# Patient Record
Sex: FEMALE | Race: ASIAN | NOT HISPANIC OR LATINO | Employment: OTHER | URBAN - METROPOLITAN AREA
[De-identification: names, ages, dates, MRNs, and addresses within clinical notes are randomized per-mention and may not be internally consistent; named-entity substitution may affect disease eponyms.]

---

## 2019-09-19 ENCOUNTER — TRANSFERRED RECORDS (OUTPATIENT)
Dept: HEALTH INFORMATION MANAGEMENT | Facility: CLINIC | Age: 59
End: 2019-09-19

## 2019-12-12 ENCOUNTER — TRANSFERRED RECORDS (OUTPATIENT)
Dept: HEALTH INFORMATION MANAGEMENT | Facility: CLINIC | Age: 59
End: 2019-12-12

## 2019-12-23 ENCOUNTER — TRANSCRIBE ORDERS (OUTPATIENT)
Dept: OTHER | Age: 59
End: 2019-12-23

## 2019-12-23 DIAGNOSIS — H40.1233 LOW-TENSION GLAUCOMA OF BOTH EYES, SEVERE STAGE: Primary | ICD-10-CM

## 2020-03-01 ENCOUNTER — NURSE TRIAGE (OUTPATIENT)
Dept: NURSING | Facility: CLINIC | Age: 60
End: 2020-03-01

## 2020-03-01 NOTE — TELEPHONE ENCOUNTER
S-(situation):  calls asking for Tamiflu.  Patient has symptoms and was exposed to someone with the flu.    B-(background): Symptoms of fatigue,bodyaches, nausea yesterday, cough, headache, nasal drainage, sore throat started yesterday.  Temp of 101 F (ax) started this morning.      A-(assessment): needs to be seen, does not meet criteria for JON protocol since she is not an established patient.  Patient is previous patient at [facility].    R-(recommendations): Reviewed care advice with caller per RN triage protocol.  FNA advised to call [facility] to request, on-care or go to  as last option.  FNA advised to call back with any concerns.   Caller verbalized understanding and agrees with plan.          Influenza-Like Illness (JON) Protocol    Willa Nettleslle      Age: 59 year old     YOB: 1960    Please select the type of triage protocol you used for this patient? Epic Filiberto and Juan Miguel or another form of electronic triage protocol was used.     Influenza-Like Illness (JON) Standing Order    Has the patient been seen by a primary care provider at an [facility] or [facility] within the past two years? No, therefore the patient is not eligible for JON Standing Order evaluation.  Recommend an in clinic visit with a provider.    Additional educational resources include:    [email or web address]    http://www.cdc.gov/flu/  Fuad Chow RN        Reason for Disposition    [1] Patient is NOT HIGH RISK AND [2] strongly requests antiviral medicine AND [3] flu symptoms present < 48 hours    Additional Information    Negative: Severe difficulty breathing (e.g., struggling for each breath, speaks in single words)    Negative: Bluish (or gray) lips or face now    Negative: Shock suspected (e.g., cold/pale/clammy skin, too weak to stand, low BP, rapid pulse)    Negative: Sounds like a life-threatening emergency to the triager    Negative: [1]  Sounds like a cold AND [2] no fever    Negative: [1] Cough with sputum AND [2] no fever    Negative: [1] Dry cough (no sputum) sputum AND [2] no fever    Negative: [1] Throat pain AND [2] no fever    Negative: Influenza vaccine reaction is suspected    Negative: Chest pain  (Exception: MILD central chest pain, present only when coughing)    Negative: [1] Headache AND [2] stiff neck (can't touch chin to chest)    Negative: Fever > 104 F (40 C)    Negative: [1] Difficulty breathing AND [2] not severe AND [3] not from stuffy nose (e.g., not relieved by cleaning out the nose)    Negative: Patient sounds very sick or weak to the triager    Negative: [1] Fever > 101 F (38.3 C) AND [2] age > 60    Negative: [1] Fever > 100.0 F (37.8 C) AND [2] bedridden (e.g., nursing home patient, CVA, chronic illness, recovering from surgery)    Negative: [1] Fever > 100.0 F (37.8 C) AND [2] diabetes mellitus or weak immune system (e.g., HIV positive, cancer chemo, splenectomy, chronic steroids)    Negative: Patient is HIGH RISK (e.g., age > 64 years, pregnant, HIV+, or chronic medical condition)    Negative: Fever present > 3 days (72 hours)    Negative: [1] Fever returns after gone for over 24 hours AND [2] symptoms worse or not improved    Negative: [1] Using nasal washes and pain medicine > 24 hours AND [2] sinus pain (around cheekbone or eye) persists    Negative: Earache    Protocols used: INFLUENZA - SEASONAL-A-

## 2020-04-07 ENCOUNTER — TELEPHONE (OUTPATIENT)
Dept: OPHTHALMOLOGY | Facility: CLINIC | Age: 60
End: 2020-04-07

## 2020-05-08 NOTE — TELEPHONE ENCOUNTER
RECORDS STATUS - BREAST    RECORDS REQUESTED FROM: [employer]   DATE REQUESTED:    NOTES DETAILS STATUS   OFFICE NOTE from referring provider     OFFICE NOTE from medical oncologist     OFFICE NOTE from surgeon     OFFICE NOTE from radiation oncologist     DISCHARGE SUMMARY from hospital     DISCHARGE REPORT from the ER     OPERATIVE REPORT     MEDICATION LIST     CLINICAL TRIAL TREATMENTS TO DATE     LABS     PATHOLOGY REPORTS  (Tissue diagnosis, Stage, ER/WY percentage positive and intensity of staining, HER2 IHC, FISH, and all biopsies from breast and any distant metastasis)                 CE - [facility] 7/31/19: Report in  CE   GENONOMIC TESTING     TYPE:   (Next Generation Sequencing, including Foundation One testing, and Oncotype score)     IMAGING (NEED IMAGES & REPORT)     CT SCANS     MRI     MAMMO PACS 7/30/19, 7/29/19: [facility], Report in CE   ULTRASOUND     PET     BONE SCAN     BRAIN MRI       Action    Action Taken 5/8/20: Imaging from [facility] resolved, and now viewable to PACS  3:24 PM

## 2020-05-08 NOTE — TELEPHONE ENCOUNTER
Oncology/Surgical Oncology Referral Request:     Specialty Requested: Surgical Oncology - Breast Surgery    Referring Provider: Self Referred    Referring Clinic/Organization: Self Referred - [facility]    Records location: Care Everywhere     Requested Provider (if specified): Not Specified

## 2020-05-11 NOTE — PROGRESS NOTES
NEW CONSULTATION  May 13, 2020     Willa Downey is a 59 year old woman who presents with a left breast lump and left breast pain.     HPI:    She had a screening mammogram in  that demonstrated right breast calcifications. Biopsy was done and demonstrated fibrocystic change.     She developed left breast discomfort this past April. Then about 1 week ago she self palpated a left breast lump on the top part of the breast. The lump is about the size of a rectangular pill, about 1/2 inch in size. She denies any skin changes, nipple inversion or nipple discharge. The breast pain is at the top of the breast and radiated to her arm pit.     BREAST-SPECIFIC HISTORY:    Previous breast imaging: Yes   - 03 Smammo BI-RADS 1  - 04 Smammo BI-RADS 1  - 06 Smammo BI-RADS 1  - 08 Smammo right breast oval density BI-RADS 0  - 08 right Dmammo BI-RADS 1  - 09 SMammo BI-RADS 1  - /10 Smammo BI-RADS 1  - // Smammo BI-RADS 1  - 12 Smammo BI-RADS 1  - 14 Smamo BI-RADS 1  - /15 Smammo BI-RADS 1  - / Smammo BI-RADS 1  - / Smammo BI-RADS 1  - 18 Smammo BI-RADS 1  - 19 Smammo right breast calcification at 9:00 BI-RADS 0  - 19 right Dmammo: calcifications 10:00 BI-RADS 4. Stereotactic biopsy right breast 10:00: fibrocystic change    Prior breast biopsies/surgeries: Yes   - 19 Stereotactic biopsy right breast 10:00: fibrocystic change    Prior history of breast cancer: No  Prior radiation history: No   Self breast exams: Yes  Breast density: heterogeneously dense    GYN HISTORY:  . Age at 1st pregnancy: 41. Breastfeeding history: Yes.   Age at menarche: [age]  Menopausal: 5  Menopausal hormone replacement therapy: No     RISK ASSESSMENT: > 1.7% 5 year risk and < 20% lifetime risk   Deborah: 3.1% lifetime risk   KATE: 15.8% lifetime risk   Seun: 9.4% lifetime risk     FAMILY HISTORY:  Breast ca: Yes   - maternal aunt diagnosed at age 60's, passed of  leukemia   Ovarian ca: No  Pancreatic ca: No  Prostate: No  Gastric ca: Yes   - father 88  Melanoma: No  Colon ca: No  Other cancer: No  Other genetic, testing, syndromes, or clotting disorders: no     PAST MEDICAL HISTORY  No past medical history on file.    PAST SURGICAL HISTORY   No past surgical history on file.    MEDICATIONS  No current outpatient medications on file.      ALLERGIES   Allergies not on file     SOCIAL HISTORY:  Smokes: No  EtOH: No  Exercise: yes, 30 minutes per day     ROS:  Change in vision no  Headaches: no  Respiratory: No shortness of breath, dyspnea on exertion, cough, or hemoptysis   Cardiovascular: negative   Gastrointestinal: negative Abdominal pain: no  Breast: left breast pain and lump  Musculoskeletal: negative Joint pain No Back pain: no  Psychiatric: negative  Hematologic/Lymphatic/Immunologic: negative  Endocrine: negative    EXAM  There were no vitals taken for this visit.   PHYSICAL EXAM  Constitutional - general appearance, body habitus  Eyes - redness, discharge  Respiratory - cough, labored breathing  Musculoskeletal - range of motion  Skin - discoloration, lesions  Neurological - tremors, headaches  Psychiatric - anxiety, alert & oriented  The rest of a comprehensive physical examination is deferred due to PHE (public health emergency) video visit restrictions.    ASSESSMENT/PLAN:    Willa Downey is a 59 year old woman with new left breast pain and left breast lump and family history of breast cancer.    1) Left breast mass with pain  - Will get a bilateral diagnostic mammogram and left breast ultrasound    2) Left breast pain - will obtain imaging   Discussed unclear etiology for breast pain and that it can be difficult to treat. We discussed its association with breast cancer is low. The following was suggested.   -  Supportive Bra that was professionally fit. Recommend Soma or Allure Intimate Apparel.    -  Wear a high-intensity sports bra when exercising. Wear while  sleeping.   -  Consider eliminating/decrease caffeine (chocolate, tea, coffee, soda) for a two week period of time to see if pain improves/resolves  -  Eat a low fat diet and improve omega 3 fatty acid intake   -  Flax seed 25 grams (2 tablespoons) daily     3) Family history of breast cancer  Continue yearly screening mammogram (consider tomosynthesis) and clinical encounter. Be familiar with your breast and how they normally feel and appear. Promptly report any changes to your healthcare provider.     Luz Berry PA-C    Total time spent face to face with the patient was 30 minutes. 20 minutes was spent counseling the patient as described above.

## 2020-05-12 ASSESSMENT — ENCOUNTER SYMPTOMS
BREAST PAIN: 1
DOUBLE VISION: 0
EYE PAIN: 1
EYE IRRITATION: 0
BREAST MASS: 1
EYE REDNESS: 0
EYE WATERING: 0

## 2020-05-13 ENCOUNTER — VIRTUAL VISIT (OUTPATIENT)
Dept: SURGERY | Facility: CLINIC | Age: 60
End: 2020-05-13
Attending: PHYSICIAN ASSISTANT
Payer: COMMERCIAL

## 2020-05-13 ENCOUNTER — PRE VISIT (OUTPATIENT)
Dept: ONCOLOGY | Facility: CLINIC | Age: 60
End: 2020-05-13

## 2020-05-13 DIAGNOSIS — N63.0 BREAST LUMP: Primary | ICD-10-CM

## 2020-05-13 PROCEDURE — 99204 OFFICE O/P NEW MOD 45 MIN: CPT | Mod: GT | Performed by: PHYSICIAN ASSISTANT

## 2020-05-13 PROCEDURE — 40000114 ZZH STATISTIC NO CHARGE CLINIC VISIT

## 2020-05-13 RX ORDER — PHENOL 1.4 %
AEROSOL, SPRAY (ML) MUCOUS MEMBRANE DAILY
COMMUNITY

## 2020-05-13 RX ORDER — CLOBETASOL PROPIONATE 0.5 MG/G
OINTMENT TOPICAL
COMMUNITY
Start: 2019-12-10 | End: 2021-06-11

## 2020-05-13 RX ORDER — LORATADINE 10 MG/1
1 CAPSULE, LIQUID FILLED ORAL
COMMUNITY

## 2020-05-13 NOTE — LETTER
2020       RE: Willa Downey  [address] [facility] [address]     Dear Colleague,    Thank you for referring your patient, Willa Downey, to the [facility]. Please see a copy of my visit note below.    NEW CONSULTATION  May 13, 2020     Willa Downey is a 59 year old woman who presents with a left breast lump and left breast pain.     HPI:    She had a screening mammogram in  that demonstrated right breast calcifications. Biopsy was done and demonstrated fibrocystic change.     She developed left breast discomfort this past April. Then about 1 week ago she self palpated a left breast lump on the top part of the breast. The lump is about the size of a rectangular pill, about 1/2 inch in size. She denies any skin changes, nipple inversion or nipple discharge. The breast pain is at the top of the breast and radiated to her arm pit.     BREAST-SPECIFIC HISTORY:    Previous breast imaging: Yes   - 03 Smammo BI-RADS 1  - 04 Smammo BI-RADS 1  - 06 Smammo BI-RADS 1  - 08 Smammo right breast oval density BI-RADS 0  - 08 right Dmammo BI-RADS 1  - 09 SMammo BI-RADS 1  - 6/16/10 Smammo BI-RADS 1  - 11 Smammo BI-RADS 1  - 12 Smammo BI-RADS 1  - 14 Smamo BI-RADS 1  - /15 Smammo BI-RADS 1  - 16 Smammo BI-RADS 1  - / Smammo BI-RADS 1  - 18 Smammo BI-RADS 1  - 19 Smammo right breast calcification at 9:00 BI-RADS 0  - 19 right Dmammo: calcifications 10:00 BI-RADS 4. Stereotactic biopsy right breast 10:00: fibrocystic change    Prior breast biopsies/surgeries: Yes   - 19 Stereotactic biopsy right breast 10:00: fibrocystic change    Prior history of breast cancer: No  Prior radiation history: No   Self breast exams: Yes  Breast density: heterogeneously dense    GYN HISTORY:  . Age at 1st pregnancy: 41. Breastfeeding history: Yes.   Age at menarche: [age]  Menopausal: 5  Menopausal hormone replacement  therapy: No     RISK ASSESSMENT: > 1.7% 5 year risk and < 20% lifetime risk   Deborah: 3.1% lifetime risk   KATE: 15.8% lifetime risk   Seun: 9.4% lifetime risk     FAMILY HISTORY:  Breast ca: Yes   - maternal aunt diagnosed at age 60's, passed of leukemia   Ovarian ca: No  Pancreatic ca: No  Prostate: No  Gastric ca: Yes   - father 88  Melanoma: No  Colon ca: No  Other cancer: No  Other genetic, testing, syndromes, or clotting disorders: no     PAST MEDICAL HISTORY  No past medical history on file.    PAST SURGICAL HISTORY   No past surgical history on file.    MEDICATIONS  No current outpatient medications on file.      ALLERGIES   Allergies not on file     SOCIAL HISTORY:  Smokes: No  EtOH: No  Exercise: yes, 30 minutes per day     ROS:  Change in vision no  Headaches: no  Respiratory: No shortness of breath, dyspnea on exertion, cough, or hemoptysis   Cardiovascular: negative   Gastrointestinal: negative Abdominal pain: no  Breast: left breast pain and lump  Musculoskeletal: negative Joint pain No Back pain: no  Psychiatric: negative  Hematologic/Lymphatic/Immunologic: negative  Endocrine: negative    EXAM  There were no vitals taken for this visit.   PHYSICAL EXAM  Constitutional - general appearance, body habitus  Eyes - redness, discharge  Respiratory - cough, labored breathing  Musculoskeletal - range of motion  Skin - discoloration, lesions  Neurological - tremors, headaches  Psychiatric - anxiety, alert & oriented  The rest of a comprehensive physical examination is deferred due to PHE (public health emergency) video visit restrictions.    ASSESSMENT/PLAN:    Willa Downey is a 59 year old woman with new left breast pain and left breast lump and family history of breast cancer.    1) Left breast mass with pain  - Will get a bilateral diagnostic mammogram and left breast ultrasound    2) Left breast pain - will obtain imaging   Discussed unclear etiology for breast pain and that it can be difficult to  "treat. We discussed its association with breast cancer is low. The following was suggested.   -  Supportive Bra that was professionally fit. Recommend Soma or Allure Intimate Apparel.    -  Wear a high-intensity sports bra when exercising. Wear while sleeping.   -  Consider eliminating/decrease caffeine (chocolate, tea, coffee, soda) for a two week period of time to see if pain improves/resolves  -  Eat a low fat diet and improve omega 3 fatty acid intake   -  Flax seed 25 grams (2 tablespoons) daily     3) Family history of breast cancer  Continue yearly screening mammogram (consider tomosynthesis) and clinical encounter. Be familiar with your breast and how they normally feel and appear. Promptly report any changes to your healthcare provider.     Luz Berry PA-C    Total time spent face to face with the patient was 30 minutes. 20 minutes was spent counseling the patient as described above.     Willa Downey is a 59 year old female who is being evaluated via a billable video visit.      The patient has been notified of following:     \"This video visit will be conducted via a call between you and your physician/provider. We have found that certain health care needs can be provided without the need for an in-person physical exam.  This service lets us provide the care you need with a video conversation.  If a prescription is necessary we can send it directly to your pharmacy.  If lab work is needed we can place an order for that and you can then stop by our lab to have the test done at a later time.    Video visits are billed at different rates depending on your insurance coverage.  Please reach out to your insurance provider with any questions.    If during the course of the call the physician/provider feels a video visit is not appropriate, you will not be charged for this service.\"    Patient has given verbal consent for Video visit? Yes    How would you like to obtain your AVS? Aneudy    Patient would " like the video invitation sent by: Send to e-mail at: [email or web address]    Will anyone else be joining your video visit? No      New pt to discuss breast lump.    Video-Visit Details    Type of service:  Video Visit    Video Start Time:10:00 am  Video End Time: 10:30 am    Originating Location (pt. Location): Home    Distant Location (provider location):  [facility]     Platform used for Video Visit: Tiburcio Berry PA-C

## 2020-05-13 NOTE — PROGRESS NOTES
"Willa Downey is a 59 year old female who is being evaluated via a billable video visit.      The patient has been notified of following:     \"This video visit will be conducted via a call between you and your physician/provider. We have found that certain health care needs can be provided without the need for an in-person physical exam.  This service lets us provide the care you need with a video conversation.  If a prescription is necessary we can send it directly to your pharmacy.  If lab work is needed we can place an order for that and you can then stop by our lab to have the test done at a later time.    Video visits are billed at different rates depending on your insurance coverage.  Please reach out to your insurance provider with any questions.    If during the course of the call the physician/provider feels a video visit is not appropriate, you will not be charged for this service.\"    Patient has given verbal consent for Video visit? Yes    How would you like to obtain your AVS? Aneudy    Patient would like the video invitation sent by: Send to e-mail at: [email or web address]    Will anyone else be joining your video visit? No      New pt to discuss breast lump.    Video-Visit Details    Type of service:  Video Visit    Video Start Time:10:00 am  Video End Time: 10:30 am    Originating Location (pt. Location): Home    Distant Location (provider location):  [facility]     Platform used for Video Visit: Tiburcio Berry PA-C        "

## 2020-05-21 ENCOUNTER — ANCILLARY PROCEDURE (OUTPATIENT)
Dept: MAMMOGRAPHY | Facility: CLINIC | Age: 60
End: 2020-05-21
Attending: PHYSICIAN ASSISTANT
Payer: COMMERCIAL

## 2020-05-21 DIAGNOSIS — N63.0 BREAST LUMP: ICD-10-CM

## 2020-07-09 ENCOUNTER — VIRTUAL VISIT (OUTPATIENT)
Dept: FAMILY MEDICINE | Facility: CLINIC | Age: 60
End: 2020-07-09
Payer: COMMERCIAL

## 2020-07-09 DIAGNOSIS — M54.41 ACUTE RIGHT-SIDED LOW BACK PAIN WITH RIGHT-SIDED SCIATICA: Primary | ICD-10-CM

## 2020-07-09 PROCEDURE — 99213 OFFICE O/P EST LOW 20 MIN: CPT | Mod: TEL | Performed by: PHYSICIAN ASSISTANT

## 2020-07-09 RX ORDER — CYCLOBENZAPRINE HCL 5 MG
5 TABLET ORAL 3 TIMES DAILY PRN
Qty: 30 TABLET | Refills: 1 | Status: SHIPPED | OUTPATIENT
Start: 2020-07-09 | End: 2020-10-14

## 2020-07-09 SDOH — HEALTH STABILITY: MENTAL HEALTH: HOW OFTEN DO YOU HAVE A DRINK CONTAINING ALCOHOL?: NEVER

## 2020-07-09 NOTE — PROGRESS NOTES
"Willa Downey is a 59 year old female who is being evaluated via a billable telephone visit.      The patient has been notified of following:     \"This telephone visit will be conducted via a call between you and your physician/provider. We have found that certain health care needs can be provided without the need for a physical exam.  This service lets us provide the care you need with a short phone conversation.  If a prescription is necessary we can send it directly to your pharmacy.  If lab work is needed we can place an order for that and you can then stop by our lab to have the test done at a later time.    Telephone visits are billed at different rates depending on your insurance coverage. During this emergency period, for some insurers they may be billed the same as an in-person visit.  Please reach out to your insurance provider with any questions.    If during the course of the call the physician/provider feels a telephone visit is not appropriate, you will not be charged for this service.\"    Patient has given verbal consent for Telephone visit?  Yes    What phone number would you like to be contacted at? [phone]    How would you like to obtain your AVS? Aneudy Talbot     Willa Downey is a 59 year old female who presents via phone visit today for the following health issues:    HPI  Back Pain       Duration: x 6 weeks        Specific cause: none    Description:   Location of pain: low back right, middle of back right, upper back right and hip right  Character of pain: sharp and stabbing  Pain radiation:radiates into the right leg  New numbness or weakness in legs, not attributed to pain:  no     Intensity: At its worst 9/10    History:   Pain interferes with job: Not applicable  History of back problems: no prior back problems  Any previous MRI or X-rays: None  Sees a specialist for back pain:  No  Therapies tried without relief: ibuprofen, stretching, exercise, ice/heat " pack    Alleviating factors:   Improved by: none      Precipitating factors:  Worsened by: Standing    6 week history of right side low back pain with sciatica down right leg.   Feels like symptoms are getting worse.   Has been trying multiple therapies to help. 30 minutes stationary bike. Going for walks. Has also been doing floor stretching since pain started. Has also tried ice, heat, ibuprofen. Taking ibuprofen multiple times per day but pain especially in evening is intense.    No injury leading up to symptoms.   No history of back problems.   Tylenol does not seem to help as much as ibuprofen.       Accompanying Signs & Symptoms:  Risk of Fracture:  None  Risk of Cauda Equina:  None  Risk of Infection:  None  Risk of Cancer:  Family history breast cancer  Risk of Ankylosing Spondylitis:  Onset at age <35, male, AND morning back stiffness. no              Review of Systems   Constitutional, HEENT, cardiovascular, pulmonary, gi and gu systems are negative, except as otherwise noted.       Objective   Reported vitals:  There were no vitals taken for this visit.   healthy, alert and no distress  PSYCH: Alert and oriented times 3; coherent speech, normal   rate and volume, able to articulate logical thoughts, able   to abstract reason, no tangential thoughts, no hallucinations   or delusions  Her affect is normal and pleasant  RESP: No cough, no audible wheezing, able to talk in full sentences  Remainder of exam unable to be completed due to telephone visits    Diagnostic Test Results:  none         Assessment/Plan:  1. Acute right-sided low back pain with right-sided sciatica  - Referred patient to orthopedic walk in clinic at the [facility]. Phone number to schedule provided (they are not currently taking walk-in appts during pandemic but are scheduling patients for face-to-face visits). She has been diligent about conservative care for this new back pain but symptoms have been worsening. Warrants physical exam and  further evaluation. Will trial course of flexeril in meantime. Patient understands not to take prior to driving or operating machinery. Will likely take at bedtime only. Advised to cut back on ibuprofen use as has been taking throughout the day x 6 weeks will recommend she try to manage pain more with tylenol. Discussed daily max of 3000 mg. Patient in agreement with this plan.   - cyclobenzaprine (FLEXERIL) 5 MG tablet; Take 1 tablet (5 mg) by mouth 3 times daily as needed for muscle spasms  Dispense: 30 tablet; Refill: 1    Return for orthopedic walk in clinic.      Phone call duration:  21 minutes    Chad Peck PA-C

## 2020-07-13 ENCOUNTER — OFFICE VISIT (OUTPATIENT)
Dept: ORTHOPEDICS | Facility: CLINIC | Age: 60
End: 2020-07-13
Payer: COMMERCIAL

## 2020-07-13 ENCOUNTER — ANCILLARY PROCEDURE (OUTPATIENT)
Dept: GENERAL RADIOLOGY | Facility: CLINIC | Age: 60
End: 2020-07-13
Attending: FAMILY MEDICINE
Payer: COMMERCIAL

## 2020-07-13 VITALS — WEIGHT: 140 LBS | HEIGHT: 64 IN | BODY MASS INDEX: 23.9 KG/M2

## 2020-07-13 DIAGNOSIS — M54.41 ACUTE RIGHT-SIDED LOW BACK PAIN WITH RIGHT-SIDED SCIATICA: Primary | ICD-10-CM

## 2020-07-13 RX ORDER — METHYLPREDNISOLONE 4 MG
TABLET, DOSE PACK ORAL
Qty: 21 TABLET | Refills: 0 | Status: SHIPPED | OUTPATIENT
Start: 2020-07-13 | End: 2020-10-14

## 2020-07-13 ASSESSMENT — MIFFLIN-ST. JEOR: SCORE: 1205.04

## 2020-07-13 NOTE — PATIENT INSTRUCTIONS
Take steroid medication in the morning with food.  Take all tablets in the morning.  Okay to use acetaminophen with steroid medication  Do not use ibuprofen while taking steroid  Continue to use Flexeril as needed for symptom relief  Follow-up with physical therapy  Follow-up in clinic if symptoms are not improved after steroid course or if they fail to improve with physical therapy.

## 2020-07-13 NOTE — PROGRESS NOTES
SPORTS & ORTHOPEDIC WALK-IN VISIT 7/13/2020    Primary Care Physician: Dr. Gross    Here with right sided low back pain and pain radiating down posterior right leg. She started to have right low back pain about 6 weeks ago. No inciting event or trauma. It is radiating pain from right low back down leg into calf. She has been doing some home exercises to try and help but she hasn't had any relief. She saw her primary and was prescribed flexeril and it doesn't seem to help. Her pain is worse at night and with sitting. No tingling or numbness. No noticeable weakness.  No significant history of low back pain.  She has been trying some stretches she found on the Internet which do seem to help slightly but only temporarily.    Reason for visit:     What part of your body is injured / painful?  right low back    What caused the injury /pain? No inciting event     How long ago did your injury occur or pain begin? 6 weeks ago    What are your most bothersome symptoms? Pain    How would you characterize your symptom?  sharp and radiating    What makes your symptoms better? Ibuprofen     What makes your symptoms worse? Standing, Walking, Movement and Bending    Have you been previously seen for this problem? Yes, FP 7/9/20    Medical History:    Any recent changes to your medical history? No    Any new medication prescribed since last visit? No    Have you had surgery on this body part before? No    Social History:    Occupation: Homemaker    Handedness: Right    Exercise: Walking and stationary bike    Review of Systems:    Do you have fever, chills, weight loss? No    Do you have any vision problems? No    Do you have any chest pain or edema? No    Do you have any shortness of breath or wheezing?  No    Do you have stomach problems? No    Do you have any numbness or focal weakness? No    Do you have diabetes? No    Do you have problems with bleeding or clotting? No    Do you have an rashes or other skin lesions? Yes,  "psoriasis          Past Medical History, Current Medications, and Allergies are reviewed in the electronic medical record as appropriate.       EXAM:Ht 1.626 m (5' 4\")   Wt 63.5 kg (140 lb)   BMI 24.03 kg/m      General: alert, pleasant, no distress. sitting comfortably in chair  Back/Spine: no tenderness to palpation of spinous processes, or paraspinous musculature of lumbar spine. full ROM with flexion, extension, twisting, and side bending with pain on extension. Straight leg raise negative bilaterally.   mild hamstring tightness noted. no pain in back with LILLIAM testing bilaterally  Neuro: SILT on bilateral lower extremities, can stand on toes and heel walk without difficulty, patellar and achilles reflexes 2+ and symmetric    Imagin view xrays of lumbar spine performed and reviewed independently demonstrating mild anterolisthesis of L4 on L5.  No acute fracture.  Otherwise no significant degenerative change.  See EMR for formal radiology report.     Assessment: Patient is a 59 year old female with right-sided low back pain and right lower extremity radiculopathy.    Recommendations:   Reviewed imaging and assessment with the patient in detail  Have recommended a short course of steroid  She can continue to use Flexeril to the degree that it helps her symptoms  She will follow-up with physical therapy  She will follow-up in clinic if her symptoms are not improved or worsening after the course of steroid.    Claus Tesfaye MD                "

## 2020-07-15 ENCOUNTER — THERAPY VISIT (OUTPATIENT)
Dept: PHYSICAL THERAPY | Facility: CLINIC | Age: 60
End: 2020-07-15
Attending: FAMILY MEDICINE
Payer: COMMERCIAL

## 2020-07-15 DIAGNOSIS — M54.41 ACUTE RIGHT-SIDED LOW BACK PAIN WITH RIGHT-SIDED SCIATICA: ICD-10-CM

## 2020-07-15 PROCEDURE — 97530 THERAPEUTIC ACTIVITIES: CPT | Mod: GP | Performed by: PHYSICAL THERAPIST

## 2020-07-15 PROCEDURE — 97161 PT EVAL LOW COMPLEX 20 MIN: CPT | Mod: GP | Performed by: PHYSICAL THERAPIST

## 2020-07-15 PROCEDURE — 97112 NEUROMUSCULAR REEDUCATION: CPT | Mod: GP | Performed by: PHYSICAL THERAPIST

## 2020-07-15 NOTE — PROGRESS NOTES
[facility] Initial Evaluation  Subjective:  The history is provided by the patient. No  was used.   Therapist Generated HPI Evaluation         Type of problem:  Lumbar (right , June 2020).    This is a new condition.  Condition occurred with:  Insidious onset.  Where condition occurred: for unknown reasons.  Site of Pain: right low back 2/10.  Pain is described as sharp, shooting and stabbing and is intermittent.  Pain radiates to:  No radiation (pain radiates to posterior distal calf). Pain timing: none.  Since onset symptoms are gradually improving.  Associated with: none. Symptoms are exacerbated by standing, walking, bending and lifting  Relieved by: oral steroid - started yesterday and feeling better.  Special tests included:  X-ray.  Past treatment: none. There was none improvement following previous treatment.  Work activity restrictions: homemaker.  Barriers include:  None as reported by patient.                        Objective:  System         Lumbar/SI Evaluation  ROM:    AROM Lumbar:   Flexion:          90%  Ext:                    WNL   Side Bend:        Left:  50%    Right:  50%  Rotation:           Left:     Right:   Side Glide:        Left:     Right:           Lumbar Myotomes:  not assessed            Lumbar DTR's:  not assessed      Cord Signs:  not assessed    Lumbar Dermtomes:  not assessed                Neural Tension/Mobility:  Lumbar:  Not assessed  Thoracic:  Not assessed                                                       XRAY: Impression:  1.  No acute osseous abnormality.  2.  Grade 1 degenerative type spondylolisthesis of L4 on L5, measuring  approximately 8 mm.    Fair abdominal tone, faulty abdominal recruitment pattern with compensatory sucking in,     General     ROS    Assessment/Plan:    Patient is a 59 year old female with lumbar complaints.    Patient has the following significant findings with corresponding treatment plan.                 1) Diagnosis 1:  Acute right-sided low back pain with right-sided sciatica   Clinical presentation characteristics are:   Stable/Uncomplicated.  2) Decision-Making    Low complexity using standardized patient assessment instrument and/or measureable assessment of functional outcome.  Cumulative Therapy Evaluation is: Low complexity.    Previous and current functional limitations:  (See Goal Flow Sheet for this information)    Short term and Long term goals: (See Goal Flow Sheet for this information)     Communication ability:  Patient appears to be able to clearly communicate and understand verbal and written communication and follow directions correctly.  Treatment Explanation - The following has been discussed with the patient:   RX ordered/plan of care  Anticipated outcomes  Possible risks and side effects  This patient would benefit from PT intervention to resume normal activities.   Rehab potential is excellent.    Frequency:  1 X week, once daily  Duration:  for 6 weeks  Discharge Plan:  Achieve all LTG.  Independent in home treatment program.  Reach maximal therapeutic benefit.    Please refer to the daily flowsheet for treatment today, total treatment time and time spent performing 1:1 timed codes.

## 2020-07-22 ENCOUNTER — THERAPY VISIT (OUTPATIENT)
Dept: PHYSICAL THERAPY | Facility: CLINIC | Age: 60
End: 2020-07-22
Payer: COMMERCIAL

## 2020-07-22 DIAGNOSIS — M54.41 ACUTE RIGHT-SIDED LOW BACK PAIN WITH RIGHT-SIDED SCIATICA: Primary | ICD-10-CM

## 2020-07-22 PROCEDURE — 97112 NEUROMUSCULAR REEDUCATION: CPT | Mod: GP | Performed by: PHYSICAL THERAPIST

## 2020-07-22 PROCEDURE — 97110 THERAPEUTIC EXERCISES: CPT | Mod: GP | Performed by: PHYSICAL THERAPIST

## 2020-07-29 ENCOUNTER — THERAPY VISIT (OUTPATIENT)
Dept: PHYSICAL THERAPY | Facility: CLINIC | Age: 60
End: 2020-07-29
Payer: COMMERCIAL

## 2020-07-29 DIAGNOSIS — M54.41 ACUTE RIGHT-SIDED LOW BACK PAIN WITH RIGHT-SIDED SCIATICA: Primary | ICD-10-CM

## 2020-07-29 PROCEDURE — 97112 NEUROMUSCULAR REEDUCATION: CPT | Mod: GP | Performed by: PHYSICAL THERAPIST

## 2020-07-29 PROCEDURE — 97140 MANUAL THERAPY 1/> REGIONS: CPT | Mod: GP | Performed by: PHYSICAL THERAPIST

## 2020-08-04 ENCOUNTER — TELEPHONE (OUTPATIENT)
Dept: ORTHOPEDICS | Facility: CLINIC | Age: 60
End: 2020-08-04

## 2020-08-04 NOTE — TELEPHONE ENCOUNTER
Appointment made for Willa for continued Sciatica issues, progressing to calf tightness. Made return appointment for Friday per her request. [facility] restrictions, masking requirements, and  updates were given.

## 2020-08-07 ENCOUNTER — OFFICE VISIT (OUTPATIENT)
Dept: ORTHOPEDICS | Facility: CLINIC | Age: 60
End: 2020-08-07
Payer: COMMERCIAL

## 2020-08-07 VITALS — HEIGHT: 64 IN | RESPIRATION RATE: 16 BRPM | BODY MASS INDEX: 23.9 KG/M2 | WEIGHT: 140 LBS

## 2020-08-07 DIAGNOSIS — M54.41 ACUTE RIGHT-SIDED LOW BACK PAIN WITH RIGHT-SIDED SCIATICA: Primary | ICD-10-CM

## 2020-08-07 RX ORDER — GABAPENTIN 300 MG/1
300 CAPSULE ORAL 2 TIMES DAILY
Qty: 30 CAPSULE | Refills: 0 | Status: SHIPPED | OUTPATIENT
Start: 2020-08-07 | End: 2020-08-16

## 2020-08-07 ASSESSMENT — MIFFLIN-ST. JEOR: SCORE: 1205.04

## 2020-08-07 NOTE — PROGRESS NOTES
"ESTABLISHED PATIENT FOLLOW-UP:  RECHECK (back pain )       HISTORY OF PRESENT ILLNESS  Ms. Downey is a pleasant 59 year old year old female who presents to clinic today for follow-up of low back pain with radiculopathy.    Date of onset: Mid May 2020  Date last seen: 7/13/20  Following Therapeutic Plan: Yes  Pain: Unchanged  Function: Unchanged  Interval History: Had improvement with steroid dose for first week.  However after cessation, pain has slowly worsened. Continued radicular symptoms down lateral right leg.  Left calf now starting.     Unable to perform PT exercises.     Continued pain with prolonged standing, walking.  Improved with rest.     Using cyclobenzaprine helps at night only. Too drowsy with daytime dosing.    S/p ibuprofen x 6 weeks followed by most recently tylenol.     No weakness    Additional medical/Social/Surgical histories reviewed in [facility] and updated as appropriate.    REVIEW OF SYSTEMS (8/7/2020)  CONSTITUTIONAL: Denies fever and weight loss  GASTROINTESTINAL: Denies abdominal pain, nausea, vomiting  MUSCULOSKELETAL: See HPI  SKIN: Denies any recent rash or lesion  NEUROLOGICAL: Denies numbness or focal weakness     PHYSICAL EXAM  Resp 16   Ht 1.626 m (5' 4\")   Wt 63.5 kg (140 lb)   BMI 24.03 kg/m      General: alert, pleasant, no distress. sitting comfortably in chair  Back/Spine: no tenderness to palpation of spinous processes, or paraspinous musculature of lumbar spine. full ROM with flexion, extension, twisting, and side bending with pain on extension. Straight leg raise negative bilaterally.   mild hamstring tightness noted. no pain in back with LILLIAM testing bilaterally  Neuro: SILT on bilateral lower extremities, can stand on toes and heel walk without difficulty, patellar and achilles reflexes 2+ and symmetric    IMAGING :   Impression:  1.  No acute osseous abnormality.  2.  Grade 1 degenerative type spondylolisthesis of L4 on L5, measuring  approximately 8 mm.     I have " personally reviewed the examination and initial interpretation  and I agree with the findings.     PABLO TOBIN     ASSESSMENT & PLAN  Ms. Downey is a 59 year old year old female who presents to clinic today with persisting right sided low back pain with right sided sciatica despite analgesics, PT, muscle relaxants, and steroid burst.    -MRI lumbar spine.  -Start gabapentin BID at this time, transition to TID  -Heat, analgesics OTC  -Flexeril at bedtime sleep  -Follow up after MRI    It was a pleasure seeing Willa.    Dimitri Zhou DO, CAQSM  Primary Care Sports Medicine

## 2020-08-07 NOTE — PROGRESS NOTES
SPORTS & ORTHOPEDIC WALK-IN FOLLOW-UP VISIT 8/7/2020    Interval History:     Follow up reason: right sided low back pain    Date of injury: NA    Date last seen: 7/13/20     Following Therapeutic Plan: Yes     Pain: Unchanged    Function: Unchanged    Interval History:      Medical History:    Any recent changes to your medical history? No    Any new medication prescribed since last visit? No    Review of Systems:    Do you have fever, chills, weight loss? No    Do you have any vision problems? No    Do you have any chest pain or edema? No    Do you have any shortness of breath or wheezing?  No    Do you have stomach problems? No    Do you have any numbness or focal weakness? No    Do you have diabetes? No    Do you have problems with bleeding or clotting? No    Do you have an rashes or other skin lesions? Psoriasis fingers

## 2020-08-07 NOTE — LETTER
8/7/2020         RE: Willa Downey  [address]  [facility] [address]        Dear Colleague,    Thank you for referring your patient, Willa Downey, to the [facility] SPORTS AND ORTHOPAEDIC WALK IN CLINIC. Please see a copy of my visit note below.          SPORTS & ORTHOPEDIC WALK-IN FOLLOW-UP VISIT 8/7/2020    Interval History:     Follow up reason: right sided low back pain    Date of injury: NA    Date last seen: 7/13/20     Following Therapeutic Plan: Yes     Pain: Unchanged    Function: Unchanged    Interval History:      Medical History:    Any recent changes to your medical history? No    Any new medication prescribed since last visit? No    Review of Systems:    Do you have fever, chills, weight loss? No    Do you have any vision problems? No    Do you have any chest pain or edema? No    Do you have any shortness of breath or wheezing?  No    Do you have stomach problems? No    Do you have any numbness or focal weakness? No    Do you have diabetes? No    Do you have problems with bleeding or clotting? No    Do you have an rashes or other skin lesions? Psoriasis fingers              ESTABLISHED PATIENT FOLLOW-UP:  RECHECK (back pain )       HISTORY OF PRESENT ILLNESS  Ms. Downey is a pleasant 59 year old year old female who presents to clinic today for follow-up of low back pain with radiculopathy.    Date of onset: Mid May 2020  Date last seen: 7/13/20  Following Therapeutic Plan: Yes  Pain: Unchanged  Function: Unchanged  Interval History: Had improvement with steroid dose for first week.  However after cessation, pain has slowly worsened. Continued radicular symptoms down lateral right leg.  Left calf now starting.     Unable to perform PT exercises.     Continued pain with prolonged standing, walking.  Improved with rest.     Using cyclobenzaprine helps at night only. Too drowsy with daytime dosing.    S/p ibuprofen x 6 weeks followed by most recently tylenol.     No  "weakness    Additional medical/Social/Surgical histories reviewed in [facility] and updated as appropriate.    REVIEW OF SYSTEMS (8/7/2020)  CONSTITUTIONAL: Denies fever and weight loss  GASTROINTESTINAL: Denies abdominal pain, nausea, vomiting  MUSCULOSKELETAL: See HPI  SKIN: Denies any recent rash or lesion  NEUROLOGICAL: Denies numbness or focal weakness     PHYSICAL EXAM  Resp 16   Ht 1.626 m (5' 4\")   Wt 63.5 kg (140 lb)   BMI 24.03 kg/m      General: alert, pleasant, no distress. sitting comfortably in chair  Back/Spine: no tenderness to palpation of spinous processes, or paraspinous musculature of lumbar spine. full ROM with flexion, extension, twisting, and side bending with pain on extension. Straight leg raise negative bilaterally.   mild hamstring tightness noted. no pain in back with LILLIAM testing bilaterally  Neuro: SILT on bilateral lower extremities, can stand on toes and heel walk without difficulty, patellar and achilles reflexes 2+ and symmetric    IMAGING :   Impression:  1.  No acute osseous abnormality.  2.  Grade 1 degenerative type spondylolisthesis of L4 on L5, measuring  approximately 8 mm.     I have personally reviewed the examination and initial interpretation  and I agree with the findings.     PABLO TOBIN     ASSESSMENT & PLAN  Ms. Downey is a 59 year old year old female who presents to clinic today with persisting right sided low back pain with right sided sciatica despite analgesics, PT, muscle relaxants, and steroid burst.    -MRI lumbar spine.  -Start gabapentin BID at this time, transition to TID  -Heat, analgesics OTC  -Flexeril at bedtime sleep  -Follow up after MRI    It was a pleasure seeing Willa.    Dimitri Zhou DO, CAM  Primary Care Sports Medicine        "

## 2020-08-12 ENCOUNTER — TELEPHONE (OUTPATIENT)
Dept: OPHTHALMOLOGY | Facility: CLINIC | Age: 60
End: 2020-08-12

## 2020-08-12 NOTE — TELEPHONE ENCOUNTER
Called the patient and left a voicemail informing them that the appointment they had scheduled with Dr. Yeager needs to be rescheduled due to the provider leaving the clinic. Voicemail was left and letter was sent.    Patient is active on MyChart. Message was sent and appointment was canceled.    Giselle Iglesias  Patient Rep

## 2020-08-12 NOTE — TELEPHONE ENCOUNTER
Dr. Silva referred pt to Dr. Yeager who has now left  [facility]    Reviewed may contact Dr. Yeager's new location    -- pt states changed insurance to see Dr. Yeager.    Pt will check on insurance coverage with Dr. Yeager at new location    -- if insurance does not cover would recommend f/u with Dr. Roly Deluca here for glaucoma care.    Direct number provided for scheduling here     Pt seemed comfortable with information/plan    Omid Brooks RN 2:50 PM 08/12/20        [facility] Call Center    Phone Message    May a detailed message be left on voicemail: yes     Reason for Call: Other: Pt would like a call back to discuss her eye care needs  , she feels she needs to see Glaucoma Specialist but would like to discuss with Care team for advise  Please call Pt to discuss  Thank you,      Action Taken: Message routed to:  [facility]    Travel Screening: Not Applicable

## 2020-08-14 ENCOUNTER — ANCILLARY PROCEDURE (OUTPATIENT)
Dept: MRI IMAGING | Facility: CLINIC | Age: 60
End: 2020-08-14
Attending: FAMILY MEDICINE
Payer: COMMERCIAL

## 2020-08-14 DIAGNOSIS — M54.41 ACUTE RIGHT-SIDED LOW BACK PAIN WITH RIGHT-SIDED SCIATICA: ICD-10-CM

## 2020-08-16 ENCOUNTER — VIRTUAL VISIT (OUTPATIENT)
Dept: ORTHOPEDICS | Facility: CLINIC | Age: 60
End: 2020-08-16
Payer: COMMERCIAL

## 2020-08-16 DIAGNOSIS — M51.26 DISPLACEMENT OF LUMBAR INTERVERTEBRAL DISC WITHOUT MYELOPATHY: ICD-10-CM

## 2020-08-16 DIAGNOSIS — M54.41 ACUTE RIGHT-SIDED LOW BACK PAIN WITH RIGHT-SIDED SCIATICA: Primary | ICD-10-CM

## 2020-08-16 RX ORDER — GABAPENTIN 300 MG/1
300 CAPSULE ORAL 2 TIMES DAILY
Qty: 90 CAPSULE | Refills: 0 | Status: SHIPPED | OUTPATIENT
Start: 2020-08-16 | End: 2020-08-18

## 2020-08-16 NOTE — PROGRESS NOTES
"Willa Downey is a 59 year old female who is being evaluated via a billable video visit.      The patient has been notified of following:     \"This video visit will be conducted via a call between you and your physician/provider. We have found that certain health care needs can be provided without the need for an in-person physical exam.  This service lets us provide the care you need with a video conversation.  If a prescription is necessary we can send it directly to your pharmacy.  If lab work is needed we can place an order for that and you can then stop by our lab to have the test done at a later time.    Video visits are billed at different rates depending on your insurance coverage.  Please reach out to your insurance provider with any questions.    If during the course of the call the physician/provider feels a video visit is not appropriate, you will not be charged for this service.\"    Patient has given verbal consent for Video visit? Yes  How would you like to obtain your AVS? MyChart  If you are dropped from the video visit, the video invite should be resent to: Send to e-mail at: [email or web address]  Will anyone else be joining your video visit? No      ESTABLISHED PATIENT FOLLOW-UP:  Follow Up (MRI Lumbar f/u)       HISTORY OF PRESENT ILLNESS  Ms. Downey is a pleasant 59 year old year old female who presents virtually for follow up:    Date of onset: May 2020  Date last seen: 7/13/20  Following Therapeutic Plan: Yes  Pain: Improved  Function: Unchanged  Interval History:  Willa notes that overall quality and intensity of pain have improved with gabapentin.  Tolerating well taking twice daily.  Feels worse after gabapentin effects wane toward afternoon. Feels sleeping has also improved.  Still having same pattern of pain of lumbar and right lower extremity. Does note left calf tenderness and very light sensation of tingling at plantar aspect of left foot.  No weakness of feet or lower extremities " present.    Additional medical/Social/Surgical histories reviewed in [facility] and updated as appropriate.    REVIEW OF SYSTEMS (8/16/2020)  CONSTITUTIONAL: Denies fever and weight loss  GASTROINTESTINAL: Denies abdominal pain, nausea, vomiting  MUSCULOSKELETAL: See HPI  SKIN: Denies any recent rash or lesion  NEUROLOGICAL: Denies numbness or focal weakness     PHYSICAL EXAM  There were no vitals taken for this visit.    General Appearance: Well appearing, alert, in no acute distress, well-hydrated, and well nourished  Skin: No rashes, lesions, or ecchymosis present  Respiratory: no respiratory distress, no audible wheezing, no labored breathing, symmetric thoracic excursion  Psychiatric: mood and affect are appropriate, patient is oriented to time, place and person    IMAGING:  MRI LUMBAR SPINE W/O CONTRAST  Impression:   1. Spondylosis, particularly at L4-5 where there is severe spinal  canal stenosis and moderate bilateral neural foraminal stenosis.  2. Active inflammatory arthropathy of the bilateral L4-5 facet joints.     VANDANA HUBBARD MD     ASSESSMENT & PLAN    Diagnosis:  Lumbar stenosis   Lumbar radiculopathy without myelopathy    Ms. Downey is a 59 year old year old female who presents to clinic today for follow-up of lumbar MRI and discuss ongoing lumbar radiculopathy now affecting bilateral lower extremities R>>L.  MRI revealing severe spinal stenosis without evidence for myelomalacia.  No red flag symptoms or weakness on exam today. Because of the absence of these symptoms we reviewed options and she would like to proceed with epidural steroid injection.  She has had extensive physical therapy and I do think that she may benefit from returning 1-2 weeks after her injection for a few sessions.  Continue gabapentin and increase to TID as she is tolerating well and having favorable improvement with medication.  Red flag symptoms warrant immediate follow up. Otherwise 3 weeks after EMILY.    It was a  rosie Crouch.    Dimitri Zhou DO, CAQSM  Primary Care Sports Medicine      Video-Visit Details    Type of service:  Video Visit    Video Start Time: 0942  Video End Time: 0958    Originating Location (pt. Location): Home    Distant Location (provider location):  [facility] SPORTS AND ORTHOPAEDIC WALK IN CLINIC     Platform used for Video Visit: [email or web address]

## 2020-08-16 NOTE — LETTER
"    8/16/2020         RE: Willa Downey  [address]  [facility] [address]        Dear Colleague,    Thank you for referring your patient, Willa Downey, to the [facility]. Please see a copy of my visit note below.    Willa Downey is a 59 year old female who is being evaluated via a billable video visit.      The patient has been notified of following:     \"This video visit will be conducted via a call between you and your physician/provider. We have found that certain health care needs can be provided without the need for an in-person physical exam.  This service lets us provide the care you need with a video conversation.  If a prescription is necessary we can send it directly to your pharmacy.  If lab work is needed we can place an order for that and you can then stop by our lab to have the test done at a later time.    Video visits are billed at different rates depending on your insurance coverage.  Please reach out to your insurance provider with any questions.    If during the course of the call the physician/provider feels a video visit is not appropriate, you will not be charged for this service.\"    Patient has given verbal consent for Video visit? Yes  How would you like to obtain your AVS? MyChart  If you are dropped from the video visit, the video invite should be resent to: Send to e-mail at: [email or web address]  Will anyone else be joining your video visit? No      ESTABLISHED PATIENT FOLLOW-UP:  Follow Up (MRI Lumbar f/u)       HISTORY OF PRESENT ILLNESS  Ms. Downey is a pleasant 59 year old year old female who presents virtually for follow up:    Date of onset: May 2020  Date last seen: 7/13/20  Following Therapeutic Plan: Yes  Pain: Improved  Function: Unchanged  Interval History:  Willa notes that overall quality and intensity of pain have improved with gabapentin.  Tolerating well taking twice daily.  Feels worse after gabapentin effects wane " toward afternoon. Feels sleeping has also improved.  Still having same pattern of pain of lumbar and right lower extremity. Does note left calf tenderness and very light sensation of tingling at plantar aspect of left foot.  No weakness of feet or lower extremities present.    Additional medical/Social/Surgical histories reviewed in EPIC and updated as appropriate.    REVIEW OF SYSTEMS (8/16/2020)  CONSTITUTIONAL: Denies fever and weight loss  GASTROINTESTINAL: Denies abdominal pain, nausea, vomiting  MUSCULOSKELETAL: See HPI  SKIN: Denies any recent rash or lesion  NEUROLOGICAL: Denies numbness or focal weakness     PHYSICAL EXAM  There were no vitals taken for this visit.    General Appearance: Well appearing, alert, in no acute distress, well-hydrated, and well nourished  Skin: No rashes, lesions, or ecchymosis present  Respiratory: no respiratory distress, no audible wheezing, no labored breathing, symmetric thoracic excursion  Psychiatric: mood and affect are appropriate, patient is oriented to time, place and person    IMAGING:  MRI LUMBAR SPINE W/O CONTRAST  Impression:   1. Spondylosis, particularly at L4-5 where there is severe spinal  canal stenosis and moderate bilateral neural foraminal stenosis.  2. Active inflammatory arthropathy of the bilateral L4-5 facet joints.     VANDANA HUBBARD MD     ASSESSMENT & PLAN    Diagnosis:  Lumbar stenosis   Lumbar radiculopathy without myelopathy    Ms. Downey is a 59 year old year old female who presents to clinic today for follow-up of lumbar MRI and discuss ongoing lumbar radiculopathy now affecting bilateral lower extremities R>>L.  MRI revealing severe spinal stenosis without evidence for myelomalacia.  No red flag symptoms or weakness on exam today. Because of the absence of these symptoms we reviewed options and she would like to proceed with epidural steroid injection.  She has had extensive physical therapy and I do think that she may benefit from  returning 1-2 weeks after her injection for a few sessions.  Continue gabapentin and increase to TID as she is tolerating well and having favorable improvement with medication.  Red flag symptoms warrant immediate follow up. Otherwise 3 weeks after EMILY.    It was a pleasure seeing Willa.    Dimitri Zhou DO, IRAJ  Primary Care Sports Medicine      Video-Visit Details    Type of service:  Video Visit    Video Start Time: 0942  Video End Time: 0958    Originating Location (pt. Location): Home    Distant Location (provider location):  [facility] CLIN[facility]     Platform used for Video Visit: Cast[employer]

## 2020-08-17 DIAGNOSIS — Z11.59 ENCOUNTER FOR SCREENING FOR OTHER VIRAL DISEASES: Primary | ICD-10-CM

## 2020-08-18 DIAGNOSIS — M54.41 ACUTE RIGHT-SIDED LOW BACK PAIN WITH RIGHT-SIDED SCIATICA: ICD-10-CM

## 2020-08-18 RX ORDER — GABAPENTIN 300 MG/1
300 CAPSULE ORAL 3 TIMES DAILY
Qty: 90 CAPSULE | Refills: 0 | Status: SHIPPED | OUTPATIENT
Start: 2020-08-18 | End: 2020-09-15

## 2020-09-08 ENCOUNTER — VIRTUAL VISIT (OUTPATIENT)
Dept: ORTHOPEDICS | Facility: CLINIC | Age: 60
End: 2020-09-08
Payer: COMMERCIAL

## 2020-09-08 DIAGNOSIS — M54.41 ACUTE RIGHT-SIDED LOW BACK PAIN WITH RIGHT-SIDED SCIATICA: Primary | ICD-10-CM

## 2020-09-08 NOTE — LETTER
"    9/8/2020         RE: Wlila Downey  [address]  [facility] [address]        Dear Colleague,    Thank you for referring your patient, Willa Downey, to the [facility] CLINIC. Please see a copy of my visit note below.    Video Visit Technology for this patient: [employer] Video Visit- Patient was left in waiting room      Willa Downey is a 60 year old female who is being evaluated via a billable video visit.      The patient has been notified of following:     \"This video visit will be conducted via a call between you and your physician/provider. We have found that certain health care needs can be provided without the need for an in-person physical exam.  This service lets us provide the care you need with a video conversation.  If a prescription is necessary we can send it directly to your pharmacy.  If lab work is needed we can place an order for that and you can then stop by our lab to have the test done at a later time.    Video visits are billed at different rates depending on your insurance coverage.  Please reach out to your insurance provider with any questions.    If during the course of the call the physician/provider feels a video visit is not appropriate, you will not be charged for this service.\"    Patient has given verbal consent for Video visit? Yes  How would you like to obtain your AVS? MyChart  If you are dropped from the video visit, the video invite should be resent to: Send to e-mail at: [email or web address]  Will anyone else be joining your video visit? No      ESTABLISHED PATIENT FOLLOW-UP:  Follow Up (She has gotten a lot of good relief from the injection and she has continued to take gabapentin. )       HISTORY OF PRESENT ILLNESS  Ms. Downey is a pleasant 60 year old year old female who presents to clinic today for follow-up after lumbar EMILY.    Date of injury: May 2020  Date last seen: Virtual 8/16/20  Following Therapeutic Plan: Yes, EMILY " "completed  Pain: Improved  Function: Improved  Interval History: Willa very happy with progress to date. States she has improved pain now \"nearly gone\" to 2/10 at worst.  Has not returned to walking program due to hesitancy.  Occasional \"fullness\" of legs and back when gabapentin wears off. Has been continuing gabapentin due to this.No other issues. No weakness. Did not go back to PT as she is not completely satisfied with her PT course to date.  Has HEP though.     Additional medical/Social/Surgical histories reviewed in [facility] and updated as appropriate.    REVIEW OF SYSTEMS (9/8/2020)  CONSTITUTIONAL: Denies fever and weight loss  GASTROINTESTINAL: Denies abdominal pain, nausea, vomiting  MUSCULOSKELETAL: See HPI  SKIN: Denies any recent rash or lesion  NEUROLOGICAL: Denies numbness or focal weakness     PHYSICAL EXAM  There were no vitals taken for this visit.    General Appearance: Well appearing, alert, in no acute distress, well-hydrated, and well nourished  Cardiovascular: no signs of upper extremity edema  Respiratory: no respiratory distress, no audible wheezing, no labored breathing, symmetric thoracic excursion  Psychiatric: mood and affect are appropriate, patient is oriented to time, place and person    ASSESSMENT & PLAN  Ms. Downey is a 60 year old year old female who presents to clinic today with Follow Up (She has gotten a lot of good relief from the injection and she has continued to take gabapentin. )    Doing well s/p EMILY.      -Continue gabapentin for now, review medication with ophthalmologist  -Start HEP from PT to begin this week  -Gradually increase activity  -Mychart in 4 weeks on progress, consider discontinuing gabapentin if going well  -Follow up PRN; if worsening in next 3 months, discussed considering referral to spine surgery.    It was a pleasure seeing Willa.    Dimitri Zhou DO, CAQSM  Primary Care Sports Medicine    Video-Visit Details    Type of service:  Video Visit    Video " Start Time: 0855  Video End Time: 0910    Originating Location (pt. Location): Home    Distant Location (provider location):  [facility]TH[facility] CLINIC     Platform used for Video Visit: Tiburcio

## 2020-09-08 NOTE — PROGRESS NOTES
"Video Visit Technology for this patient: [facility] Video Visit- Patient was left in waiting room      Willa Downey is a 60 year old female who is being evaluated via a billable video visit.      The patient has been notified of following:     \"This video visit will be conducted via a call between you and your physician/provider. We have found that certain health care needs can be provided without the need for an in-person physical exam.  This service lets us provide the care you need with a video conversation.  If a prescription is necessary we can send it directly to your pharmacy.  If lab work is needed we can place an order for that and you can then stop by our lab to have the test done at a later time.    Video visits are billed at different rates depending on your insurance coverage.  Please reach out to your insurance provider with any questions.    If during the course of the call the physician/provider feels a video visit is not appropriate, you will not be charged for this service.\"    Patient has given verbal consent for Video visit? Yes  How would you like to obtain your AVS? MyChart  If you are dropped from the video visit, the video invite should be resent to: Send to e-mail at: [email or web address]  Will anyone else be joining your video visit? No      ESTABLISHED PATIENT FOLLOW-UP:  Follow Up (She has gotten a lot of good relief from the injection and she has continued to take gabapentin. )       HISTORY OF PRESENT ILLNESS  Ms. Downey is a pleasant 60 year old year old female who presents to clinic today for follow-up after lumbar EMILY.    Date of injury: May 2020  Date last seen: Virtual 8/16/20  Following Therapeutic Plan: Yes, EMILY completed  Pain: Improved  Function: Improved  Interval History: Willa very happy with progress to date. States she has improved pain now \"nearly gone\" to 2/10 at worst.  Has not returned to walking program due to hesitancy.  Occasional \"fullness\" of legs and back when " gabapentin wears off. Has been continuing gabapentin due to this.No other issues. No weakness. Did not go back to PT as she is not completely satisfied with her PT course to date.  Has HEP though.     Additional medical/Social/Surgical histories reviewed in [facility] and updated as appropriate.    REVIEW OF SYSTEMS (9/8/2020)  CONSTITUTIONAL: Denies fever and weight loss  GASTROINTESTINAL: Denies abdominal pain, nausea, vomiting  MUSCULOSKELETAL: See HPI  SKIN: Denies any recent rash or lesion  NEUROLOGICAL: Denies numbness or focal weakness     PHYSICAL EXAM  There were no vitals taken for this visit.    General Appearance: Well appearing, alert, in no acute distress, well-hydrated, and well nourished  Cardiovascular: no signs of upper extremity edema  Respiratory: no respiratory distress, no audible wheezing, no labored breathing, symmetric thoracic excursion  Psychiatric: mood and affect are appropriate, patient is oriented to time, place and person    ASSESSMENT & PLAN  Ms. Downey is a 60 year old year old female who presents to clinic today with Follow Up (She has gotten a lot of good relief from the injection and she has continued to take gabapentin. )    Doing well s/p EMILY.      -Continue gabapentin for now, review medication with ophthalmologist  -Start HEP from PT to begin this week  -Gradually increase activity  -Mychart in 4 weeks on progress, consider discontinuing gabapentin if going well  -Follow up PRN; if worsening in next 3 months, discussed considering referral to spine surgery.    It was a pleasure seeing Willa.    Dimitri Zhou DO, CAM  Primary Care Sports Medicine    Video-Visit Details    Type of service:  Video Visit    Video Start Time: 0855  Video End Time: 0910    Originating Location (pt. Location): Home    Distant Location (provider location):  [facility]THOPA[facility] CLINIC     Platform used for Video Visit: [employer]il[employer]

## 2020-09-15 ENCOUNTER — MYC REFILL (OUTPATIENT)
Dept: ORTHOPEDICS | Facility: CLINIC | Age: 60
End: 2020-09-15

## 2020-09-15 DIAGNOSIS — M54.41 ACUTE RIGHT-SIDED LOW BACK PAIN WITH RIGHT-SIDED SCIATICA: ICD-10-CM

## 2020-09-15 RX ORDER — GABAPENTIN 300 MG/1
300 CAPSULE ORAL 3 TIMES DAILY
Qty: 90 CAPSULE | Refills: 0 | Status: SHIPPED | OUTPATIENT
Start: 2020-09-15 | End: 2020-10-14

## 2020-09-16 ENCOUNTER — MYC MEDICAL ADVICE (OUTPATIENT)
Dept: ORTHOPEDICS | Facility: CLINIC | Age: 60
End: 2020-09-16

## 2020-09-16 DIAGNOSIS — M54.41 ACUTE RIGHT-SIDED LOW BACK PAIN WITH RIGHT-SIDED SCIATICA: Primary | ICD-10-CM

## 2020-09-17 RX ORDER — GABAPENTIN 300 MG/1
300 CAPSULE ORAL 3 TIMES DAILY
Qty: 90 CAPSULE | Refills: 0 | Status: SHIPPED | OUTPATIENT
Start: 2020-09-17 | End: 2020-10-18

## 2020-10-12 ENCOUNTER — MEDICAL CORRESPONDENCE (OUTPATIENT)
Dept: HEALTH INFORMATION MANAGEMENT | Facility: CLINIC | Age: 60
End: 2020-10-12

## 2020-10-12 ENCOUNTER — TRANSFERRED RECORDS (OUTPATIENT)
Dept: HEALTH INFORMATION MANAGEMENT | Facility: CLINIC | Age: 60
End: 2020-10-12

## 2020-10-15 ENCOUNTER — OFFICE VISIT (OUTPATIENT)
Dept: FAMILY MEDICINE | Facility: CLINIC | Age: 60
End: 2020-10-15
Payer: COMMERCIAL

## 2020-10-15 VITALS
OXYGEN SATURATION: 96 % | BODY MASS INDEX: 24.46 KG/M2 | HEART RATE: 83 BPM | WEIGHT: 146.8 LBS | HEIGHT: 65 IN | RESPIRATION RATE: 13 BRPM | SYSTOLIC BLOOD PRESSURE: 134 MMHG | DIASTOLIC BLOOD PRESSURE: 81 MMHG | TEMPERATURE: 98 F

## 2020-10-15 DIAGNOSIS — L40.50 PSORIATIC ARTHROPATHY (H): ICD-10-CM

## 2020-10-15 DIAGNOSIS — L40.9 PSORIASIS OF NAIL: ICD-10-CM

## 2020-10-15 DIAGNOSIS — M79.89 SWELLING OF LEFT MIDDLE FINGER: Primary | ICD-10-CM

## 2020-10-15 DIAGNOSIS — M18.0 OSTEOARTHRITIS OF CARPOMETACARPAL (CMC) JOINTS OF BOTH THUMBS, UNSPECIFIED OSTEOARTHRITIS TYPE: ICD-10-CM

## 2020-10-15 PROBLEM — H25.13 NUCLEAR SENILE CATARACT OF BOTH EYES: Status: ACTIVE | Noted: 2018-08-16

## 2020-10-15 PROBLEM — M19.90 OSTEOARTHRITIS: Status: ACTIVE | Noted: 2020-10-15

## 2020-10-15 PROCEDURE — 99214 OFFICE O/P EST MOD 30 MIN: CPT | Performed by: FAMILY MEDICINE

## 2020-10-15 RX ORDER — MOMETASONE FUROATE 1 MG/G
OINTMENT TOPICAL DAILY
COMMUNITY
End: 2021-07-13

## 2020-10-15 ASSESSMENT — MIFFLIN-ST. JEOR: SCORE: 1246.76

## 2020-10-15 NOTE — Clinical Note
Chart reviewed. Recommend she go ahead schedule the mri ordered by outside rhuematologist while she awaits hand surgeon consult. Also recommend she see rheum at the [facility]. & get us records of derm & rheum seen recently outside [facility]. If she can also clarify when she last took methotrexate given in the past for psoriatic arthritis & psoriasis of the nail? thxs

## 2020-10-15 NOTE — PATIENT INSTRUCTIONS
See hand specialist  Go to the ER if has fever  Return in 2 weeks for a physical and follow up from today   Will try to review prior records

## 2020-10-15 NOTE — PROGRESS NOTES
Subjective     Willa Downey is a 60 year old female who presents to clinic today for the following health issues:    HPI         Musculoskeletal problem/pain  Onset/Duration: 1 month   Description  Location: fingers - left  Joint Swelling: YES  Redness: YES  Pain: YES  Warmth: YES  Intensity:  moderate  Progression of Symptoms:  same  Accompanying signs and symptoms:   Fevers: no  Numbness/tingling/weakness: no  History  Trauma to the area: no  Recent illness:  no  Previous similar problem:  Yes- on right finger  Previous evaluation:  YES - derm and rheumatoid  Precipitating or alleviating factors:  Aggravating factors include:  Hard to bend and painful with touch.  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,  with two children youngest is 14, in [place] since 1989, parents disceased, older sister also in the , Hx of OA back and thumb on gabapentin tid for low back pain, hx of migraines, gum disease, glaucoma s/p laser in 2009 & surgery 2012 & 2016, on eye drops, , HLD, insomnia , seasonal allergies, dust mites, on loratadine prn, allergic to codeine, morphine, latex & adhesive tape, hx of prior C section in 2001 secondary to a fibroid blocking the birth canal, went into [facility] after the surgery and uterine artery embolization done and adopted daughter in 2006, prior cataract surgery 2015 & 2018,     Seen for chest pain in 2012 and EKG and CXR and exam was normal. Seen by cardiology and stress echo test done was normal. Under care of eye. Seen by dentist , for URI in 2014, seen for a physical in 2014. In 2016 seen for ETD after a flight. Seen by audiology ( normal test) ,  & ENT, in 2017 seen for acute back pain and multiple small hand joint pain DIP & PIP advised PT and NSAIDS.     In 5/2018 noted nail changes of right ring finger and right thumb. Felt nail was  from nail bed. Nail clippings sent for fungal culture. Seen 5/9/2018 and gyn  was normal and pap done.     Seen 7/2018 by rheumatology at [facility] for a one year history of intermittent bilateral first CMC joint tenderness consistent with osteoarthritis, who presented with right fourth fingernail changes beginning March 2018 with onycholysis and dystrophic changes, fungal culture negative, mild onycholysis in the right thumbnail, and acute swelling, redness and tenderness of the right fourth DIP joint since May. This had been associated with some swelling and tenderness in her finger pad with tender blister-type sores. Dermatology gave her triamcinolone which caused burning of the skin. Her primary physician gave her naproxen which caused daytime lethargy but gave her some partial relief. Exam revealed nail changes and right fourth DIP joint swelling, most consistent with psoriatic arthritis. Laboratories to include CBC with differential, basic metabolic panel, liver panel, ESR, CRP, TSH, RF, CCP and hepatitis C antibody (on two occasions she has positive hepatitis B core antibody and surface antibody suggestive of prior infection which she cleared). Was given desonide for perioral dermatitis. testing was done and X-ray of hands showed soft tissue swelling at the ring finger DIP joints. No bony changes. X-ray of feet were normal. RF and CCP negative. Normal ESR and CRP. She was started on methotrexate four tablets weekly, tapered up to 6 tablets weekly. &  received prednisone 30 mg, 20 mg, 10 mg, 5 mg each ×5 days, then off.  Seen oct 2018 noted improved and methotrexate increase dto manage symptoms and felt had OA of left knee and referred to derm for fungal scrapings of toenail concerns.     Seen by dermatology at [facility] for nail changes right fourth fingernail suspected to be psoriasis & had tac injection to the nail bed, , Pincer nail deformity b/l 5th toes & epidermal inclusion cyst on neck.     Seen by rheum at [facility] on 2/222019 & noted improved and continued  on  eight tablets weekly.   Seen by derm at [facility] 3/13/19 for psoriatic nail disease and continue don clobetasol ointment bid  prn and TAC injection done of nail bed.   Seen by derm 6/2019 and declined soriatane or humira or kenalog injections and was to continue clobetasol ointment twice a day.  Had PVD left eye 8/2019.    Seen by rheum 9/13/19 & stable & continued on methotrexate, refilled  weekly. & advised thumb splints & OTC meds for b/l OA 1st CMC & meds refilled in 12/2019.  methotrexate     Next seen at [place] with insurance changes  Seen by Chad Peck 7/9/20 for acute back pain , referred to ortho and given flexeril. Seen by ortho 7/13 & given steroids and advised PT & to use flexeril sparingly. Seen by PT 7/15, then by back doctor and xray was unremarkable. On 8/16 due to persistent lumbar radiculopathy MRI done showed lumbar stenosis. Had an epidural 9/8 & noted pain improved and continued on gabapentin and home care exercise program & advised to increase activity and follow up as needed.   [place]  shows getting gabapentin 300 mg # 90 on 8/7, 8/18 & (/16/20.     CURRENTLY  Reports has no PCP at this point  Was being seen at [facility]  2 yrs ago at [facility] saw rheum due to swelling right 4th finger distal joint and nail changes. Was told had psoriasis of finger nail & arthritis. Referred to dermatologist  This time left middle finger has swelled up. Also seea nail affected and finger tip is swollen and hurts to bump things with it and is similar to prior right 4th finger issues.   Decided to go to rheumatology. Called her insurance and saw a group outside the facility. No records available. Seen on Sept 22nd. They didn't think it was psoriatic arthritis and thought it was paronychia and given keflex 500 mg twice a day  For 10 days and meanwhile was advised to see dermatology so so derm  specialists in [place]  Saw derm 10 days after had completed keflex on oct 2nd. Derm tod her keflex  was not the correct abx and gave her Cipro 500 mg bid to take for 10 days but noted No improvement. So set up another apt with derm on oct 12th. Derm said they did see some improvement and that the color was better but it still felt swollen and painful to touch. Was told to leave it alone & due to touching anything or bumping into it caused pain she had been covering it wit neosporin, gauze & paper tape & derm told her that had made it worse & to leave it open. Felt derm was dismissive with her. Then on Oct 13th had an additional rheum apt. He looked at finger and told her the finger was quite swollen and he was worried it was infected & told her she needed an MRI done to rule out dactylitis & to see a hand surgeon & she got the impression he was frustrated with her.     She is uncertain what to do & what to think & who to see.     Labs on 9/22 and oct 2 were normal : Crp, LFT, uric acid esr . Xray showed no findings other than cmc arthritis.   Her nail is coming off but that doesnt bother her    No fever or chills, no headache or dizziness, no double or blurry vision, no facial pain, earache, sore throat, runny nose, post nasal drip, no trouble hearing, smelling, tasting or swallowing, no cough , no chest pain, trouble breathing or palpitations, No abdominal pain, heart burn, reflux, nausea or vomiting or diarrhea or constipation, no blood in stools or black stools, no weight loss or night sweats. No dysuria, hematuria, frequency, urgency, hesitancy, incontinence, No pelvic complaints. No leg swelling No rash.      Will return for a physical at another time  Previously getting care at [facility] and [facility]  CARE EVERYWHERE reviewed    Review of Systems   Constitutional, HEENT, cardiovascular, pulmonary, GI, , musculoskeletal, neuro, skin, endocrine and psych systems are negative, except as otherwise noted.      Objective    /81 (BP Location: Left arm, Patient Position: Chair, Cuff Size: Adult Regular)   " Pulse 83   Temp 98  F (36.7  C) (Tympanic)   Resp 13   Ht 1.651 m (5' 5\")   Wt 66.6 kg (146 lb 12.8 oz)   SpO2 96%   BMI 24.43 kg/m    Body mass index is 24.43 kg/m .  Physical Exam   GENERAL: healthy, alert and no distress  EYES: Eyes grossly normal to inspection, PERRL and conjunctivae and sclerae normal  RESP: lungs clear to auscultation - no rales, rhonchi or wheezes  CV: regular rates and rhythm, normal S1 S2, no S3 or S4, no murmur, click or rub, peripheral pulses strong and no peripheral edema  ABDOMEN: soft, non tender  MS: no gross musculoskeletal defects noted, no edema  Right 4th finger: no swelling, redness or warmth, has a dystrophic nail with ridges & separation from nail bed partially  Left middle finger : dip & pulp is swollen, red, not warm, is tender & no blanching noted. There is no drainage or pus or blisters. The nail is dystrophic with horizontal ridges & onychoclasis & separation from nail bed.   SKIN: no suspicious lesions or rashes  NEURO: Normal strength and tone, mentation intact and speech normal  PSYCH: mentation appears normal, affect normal/bright, tearful, anxious, judgement and insight intact and appearance well groomed    No results found for any visits on 10/15/20.        Assessment & Plan     Willa was seen today for musculoskeletal problem.    Diagnoses and all orders for this visit:    Swelling of left middle finger  -     Orthopedic & Spine  Referral; Future  -     RHEUMATOLOGY REFERRAL; Future    Psoriasis of nail  -     RHEUMATOLOGY REFERRAL; Future    Psoriatic arthropathy (H)  -     RHEUMATOLOGY REFERRAL; Future    Osteoarthritis of carpometacarpal (CMC) joints of both thumbs, unspecified osteoarthritis type  -     RHEUMATOLOGY REFERRAL; Future      60 yr old lady with previous hx of psoriatic arthritis & psoriasis of right fourth finger nail treated in 2018 with tac injections by derm, clobetasol bid & on methotrexate by rheum, now no longer appearing to be " on methotrexate with new onset left middle finger nail changes, separation from nail bed with dip to pul swelling pain & tenderness similar O when dx with psoriasis & psoriatic arthritis previously.   Was previously under care of health [facility] derm & rheum but since insurance changed & didn't have a PCP did see rheum out of system & [facility] in [place]  & given two courses of abx with no improvement & reports rheum concerned about dactylitis & advised to get an mri & see the hand surgeon. Has not done the imaging yet & needs help as to what to do next.   Currently no sign of sepsis & looks well.  Records reviewed after visit in more detail  Was advised & given referral to se a hand surgeon & to hang on to mri order for now as hand surgeon will likely make recommendations on that  Advised to Go to the ER if has fever  Return in 2 weeks for a physical and follow up from today   Later on I also thought may be good to se rheum at the  & go ahead with imaging as awaits ortho consult.   Will also try to get records from prior rheum & derm seen recently outside the [facility] clin.             See Patient Instructions    Return in about 1 week (around 10/22/2020) for Physical Exam with PCP.    Geovanna Rosales MD  [facility]

## 2020-10-18 ENCOUNTER — MYC REFILL (OUTPATIENT)
Dept: ORTHOPEDICS | Facility: CLINIC | Age: 60
End: 2020-10-18

## 2020-10-18 DIAGNOSIS — M54.41 ACUTE RIGHT-SIDED LOW BACK PAIN WITH RIGHT-SIDED SCIATICA: ICD-10-CM

## 2020-10-19 ENCOUNTER — TELEPHONE (OUTPATIENT)
Dept: FAMILY MEDICINE | Facility: CLINIC | Age: 60
End: 2020-10-19

## 2020-10-19 PROBLEM — L40.9 PSORIASIS OF NAIL: Status: ACTIVE | Noted: 2020-10-19

## 2020-10-19 RX ORDER — GABAPENTIN 300 MG/1
300 CAPSULE ORAL 3 TIMES DAILY
Qty: 90 CAPSULE | Refills: 0 | Status: SHIPPED | OUTPATIENT
Start: 2020-10-19 | End: 2020-11-15

## 2020-10-19 NOTE — TELEPHONE ENCOUNTER
"\"  Geovanna Rosales MD  P Hw Triage [place] [facility]             Chart reviewed. Recommend she go ahead schedule the mri ordered by outside rhuematologist while she awaits hand surgeon consult. Also recommend she see rheum at the [place]. & get us records of derm & rheum seen recently outside [place]. If she can also clarify when she last took methotrexate given in the past for psoriatic arthritis & psoriasis of the nail? thxs \"       "

## 2020-10-20 NOTE — TELEPHONE ENCOUNTER
Dr. Rosales:  1. Patient stated last dose of Methotrexate was 1 year ago, or more  2. Patient has hand specialist appointment with [facility] on 10/22/20    Writer called patient and reviewed message per Dr. Rosales.    Patient verbalized understanding and in agreement with plan.    GERALD KebedeN, RN

## 2020-10-20 NOTE — TELEPHONE ENCOUNTER
Ok will wait to see what ortho hand says. Want to rule out infection first.   hopefully they will send us records  I do think though if she had psoriatric arthritis and was taking methotrexate for that may likely need to see a rheumatologist to get back on meds for psoriais and psoriatic arthritis as im not sure it just goes away but likely needs lifelong medications to keep it quiscent & current symptoms could be a flare up.

## 2020-10-22 ENCOUNTER — TELEPHONE (OUTPATIENT)
Dept: RHEUMATOLOGY | Facility: CLINIC | Age: 60
End: 2020-10-22

## 2020-10-22 ENCOUNTER — TELEPHONE (OUTPATIENT)
Dept: FAMILY MEDICINE | Facility: CLINIC | Age: 60
End: 2020-10-22

## 2020-10-22 NOTE — TELEPHONE ENCOUNTER
Dr Bobby BARAJAS    Pt was contacted. She was aware they would not see her at [facility]  Rheumatology    Today she went to [facility]. The issue she has on her middle finger which is swollen for one month, the nail came off. The pain has subsided. The ortho provider thinks it will get better in the next few months.   She was told today she just has simple osteoarthrits and therefore she does not need rheumatology.    (Pt does have long appt in person on 11/11 with Dr Rosales)    aWleska Mcintyre, RN, BSN

## 2020-10-22 NOTE — TELEPHONE ENCOUNTER
Adult Rheumatology Clinic  [address], Mail [address]                        [place] [address]  Ph: [phone]                     Fax: [phone]       Date: October 22, 2020  Name: Willa Downey  YOB: 1960  MRN: [id number]     Dear Referring Provider,  Thank you for the referral for Willa Downey, 1960. After careful review by the Rheumatologist, your patient does not meet the criteria for an appointment. We encourage you to refer your patient to one of our community sites:    [facility]    Provider of your choice  Thank you for your support and understanding.    Sincerely,  The Division of Arthritis and Autoimmune Disorders

## 2020-10-22 NOTE — LETTER
Adult Rheumatology Clinic  [address], [address]                        [place] [address]  Ph: [phone]                     Fax: [phone]       Date: October 22, 2020  Name: Willa Downey  YOB: 1960  MRN: [id number]     Dear Referring Provider,  Thank you for the referral for Willa Downey, 1960. After careful review by the Rheumatologist, your patient does not meet the criteria for an appointment. We encourage you to refer your patient to one of our community sites:    [facility]    Provider of your choice  Thank you for your support and understanding.    Sincerely,  The Division of Arthritis and Autoimmune Disorders

## 2020-10-22 NOTE — TELEPHONE ENCOUNTER
Geovanna Rosales MD  P  Triage [facility]             Please inform patient u of m rheum does not have ability to see her. If ortho hand has no answers recommend seeing a rheum in the community maybe different from one she recently saw if desired to evaluate for hx of psoriatic arthritis

## 2020-11-08 ENCOUNTER — HEALTH MAINTENANCE LETTER (OUTPATIENT)
Age: 60
End: 2020-11-08

## 2020-11-10 PROBLEM — H25.13 NUCLEAR SENILE CATARACT OF BOTH EYES: Status: RESOLVED | Noted: 2018-08-16 | Resolved: 2020-11-10

## 2020-11-10 ASSESSMENT — ENCOUNTER SYMPTOMS
CHILLS: 0
NERVOUS/ANXIOUS: 0
DYSURIA: 0
CONSTIPATION: 0
MYALGIAS: 0
SORE THROAT: 0
NAUSEA: 0
ARTHRALGIAS: 0
BREAST MASS: 0
SHORTNESS OF BREATH: 0
HEMATOCHEZIA: 0
PARESTHESIAS: 0
FEVER: 0
HEMATURIA: 0
JOINT SWELLING: 0
DIZZINESS: 0
WEAKNESS: 0
COUGH: 0
HEARTBURN: 0
FREQUENCY: 0
PALPITATIONS: 0
DIARRHEA: 0
HEADACHES: 0
ABDOMINAL PAIN: 0
EYE PAIN: 0

## 2020-11-11 ENCOUNTER — OFFICE VISIT (OUTPATIENT)
Dept: FAMILY MEDICINE | Facility: CLINIC | Age: 60
End: 2020-11-11
Payer: COMMERCIAL

## 2020-11-11 VITALS
HEIGHT: 64 IN | BODY MASS INDEX: 24.89 KG/M2 | SYSTOLIC BLOOD PRESSURE: 118 MMHG | OXYGEN SATURATION: 97 % | HEART RATE: 97 BPM | TEMPERATURE: 97.6 F | WEIGHT: 145.8 LBS | RESPIRATION RATE: 16 BRPM | DIASTOLIC BLOOD PRESSURE: 66 MMHG

## 2020-11-11 DIAGNOSIS — M48.061 SPINAL STENOSIS OF LUMBAR REGION, UNSPECIFIED WHETHER NEUROGENIC CLAUDICATION PRESENT: ICD-10-CM

## 2020-11-11 DIAGNOSIS — R73.09 ELEVATED GLUCOSE: ICD-10-CM

## 2020-11-11 DIAGNOSIS — Z12.11 SCREEN FOR COLON CANCER: ICD-10-CM

## 2020-11-11 DIAGNOSIS — Z11.59 ENCOUNTER FOR HEPATITIS C SCREENING TEST FOR LOW RISK PATIENT: ICD-10-CM

## 2020-11-11 DIAGNOSIS — E78.5 HYPERLIPIDEMIA, UNSPECIFIED HYPERLIPIDEMIA TYPE: ICD-10-CM

## 2020-11-11 DIAGNOSIS — M54.41 BILATERAL LOW BACK PAIN WITH RIGHT-SIDED SCIATICA, UNSPECIFIED CHRONICITY: ICD-10-CM

## 2020-11-11 DIAGNOSIS — Z13.1 SCREENING FOR DIABETES MELLITUS: ICD-10-CM

## 2020-11-11 DIAGNOSIS — M48.061 NEUROFORAMINAL STENOSIS OF LUMBAR SPINE: ICD-10-CM

## 2020-11-11 DIAGNOSIS — Z00.00 ROUTINE HISTORY AND PHYSICAL EXAMINATION OF ADULT: Primary | ICD-10-CM

## 2020-11-11 DIAGNOSIS — M18.0 OSTEOARTHRITIS OF CARPOMETACARPAL (CMC) JOINTS OF BOTH THUMBS, UNSPECIFIED OSTEOARTHRITIS TYPE: ICD-10-CM

## 2020-11-11 DIAGNOSIS — L40.50 PSORIATIC ARTHROPATHY (H): ICD-10-CM

## 2020-11-11 DIAGNOSIS — Z11.4 SCREENING FOR HIV (HUMAN IMMUNODEFICIENCY VIRUS): ICD-10-CM

## 2020-11-11 DIAGNOSIS — H40.1233 LOW-TENSION GLAUCOMA OF BOTH EYES, SEVERE STAGE: ICD-10-CM

## 2020-11-11 DIAGNOSIS — L40.9 PSORIASIS OF NAIL: ICD-10-CM

## 2020-11-11 LAB
ALBUMIN SERPL-MCNC: 3.8 G/DL (ref 3.4–5)
ALP SERPL-CCNC: 77 U/L (ref 40–150)
ALT SERPL W P-5'-P-CCNC: 23 U/L (ref 0–50)
ANION GAP SERPL CALCULATED.3IONS-SCNC: 7 MMOL/L (ref 3–14)
AST SERPL W P-5'-P-CCNC: 14 U/L (ref 0–45)
BASOPHILS # BLD AUTO: 0.1 10E9/L (ref 0–0.2)
BASOPHILS NFR BLD AUTO: 0.9 %
BILIRUB SERPL-MCNC: 0.4 MG/DL (ref 0.2–1.3)
BUN SERPL-MCNC: 15 MG/DL (ref 7–30)
CALCIUM SERPL-MCNC: 9.7 MG/DL (ref 8.5–10.1)
CHLORIDE SERPL-SCNC: 107 MMOL/L (ref 94–109)
CHOLEST SERPL-MCNC: 271 MG/DL
CO2 SERPL-SCNC: 23 MMOL/L (ref 20–32)
CREAT SERPL-MCNC: 0.56 MG/DL (ref 0.52–1.04)
DIFFERENTIAL METHOD BLD: NORMAL
EOSINOPHIL # BLD AUTO: 0.3 10E9/L (ref 0–0.7)
EOSINOPHIL NFR BLD AUTO: 4.6 %
ERYTHROCYTE [DISTWIDTH] IN BLOOD BY AUTOMATED COUNT: 12.3 % (ref 10–15)
GFR SERPL CREATININE-BSD FRML MDRD: >90 ML/MIN/{1.73_M2}
GLUCOSE SERPL-MCNC: 141 MG/DL (ref 70–99)
HBA1C MFR BLD: 5.3 % (ref 0–5.6)
HCT VFR BLD AUTO: 39.3 % (ref 35–47)
HCV AB SERPL QL IA: NONREACTIVE
HDLC SERPL-MCNC: 45 MG/DL
HGB BLD-MCNC: 13 G/DL (ref 11.7–15.7)
LDLC SERPL CALC-MCNC: ABNORMAL MG/DL
LDLC SERPL DIRECT ASSAY-MCNC: 166 MG/DL
LYMPHOCYTES # BLD AUTO: 2.4 10E9/L (ref 0.8–5.3)
LYMPHOCYTES NFR BLD AUTO: 36.2 %
MCH RBC QN AUTO: 29.7 PG (ref 26.5–33)
MCHC RBC AUTO-ENTMCNC: 33.1 G/DL (ref 31.5–36.5)
MCV RBC AUTO: 90 FL (ref 78–100)
MONOCYTES # BLD AUTO: 0.5 10E9/L (ref 0–1.3)
MONOCYTES NFR BLD AUTO: 6.9 %
NEUTROPHILS # BLD AUTO: 3.4 10E9/L (ref 1.6–8.3)
NEUTROPHILS NFR BLD AUTO: 51.4 %
NONHDLC SERPL-MCNC: 226 MG/DL
PLATELET # BLD AUTO: 358 10E9/L (ref 150–450)
POTASSIUM SERPL-SCNC: 3.6 MMOL/L (ref 3.4–5.3)
PROT SERPL-MCNC: 7.6 G/DL (ref 6.8–8.8)
RBC # BLD AUTO: 4.37 10E12/L (ref 3.8–5.2)
SODIUM SERPL-SCNC: 137 MMOL/L (ref 133–144)
TRIGL SERPL-MCNC: 452 MG/DL
TSH SERPL DL<=0.005 MIU/L-ACNC: 1.3 MU/L (ref 0.4–4)
WBC # BLD AUTO: 6.6 10E9/L (ref 4–11)

## 2020-11-11 PROCEDURE — 87389 HIV-1 AG W/HIV-1&-2 AB AG IA: CPT | Performed by: FAMILY MEDICINE

## 2020-11-11 PROCEDURE — 86803 HEPATITIS C AB TEST: CPT | Performed by: FAMILY MEDICINE

## 2020-11-11 PROCEDURE — 83036 HEMOGLOBIN GLYCOSYLATED A1C: CPT | Performed by: FAMILY MEDICINE

## 2020-11-11 PROCEDURE — 80050 GENERAL HEALTH PANEL: CPT | Performed by: FAMILY MEDICINE

## 2020-11-11 PROCEDURE — 99213 OFFICE O/P EST LOW 20 MIN: CPT | Mod: 25 | Performed by: FAMILY MEDICINE

## 2020-11-11 PROCEDURE — 80061 LIPID PANEL: CPT | Performed by: FAMILY MEDICINE

## 2020-11-11 PROCEDURE — 36415 COLL VENOUS BLD VENIPUNCTURE: CPT | Performed by: FAMILY MEDICINE

## 2020-11-11 PROCEDURE — 99396 PREV VISIT EST AGE 40-64: CPT | Performed by: FAMILY MEDICINE

## 2020-11-11 ASSESSMENT — ENCOUNTER SYMPTOMS
CHILLS: 0
HEMATOCHEZIA: 0
MYALGIAS: 0
SHORTNESS OF BREATH: 0
NAUSEA: 0
SORE THROAT: 0
WEAKNESS: 0
HEMATURIA: 0
COUGH: 0
HEARTBURN: 0
PALPITATIONS: 0
FREQUENCY: 0
HEADACHES: 0
FEVER: 0
EYE PAIN: 0
PARESTHESIAS: 0
BREAST MASS: 0
DYSURIA: 0
ARTHRALGIAS: 0
DIARRHEA: 0
ABDOMINAL PAIN: 0
CONSTIPATION: 0
DIZZINESS: 0
NERVOUS/ANXIOUS: 0
JOINT SWELLING: 0

## 2020-11-11 ASSESSMENT — MIFFLIN-ST. JEOR: SCORE: 1226.34

## 2020-11-11 NOTE — RESULT ENCOUNTER NOTE
Tram Ms. Downey,  Your results came back showing  Triglycerides are elevated expected due to non fasting sample  ldl remains elevated similar to prior this does not get affected by whether you fasted or not but given elevated tg in the future may be best to always check fasting   Irrespective of that given ldl value and other factors, overall cardiac risk is low so I dont think a statin medication is necessary but we could try that if you would like or discuss more or work on diet and recheck in 3 to 6 months a fasting sample to get a better picture of the triglycerides too   The 10-year ASCVD risk score (Lacyludivina MATTHEWS Jr., et al., 2013) is: 3.5%    Values used to calculate the score:      Age: 60 years      Sex: Female      Is Non- : No      Diabetic: No      Tobacco smoker: No      Systolic Blood Pressure: 118 mmHg      Is BP treated: No      HDL Cholesterol: 45 mg/dL      Total Cholesterol: 271 mg/dL   -LDL(bad) cholesterol level is elevated, and your triglycerides are elevated which can increase your heart disease risk.  A diet high in fat and simple carbohydrates, genetics and being overweight can contribute to this. ADVISE: exercising 150 minutes of aerobic exercise per week (30 minutes for 5 days per week or 50 minutes for 3 days per week are options), eating a low saturated fat/low carbohydrate diet, and omega-3 fatty acids (fish oil) 2332-6893 mg daily are helpful to improve this. In 3 months, you should recheck your fasting cholesterol panel by scheduling a lab-only appointment.  -Liver and gallbladder tests (ALT,AST, Alk phos,bilirubin) are normal.  -Kidney function (GFR) is normal.  -Sodium is normal.  -Potassium is normal.  -Calcium is normal.  Glucose is elevated. This can be just from being a non fasting samples but I am trying to add a HBA1c test to make sure you dont have diabetes.   -TSH (thyroid stimulating hormone) level is normal which indicates normal thyroid function..  If you have any further concerns please do not hesitate to contact us by message, phone or making an appointment.  Have a good day   Dr Bobby ROSE

## 2020-11-11 NOTE — PROGRESS NOTES
SUBJECTIVE:   CC: Willa Downey is an 60 year old woman who presents for preventive health visit.     Patient has been advised of split billing requirements and indicates understanding: Yes  Healthy Habits:     Getting at least 3 servings of Calcium per day:  Yes    Bi-annual eye exam:  Yes    Dental care twice a year:  NO    Sleep apnea or symptoms of sleep apnea:  None    Diet:  Regular (no restrictions)    Frequency of exercise:  2-3 days/week    Duration of exercise:  15-30 minutes    Taking medications regularly:  Yes    Medication side effects:  None    PHQ-2 Total Score: 0    Additional concerns today:  Yes    HERE FOR A PHYSICAL  No current breast issues, had a biopsy in 2019 on right that was benign and diagnostic imaging for pain in 5/2020 that was normal and due for mammogram in 2021. Will do Colon cancer screen with FIT   No prior abnormal pap smears, due for cotest in 2021  Discussed working on advance directives   HLd, FH of mother who was thin with hld. Previous wellness labs brought in for review. In 2018 TC was 232, HDL 49,  &  and in 2019 was 234/57/134/248. Due to her sciatica has not been able to exercise much. Ok to do non fasting lipids today to compare LDL and make a risk assessment  OA cmc B/l thumbs: no acute issue currently  lBP with sciatica/ Lumbar stenosis L 4 -5, neuro foraminal stenosis, spondylolisthesis L 4 on 5, has sciatica right leg , epidural only helped temporarily. Managing with therapy and gabapentin. Would prefer to defer surgery as long as possible odalis in these covid times    Hx of prior noted psoriatic arthropathy noted 2 yrs ago [facility]um [facility]. Seen most recently in sept / oct by derm specialists and a different rheumatology group for similar to prior though now in left middle finger distal joint to pulp tip swelling and nail loosening. Seen by rheum and dermatology and given differing opinions. Rheumatologist seen  recently said did not have psoriatic arthritis & was concerned about dactylitis. Seen by derm and treated with two different abx for possible paronychia and on clobetasol and elecon for psoriasis of nail and nail fold skin. Seen by [facility] hand specialist on 10/22. No notes from them or rheumatology seen recently yet. About 5 days before seen hand specialist the nail came off. Its not as painful but still a bit swollen. Ortho hand looked at her xray and told her she had no evidence of psoriatic arthritis  And mri was not needed & advised it could take several months to get better. They were not sure why finger was swollen.  [facility] rheum did not accept her . Since is doing better, has no fever  & feels well would like to hold off currently on seeing a different derm or rheum specialist for now as both recent rheumatologist and ortho hand told her unlikely was psoriatic arthritis. She would prefer to monitor and contact us if gets worse or recurs etc.  For the psoriasis of her nail she has been using clobetasol under care of derm  Felt though derm and rheum were dismissive and if needs new one sin the future would prefer a different group to ones she had seen recently.     Glaucoma under care of eye, s/p surgery    Ok to labs ordered today.     Currently No fever or chills, no headache or dizziness, no double or blurry vision, no facial pain, earache, sore throat, runny nose, post nasal drip, no trouble hearing, smelling, tasting or swallowing, no cough, no chest pain, trouble breathing or palpitations, No abdominal pain, heart burn, reflux, nausea or vomiting or diarrhea or constipation, no blood in stools or black stools, no weight loss or night sweats. No dysuria, hematuria, frequency, urgency, hesitancy, incontinence, No pelvic complaints. No leg swelling or joint pain. No rash.    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,  with two children youngest is 14, in [place] since 1989, parents diseased, older  sister also in the [place], Hx of OA back and thumb on gabapentin tid for low back pain, hx of psoriatic arthropathy and psoriasis of nail, hx of migraines, gum disease, glaucoma s/p laser in 2009 & surgery 2012 & 2016, on eye drops, , HLD on no meds, , insomnia , seasonal allergies, dust mites, on loratadine prn, allergic to codeine, morphine, latex & adhesive tape, hx of prior C section in 2001 secondary to a fibroid blocking the birth canal, went into [facility] after the surgery and uterine artery embolization done and adopted daughter in 2006, prior cataract surgery 2015 & 2018, prior right breast biopsy ( benign),      Seen for chest pain in 2012 and EKG and CXR and exam was normal. Seen by cardiology and stress echo test done was normal. Under care of eye. Seen by dentist , for URI in 2014, seen for a physical in 2014. In 2016 seen for ETD after a flight. Seen by audiology ( normal test) ,  & ENT, in 2017 seen for acute back pain and multiple small hand joint pain DIP & PIP advised PT and NSAIDS.      In 5/2018 noted nail changes of right ring finger and right thumb. Felt nail was  from nail bed. Nail clippings sent for fungal culture. Seen 5/9/2018 and gyn was normal and pap done.      [facility] of intermittent bilateral first CMC joint tenderness consistent with osteoarthritis, who presented with right fourth fingernail changes beginning March 2018 with onycholysis and dystrophic changes, fungal culture negative, mild onycholysis in the right thumbnail, and acute swelling, redness and tenderness of the right fourth DIP joint since May. This had been associated with some swelling and tenderness in her finger pad with tender blister-type sores. Dermatology gave her triamcinolone which caused burning of the skin. Her primary physician gave her naproxen which caused daytime lethargy but gave her some partial relief. Exam revealed nail changes and right  fourth DIP joint swelling, most consistent with psoriatic arthritis. Laboratories to include CBC with differential, basic metabolic panel, liver panel, ESR, CRP, TSH, RF, CCP and hepatitis C antibody (on two occasions she has positive hepatitis B core antibody and surface antibody suggestive of prior infection which she cleared). Was given desonide for perioral dermatitis. testing was done and X-ray of hands showed soft tissue swelling at the ring finger DIP joints. No bony changes. X-ray of feet were normal. RF and CCP negative. Normal ESR and CRP. She was started on methotrexate four tablets weekly, tapered up to 6 tablets weekly. &  received prednisone 30 mg, 20 mg, 10 mg, 5 mg each ×5 days, then off.  Seen oct 2018 noted improved and methotrexate increase dto manage symptoms and felt had OA of left knee and referred to derm for fungal scrapings of toenail concerns.      Seen by dermatology at [facility] for nail changes right fourth fingernail suspected to be psoriasis & had tac injection to the nail bed, , Pincer nail deformity B/l 5th toes & epidermal inclusion cyst on neck.      Seen by rheum at [facility] on 2/222019 & noted improved and continued on  eight tablets weekly.   Seen by derm at [facility] 3/13/19 for psoriatic nail disease and continue don clobetasol ointment bid  prn and TAC injection done of nail bed.   Seen by derm 6/2019 and declined soriatane or humira or kenalog injections and was to continue clobetasol ointment twice a day.  Had PVD left eye 8/2019.  Seen by rheum 9/13/19 & stable & continued on methotrexate, refilled  weekly. & advised thumb splints & OTC meds for B/l OA 1st CMC & meds refilled in 12/2019.  methotrexate      Next seen at [place] with insurance changes  Seen by Chad Peck 7/9/20 for acute back pain , referred to ortho and given flexeril. Seen by ortho 7/13 & given steroids and advised PT & to use flexeril sparingly. Seen by PT 7/15, then by back doctor and  xray was unremarkable. On 8/16 due to persistent lumbar radiculopathy MRI done showed lumbar stenosis. Had an epidural 9/8 & noted pain improved and continued on gabapentin and home care exercise program & advised to increase activity and follow up as needed.     Seen first time by this writer on 10/15/20 for swelling of left middle finger pulp and onycholysis of the nail with prior hx of psoriatic arthropathy / psoriatic arthritis & psoriasis of right fourth finger nail treated in 2018 with Kenalog  injections by derm, clobetasol bid & on methotrexate by rheum, no longer on methotrexate since 12/2019 with new onset left middle finger nail changes, separation from nail bed with DIP to pulp swelling pain & tenderness similar to when dx with psoriasis & psoriatic arthritis previously.   Was [facility] under care of [facility] & rheum but since insurance changed & didn't have a PCP did see rheum out of [facility] in [place]  & given two courses of abx with no improvement & reports rheum concerned about dactylitis & advised to get an mri & see the hand surgeon. Had not done the imaging yet & needed help on what to do next.   Had no sign of sepsis & looked well. Records reviewed after visit in more detail. Was advised & given referral to see a hand surgeon & to hang on to mri order until seen by the  hand surgeon to make further recommendations on that. Advised to Go to the ER if had a fever     Derm notes received and reviewed noting psoriasis. No notes received form rheum. [facility] were not able to see her. Seen by hand specialist at [facility] on 1/22 , by then nail had fallen off and pain had subsided and was told had simple OA and opted not to see another rheumatologist.   [place]  shows getting gabapentin 300 mg # 90 on 8/7, 8/18 & 9/16/20.   Today's PHQ-2 Score:   PHQ-2 ( 1999 Pfizer) 11/10/2020   Q1: Little interest or pleasure in doing things 0   Q2: Feeling down, depressed or  hopeless 0   PHQ-2 Score 0   Q1: Little interest or pleasure in doing things Not at all   Q2: Feeling down, depressed or hopeless Not at all   PHQ-2 Score 0       Abuse: Current or Past (Physical, Sexual or Emotional) - No  Do you feel safe in your environment? Yes    Have you ever done Advance Care Planning? (For example, a Health Directive, POLST, or a discussion with a medical provider or your loved ones about your wishes): No, advance care planning information given to patient to review.  Patient declined advance care planning discussion at this time.    Social History     Tobacco Use     Smoking status: Never Smoker     Smokeless tobacco: Never Used   Substance Use Topics     Alcohol use: Never     Frequency: Never     If you drink alcohol do you typically have >3 drinks per day or >7 drinks per week? No    No flowsheet data found.    Reviewed orders with patient.  Reviewed health maintenance and updated orders accordingly - Yes  Lab work is in process  Labs reviewed in EPIC  BP Readings from Last 3 Encounters:   11/11/20 118/66   10/15/20 134/81    Wt Readings from Last 3 Encounters:   11/11/20 66.1 kg (145 lb 12.8 oz)   10/15/20 66.6 kg (146 lb 12.8 oz)   08/07/20 63.5 kg (140 lb)         Patient Active Problem List   Diagnosis     Hyperlipidemia     Low-tension glaucoma of both eyes, severe stage     Myopia     Osteoarthritis     Screening for malignant neoplasm of cervix     Psoriasis of nail     Spinal stenosis of lumbar region, unspecified whether neurogenic claudication present     Neuroforaminal stenosis of lumbar spine     Bilateral low back pain with right-sided sciatica, unspecified chronicity     Past Surgical History:   Procedure Laterality Date     BIOPSY  07/30/2019    the right breast tissue(benign)     EYE SURGERY      3 surgeries for glaucoma treatment       Social History     Tobacco Use     Smoking status: Never Smoker     Smokeless tobacco: Never Used   Substance Use Topics     Alcohol use:  Never     Frequency: Never     Family History   Problem Relation Age of Onset     Hyperlipidemia Mother         My mother had high cholesterol.     Osteoporosis Mother      Hypertension Mother      Stomach Cancer Father      Other Cancer Father         My father  of stomach cancer.     Cataracts Maternal Grandmother      Breast Cancer Maternal Aunt      Breast Cancer Other         My maternal aunt  of breast cancer in her 70s.         Current Outpatient Medications   Medication Sig Dispense Refill     CALCIUM CARBONATE-VIT D-MIN PO Take 1 tablet by mouth       clobetasol (TEMOVATE) 0.05 % external ointment Apply to affected area tid prn.       gabapentin (NEURONTIN) 300 MG capsule Take 1 capsule (300 mg) by mouth 3 times daily 90 capsule 0     loratadine (CLARITIN) 10 MG capsule Take 1 tablet by mouth       mometasone (ELOCON) 0.1 % external ointment Apply topically daily       Multiple Vitamins-Minerals (MULTIVITAMIN ADULTS 50+) TABS        Allergies   Allergen Reactions     Adhesive Tape Blisters, Itching and Swelling     Dust Mites      Latex Blisters, Itching, Other (See Comments) and Swelling     Skin sensitivity       Morphine Sulfate (Concentrate) Itching     Other reaction(s): Chills  heart palpatations     Seasonal Allergies      Codeine Palpitations     chills     No lab results found.     Mammogram Screening: Patient over age 50, mutual decision to screen reflected in health maintenance.    Pertinent mammograms are reviewed under the imaging tab.  History of abnormal Pap smear: NO - age 30-65 PAP every 5 years with negative HPV co-testing recommended  Last 3 Pap Results: No results found for: PAP  Last 3 Pap and HPV Results:       Reviewed and updated as needed this visit by clinical staff  Tobacco  Allergies  Meds  Problems  Med Hx  Surg Hx  Fam Hx  Soc Hx          Reviewed and updated as needed this visit by Provider  Tobacco  Allergies  Meds  Problems  Med Hx  Surg Hx  Fam Hx  Soc  "Hx         Past Medical History:   Diagnosis Date     Nuclear senile cataract of both eyes 8/16/2018     Psoriatic arthropathy (H) 10/19/2020      Past Surgical History:   Procedure Laterality Date     BIOPSY  07/30/2019    the right breast tissue(benign)     EYE SURGERY      3 surgeries for glaucoma treatment     OB History   No obstetric history on file.       Review of Systems   Constitutional: Negative for chills and fever.   HENT: Negative for congestion, ear pain, hearing loss and sore throat.    Eyes: Negative for pain and visual disturbance.   Respiratory: Negative for cough and shortness of breath.    Cardiovascular: Negative for chest pain, palpitations and peripheral edema.   Gastrointestinal: Negative for abdominal pain, constipation, diarrhea, heartburn, hematochezia and nausea.   Breasts:  Negative for tenderness, breast mass and discharge.   Genitourinary: Negative for dysuria, frequency, genital sores, hematuria, pelvic pain, urgency, vaginal bleeding and vaginal discharge.   Musculoskeletal: Negative for arthralgias, joint swelling and myalgias.   Skin: Negative for rash.   Neurological: Negative for dizziness, weakness, headaches and paresthesias.   Psychiatric/Behavioral: Negative for mood changes. The patient is not nervous/anxious.       OBJECTIVE:   /66 (BP Location: Left arm, Patient Position: Sitting, Cuff Size: Adult Regular)   Pulse 97   Temp 97.6  F (36.4  C) (Oral)   Resp 16   Ht 1.626 m (5' 4\")   Wt 66.1 kg (145 lb 12.8 oz)   SpO2 97%   BMI 25.03 kg/m    Physical Exam  GENERAL: healthy, alert and no distress  EYES: Eyes grossly normal to inspection, PERRL and conjunctivae and sclerae normal, wears glasses.  HENT: ear canals and TM's normal, nose and mouth without ulcers or lesions  NECK: no adenopathy, no asymmetry, masses, or scars and thyroid normal to palpation  RESP: lungs clear to auscultation - no rales, rhonchi or wheezes  BREAST: normal without masses, tenderness or " nipple discharge and no palpable axillary masses or adenopathy  CV: regular rate and rhythm, normal S1 S2, no S3 or S4, no murmur, click or rub, no peripheral edema and peripheral pulses strong  ABDOMEN: soft, nontender, no hepatosplenomegaly, no masses and bowel sounds normal  MS: no gross musculoskeletal defects noted, no edema  SKIN: no suspicious lesions or rashes, left middle finger shows absence of nail, nail bed look not infected, distal finger soft tissue mildly erythematous , non blanching, not warm or tender. full range of motion of finger joints.   NEURO: Normal strength and tone, mentation intact and speech normal  PSYCH: mentation appears normal, affect normal/bright    Diagnostic Test Results:  Labs reviewed in Epic  Results for orders placed or performed in visit on 11/11/20   CBC with platelets differential     Status: None   Result Value Ref Range    WBC 6.6 4.0 - 11.0 10e9/L    RBC Count 4.37 3.8 - 5.2 10e12/L    Hemoglobin 13.0 11.7 - 15.7 g/dL    Hematocrit 39.3 35.0 - 47.0 %    MCV 90 78 - 100 fl    MCH 29.7 26.5 - 33.0 pg    MCHC 33.1 31.5 - 36.5 g/dL    RDW 12.3 10.0 - 15.0 %    Platelet Count 358 150 - 450 10e9/L    % Neutrophils 51.4 %    % Lymphocytes 36.2 %    % Monocytes 6.9 %    % Eosinophils 4.6 %    % Basophils 0.9 %    Absolute Neutrophil 3.4 1.6 - 8.3 10e9/L    Absolute Lymphocytes 2.4 0.8 - 5.3 10e9/L    Absolute Monocytes 0.5 0.0 - 1.3 10e9/L    Absolute Eosinophils 0.3 0.0 - 0.7 10e9/L    Absolute Basophils 0.1 0.0 - 0.2 10e9/L    Diff Method Automated Method        ASSESSMENT/PLAN:       ICD-10-CM    1. Routine history and physical examination of adult  Z00.00 Comprehensive metabolic panel     Fecal colorectal cancer screen FIT     HIV Antigen Antibody Combo     Hepatitis C Screen Reflex to HCV RNA Quant and Genotype   2. Hyperlipidemia, unspecified hyperlipidemia type  E78.5 Lipid panel reflex to direct LDL Non-fasting     TSH with free T4 reflex   3. Osteoarthritis of  carpometacarpal (CMC) joints of both thumbs, unspecified osteoarthritis type  M18.0 CBC with platelets differential   4. Spinal stenosis of lumbar region, unspecified whether neurogenic claudication present  M48.061    5. Neuroforaminal stenosis of lumbar spine  M99.73    6. Bilateral low back pain with right-sided sciatica, unspecified chronicity  M54.41    7. Psoriatic arthropathy (H)  L40.50 CBC with platelets differential   8. Psoriasis of nail  L40.9 CBC with platelets differential   9. Low-tension glaucoma of both eyes, severe stage  H40.1233    10. Screening for diabetes mellitus  Z13.1 Comprehensive metabolic panel   11. Screen for colon cancer  Z12.11 Fecal colorectal cancer screen FIT   12. Screening for HIV (human immunodeficiency virus)  Z11.4 HIV Antigen Antibody Combo   13. Encounter for hepatitis C screening test for low risk patient  Z11.59 Hepatitis C Screen Reflex to HCV RNA Quant and Genotype     Physical done. Breast exam was normal. Self check regularly. Mammogram due 5/2021 as dx imaging normal in 5/2020. Pelvic/ pap due next year 2021. Discussed working on advance directives. Will do FIT for colon cancer screening. Labs today.    HLD on no meds. Likely  familial. Not much activity due to sciatica and spinal stenosis but slowly picking up. Will do labs and make further recommendations.     OA CMC B/l thumb. Stay active and see ortho as needed    Spinal stenosis/ spondylolisthesis L 4-5 with neuro foraminal stenosis and sciatica on right, not much help with epidural. Did PT, seen by dr Zhou on gabapentin 300 mg tid which helps. Started stationary bike. Hopes to defer surgery for as long as possible. No side effects from gabapentin to date. See back doctor for spinal stenosis and continue gabapentin with them if no side effects    Prior hx of right finger documented psoriatic arthropathy treated in 2018 was on methotrexate, not had in over 1 yr. Hx of psoriasis nail seen by derm in past and  recently for it. Recently had new onset left middle finger DIP to pulp swelling with loosening and subsequent loss of a dystrophic looking nail. Was ruled out for fungal infection and treated for paronychia with two different antibiotics, uses clobetasol on nail folds and seen by a different rheumatologist who told her it was not psoriatic arthritis and advised seeing a hand surgeon & doing an mri as was concerned about possibility of dactylitis. Seen by [facility] hand surgeon on 10/22 who also reviewed xray's and said joints were not involved and did not look like psoriatic arthritis , symptoms improved after nail fell off mid October and advised  MRI was not needed and to monitor. Currently doing better and opts to wait to see a new derm and rheum for a third opinion and will call if needed for now. Follow up with dermatology and ortho back as needed. To get us records from recent visit with [facility].  Prior records a bit confusing as some stated had psoriatic arthritis and usually it is not a diagnosis that goes away. But recently ortho and rheum said its not psoriatic arthritis & maybe it was just localized skin/ nail psoriasis. Will wait to see what records show or how she does. Can refer to rheumatology to evaluate further if needed as well as a new dermatologist if needed. For now I resolved the dx of psoriatic arthropathy off her active problem list.     Continue care with eye for her glaucoma    Return in  1 yr for a preventive physical or sooner in an office visit or virtual visit for any new or persistent concerns.     Patient has been advised of split billing requirements and indicates understanding: Yes  COUNSELING:  Reviewed preventive health counseling, as reflected in patient instructions       Regular exercise       Healthy diet/nutrition       Vision screening       Hearing screening       Immunizations       Alcohol Use       Osteoporosis prevention/bone health       Colon cancer screening       The ASCVD  "Risk score (Lacy MATTHEWS Jr., et al., 2013) failed to calculate for the following reasons:    Cannot find a previous HDL lab    Cannot find a previous total cholesterol lab       Advance Care Planning    Estimated body mass index is 25.03 kg/m  as calculated from the following:    Height as of this encounter: 1.626 m (5' 4\").    Weight as of this encounter: 66.1 kg (145 lb 12.8 oz).    Weight management plan: Discussed healthy diet and exercise guidelines    She reports that she has never smoked. She has never used smokeless tobacco.      Counseling Resources:  ATP IV Guidelines  Pooled Cohorts Equation Calculator  Breast Cancer Risk Calculator  BRCA-Related Cancer Risk Assessment: FHS-7 Tool  FRAX Risk Assessment  ICSI Preventive Guidelines  Dietary Guidelines for Americans, 2010  USDA's MyPlate  ASA Prophylaxis  Lung CA Screening    Geovanna Rosales MD  [facility]    "

## 2020-11-11 NOTE — RESULT ENCOUNTER NOTE
Tram MckeonRoberto Shayan,  Some of your results came back and are within acceptable limits. -Normal red blood cell (hgb) levels, normal white blood cell count and normal platelet levels.. If you have any further concerns please do not hesitate to contact us by message, phone or making an appointment.  Have a good day   Dr Bobby ROSE

## 2020-11-11 NOTE — PATIENT INSTRUCTIONS
Physical done  Breast exam  Self check regularily   Mammogram due 5/2021 as dx imaging normal in 5/2020  Pelvic/ pap due next year  Work on advance directives  Labs today   Follow up with dermatology and ortho back as needed  Get us records from recent visit with [facility]  Prior records a bit confusing as some state had psoriatic arthritis and usually it is not a diagnosis that goes away.  But recently ortho and rheum said its not psoriatic arthritis & maybe it was just localized skin/ nail psoriasis  See back doctor for spinal stenosis and continue gabapentin with them if no side effects  Can refer to rheumatology to evaluate further if needed as well as a new dermatologist if needed      Preventive Health Recommendations  Female Ages 50 - 64    Yearly exam: See your health care provider every year in order to  o Review health changes.   o Discuss preventive care.    o Review your medicines if your doctor has prescribed any.      Get a Pap test every three years (unless you have an abnormal result and your provider advises testing more often).    If you get Pap tests with HPV test, you only need to test every 5 years, unless you have an abnormal result.     You do not need a Pap test if your uterus was removed (hysterectomy) and you have not had cancer.    You should be tested each year for STDs (sexually transmitted diseases) if you're at risk.     Have a mammogram every 1 to 2 years.    Have a colonoscopy at age 50, or have a yearly FIT test (stool test). These exams screen for colon cancer.      Have a cholesterol test every 5 years, or more often if advised.    Have a diabetes test (fasting glucose) every three years. If you are at risk for diabetes, you should have this test more often.     If you are at risk for osteoporosis (brittle bone disease), think about having a bone density scan (DEXA).    Shots: Get a flu shot each year. Get a tetanus shot every 10 years.    Nutrition:     Eat at least 5 servings of  fruits and vegetables each day.    Eat whole-grain bread, whole-wheat pasta and brown rice instead of white grains and rice.    Get adequate Calcium and Vitamin D.     Lifestyle    Exercise at least 150 minutes a week (30 minutes a day, 5 days a week). This will help you control your weight and prevent disease.    Limit alcohol to one drink per day.    No smoking.     Wear sunscreen to prevent skin cancer.     See your dentist every six months for an exam and cleaning.    See your eye doctor every 1 to 2 years.

## 2020-11-12 DIAGNOSIS — Z00.00 ROUTINE HISTORY AND PHYSICAL EXAMINATION OF ADULT: ICD-10-CM

## 2020-11-12 DIAGNOSIS — Z12.11 SCREEN FOR COLON CANCER: ICD-10-CM

## 2020-11-12 LAB — HIV 1+2 AB+HIV1 P24 AG SERPL QL IA: NONREACTIVE

## 2020-11-12 PROCEDURE — 82274 ASSAY TEST FOR BLOOD FECAL: CPT | Performed by: FAMILY MEDICINE

## 2020-11-12 NOTE — RESULT ENCOUNTER NOTE
Tram Ms. Downey,  Your results came back and are within acceptable limits. You do not have diabetes. -A1C (diabetic test) is normal and indicates that your blood sugar has been in a normal range the last 3 months.  -Hepatitis C antibody screen test shows no signs of a previous hepatitis C infection.. If you have any further concerns please do not hesitate to contact us by message, phone or making an appointment.  Have a good day   Dr Bobby ROSE

## 2020-11-12 NOTE — RESULT ENCOUNTER NOTE
Tram MckeonRoberto Shayan,  Your results came back and are within acceptable limits. -HIV test is normal.. If you have any further concerns please do not hesitate to contact us by message, phone or making an appointment.  Have a good day   Dr Bobby ROSE

## 2020-11-15 ENCOUNTER — MYC REFILL (OUTPATIENT)
Dept: ORTHOPEDICS | Facility: CLINIC | Age: 60
End: 2020-11-15

## 2020-11-15 DIAGNOSIS — M54.41 ACUTE RIGHT-SIDED LOW BACK PAIN WITH RIGHT-SIDED SCIATICA: ICD-10-CM

## 2020-11-16 LAB — HEMOCCULT STL QL IA: NEGATIVE

## 2020-11-16 RX ORDER — GABAPENTIN 300 MG/1
300 CAPSULE ORAL 3 TIMES DAILY
Qty: 90 CAPSULE | Refills: 0 | Status: SHIPPED | OUTPATIENT
Start: 2020-11-16 | End: 2020-12-20

## 2020-11-17 NOTE — RESULT ENCOUNTER NOTE
Tram Ms. Shayan,  Your results came back and are within acceptable limits. -FIT test (screening test for colon cancer) was normal. ADVISE: rechecking this test in 1 year.. If you have any further concerns please do not hesitate to contact us by message, phone or making an appointment.  Have a good day   Dr Bobby ROSE

## 2021-01-15 ENCOUNTER — ANCILLARY PROCEDURE (OUTPATIENT)
Dept: GENERAL RADIOLOGY | Facility: CLINIC | Age: 61
End: 2021-01-15
Attending: FAMILY MEDICINE
Payer: COMMERCIAL

## 2021-01-15 ENCOUNTER — ANCILLARY PROCEDURE (OUTPATIENT)
Dept: MRI IMAGING | Facility: CLINIC | Age: 61
End: 2021-01-15
Attending: FAMILY MEDICINE
Payer: COMMERCIAL

## 2021-01-15 ENCOUNTER — OFFICE VISIT (OUTPATIENT)
Dept: ORTHOPEDICS | Facility: CLINIC | Age: 61
End: 2021-01-15
Payer: COMMERCIAL

## 2021-01-15 VITALS — BODY MASS INDEX: 23.9 KG/M2 | HEIGHT: 64 IN | WEIGHT: 140 LBS

## 2021-01-15 DIAGNOSIS — M25.442 SWELLING OF FINGER JOINT OF LEFT HAND: ICD-10-CM

## 2021-01-15 DIAGNOSIS — M79.642 LEFT HAND PAIN: Primary | ICD-10-CM

## 2021-01-15 DIAGNOSIS — M25.442 SWELLING OF FINGER JOINT OF LEFT HAND: Primary | ICD-10-CM

## 2021-01-15 LAB — RADIOLOGIST FLAGS: NORMAL

## 2021-01-15 PROCEDURE — 73221 MRI JOINT UPR EXTREM W/O DYE: CPT | Mod: LT | Performed by: RADIOLOGY

## 2021-01-15 PROCEDURE — 99214 OFFICE O/P EST MOD 30 MIN: CPT | Performed by: FAMILY MEDICINE

## 2021-01-15 PROCEDURE — 73130 X-RAY EXAM OF HAND: CPT | Mod: LT | Performed by: RADIOLOGY

## 2021-01-15 ASSESSMENT — MIFFLIN-ST. JEOR: SCORE: 1200.04

## 2021-01-15 NOTE — PROGRESS NOTES
SPORTS & ORTHOPEDIC WALK-IN VISIT 1/15/2021    Primary Care Physician: Dr. Gross    In September 2020 she started to have pain in her left middle finger. She saw Rheumatology and Dermatology and was given 2 types of antibiotics. She has pain, redness, warm near the DIP of middle finger. She was originally able to bend the DIP and now she is unable to. She has also noticed a similar issue developing on her 5th finger.     Reason for visit:     What part of your body is injured / painful?  left hand    What caused the injury /pain? No inciting event     How long ago did your injury occur or pain begin? Sep. 2020    What are your most bothersome symptoms? Pain and Swelling    How would you characterize your symptom?  sharp    What makes your symptoms better? Nothing    What makes your symptoms worse? Movement    Have you been previously seen for this problem? Yes, Rheum and Derm    Medical History:    Any recent changes to your medical history? No    Any new medication prescribed since last visit? No    Have you had surgery on this body part before? No    Social History:    Occupation:     Handedness: Right    Exercise: Most days/week    Review of Systems:    Do you have fever, chills, weight loss? No    Do you have any vision problems? No    Do you have any chest pain or edema? No    Do you have any shortness of breath or wheezing?  No    Do you have stomach problems? No    Do you have any numbness or focal weakness? No    Do you have diabetes? No    Do you have problems with bleeding or clotting? No    Do you have an rashes or other skin lesions? No

## 2021-01-15 NOTE — PROGRESS NOTES
"CHIEF COMPLAINT:  Pain of the Left Hand       HISTORY OF PRESENT ILLNESS  Ms. Downey is a pleasant 60 year old year old female who presents to clinic today with pain of left finger.  Willa explains that left finger pain pain and swelling.    In September 2020 she started to have pain in her left middle finger. She saw Rheumatology and started on keflex for possible paronychia.  She then was referred to Dermatology and started on ciproflocacin for possible underlying pseudomonal nail infection with suspicion for psoriatic arthritis at joint. She was then referred on to rheumatology on October 13, told that it was possibly infected.       She has had pain, redness, warm near the DIP of middle finger since September. Nail had been \"green\" subungual but resolved after ABx. Onycholysis flared in this finger and nail avulsed.  She was originally able to bend the DIP and now she is unable to. She has also noticed a similar issue developing on her 5th finger.      She has a history of psoriasis and is on methotrexate. Dx with psoriatic arthritis as well in 2019, hx of psoriateic arthritis involving fourth DIP and fingernail of right hand.  Previous right finger DIP swelling and pain treated with metotrexate and TAC injection to \"  nail bed\".    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 Additonal history.     Reason for visit:     What part of your body is injured / painful?  left hand    What caused the injury /pain? No inciting event     How long ago did your injury occur or pain begin? Sep. 2020    What are your most bothersome symptoms? Pain and Swelling    How would you characterize your symptom?  sharp    What makes your symptoms better? Nothing    What makes your symptoms worse? Movement    Have you been previously seen for this problem? Yes, Rheum and Derm, hand surgeon at [facility].          Additional history: as documented    MEDICAL HISTORY  Patient Active Problem List   Diagnosis     Hyperlipidemia     " "Low-tension glaucoma of both eyes, severe stage     Myopia     Osteoarthritis     Screening for malignant neoplasm of cervix     Psoriasis of nail     Spinal stenosis of lumbar region, unspecified whether neurogenic claudication present     Neuroforaminal stenosis of lumbar spine     Bilateral low back pain with right-sided sciatica, unspecified chronicity       Current Outpatient Medications   Medication Sig Dispense Refill     CALCIUM CARBONATE-VIT D-MIN PO Take 1 tablet by mouth       clobetasol (TEMOVATE) 0.05 % external ointment Apply to affected area tid prn.       gabapentin (NEURONTIN) 300 MG capsule Take 1 capsule (300 mg) by mouth 3 times daily 90 capsule 3     loratadine (CLARITIN) 10 MG capsule Take 1 tablet by mouth       mometasone (ELOCON) 0.1 % external ointment Apply topically daily       Multiple Vitamins-Minerals (MULTIVITAMIN ADULTS 50+) TABS          Allergies   Allergen Reactions     Adhesive Tape Blisters, Itching and Swelling     Dust Mites      Latex Blisters, Itching, Other (See Comments) and Swelling     Skin sensitivity       Morphine Sulfate (Concentrate) Itching     Other reaction(s): Chills  heart palpatations     Seasonal Allergies      Codeine Palpitations     chills       Family History   Problem Relation Age of Onset     Hyperlipidemia Mother         My mother had high cholesterol.     Osteoporosis Mother      Hypertension Mother      Stomach Cancer Father      Other Cancer Father         My father  of stomach cancer.     Cataracts Maternal Grandmother      Breast Cancer Maternal Aunt      Breast Cancer Other         My maternal aunt  of breast cancer in her 70s.       Additional medical/Social/Surgical histories reviewed in [facility] and updated as appropriate.     REVIEW OF SYSTEMS (1/15/2021)  A 10-point review of systems was obtained and is negative except for as noted in the HPI.      PHYSICAL EXAM  Ht 1.626 m (5' 4\")   Wt 63.5 kg (140 lb)   BMI 24.03 kg/m      General  - " normal appearance, in no obvious distress  Musculoskeletal -Left third finger  - inspection: no atrophy, normal joint alignment, no swelling  - palpation: no bony or soft tissue tenderness, no tenderness at the anatomical snuffbox  - ROM:  MCP 90 deg flexion   0 deg extension    deg flexion   0 deg extension   DIP Limited ROM flexion/ extension  - strength: 5/5  strength, 5/5 wrist abduction, 5/5 flexion, extension, pronation, supination, adduction  - special tests:  (-) varus  (-) valgus  Neuro  - no numbness, no motor deficit, grossly normal coordination, normal muscle tone  Skin  -Dytrophic nail of third and fifth digits. Erythematous and circumferential swelling DIP joint extending into nail bed.  Psych  - interactive, appropriate, normal mood and affect    IMAGING :XR hand left 3V Final results and radiologist's interpretation, available in the [facility] health record. Images were reviewed with the patient/family members in the office today. My personal interpretation of the performed imaging is no significant joint space narrowing or destruction of third DIP joint. 1st CMC osteoarthritis present.     ASSESSMENT & PLAN  Ms. Downey is a 60 year old year old female who presents to clinic today with pain and swelling of left third digit, additional onycholysis of this digit recently and on contralateral hand in the past.  Suspect psoriatic arthritis, ddx also includes septic arthritis, tenosynovitis.     Reviewed D[facility]atology [facility]ists note  Reviewed FP notes  XR independently interpreted.    Diagnosis:   1. Pain and swelling of left third finger  2. CMC osteoarthritis of left first digit    -S/p antibiotics x 2 without resolution  -No improvement despite rheumatology consultation, dermatology consultation and previous hand surgery visit at [facility]  -MRI left finger ordered  -Follow up after MRI    It was a pleasure seeing Willa today.    Dimitri Zhou DO, CAQSM  Primary Care Sports Medicine    40 minutes on  date of the encounter doing chart review, history and examination, independent imaging review, documentation, and additional activities noted above.

## 2021-01-15 NOTE — LETTER
1/15/2021         RE: Willa Downey  [address]  [facility] [address]        Dear Colleague,    Thank you for referring your patient, Willa Downey, to the [facility] [place]. Please see a copy of my visit note below.          SPORTS & ORTHOPEDIC WALK-IN VISIT 1/15/2021    Primary Care Physician: Dr. Gross    In September 2020 she started to have pain in her left middle finger. She saw Rheumatology and Dermatology and was given 2 types of antibiotics. She has pain, redness, warm near the DIP of middle finger. She was originally able to bend the DIP and now she is unable to. She has also noticed a similar issue developing on her 5th finger.     Reason for visit:     What part of your body is injured / painful?  left hand    What caused the injury /pain? No inciting event     How long ago did your injury occur or pain begin? Sep. 2020    What are your most bothersome symptoms? Pain and Swelling    How would you characterize your symptom?  sharp    What makes your symptoms better? Nothing    What makes your symptoms worse? Movement    Have you been previously seen for this problem? Yes, Rheum and Derm    Medical History:    Any recent changes to your medical history? No    Any new medication prescribed since last visit? No    Have you had surgery on this body part before? No    Social History:    Occupation:     Handedness: Right    Exercise: Most days/week    Review of Systems:    Do you have fever, chills, weight loss? No    Do you have any vision problems? No    Do you have any chest pain or edema? No    Do you have any shortness of breath or wheezing?  No    Do you have stomach problems? No    Do you have any numbness or focal weakness? No    Do you have diabetes? No    Do you have problems with bleeding or clotting? No    Do you have an rashes or other skin lesions? No           CHIEF COMPLAINT:  Pain of the Left Hand       HISTORY OF PRESENT  "ILLNESS  Ms. Downey is a pleasant 60 year old year old female who presents to clinic today with pain of left finger.  Willa explains that left finger pain pain and swelling.    In September 2020 she started to have pain in her left middle finger. She saw Rheumatology and started on keflex for possible paronychia.  She then was referred to Dermatology and started on ciproflocacin for possible underlying pseudomonal nail infection with suspicion for psoriatic arthritis at joint. She was then referred on to rheumatology on October 13, told that it was possibly infected.       She has had pain, redness, warm near the DIP of middle finger since September. Nail had been \"green\" subungual but resolved after ABx. Onycholysis flared in this finger and nail avulsed.  She was originally able to bend the DIP and now she is unable to. She has also noticed a similar issue developing on her 5th finger.      She has a history of psoriasis and is on methotrexate. Dx with psoriatic arthritis as well in 2019, hx of psoriateic arthritis involving fourth DIP and fingernail of right hand.  Previous right finger DIP swelling and pain treated with metotrexate and TAC injection to \"  nail bed\".    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 Additonal history.     Reason for visit:     What part of your body is injured / painful?  left hand    What caused the injury /pain? No inciting event     How long ago did your injury occur or pain begin? Sep. 2020    What are your most bothersome symptoms? Pain and Swelling    How would you characterize your symptom?  sharp    What makes your symptoms better? Nothing    What makes your symptoms worse? Movement    Have you been previously seen for this problem? Yes, Rheum and Derm, hand surgeon at [facility].          Additional history: as documented    MEDICAL HISTORY  Patient Active Problem List   Diagnosis     Hyperlipidemia     Low-tension glaucoma of both eyes, severe stage     Myopia     " "Osteoarthritis     Screening for malignant neoplasm of cervix     Psoriasis of nail     Spinal stenosis of lumbar region, unspecified whether neurogenic claudication present     Neuroforaminal stenosis of lumbar spine     Bilateral low back pain with right-sided sciatica, unspecified chronicity       Current Outpatient Medications   Medication Sig Dispense Refill     CALCIUM CARBONATE-VIT D-MIN PO Take 1 tablet by mouth       clobetasol (TEMOVATE) 0.05 % external ointment Apply to affected area tid prn.       gabapentin (NEURONTIN) 300 MG capsule Take 1 capsule (300 mg) by mouth 3 times daily 90 capsule 3     loratadine (CLARITIN) 10 MG capsule Take 1 tablet by mouth       mometasone (ELOCON) 0.1 % external ointment Apply topically daily       Multiple Vitamins-Minerals (MULTIVITAMIN ADULTS 50+) TABS          Allergies   Allergen Reactions     Adhesive Tape Blisters, Itching and Swelling     Dust Mites      Latex Blisters, Itching, Other (See Comments) and Swelling     Skin sensitivity       Morphine Sulfate (Concentrate) Itching     Other reaction(s): Chills  heart palpatations     Seasonal Allergies      Codeine Palpitations     chills       Family History   Problem Relation Age of Onset     Hyperlipidemia Mother         My mother had high cholesterol.     Osteoporosis Mother      Hypertension Mother      Stomach Cancer Father      Other Cancer Father         My father  of stomach cancer.     Cataracts Maternal Grandmother      Breast Cancer Maternal Aunt      Breast Cancer Other         My maternal aunt  of breast cancer in her 70s.       Additional medical/Social/Surgical histories reviewed in [facility] and updated as appropriate.     REVIEW OF SYSTEMS (1/15/2021)  A 10-point review of systems was obtained and is negative except for as noted in the HPI.      PHYSICAL EXAM  Ht 1.626 m (5' 4\")   Wt 63.5 kg (140 lb)   BMI 24.03 kg/m      General  - normal appearance, in no obvious distress  Musculoskeletal " -Left third finger  - inspection: no atrophy, normal joint alignment, no swelling  - palpation: no bony or soft tissue tenderness, no tenderness at the anatomical snuffbox  - ROM:  MCP 90 deg flexion   0 deg extension    deg flexion   0 deg extension   DIP Limited ROM flexion/ extension  - strength: 5/5  strength, 5/5 wrist abduction, 5/5 flexion, extension, pronation, supination, adduction  - special tests:  (-) varus  (-) valgus  Neuro  - no numbness, no motor deficit, grossly normal coordination, normal muscle tone  Skin  -Dytrophic nail of third and fifth digits. Erythematous and circumferential swelling DIP joint extending into nail bed.  Psych  - interactive, appropriate, normal mood and affect    IMAGING :XR hand left 3V Final results and radiologist's interpretation, available in the [facility] health record. Images were reviewed with the patient/family members in the office today. My personal interpretation of the performed imaging is no significant joint space narrowing or destruction of third DIP joint. 1st CMC osteoarthritis present.     ASSESSMENT & PLAN  Ms. Downey is a 60 year old year old female who presents to clinic today with pain and swelling of left third digit, additional onycholysis of this digit recently and on contralateral hand in the past.  Suspect psoriatic arthritis, ddx also includes septic arthritis, tenosynovitis.     Reviewed Dermatology Specialists note  Reviewed FP notes  XR independently interpreted.    Diagnosis:   1. Pain and swelling of left third finger  2. CMC osteoarthritis of left first digit    -S/p antibiotics x 2 without resolution  -No improvement despite rheumatology consultation, dermatology consultation and previous hand surgery visit at [facility]  -MRI left finger ordered  -Follow up after MRI    It was a pleasure seeing Willa today.    Dimitri Zhou DO, CAQSM  Primary Care Sports Medicine    40 minutes on date of the encounter doing chart review, history and  examination, independent imaging review, documentation, and additional activities noted above.

## 2021-01-18 ENCOUNTER — VIRTUAL VISIT (OUTPATIENT)
Dept: ORTHOPEDICS | Facility: CLINIC | Age: 61
End: 2021-01-18
Payer: COMMERCIAL

## 2021-01-18 DIAGNOSIS — M25.442 SWELLING OF FINGER JOINT OF LEFT HAND: Primary | ICD-10-CM

## 2021-01-18 PROCEDURE — 99215 OFFICE O/P EST HI 40 MIN: CPT | Mod: 95 | Performed by: FAMILY MEDICINE

## 2021-01-18 NOTE — PROGRESS NOTES
SPORTS & ORTHOPEDIC WALK-IN FOLLOW-UP VISIT 1/18/2021    Mentions Sx are about the same.    Interval History:     Follow up reason: MRI f/u    Date of injury/onset: 9/2020    Date last seen: 1/15/21    Following Therapeutic Plan: Yes     Pain: Unchanged    Function: Unchanged         Willa is a 60 year old who is being evaluated via a billable video visit.      How would you like to obtain your AVS? MyChart  If the video visit is dropped, the invitation should be resent by: Send to e-mail at: [email or web address]  Will anyone else be joining your video visit? No    ESTABLISHED PATIENT FOLLOW-UP VIRTUAL:  Follow Up (MRI f/u)       This encounter was conducted via telephone in lieu of face-to-face encounter due to precautions implemented during COVID-19 pandemic.     HISTORY OF PRESENT ILLNESS  Ms. Downey is a pleasant 60 year old year old female who presents virtually today for follow-up of MRI of left finger.    Date of onset: September 2020  Date last seen: 1/15/21  Following Therapeutic Plan: Yes  Pain: Unchanged  Function: Unchanged  Interval History:     Again patient has been seen by multiple providers for this left finger swelling.    Rheumatology visit in Sept - Concern for paronychia, keflex course x 10 days    Dermatology on 10/2/20 and 10/12/20 with diagnosis of pustular psoriasis and cellulitis, given clobetasol, rx ciprofloxacin.    Different rheumatology group - Labs ESR, CRP negative per PCP notes. Concern regarding dactylitis and recommend hand surgery / MRI.     Orthopedic hand surgery [facility] - 10/22/20. No MRI ordered. Told this was osteoarthritis per patient. We dont have records.      Additional medical/Social/Surgical histories reviewed in [facility] and updated as appropriate.    PHYSICAL EXAMINATION  General Appearance: Well appearing, alert, in no acute distress, well-hydrated, and well nourished  ENT: Pupils equal, round, no conjunctival injection.  No lid  lag  Cardiovascular: no signs of upper extremity edema  Respiratory: no respiratory distress, no audible wheezing, no labored breathing, symmetric thoracic excursion  Psychiatric: mood and affect are appropriate, patient is oriented to time, place and person  Musculoskeletal: Left third digit with swelling, erythema and loss ROM at DIPJ  Skin: No rashes, lesions, or ecchymosis present      IMAGING :   Impression:  1a. Correlating to the marker at the third digit interphalangeal  joint, there is a focal erosion along the volar aspect of the distal  phalangeal base with intense bone marrow edema and abnormal T1 marrow  signal. Findings are concerning for osteomyelitis and infection should  be ruled out. Also on the differential is sequelae of long-standing  active inflammatory arthropathy (including psoriatic arthritis given  patient's clinical history) where marginal erosions and marrow signal  abnormality are expected though the degree of T1 marrow replacement is  out of proportion to typical findings.   b. Subtle bone marrow edema in the middle phalanx without T1 marrow  signal abnormality, this is favored to represent reactive edema.  c. Joint effusion at the third digit interphalangeal joint,  potentially infectious or inflammatory. Consider aspiration for  definitive diagnosis.  d. The partially visualized fifth digit shows edema within the distal  phalanx and head of the middle phalanx with regional soft tissue  edema. Given predominant findings in the third digit, similar changes  in the fifth digit could support a polyarticular inflammatory  arthropathy. Correlate clinically.  2. Tenosynovitis of the third digit flexor and extensor tendons. There  is also tendinosis with high-grade (near full-thickness) tearing of  the extensor tendon/extensor saul at the distal phalangeal base with  slight proximal retraction of the residual fibers.  3. Diffusely thickened, edematous ulnar and radial collateral  ligaments,  likely combination of sprain and reactive edema.  4. Extensive soft tissue edema centered about the interphalangeal  joint.        [Consider Follow Up: 1. Concern for osteomyelitis of the third digit  distal phalanx. 2. Bone marrow edema in the partially visualized fifth  digit, potentially representing polyarticular inflammatory        This report will be copied to the [facility] to ensure a  provider acknowledges the finding.      I have personally reviewed the examination and initial interpretation  and I agree with the findings.     JUTTA ELLERMANN, MD    MRI Reviewed     ASSESSMENT & PLAN  Ms. Downey is a 60 year old year old female who presents virtually today with Follow Up (MRI f/u)    Diagnosis:  Pain and swelling of left third digit  History of psoriatic arthritis    Concern for osteomyelitis at this time given appearance of bony erosion/edema and tenosynovitis.  Patient is well currently and will immediately report for medical attention if this changes including f/c/n/v.    -Case discussed with Hand Surgery team, Dr. Kim and recommended labs/follow up in hand clinic  -Referral to our hand surgery team placed  -Repeat ESR, CRP, CBC prior to hand visit  -Follow up PRN    It was a pleasure seeing Willa.    Dimitri Zhou DO, [facility]  Primary Care Sports Medicine  [facility]      Video-Visit Details    Video Start Time: 0848  Type of service:  Video Visit    Video End Time:0902    Originating Location (pt. Location): Home    Distant Location (provider location):  [facility] [place]     Platform used for Video Visit: [employer]     40 minutes on date of the encounter doing chart review, history and examination, independent imaging review,discussing with consultant, documentation, and additional activities noted above.

## 2021-01-18 NOTE — LETTER
1/18/2021         RE: Willa Downey  [address]  [place] [address]        Dear Colleague,    Thank you for referring your patient, Willa Downey, to the [facility] [place]. Please see a copy of my visit note below.          SPORTS & ORTHOPEDIC WALK-IN FOLLOW-UP VISIT 1/18/2021    Mentions Sx are about the same.    Interval History:     Follow up reason: MRI f/u    Date of injury/onset: 9/2020    Date last seen: 1/15/21    Following Therapeutic Plan: Yes     Pain: Unchanged    Function: Unchanged         Willa is a 60 year old who is being evaluated via a billable video visit.      How would you like to obtain your AVS? MyChart  If the video visit is dropped, the invitation should be resent by: Send to e-mail at: [email or web address]  Will anyone else be joining your video visit? No    ESTABLISHED PATIENT FOLLOW-UP VIRTUAL:  Follow Up (MRI f/u)       This encounter was conducted via telephone in lieu of face-to-face encounter due to precautions implemented during COVID-19 pandemic.     HISTORY OF PRESENT ILLNESS  Ms. Downey is a pleasant 60 year old year old female who presents virtually today for follow-up of MRI of left finger.    Date of onset: September 2020  Date last seen: 1/15/21  Following Therapeutic Plan: Yes  Pain: Unchanged  Function: Unchanged  Interval History:     Again patient has been seen by multiple providers for this left finger swelling.    Rheumatology visit in Sept - Concern for paronychia, keflex course x 10 days    Dermatology on 10/2/20 and 10/12/20 with diagnosis of pustular psoriasis and cellulitis, given clobetasol, rx ciprofloxacin.    Different rheumatology group - Labs ESR, CRP negative per PCP notes. Concern regarding dactylitis and recommend hand surgery / MRI.     Orthopedic hand surgery [facility] - 10/22/20. No MRI ordered. Told this was osteoarthritis per patient. We dont have records.      Additional  medical/Social/Surgical histories reviewed in [facility] and updated as appropriate.    PHYSICAL EXAMINATION  General Appearance: Well appearing, alert, in no acute distress, well-hydrated, and well nourished  ENT: Pupils equal, round, no conjunctival injection.  No lid lag  Cardiovascular: no signs of upper extremity edema  Respiratory: no respiratory distress, no audible wheezing, no labored breathing, symmetric thoracic excursion  Psychiatric: mood and affect are appropriate, patient is oriented to time, place and person  Musculoskeletal: Left third digit with swelling, erythema and loss ROM at DIPJ  Skin: No rashes, lesions, or ecchymosis present      IMAGING :   Impression:  1a. Correlating to the marker at the third digit interphalangeal  joint, there is a focal erosion along the volar aspect of the distal  phalangeal base with intense bone marrow edema and abnormal T1 marrow  signal. Findings are concerning for osteomyelitis and infection should  be ruled out. Also on the differential is sequelae of long-standing  active inflammatory arthropathy (including psoriatic arthritis given  patient's clinical history) where marginal erosions and marrow signal  abnormality are expected though the degree of T1 marrow replacement is  out of proportion to typical findings.   b. Subtle bone marrow edema in the middle phalanx without T1 marrow  signal abnormality, this is favored to represent reactive edema.  c. Joint effusion at the third digit interphalangeal joint,  potentially infectious or inflammatory. Consider aspiration for  definitive diagnosis.  d. The partially visualized fifth digit shows edema within the distal  phalanx and head of the middle phalanx with regional soft tissue  edema. Given predominant findings in the third digit, similar changes  in the fifth digit could support a polyarticular inflammatory  arthropathy. Correlate clinically.  2. Tenosynovitis of the third digit flexor and extensor tendons.  There  is also tendinosis with high-grade (near full-thickness) tearing of  the extensor tendon/extensor saul at the distal phalangeal base with  slight proximal retraction of the residual fibers.  3. Diffusely thickened, edematous ulnar and radial collateral  ligaments, likely combination of sprain and reactive edema.  4. Extensive soft tissue edema centered about the interphalangeal  joint.        [Consider Follow Up: 1. Concern for osteomyelitis of the third digit  distal phalanx. 2. Bone marrow edema in the partially visualized fifth  digit, potentially representing polyarticular inflammatory        This report will be copied to the [facility] to ensure a  provider acknowledges the finding.      I have personally reviewed the examination and initial interpretation  and I agree with the findings.     JUTTA ELLERMANN, MD    MRI Reviewed     ASSESSMENT & PLAN  Ms. Downey is a 60 year old year old female who presents virtually today with Follow Up (MRI f/u)    Diagnosis:  Pain and swelling of left third digit  History of psoriatic arthritis    Concern for osteomyelitis at this time given appearance of bony erosion/edema and tenosynovitis.  Patient is well currently and will immediately report for medical attention if this changes including f/c/n/v.    -Case discussed with Hand Surgery team, Dr. Kim and recommended labs/follow up in hand clinic  -Referral to our hand surgery team placed  -Repeat ESR, CRP, CBC prior to hand visit  -Follow up PRN    It was a pleasure seeing Willa.    Dimitri Zhou DO, [facility]  Primary Care Sports Medicine  [facility]      Video-Visit Details    Video Start Time: 0848  Type of service:  Video Visit    Video End Time:0902    Originating Location (pt. Location): Home    Distant Location (provider location):  [facility]     Platform used for Video Visit: [employer]     40 minutes on date of the encounter doing chart  review, history and examination, independent imaging review,discussing with consultant, documentation, and additional activities noted above.

## 2021-02-02 DIAGNOSIS — M25.442 SWELLING OF FINGER JOINT OF LEFT HAND: ICD-10-CM

## 2021-02-02 LAB
BASOPHILS # BLD AUTO: 0 10E9/L (ref 0–0.2)
BASOPHILS NFR BLD AUTO: 0.7 %
CRP SERPL-MCNC: <2.9 MG/L (ref 0–8)
DIFFERENTIAL METHOD BLD: NORMAL
EOSINOPHIL # BLD AUTO: 0.2 10E9/L (ref 0–0.7)
EOSINOPHIL NFR BLD AUTO: 3.8 %
ERYTHROCYTE [DISTWIDTH] IN BLOOD BY AUTOMATED COUNT: 13.2 % (ref 10–15)
ERYTHROCYTE [SEDIMENTATION RATE] IN BLOOD BY WESTERGREN METHOD: 9 MM/H (ref 0–30)
HCT VFR BLD AUTO: 39.4 % (ref 35–47)
HGB BLD-MCNC: 12.8 G/DL (ref 11.7–15.7)
IMM GRANULOCYTES # BLD: 0 10E9/L (ref 0–0.4)
IMM GRANULOCYTES NFR BLD: 0.2 %
LYMPHOCYTES # BLD AUTO: 2.5 10E9/L (ref 0.8–5.3)
LYMPHOCYTES NFR BLD AUTO: 40.6 %
MCH RBC QN AUTO: 28.8 PG (ref 26.5–33)
MCHC RBC AUTO-ENTMCNC: 32.5 G/DL (ref 31.5–36.5)
MCV RBC AUTO: 89 FL (ref 78–100)
MONOCYTES # BLD AUTO: 0.5 10E9/L (ref 0–1.3)
MONOCYTES NFR BLD AUTO: 7.4 %
NEUTROPHILS # BLD AUTO: 2.9 10E9/L (ref 1.6–8.3)
NEUTROPHILS NFR BLD AUTO: 47.3 %
NRBC # BLD AUTO: 0 10*3/UL
NRBC BLD AUTO-RTO: 0 /100
PLATELET # BLD AUTO: 260 10E9/L (ref 150–450)
RBC # BLD AUTO: 4.45 10E12/L (ref 3.8–5.2)
WBC # BLD AUTO: 6.1 10E9/L (ref 4–11)

## 2021-02-02 PROCEDURE — 36415 COLL VENOUS BLD VENIPUNCTURE: CPT | Performed by: PATHOLOGY

## 2021-02-02 PROCEDURE — 85652 RBC SED RATE AUTOMATED: CPT | Performed by: PATHOLOGY

## 2021-02-02 PROCEDURE — 85025 COMPLETE CBC W/AUTO DIFF WBC: CPT | Performed by: PATHOLOGY

## 2021-02-02 PROCEDURE — 86140 C-REACTIVE PROTEIN: CPT | Performed by: PATHOLOGY

## 2021-02-03 ENCOUNTER — OFFICE VISIT (OUTPATIENT)
Dept: ORTHOPEDICS | Facility: CLINIC | Age: 61
End: 2021-02-03
Payer: COMMERCIAL

## 2021-02-03 ENCOUNTER — ANCILLARY PROCEDURE (OUTPATIENT)
Dept: GENERAL RADIOLOGY | Facility: CLINIC | Age: 61
End: 2021-02-03
Attending: STUDENT IN AN ORGANIZED HEALTH CARE EDUCATION/TRAINING PROGRAM
Payer: COMMERCIAL

## 2021-02-03 VITALS
HEIGHT: 64 IN | DIASTOLIC BLOOD PRESSURE: 88 MMHG | BODY MASS INDEX: 23.9 KG/M2 | SYSTOLIC BLOOD PRESSURE: 140 MMHG | WEIGHT: 140 LBS

## 2021-02-03 DIAGNOSIS — M25.442 SWELLING OF FINGER JOINT OF LEFT HAND: ICD-10-CM

## 2021-02-03 DIAGNOSIS — M46.90 DACTYLITIS DUE TO SPONDYLOARTHRITIC DISORDER (H): ICD-10-CM

## 2021-02-03 DIAGNOSIS — L40.50 PSORIATIC ARTHRITIS (H): Primary | ICD-10-CM

## 2021-02-03 PROCEDURE — 73130 X-RAY EXAM OF HAND: CPT | Mod: LT | Performed by: RADIOLOGY

## 2021-02-03 PROCEDURE — 99203 OFFICE O/P NEW LOW 30 MIN: CPT | Performed by: STUDENT IN AN ORGANIZED HEALTH CARE EDUCATION/TRAINING PROGRAM

## 2021-02-03 ASSESSMENT — MIFFLIN-ST. JEOR: SCORE: 1200.04

## 2021-02-03 NOTE — PROGRESS NOTES
Hand Surgery History & Physical    REFERRING PHYSICIAN: Dimitri Zhou   PRIMARY CARE PHYSICIAN: Lynsey Gross           Chief Complaint:   Pain of the Left Hand      History of Present Illness:  Symptom Profile Including: location of symptoms, onset, severity, exacerbating/alleviating factors, previous treatments:        Willa Downey is a 60 year old female who presents for evaluation of left middle, ring and small fingers and the right ring finger.     She states that she has had swelling and redness of the involved digits since September 2020.  She is never had any drainage from the digits.  The left hand became symptomatic in September however she does state the right hand ring finger started in 2018 or 2019.  These changes of swelling and erythema are also associated with significant nail deformity.  She denies prior injury.  She has been treated by primary care physicians as well as dermatologist with antibiotics in the past.  She was treated with ciprofloxacin and cephalexin and neither of these relieved her symptoms.  She was also treated with steroids including clobetasol and mometasone and these did not help her symptoms.  She was referred to me by Dr. Zhou due to concern for an MRI that was concerning for left middle finger distal phalanx osteomyelitis versus inflammatory arthropathy.  She also states that she has been treated with methotrexate in the past but this did not help.  She also states that she has had a prior diagnosis of psoriasis but she is currently not taking any medication for this.  She denies systemic signs of infection including fever or chills.         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    Past Medical History:   Diagnosis Date     Nuclear senile cataract of both eyes 8/16/2018     Psoriatic arthropathy (H) 10/19/2020            Past Surgical History:     Past Surgical History:   Procedure Laterality Date     BIOPSY   "2019    the right breast tissue(benign)     EYE SURGERY      3 surgeries for glaucoma treatment            Social History:     Works as a homemaker.    Social History     Tobacco Use     Smoking status: Never Smoker     Smokeless tobacco: Never Used   Substance Use Topics     Alcohol use: Never     Frequency: Never            Family History:     Family history of psoriasis    Family History   Problem Relation Age of Onset     Hyperlipidemia Mother         My mother had high cholesterol.     Osteoporosis Mother      Hypertension Mother      Stomach Cancer Father      Other Cancer Father         My father  of stomach cancer.     Cataracts Maternal Grandmother      Breast Cancer Maternal Aunt      Breast Cancer Other         My maternal aunt  of breast cancer in her 70s.            Allergies:     Allergies   Allergen Reactions     Adhesive Tape Blisters, Itching and Swelling     Dust Mites      Latex Blisters, Itching, Other (See Comments) and Swelling     Skin sensitivity       Morphine Sulfate (Concentrate) Itching     Other reaction(s): Chills  heart palpatations     Seasonal Allergies      Codeine Palpitations     chills            Medications:     Current Outpatient Medications   Medication     CALCIUM CARBONATE-VIT D-MIN PO     clobetasol (TEMOVATE) 0.05 % external ointment     gabapentin (NEURONTIN) 300 MG capsule     loratadine (CLARITIN) 10 MG capsule     mometasone (ELOCON) 0.1 % external ointment     Multiple Vitamins-Minerals (MULTIVITAMIN ADULTS 50+) TABS     No current facility-administered medications for this visit.              Review of Systems:     A 10 point ROS was performed and reviewed. Specific responses to these questions are noted at the end of the document.         Physical Exam:   Vitals: Ht 1.626 m (5' 4\")   Wt 63.5 kg (140 lb)   BMI 24.03 kg/m      Physical Exam Adult:  General: Well-nourished, alert, cooperative with exam and in no acute distress  HEENT: Atraumatic, " normocephalic, extraocular movements intact, moist mucous membranes, trachea midline  Pulmonary: Unlabored work of breathing, on room air  Cardiovascular: Warm and well-perfused extremities. 2+ radial pulses  Skin: Warm, intact without rashes to the upper extremities, head or neck   Gait: Normal  Neuro/psych: Oriented to time, place and person. Affect is appropriate    Musculoskeletal: A focused physical examination of the bilateral hands reveals:   Inspection-swelling and rubor of the left middle finger, ring finger, small finger and right ring finger including the entire distal phalanx, eponychial fold and ending at the DIP joint.  The eponychial fold is swollen.  The nails have deformity and there is a slight fungal appearance.  There is no drainage.  The tips of the fingers appear somewhat scaly with flaking skin.  The tips of the fingers are severely tender to palpation.  She does have full range of motion of all digits.  There is no pain in the DIP joint with axial loading.                        Imaging:   3 view X-ray of the left hand was independently interpreted by me. The results were discussed with the patient.  Findings include:    Very mild degenerative changes of the distal interphalangeal joints of the middle, ring, small fingers.  No erosive changes at the joint.  Lytic changes in the bone or evidence of osteolysis.      Other Diagnostic Tests:    MRI: Left middle finger was independently interpreted by me. The results were discussed with the patient.  Findings include:     Edema in the bones of the middle finger distal phalanx.  Joint effusion of the middle finger DIP joint.   Focal thickening of the radial and ulnar collateral ligaments.      Laboratory tests, ESR, CRP, CBC all within normal limits.               Assessment and Plan:   Assessment:  60 year old female with swelling and rubor of the left middle, ring, small fingers and right ring finger as well as nail deformity.  Diagnosis is not  certain however clinical exam is most consistent with cutaneous changes associated with psoriasis which are preceding degenerative DIP joint changes.  There may be superimposed onychomycosis of the nail plates.     There is no acute infection at this time and exam is not consistent with osteomyelitis or DIP joint septic arthritis.     Plan:  1. I counseled the patient that there are not certain about her diagnosis I do not think there is any need for acute surgical intervention.  I do not believe debridement for osteomyelitis is indicated because her exam is not consistent with osteomyelitis.  In addition surgery in areas of cutaneous manifestations of psoriasis are high risk for postoperative infection due to often superimposed staph aureus infection.  2. I have placed a referral to rheumatology to determine if there is any medication management that can be initiated for the patient.  3. I did discuss with some of my hand surgery colleagues if there is any surgical intervention that could be offered and I called and informed the patient about this but left a message.  I informed her on the voicemail that I could offer nail plate removal to determine if there is a superimposed fungal infection that needs treatment.  Other option would be treatment with a topical antifungal.  4. Follow-up as needed if there is any wish to proceed with surgical intervention of nail plate removal for culture/biopsy.      Kitty Mendez MD  Department of Orthopedic Surgery  Hand Surgery

## 2021-02-03 NOTE — PATIENT INSTRUCTIONS
Willa to follow up with Primary Care provider regarding elevated blood pressure.    1. Psoriatic arthritis (H)    2. Swelling of finger joint of left hand    3. 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 Mendez as needed    Call my office with any questions or concerns, [phone].

## 2021-02-03 NOTE — LETTER
2/3/2021         RE: Willa Downey  [address]  [facility] [address]        Dear Colleague,    Thank you for referring your patient, Willa Downey, to the [facility] [place]. Please see a copy of my visit note below.    Hand Surgery History & Physical    REFERRING PHYSICIAN: Dimitri Zhou   PRIMARY CARE PHYSICIAN: Lynsey Gross           Chief Complaint:   Pain of the Left Hand      History of Present Illness:  Symptom Profile Including: location of symptoms, onset, severity, exacerbating/alleviating factors, previous treatments:        Willa Downey is a 60 year old female who presents for evaluation of left middle, ring and small fingers and the right ring finger.     She states that she has had swelling and redness of the involved digits since September 2020.  She is never had any drainage from the digits.  The left hand became symptomatic in September however she does state the right hand ring finger started in 2018 or 2019.  These changes of swelling and erythema are also associated with significant nail deformity.  She denies prior injury.  She has been treated by primary care physicians as well as dermatologist with antibiotics in the past.  She was treated with ciprofloxacin and cephalexin and neither of these relieved her symptoms.  She was also treated with steroids including clobetasol and mometasone and these did not help her symptoms.  She was referred to me by Dr. Zhou due to concern for an MRI that was concerning for left middle finger distal phalanx osteomyelitis versus inflammatory arthropathy.  She also states that she has been treated with methotrexate in the past but this did not help.  She also states that she has had a prior diagnosis of psoriasis but she is currently not taking any medication for this.  She denies systemic signs of infection including fever or chills.         Past Medical History:     Psoriasis  Herniated disc  resulting in sciatica  Glaucoma.    Past Medical History:   Diagnosis Date     Nuclear senile cataract of both eyes 2018     Psoriatic arthropathy (H) 10/19/2020            Past Surgical History:     Past Surgical History:   Procedure Laterality Date     BIOPSY  2019    the right breast tissue(benign)     EYE SURGERY      3 surgeries for glaucoma treatment            Social History:     Works as a homemaker.    Social History     Tobacco Use     Smoking status: Never Smoker     Smokeless tobacco: Never Used   Substance Use Topics     Alcohol use: Never     Frequency: Never            Family History:     Family history of psoriasis    Family History   Problem Relation Age of Onset     Hyperlipidemia Mother         My mother had high cholesterol.     Osteoporosis Mother      Hypertension Mother      Stomach Cancer Father      Other Cancer Father         My father  of stomach cancer.     Cataracts Maternal Grandmother      Breast Cancer Maternal Aunt      Breast Cancer Other         My maternal aunt  of breast cancer in her 70s.            Allergies:     Allergies   Allergen Reactions     Adhesive Tape Blisters, Itching and Swelling     Dust Mites      Latex Blisters, Itching, Other (See Comments) and Swelling     Skin sensitivity       Morphine Sulfate (Concentrate) Itching     Other reaction(s): Chills  heart palpatations     Seasonal Allergies      Codeine Palpitations     chills            Medications:     Current Outpatient Medications   Medication     CALCIUM CARBONATE-VIT D-MIN PO     clobetasol (TEMOVATE) 0.05 % external ointment     gabapentin (NEURONTIN) 300 MG capsule     loratadine (CLARITIN) 10 MG capsule     mometasone (ELOCON) 0.1 % external ointment     Multiple Vitamins-Minerals (MULTIVITAMIN ADULTS 50+) TABS     No current facility-administered medications for this visit.              Review of Systems:     A 10 point ROS was performed and reviewed. Specific responses to these  "questions are noted at the end of the document.         Physical Exam:   Vitals: Ht 1.626 m (5' 4\")   Wt 63.5 kg (140 lb)   BMI 24.03 kg/m      Physical Exam Adult:  General: Well-nourished, alert, cooperative with exam and in no acute distress  HEENT: Atraumatic, normocephalic, extraocular movements intact, moist mucous membranes, trachea midline  Pulmonary: Unlabored work of breathing, on room air  Cardiovascular: Warm and well-perfused extremities. 2+ radial pulses  Skin: Warm, intact without rashes to the upper extremities, head or neck   Gait: Normal  Neuro/psych: Oriented to time, place and person. Affect is appropriate    Musculoskeletal: A focused physical examination of the bilateral hands reveals:   Inspection-swelling and rubor of the left middle finger, ring finger, small finger and right ring finger including the entire distal phalanx, eponychial fold and ending at the DIP joint.  The eponychial fold is swollen.  The nails have deformity and there is a slight fungal appearance.  There is no drainage.  The tips of the fingers appear somewhat scaly with flaking skin.  The tips of the fingers are severely tender to palpation.  She does have full range of motion of all digits.  There is no pain in the DIP joint with axial loading.                        Imaging:   3 view X-ray of the left hand was independently interpreted by me. The results were discussed with the patient.  Findings include:    Very mild degenerative changes of the distal interphalangeal joints of the middle, ring, small fingers.  No erosive changes at the joint.  Lytic changes in the bone or evidence of osteolysis.      Other Diagnostic Tests:    MRI: Left middle finger was independently interpreted by me. The results were discussed with the patient.  Findings include:     Edema in the bones of the middle finger distal phalanx.  Joint effusion of the middle finger DIP joint.   Focal thickening of the radial and ulnar collateral " ligaments.      Laboratory tests, ESR, CRP, CBC all within normal limits.               Assessment and Plan:   Assessment:  60 year old female with swelling and rubor of the left middle, ring, small fingers and right ring finger as well as nail deformity.  Diagnosis is not certain however clinical exam is most consistent with cutaneous changes associated with psoriasis which are preceding degenerative DIP joint changes.  There may be superimposed onychomycosis of the nail plates.     There is no acute infection at this time and exam is not consistent with osteomyelitis or DIP joint septic arthritis.     Plan:  1. I counseled the patient that there are not certain about her diagnosis I do not think there is any need for acute surgical intervention.  I do not believe debridement for osteomyelitis is indicated because her exam is not consistent with osteomyelitis.  In addition surgery in areas of cutaneous manifestations of psoriasis are high risk for postoperative infection due to often superimposed staph aureus infection.  2. I have placed a referral to rheumatology to determine if there is any medication management that can be initiated for the patient.  3. I did discuss with some of my hand surgery colleagues if there is any surgical intervention that could be offered and I called and informed the patient about this but left a message.  I informed her on the voicemail that I could offer nail plate removal to determine if there is a superimposed fungal infection that needs treatment.  Other option would be treatment with a topical antifungal.  4. Follow-up as needed if there is any wish to proceed with surgical intervention of nail plate removal for culture/biopsy.      Kitty Mendez MD  Department of Orthopedic Surgery  Hand Surgery              Again, thank you for allowing me to participate in the care of your patient.        Sincerely,        Kitty Mendez MD

## 2021-02-05 ENCOUNTER — VIRTUAL VISIT (OUTPATIENT)
Dept: RHEUMATOLOGY | Facility: CLINIC | Age: 61
End: 2021-02-05
Attending: STUDENT IN AN ORGANIZED HEALTH CARE EDUCATION/TRAINING PROGRAM
Payer: COMMERCIAL

## 2021-02-05 DIAGNOSIS — Z11.59 SCREENING FOR VIRAL DISEASE: ICD-10-CM

## 2021-02-05 DIAGNOSIS — L40.50 PSORIATIC ARTHRITIS (H): ICD-10-CM

## 2021-02-05 DIAGNOSIS — Z11.59 SCREENING FOR VIRAL DISEASE: Primary | ICD-10-CM

## 2021-02-05 DIAGNOSIS — M46.90 DACTYLITIS DUE TO SPONDYLOARTHRITIC DISORDER (H): ICD-10-CM

## 2021-02-05 DIAGNOSIS — M25.442 SWELLING OF FINGER JOINT OF LEFT HAND: ICD-10-CM

## 2021-02-05 LAB
ALBUMIN SERPL-MCNC: 4.2 G/DL (ref 3.4–5)
ALP SERPL-CCNC: 83 U/L (ref 40–150)
ALT SERPL W P-5'-P-CCNC: 21 U/L (ref 0–50)
ANION GAP SERPL CALCULATED.3IONS-SCNC: 7 MMOL/L (ref 3–14)
AST SERPL W P-5'-P-CCNC: 14 U/L (ref 0–45)
BASOPHILS # BLD AUTO: 0.1 10E9/L (ref 0–0.2)
BASOPHILS NFR BLD AUTO: 0.7 %
BILIRUB SERPL-MCNC: 0.4 MG/DL (ref 0.2–1.3)
BUN SERPL-MCNC: 12 MG/DL (ref 7–30)
CALCIUM SERPL-MCNC: 9.5 MG/DL (ref 8.5–10.1)
CHLORIDE SERPL-SCNC: 108 MMOL/L (ref 94–109)
CO2 SERPL-SCNC: 27 MMOL/L (ref 20–32)
CREAT SERPL-MCNC: 0.62 MG/DL (ref 0.52–1.04)
CRP SERPL-MCNC: <2.9 MG/L (ref 0–8)
DIFFERENTIAL METHOD BLD: NORMAL
EOSINOPHIL # BLD AUTO: 0.2 10E9/L (ref 0–0.7)
EOSINOPHIL NFR BLD AUTO: 3.4 %
ERYTHROCYTE [DISTWIDTH] IN BLOOD BY AUTOMATED COUNT: 13.2 % (ref 10–15)
ERYTHROCYTE [SEDIMENTATION RATE] IN BLOOD BY WESTERGREN METHOD: 10 MM/H (ref 0–30)
GFR SERPL CREATININE-BSD FRML MDRD: >90 ML/MIN/{1.73_M2}
GLUCOSE SERPL-MCNC: 78 MG/DL (ref 70–99)
HCT VFR BLD AUTO: 39.4 % (ref 35–47)
HGB BLD-MCNC: 13.1 G/DL (ref 11.7–15.7)
IMM GRANULOCYTES # BLD: 0 10E9/L (ref 0–0.4)
IMM GRANULOCYTES NFR BLD: 0.1 %
LYMPHOCYTES # BLD AUTO: 3 10E9/L (ref 0.8–5.3)
LYMPHOCYTES NFR BLD AUTO: 41.6 %
MCH RBC QN AUTO: 29 PG (ref 26.5–33)
MCHC RBC AUTO-ENTMCNC: 33.2 G/DL (ref 31.5–36.5)
MCV RBC AUTO: 87 FL (ref 78–100)
MONOCYTES # BLD AUTO: 0.5 10E9/L (ref 0–1.3)
MONOCYTES NFR BLD AUTO: 7.6 %
NEUTROPHILS # BLD AUTO: 3.3 10E9/L (ref 1.6–8.3)
NEUTROPHILS NFR BLD AUTO: 46.6 %
NRBC # BLD AUTO: 0 10*3/UL
NRBC BLD AUTO-RTO: 0 /100
PLATELET # BLD AUTO: 373 10E9/L (ref 150–450)
POTASSIUM SERPL-SCNC: 3.7 MMOL/L (ref 3.4–5.3)
PROT SERPL-MCNC: 8 G/DL (ref 6.8–8.8)
RBC # BLD AUTO: 4.51 10E12/L (ref 3.8–5.2)
SODIUM SERPL-SCNC: 141 MMOL/L (ref 133–144)
WBC # BLD AUTO: 7.1 10E9/L (ref 4–11)

## 2021-02-05 PROCEDURE — 80053 COMPREHEN METABOLIC PANEL: CPT | Performed by: PATHOLOGY

## 2021-02-05 PROCEDURE — 87389 HIV-1 AG W/HIV-1&-2 AB AG IA: CPT | Mod: 90 | Performed by: PATHOLOGY

## 2021-02-05 PROCEDURE — 87340 HEPATITIS B SURFACE AG IA: CPT | Mod: 90 | Performed by: PATHOLOGY

## 2021-02-05 PROCEDURE — 81374 HLA I TYPING 1 ANTIGEN LR: CPT | Mod: 90 | Performed by: PATHOLOGY

## 2021-02-05 PROCEDURE — 87517 HEPATITIS B DNA QUANT: CPT | Mod: 90 | Performed by: PATHOLOGY

## 2021-02-05 PROCEDURE — 86431 RHEUMATOID FACTOR QUANT: CPT | Mod: 90 | Performed by: PATHOLOGY

## 2021-02-05 PROCEDURE — 86140 C-REACTIVE PROTEIN: CPT | Performed by: PATHOLOGY

## 2021-02-05 PROCEDURE — 36415 COLL VENOUS BLD VENIPUNCTURE: CPT | Performed by: PATHOLOGY

## 2021-02-05 PROCEDURE — 99000 SPECIMEN HANDLING OFFICE-LAB: CPT | Performed by: PATHOLOGY

## 2021-02-05 PROCEDURE — 86481 TB AG RESPONSE T-CELL SUSP: CPT | Mod: 90 | Performed by: PATHOLOGY

## 2021-02-05 PROCEDURE — 99205 OFFICE O/P NEW HI 60 MIN: CPT | Mod: 95 | Performed by: INTERNAL MEDICINE

## 2021-02-05 PROCEDURE — 85652 RBC SED RATE AUTOMATED: CPT | Performed by: PATHOLOGY

## 2021-02-05 PROCEDURE — 86803 HEPATITIS C AB TEST: CPT | Mod: 90 | Performed by: PATHOLOGY

## 2021-02-05 PROCEDURE — 86704 HEP B CORE ANTIBODY TOTAL: CPT | Mod: 90 | Performed by: PATHOLOGY

## 2021-02-05 PROCEDURE — 85025 COMPLETE CBC W/AUTO DIFF WBC: CPT | Performed by: PATHOLOGY

## 2021-02-05 RX ORDER — MELOXICAM 15 MG/1
15 TABLET ORAL DAILY
Qty: 90 TABLET | Refills: 0 | Status: SHIPPED | OUTPATIENT
Start: 2021-02-05 | End: 2021-06-11

## 2021-02-05 NOTE — LETTER
2/5/2021       RE: Willa Downey  [address]  [facility] [address]     Dear Colleague,    Thank you for referring your patient, Willa Downey, to the [facility] [place] [facility]. Please see a copy of my visit note below.      Outpatient Rheumatology Consultation  This visit was conducted via synchronous video visit due to the current COVID-19 crisis to reduce patient risk.  Verbal consent was obtained and is documented below.    Name: Willa Downey    MRN [id number]   Today's date: 2/5/2021         Reason for consult: Evaluation and treatment of psoriatic arthritis   Requesting physician: Kitty Mendez MD             Assessment & Plan:   #negative RF  #psoriasis, nail predominant  #DIP Left hand 3rd, 4th, 5th and right hand 4th digit inflammatory arthritis    60 year old female with hx of advanced glaucoma followed closely by ophthalmology presents with 5 month of progressive left hand 3rd, 4th, 5th digit and right hand 4th digit DIP inflammatory arthritis in the context of psoriasis of the nails of these same fingers. Has had evaluation by PCP, derm, ortho, rheumatology with this presentation without obvious etiology found. Possibility of infection by exam and MRI raised, though normal inflammatory markers, lack of improvement with antibiotics, and now chronic small joint symmetric polyarthritis is somewhat atypical for this. Her presentation appears to be consistent with the diagnosis given to the patient in 2018 by Dr. Owens of  rheumatology, for which she was prescribed methotrexate though self discontinued when she changed insurance and was follow-up in early 2020.     I suspect that the patient's presentation is most likely secondary to psoriatic arthritis, though discussed with the patient that without aspiration or bx of an affected joint/bone that we cannot rule out infection. Clinically with  more evidence to support PsA, have discussed obtaining labs, to include hep b/c/HIV/quant gold, HLA-b27, RF. Will start on Mobic 15mg daily now. Will see patient back in 2 weeks. At that time will likely start her on DMARD therapy given polyarticular arthritis and nail inovlement suspect that TNF inhibitor will be most helpful in this case, with methotrexate. Could consider marie as alternative.    PLAN:  1) labs  2) mobic 15mg daily  3) f/u in 2 weeks to discuss response to NSAIDs and next steps with DMARD    Marc Arias MD  Rheumatology     I spent 60 minutes on the date of service on chart review, patient video encounter, , documentation.     Subjective:   Previously followed by Dr. Owens at  for psoriatic arthritis on methotrexate. Pain was initially in her CMCs, dx with hand OA. Then in 2019, had right hand 5th digit nail changes which was consistent with psoriasis. Was started on methotrexate at that time, was on it for 9 months. In 2020, switched to [facility] and stopped methotrexate. Then in September 2020, left hand middle finger became swollen. Tried to get into [facility], though could not. So went to [facility] instead with Dr. Orourke, there was concern for infection so started on antibiotics, then a second. Then [facility] No improvement after this second course. Went back to Dr. Orourke, who told her that she likely should not have been on methotrexate, though did not explain why. Could not determine the etiology of her swollen digit. With ongoing swelling/pain, was seen by hand ortho. Around that time lost her nail. The swelling never improved. Has pronounced ongoing DIP swelling, warmth, redness. Then went back to ortho hand, who ordered xray/mri of hands, and there was concern for infection.     Right hand only 4th digit nail involved. Left hand 3rd, 4th, 5th digit nail and DIP erythema/edema/warmth. Has night time pain that wakes her from sleep.  No pain in her PIPs/MCPs. No psoriasis elsewhere. Has sister with psoriasis of nails as well. Has severe glaucoma and has been following q6 months with ophthalmology. No other inflammatory eye disease. Has a history of low back pain, dx with herniated disc and sciatic nerve pain. Takes gabapentin. Back pain does worse at the end of the day/ more on her feet. Gabapentin is helpful. No other skin lesions. No ulcers in nose or mouth. No recurrent sinus or lung infections. No epistaxis/hemoptysis. No chest pain/SOB. No longer taking long walks due to back pain starting in 2020. No change in strength or sensation in arm or legs. No raynauds. Told by eye doctor that she has dry eyes. Not symptomatic. No dry mouth. No fever/chills/weight loss. No dactylitis. No trauma or known infection prior to onset. Unable to hold glass due to decreased . Pain is rated 6/10. Sharp at times. Dull at times. Worse gets up to 8/10. No radiation. Has tried ibuprofen/acetaminophin from time to time, which she is unclear if it is helpful. Some improvement in pain. Swelling not changed. Has been using stationary bike, which she finds helpful. At least 30 minutes daily. No GI complaints. No blood in stool.     Past Medical History  Past Medical History:   Diagnosis Date     Nuclear senile cataract of both eyes 8/16/2018     Psoriatic arthropathy (H) 10/19/2020     Past Surgical History  Past Surgical History:   Procedure Laterality Date     BIOPSY  07/30/2019    the right breast tissue(benign)     EYE SURGERY      3 surgeries for glaucoma treatment     Medications  Current Outpatient Medications   Medication     CALCIUM CARBONATE-VIT D-MIN PO     clobetasol (TEMOVATE) 0.05 % external ointment     gabapentin (NEURONTIN) 300 MG capsule     loratadine (CLARITIN) 10 MG capsule     mometasone (ELOCON) 0.1 % external ointment     Multiple Vitamins-Minerals (MULTIVITAMIN ADULTS 50+) TABS     No current facility-administered medications for this  visit.      Allergies   Allergies   Allergen Reactions     Adhesive Tape Blisters, Itching and Swelling     Dust Mites      Latex Blisters, Itching, Other (See Comments) and Swelling     Skin sensitivity       Morphine Sulfate (Concentrate) Itching     Other reaction(s): Chills  heart palpatations     Seasonal Allergies      Codeine Palpitations     chills     Family History:   No family history of autoimmune diseases.     Social History  , with children. 2 kids and they are healthy. No ETOH, smoking, drug use.      Objective:    Physical exam:  No vitals for this video visit. Vitals reviewed in Epic.  GEN: Sitting up at table. NAD  HEENT: no facial rash, sclera clear, no inflammatory nose/ external ear changes  CV: no upper extremity dependent edema  Pulm: speaking in full sentences, no cough, no audible wheezing, no use of accessory muscles  Abdomen: not distended  Skin: no acute cutaneous lesions  MSK: full active ROM of bilateral shoulders, elbows, wrists, MCPs, PIPs, DIPs. Has psoriatic changes of fingernails of digits 3-5 on the left hand and digit 3 on the right hand. Has DIP erythema/edema/warmth of the same digits affected by nail changes.     Labs:  2/2/21  ESR 9  CRP <2.9  WBC 6.1  HGB 12.8      11/11/20  A1C 5.3  Hep C/ HIV negative    Imaging:  MR left hand  Impression:  1a. Correlating to the marker at the third digit interphalangeal  joint, there is a focal erosion along the volar aspect of the distal  phalangeal base with intense bone marrow edema and abnormal T1 marrow  signal. Findings are concerning for osteomyelitis and infection should  be ruled out. Also on the differential is sequelae of long-standing  active inflammatory arthropathy (including psoriatic arthritis given  patient's clinical history) where marginal erosions and marrow signal  abnormality are expected though the degree of T1 marrow replacement is  out of proportion to typical findings.   b. Subtle bone marrow edema  in the middle phalanx without T1 marrow  signal abnormality, this is favored to represent reactive edema.  c. Joint effusion at the third digit interphalangeal joint,  potentially infectious or inflammatory. Consider aspiration for  definitive diagnosis.  d. The partially visualized fifth digit shows edema within the distal  phalanx and head of the middle phalanx with regional soft tissue  edema. Given predominant findings in the third digit, similar changes  in the fifth digit could support a polyarticular inflammatory  arthropathy. Correlate clinically.  2. Tenosynovitis of the third digit flexor and extensor tendons. There  is also tendinosis with high-grade (near full-thickness) tearing of  the extensor tendon/extensor saul at the distal phalangeal base with  slight proximal retraction of the residual fibers.  3. Diffusely thickened, edematous ulnar and radial collateral  ligaments, likely combination of sprain and reactive edema.  4. Extensive soft tissue edema centered about the interphalangeal  joint.  Pathology:     Patient reports 3 fingers on L hand are very swollen and painful. States this will be her 7th visit to a provider to properly diagnose and treat her symptoms that have been ongoing since last September.    Willa is a 60 year old who is being evaluated via a billable video visit.      How would you like to obtain your AVS? MyChart    Will anyone else be joining your video visit? No      Video-Visit Details    Type of service:  Video Visit    Start: 02/05/2021 10:01 am   Stop: 02/05/2021 10:51 am    Originating Location (pt. Location): Home    Distant Location (provider location):  [facility] [place]     Platform used for Video Visit: Tiburcio Arias MD  Rheumatology

## 2021-02-05 NOTE — PATIENT INSTRUCTIONS
1) Labs  2) Start mobic 15mg once daily, cannot take anything NSAIDs while you take this medicine  3) Once your labs come back, I will reach out with the results and instructions on starting your next medication  4) my clinic will call you to schedule follow-up with me in 4-6 weeks    Let me know if anything comes up prior to that follow-up.    Marc Arias MD  Rheumatology

## 2021-02-07 LAB
GAMMA INTERFERON BACKGROUND BLD IA-ACNC: 0.03 IU/ML
M TB IFN-G CD4+ BCKGRND COR BLD-ACNC: 9.97 IU/ML
M TB TUBERC IFN-G BLD QL: NEGATIVE
MITOGEN IGNF BCKGRD COR BLD-ACNC: 0.01 IU/ML
MITOGEN IGNF BCKGRD COR BLD-ACNC: 0.01 IU/ML
RHEUMATOID FACT SER NEPH-ACNC: <7 IU/ML (ref 0–20)

## 2021-02-09 LAB
HBV CORE AB SERPL QL IA: REACTIVE
HBV DNA SERPL NAA+PROBE-ACNC: NORMAL [IU]/ML
HBV DNA SERPL NAA+PROBE-LOG IU: NORMAL {LOG_IU}/ML
HBV SURFACE AG SERPL QL IA: NONREACTIVE
HCV AB SERPL QL IA: NONREACTIVE
HIV 1+2 AB+HIV1 P24 AG SERPL QL IA: NONREACTIVE

## 2021-02-11 LAB
B LOCUS: NORMAL
B27TEST METHOD: NORMAL

## 2021-02-18 ENCOUNTER — VIRTUAL VISIT (OUTPATIENT)
Dept: RHEUMATOLOGY | Facility: CLINIC | Age: 61
End: 2021-02-18
Attending: INTERNAL MEDICINE
Payer: COMMERCIAL

## 2021-02-18 DIAGNOSIS — Z79.1 NSAID LONG-TERM USE: ICD-10-CM

## 2021-02-18 DIAGNOSIS — L40.50 PSORIATIC ARTHRITIS (H): Primary | ICD-10-CM

## 2021-02-18 DIAGNOSIS — T50.905A MEDICATION SIDE EFFECT, INITIAL ENCOUNTER: ICD-10-CM

## 2021-02-18 PROCEDURE — 99215 OFFICE O/P EST HI 40 MIN: CPT | Mod: 95 | Performed by: INTERNAL MEDICINE

## 2021-02-18 ASSESSMENT — PAIN SCALES - GENERAL: PAINLEVEL: SEVERE PAIN (6)

## 2021-02-18 NOTE — LETTER
2/18/2021       RE: Willa Downey  [address]  [facility] [address]     Dear Colleague,    Thank you for referring your patient, Willa Downey, to the [facility] RHEUMATOLOGY CLINIC [place] at [facility]. Please see a copy of my visit note below.      Willa is a 60 year old who is being evaluated via a billable video visit.      How would you like to obtain your AVS? Mail a copy  If the video visit is dropped, the invitation should be resent by: Send to e-mail at: [email or web address]  Will anyone else be joining your video visit? No      Video-Visit Details    Type of service:  Video Visit    Start: 02/18/2021 10:01 am   Stop: 02/18/2021 10:26 am    Patient location: Home    Distant Location (provider location):  [facility] RHEUMATOLOGY [facility]     Platform used for Video Visit: Tiburcio Arias MD  Rheumatology    Outpatient Rheumatology Follow-up  This visit was conducted via synchronous video visit due to the current COVID-19 crisis to reduce patient risk.  Verbal consent was obtained and is documented below.    Name: Willa Downey    MRN [id number]   Today's date: 2/18/2021         Reason for consult: psoriatic arthritis   Requesting physician: Kitty Mendez MD        Assessment & Plan:   #negative RF  #psoriasis, nail predominant  #DIP Left hand 3rd, 4th, 5th and right hand 4th digit inflammatory arthritis    60 year old female with hx of advanced glaucoma followed closely by ophthalmology presented to rheumatology on 2/5/21 with 5 month of progressive left hand 3rd, 4th, 5th digit and right hand 4th digit DIP inflammatory arthritis in the context of psoriasis of the nails of these same fingers. Her presentation appears to be consistent with the diagnosis given to the patient in 2018 by Dr. Owens of  rheumatology, for which she was prescribed methotrexate though self discontinued when she changed  insurance / lost efficacy to this therapy in 2020. Have discussed case with ortho who had seen the patient prior to me due to possibility of infection by exam and MRI, though normal inflammatory markers, lack of improvement with antibiotics, and now chronic small joint symmetric polyarthritis is somewhat atypical for this. They agree. Ortho brings up possibility of fungal nail infection. I agree. I will place referral to derm to assist with work-up and treatment of this.     Given that Willa has now failed 2 NSAIDs (efficacy and side effects) and methotrexate, will proceed with initiating TNF inhibitor humira. Risks and benefits discussed to include infection, malignancy, autoimmune disease, demyelinating disease among others. She is agreeable. To minimize the current GI side effects of her current therapy, will decrease dose of mobic to 7.5mg to continue to some coverage while we start humira. Will also start omeprazole 20mg daily. While I think this process (both nail and DIP inflammatory arthritis) is most likely secondary to PsA, the possibility of a fungal infection contributing to the nail disease is possible and if this or other symptoms worsen as she starts immunosuppression, she knows to let us know immediately and not take additional doses.     PLAN  1) decrease mobic to 7.5mg  2) 20mg omeprazole  3) humira 40mg q2 weeks ordered to initiate PA. She is going to let me know once she receives/ starts.   4) Will place referral to derm to assist with nail disease work up (psoriasis vs fungal)    Marc Arias MD  Rheumatology    I spent a total of 40 minutes on the date of service on chart review, patient video encounter, documentation.    Subjective:   2/18/21  Within about 3-4 days of starting mobic, noticed improvement in pain and flexibility. Though from about last Wednesday, within about 10 hours after taking her medication (ie that night) she notices increases swelling/stiffness/pain. Also having  diarrhea (occasional, indigestion, stomach pain). The pain is after dinner each night, before bed, sitting on couch watching TV. Today, did not take her mobic, and if still, no pain. If flexes her fingers has 5/10 pain at the DIPs. Still with nail changes, unchanged. No new joints affected. Still with swelling and redness, though some improved since starting mobic. No radiation of the pain. Continues to spare the MCPs. ROS otherwise negative. Still without any other skin involvement. ROS otherwise negative.    HPI from initial consult  Previously followed by Dr. Owens at  for psoriatic arthritis on methotrexate. Pain was initially in her CMCs, dx with hand OA. Then in 2019, had right hand 5th digit nail changes which was consistent with psoriasis. Was started on methotrexate at that time, was on it for 9 months. In 2020, switched to [facility] and stopped methotrexate. Then in September 2020, left hand middle finger became swollen. Tried to get into [facility], though could not. So went to [facility] instead with Dr. Orourke, there was concern for infection so started on antibiotics, then a second. [facility] No improvement after this second course. Went back to Dr. Orourke, who told her that she likely should not have been on methotrexate, though did not explain why. Could not determine the etiology of her swollen digit. With ongoing swelling/pain, was seen by hand ortho. Around that time lost her nail. The swelling never improved. Has pronounced ongoing DIP swelling, warmth, redness. Then went back to ortho hand, who ordered xray/mri of hands, and there was concern for infection.     Right hand only 4th digit nail involved. Left hand 3rd, 4th, 5th digit nail and DIP erythema/edema/warmth. Has night time pain that wakes her from sleep. No pain in her PIPs/MCPs. No psoriasis elsewhere. Has sister with psoriasis of nails as well. Has severe glaucoma and has been following q6  months with ophthalmology. No other inflammatory eye disease. Has a history of low back pain, dx with herniated disc and sciatic nerve pain. Takes gabapentin. Back pain does worse at the end of the day/ more on her feet. Gabapentin is helpful. No other skin lesions. No ulcers in nose or mouth. No recurrent sinus or lung infections. No epistaxis/hemoptysis. No chest pain/SOB. No longer taking long walks due to back pain starting in 2020. No change in strength or sensation in arm or legs. No raynauds. Told by eye doctor that she has dry eyes. Not symptomatic. No dry mouth. No fever/chills/weight loss. No dactylitis. No trauma or known infection prior to onset. Unable to hold glass due to decreased . Pain is rated 6/10. Sharp at times. Dull at times. Worse gets up to 8/10. No radiation. Has tried ibuprofen/acetaminophin from time to time, which she is unclear if it is helpful. Some improvement in pain. Swelling not changed. Has been using stationary bike, which she finds helpful. At least 30 minutes daily. No GI complaints. No blood in stool.     Past Medical History  Past Medical History:   Diagnosis Date     Nuclear senile cataract of both eyes 8/16/2018     Psoriatic arthropathy (H) 10/19/2020     Past Surgical History  Past Surgical History:   Procedure Laterality Date     BIOPSY  07/30/2019    the right breast tissue(benign)     EYE SURGERY      3 surgeries for glaucoma treatment     Medications  Current Outpatient Medications   Medication     CALCIUM CARBONATE-VIT D-MIN PO     clobetasol (TEMOVATE) 0.05 % external ointment     gabapentin (NEURONTIN) 300 MG capsule     loratadine (CLARITIN) 10 MG capsule     meloxicam (MOBIC) 15 MG tablet     mometasone (ELOCON) 0.1 % external ointment     Multiple Vitamins-Minerals (MULTIVITAMIN ADULTS 50+) TABS     No current facility-administered medications for this visit.      Allergies   Allergies   Allergen Reactions     Adhesive Tape Blisters, Itching and Swelling      Dust Mites      Latex Blisters, Itching, Other (See Comments) and Swelling     Skin sensitivity       Morphine Sulfate (Concentrate) Itching     Other reaction(s): Chills  heart palpatations     Seasonal Allergies      Codeine Palpitations     chills     Family History:   No family history of autoimmune diseases.     Social History  , with children. 2 kids and they are healthy. No ETOH, smoking, drug use.      Objective:    Physical exam:  No vitals for this video visit. Vitals reviewed in Epic.  GEN: Sitting up at table. NAD  HEENT: no facial rash, sclera clear, no inflammatory nose/ external ear changes  CV: no upper extremity dependent edema  Pulm: speaking in full sentences, no cough, no audible wheezing, no use of accessory muscles  Abdomen: not distended  Skin: no acute cutaneous lesions  MSK: full active ROM of bilateral shoulders, elbows, wrists, MCPs, PIPs, DIPs. Has psoriatic changes of fingernails of digits 3-5 on the left hand and digit 3 on the right hand. Has DIP erythema/edema/warmth of the same digits affected by nail changes.     Labs:  2/5/21  HIV negative  Hep C AB negative  Hep B s AG negative  Hep B c AB reactive  Quant gold negative  Quant gold negative  RF negative  CRP <2.9  ESR 10  Cr 0.62  WBC 7.1  HGB 13.1    HLA b27 negative  Hep B virus DNA negative    2/2/21  ESR 9  CRP <2.9  WBC 6.1  HGB 12.8      11/11/20  A1C 5.3  Hep C/ HIV negative    Imaging:  MR left hand  Impression:  1a. Correlating to the marker at the third digit interphalangeal  joint, there is a focal erosion along the volar aspect of the distal  phalangeal base with intense bone marrow edema and abnormal T1 marrow  signal. Findings are concerning for osteomyelitis and infection should  be ruled out. Also on the differential is sequelae of long-standing  active inflammatory arthropathy (including psoriatic arthritis given  patient's clinical history) where marginal erosions and marrow  signal  abnormality are expected though the degree of T1 marrow replacement is  out of proportion to typical findings.   b. Subtle bone marrow edema in the middle phalanx without T1 marrow  signal abnormality, this is favored to represent reactive edema.  c. Joint effusion at the third digit interphalangeal joint,  potentially infectious or inflammatory. Consider aspiration for  definitive diagnosis.  d. The partially visualized fifth digit shows edema within the distal  phalanx and head of the middle phalanx with regional soft tissue  edema. Given predominant findings in the third digit, similar changes  in the fifth digit could support a polyarticular inflammatory  arthropathy. Correlate clinically.  2. Tenosynovitis of the third digit flexor and extensor tendons. There  is also tendinosis with high-grade (near full-thickness) tearing of  the extensor tendon/extensor saul at the distal phalangeal base with  slight proximal retraction of the residual fibers.  3. Diffusely thickened, edematous ulnar and radial collateral  ligaments, likely combination of sprain and reactive edema.  4. Extensive soft tissue edema centered about the interphalangeal  joint.

## 2021-02-18 NOTE — PROGRESS NOTES
Willa is a 60 year old who is being evaluated via a billable video visit.      How would you like to obtain your AVS? Mail a copy  If the video visit is dropped, the invitation should be resent by: Send to e-mail at: [email or web address]  Will anyone else be joining your video visit? No      Video-Visit Details    Type of service:  Video Visit    Start: 02/18/2021 10:01 am   Stop: 02/18/2021 10:26 am    Patient location: Home    Distant Location (provider location):  [facility] RHEUMATOLOGY CLINIC [place]     Platform used for Video Visit: Tiburcio Arias MD  Rheumatology    Outpatient Rheumatology Follow-up  This visit was conducted via synchronous video visit due to the current COVID-19 crisis to reduce patient risk.  Verbal consent was obtained and is documented below.    Name: Willa Downey    MRN [id number]   Today's date: 2/18/2021         Reason for consult: psoriatic arthritis   Requesting physician: Kitty Mendez MD        Assessment & Plan:   #negative RF  #psoriasis, nail predominant  #DIP Left hand 3rd, 4th, 5th and right hand 4th digit inflammatory arthritis    60 year old female with hx of advanced glaucoma followed closely by ophthalmology presented to rheumatology on 2/5/21 with 5 month of progressive left hand 3rd, 4th, 5th digit and right hand 4th digit DIP inflammatory arthritis in the context of psoriasis of the nails of these same fingers. Her presentation appears to be consistent with the diagnosis given to the patient in 2018 by Dr. Owens of  rheumatology, for which she was prescribed methotrexate though self discontinued when she changed insurance / lost efficacy to this therapy in 2020. Have discussed case with ortho who had seen the patient prior to me due to possibility of infection by exam and MRI, though normal inflammatory markers, lack of improvement with antibiotics, and now chronic small joint symmetric polyarthritis is somewhat atypical for this.  They agree. Ortho brings up possibility of fungal nail infection. I agree. I will place referral to derm to assist with work-up and treatment of this.     Given that Willa has now failed 2 NSAIDs (efficacy and side effects) and methotrexate, will proceed with initiating TNF inhibitor humira. Risks and benefits discussed to include infection, malignancy, autoimmune disease, demyelinating disease among others. She is agreeable. To minimize the current GI side effects of her current therapy, will decrease dose of mobic to 7.5mg to continue to some coverage while we start humira. Will also start omeprazole 20mg daily. While I think this process (both nail and DIP inflammatory arthritis) is most likely secondary to PsA, the possibility of a fungal infection contributing to the nail disease is possible and if this or other symptoms worsen as she starts immunosuppression, she knows to let us know immediately and not take additional doses.     PLAN  1) decrease mobic to 7.5mg  2) 20mg omeprazole  3) humira 40mg q2 weeks ordered to initiate PA. She is going to let me know once she receives/ starts.   4) Will place referral to derm to assist with nail disease work up (psoriasis vs fungal)    Marc Arias MD  Rheumatology    I spent a total of 40 minutes on the date of service on chart review, patient video encounter, documentation.    Subjective:   2/18/21  Within about 3-4 days of starting mobic, noticed improvement in pain and flexibility. Though from about last Wednesday, within about 10 hours after taking her medication (ie that night) she notices increases swelling/stiffness/pain. Also having diarrhea (occasional, indigestion, stomach pain). The pain is after dinner each night, before bed, sitting on couch watching TV. Today, did not take her mobic, and if still, no pain. If flexes her fingers has 5/10 pain at the DIPs. Still with nail changes, unchanged. No new joints affected. Still with swelling and redness, though  some improved since starting mobic. No radiation of the pain. Continues to spare the MCPs. ROS otherwise negative. Still without any other skin involvement. ROS otherwise negative.    HPI from initial consult  Previously followed by Dr. Owens at  for psoriatic arthritis on methotrexate. Pain was initially in her CMCs, dx with hand OA. Then in 2019, had right hand 5th digit nail changes which was consistent with psoriasis. Was started on methotrexate at that time, was on it for 9 months. In 2020, switched to [facility] and stopped methotrexate. Then in September 2020, left hand middle finger became swollen. Tried to get into [facility], though could not. So went to [facility] instead with Dr. Orourke, there was concern for infection so started on antibiotics, then a second. [facility] No improvement after this second course. Went back to Dr. Orourke, who told her that she likely should not have been on methotrexate, though did not explain why. Could not determine the etiology of her swollen digit. With ongoing swelling/pain, was seen by hand ortho. Around that time lost her nail. The swelling never improved. Has pronounced ongoing DIP swelling, warmth, redness. Then went back to ortho hand, who ordered xray/mri of hands, and there was concern for infection.     Right hand only 4th digit nail involved. Left hand 3rd, 4th, 5th digit nail and DIP erythema/edema/warmth. Has night time pain that wakes her from sleep. No pain in her PIPs/MCPs. No psoriasis elsewhere. Has sister with psoriasis of nails as well. Has severe glaucoma and has been following q6 months with ophthalmology. No other inflammatory eye disease. Has a history of low back pain, dx with herniated disc and sciatic nerve pain. Takes gabapentin. Back pain does worse at the end of the day/ more on her feet. Gabapentin is helpful. No other skin lesions. No ulcers in nose or mouth. No recurrent sinus or lung infections.  No epistaxis/hemoptysis. No chest pain/SOB. No longer taking long walks due to back pain starting in 2020. No change in strength or sensation in arm or legs. No raynauds. Told by eye doctor that she has dry eyes. Not symptomatic. No dry mouth. No fever/chills/weight loss. No dactylitis. No trauma or known infection prior to onset. Unable to hold glass due to decreased . Pain is rated 6/10. Sharp at times. Dull at times. Worse gets up to 8/10. No radiation. Has tried ibuprofen/acetaminophin from time to time, which she is unclear if it is helpful. Some improvement in pain. Swelling not changed. Has been using stationary bike, which she finds helpful. At least 30 minutes daily. No GI complaints. No blood in stool.     Past Medical History  Past Medical History:   Diagnosis Date     Nuclear senile cataract of both eyes 8/16/2018     Psoriatic arthropathy (H) 10/19/2020     Past Surgical History  Past Surgical History:   Procedure Laterality Date     BIOPSY  07/30/2019    the right breast tissue(benign)     EYE SURGERY      3 surgeries for glaucoma treatment     Medications  Current Outpatient Medications   Medication     CALCIUM CARBONATE-VIT D-MIN PO     clobetasol (TEMOVATE) 0.05 % external ointment     gabapentin (NEURONTIN) 300 MG capsule     loratadine (CLARITIN) 10 MG capsule     meloxicam (MOBIC) 15 MG tablet     mometasone (ELOCON) 0.1 % external ointment     Multiple Vitamins-Minerals (MULTIVITAMIN ADULTS 50+) TABS     No current facility-administered medications for this visit.      Allergies   Allergies   Allergen Reactions     Adhesive Tape Blisters, Itching and Swelling     Dust Mites      Latex Blisters, Itching, Other (See Comments) and Swelling     Skin sensitivity       Morphine Sulfate (Concentrate) Itching     Other reaction(s): Chills  heart palpatations     Seasonal Allergies      Codeine Palpitations     chills     Family History:   No family history of autoimmune diseases.     Social  History  , with children. 2 kids and they are healthy. No ETOH, smoking, drug use.      Objective:    Physical exam:  No vitals for this video visit. Vitals reviewed in Epic.  GEN: Sitting up at table. NAD  HEENT: no facial rash, sclera clear, no inflammatory nose/ external ear changes  CV: no upper extremity dependent edema  Pulm: speaking in full sentences, no cough, no audible wheezing, no use of accessory muscles  Abdomen: not distended  Skin: no acute cutaneous lesions  MSK: full active ROM of bilateral shoulders, elbows, wrists, MCPs, PIPs, DIPs. Has psoriatic changes of fingernails of digits 3-5 on the left hand and digit 3 on the right hand. Has DIP erythema/edema/warmth of the same digits affected by nail changes.     Labs:  2/5/21  HIV negative  Hep C AB negative  Hep B s AG negative  Hep B c AB reactive  Quant gold negative  Quant gold negative  RF negative  CRP <2.9  ESR 10  Cr 0.62  WBC 7.1  HGB 13.1    HLA b27 negative  Hep B virus DNA negative    2/2/21  ESR 9  CRP <2.9  WBC 6.1  HGB 12.8      11/11/20  A1C 5.3  Hep C/ HIV negative    Imaging:  MR left hand  Impression:  1a. Correlating to the marker at the third digit interphalangeal  joint, there is a focal erosion along the volar aspect of the distal  phalangeal base with intense bone marrow edema and abnormal T1 marrow  signal. Findings are concerning for osteomyelitis and infection should  be ruled out. Also on the differential is sequelae of long-standing  active inflammatory arthropathy (including psoriatic arthritis given  patient's clinical history) where marginal erosions and marrow signal  abnormality are expected though the degree of T1 marrow replacement is  out of proportion to typical findings.   b. Subtle bone marrow edema in the middle phalanx without T1 marrow  signal abnormality, this is favored to represent reactive edema.  c. Joint effusion at the third digit interphalangeal joint,  potentially infectious or  inflammatory. Consider aspiration for  definitive diagnosis.  d. The partially visualized fifth digit shows edema within the distal  phalanx and head of the middle phalanx with regional soft tissue  edema. Given predominant findings in the third digit, similar changes  in the fifth digit could support a polyarticular inflammatory  arthropathy. Correlate clinically.  2. Tenosynovitis of the third digit flexor and extensor tendons. There  is also tendinosis with high-grade (near full-thickness) tearing of  the extensor tendon/extensor saul at the distal phalangeal base with  slight proximal retraction of the residual fibers.  3. Diffusely thickened, edematous ulnar and radial collateral  ligaments, likely combination of sprain and reactive edema.  4. Extensive soft tissue edema centered about the interphalangeal  joint.

## 2021-02-21 NOTE — PATIENT INSTRUCTIONS
PLAN  1) decrease mobic to 7.5mg daily  2) 20mg omeprazole daily  3) humira 40mg once every 2 weeks ordered to initiate insurance approval. Let me know once you receive and start   4) I have placed a referral to dermatology to assist with nail disease work up (psoriasis vs fungal infection)    My clinic will call to schedule both the dermatology appointment and the follow-up with me in about 6 weeks.    Thank you  Marc Arias MD  Rheumatology

## 2021-02-22 ENCOUNTER — TELEPHONE (OUTPATIENT)
Dept: RHEUMATOLOGY | Facility: CLINIC | Age: 61
End: 2021-02-22

## 2021-02-22 NOTE — TELEPHONE ENCOUNTER
PA Initiation    Medication: HUMIRA   Insurance Company: [employer] - Phone [phone] Fax [phone]  Pharmacy Filling the Rx: [facility] - [place], [place] - [facility] [address]  Filling Pharmacy Phone:    Filling Pharmacy Fax:    Start Date: 2/22/2021    FLOR CONTRERAS ([place]: [id number])

## 2021-02-23 NOTE — TELEPHONE ENCOUNTER
Prior Authorization Approval    Authorization Effective Date: 2/22/2021  Authorization Expiration Date: 2/22/2022  Medication: HUMIRA - approved   Approved Dose/Quantity:  2 FOR 28 DAYS   Reference #:     Insurance Company: [employer] [phone] Fax [phone]  Expected CoPay: $5     CoPay Card Available: Yes    Foundation Assistance Needed:    Which Pharmacy is filling the prescription (Not needed for infusion/clinic administered): [facility] - [place] [address] [address]  Pharmacy Notified: Yes  Patient Notified: Yes

## 2021-03-01 NOTE — TELEPHONE ENCOUNTER
Called and spoke with pt regarding Humira, she is comfortable doing self-injections. Discussed [email or web address], and what to do if she becomes ill while she is on.    Nancie Warner RN  Rheumatology Clinic

## 2021-04-01 ENCOUNTER — OFFICE VISIT (OUTPATIENT)
Dept: DERMATOLOGY | Facility: CLINIC | Age: 61
End: 2021-04-01
Attending: INTERNAL MEDICINE
Payer: COMMERCIAL

## 2021-04-01 DIAGNOSIS — L72.9 CYST OF SKIN: ICD-10-CM

## 2021-04-01 DIAGNOSIS — L40.50 PSORIATIC ARTHRITIS (H): ICD-10-CM

## 2021-04-01 DIAGNOSIS — L40.9 PSORIASIS: Primary | ICD-10-CM

## 2021-04-01 PROCEDURE — 99204 OFFICE O/P NEW MOD 45 MIN: CPT | Mod: GC

## 2021-04-01 RX ORDER — CLOBETASOL PROPIONATE 0.5 MG/G
OINTMENT TOPICAL
Qty: 60 G | Refills: 3 | Status: SHIPPED | OUTPATIENT
Start: 2021-04-01 | End: 2021-07-13

## 2021-04-01 ASSESSMENT — PAIN SCALES - GENERAL: PAINLEVEL: NO PAIN (0)

## 2021-04-01 NOTE — PROGRESS NOTES
[facility] Dermatology Note  Encounter Date: Apr 1, 2021  Office Visit     Dermatology Problem List:  1. Psoriasis with psoriatic arthritis   - mainly on the hands, fingertips, nails   - humira per rheumatology   - clobetasol for fingertips and proximal nail folds  - future: consider alternative biologic   2. Cyst of skin, posterior neck midline  - referral for excision      ____________________________________________    Assessment & Plan:     # Psoriasis with psoriatic arthritis, mainly located on the hands and fingertips.  Sees rheumatology recently started on Humira in February.  We will wait another 6-12 weeks to see if she has any benefit from the humira for her fingernails and tips of the fingers.  Then we would consider starting another agent that would also work for her PsA.    - continue Humira per rheumatology   - continue clobetasol to proximal nail folds and fingertips     # EIC posterior neck midline   - referral for excision     Procedures Performed:   None    Follow-up: 2 month(s) in-person, or earlier for new or changing lesions    Staff and Resident:     Ale Mclaughlin MD     STAFF: NICOL    I have personally examined this patient and agree with Dr. Mclaughlin's documentation and plan of care. I have reviewed and amended the resident's note above. The documentation accurately reflects my clinical observations, diagnoses, treatment and follow-up plans.     Celine Morales MD  Dermatology Staff    ____________________________________________    CC: Derm Problem (Nail disease. Concern for fungal vs psoriasiatic nail involvement, X 2018 off and on)    HPI:  Ms. Willa Downey is a(n) 60 year old female who presents today as a new patient for psoriasis of the fingertips and dystrophic nails.  She was started on Humira by rheumatology in February for PsA and is also using clobetasol and mometasone for finger tips and the proximal nail folds where psoriasis is the worse.  Her  psoriasis is confined to the fingertips.  She also has dystrophic nails.  She states humira has helped significantly, however 2 days before she is due for injection, she notices flaring of the nails.   Patient is otherwise feeling well, without additional skin concerns.    Labs Reviewed:  N/A    Physical Exam:  Vitals: There were no vitals taken for this visit.  SKIN: examined the nails and hands bilaterally and the neck   - distal onycholysis right 4th digit   - Longitudinal melanonyuchia  - First right with oil spot   - First left, 3rd, 4th, 5th - thinning of the nails nail spicules   - Inflammation at proximal nail fold   - posterior neck midline with dome shaped nodule with central punctum   - No other lesions of concern on areas examined.     Medications:  Current Outpatient Medications   Medication     adalimumab (HUMIRA PEN) 40 MG/0.8ML pen kit     CALCIUM CARBONATE-VIT D-MIN PO     clobetasol (TEMOVATE) 0.05 % external ointment     gabapentin (NEURONTIN) 300 MG capsule     loratadine (CLARITIN) 10 MG capsule     meloxicam (MOBIC) 15 MG tablet     Multiple Vitamins-Minerals (MULTIVITAMIN ADULTS 50+) TABS     mometasone (ELOCON) 0.1 % external ointment     No current facility-administered medications for this visit.       Past Medical History:   Patient Active Problem List   Diagnosis     Hyperlipidemia     Low-tension glaucoma of both eyes, severe stage     Myopia     Osteoarthritis     Screening for malignant neoplasm of cervix     Psoriasis of nail     Spinal stenosis of lumbar region, unspecified whether neurogenic claudication present     Neuroforaminal stenosis of lumbar spine     Bilateral low back pain with right-sided sciatica, unspecified chronicity     Past Medical History:   Diagnosis Date     Nuclear senile cataract of both eyes 8/16/2018     Psoriatic arthropathy (H) 10/19/2020       CC Marc Arias MD  [address] [place] [address] on close of this encounter.

## 2021-04-01 NOTE — NURSING NOTE
Chief Complaint   Patient presents with     Derm Problem     Nail disease. Concern for fungal vs psoriasiatic nail involvement, X 2018 off and on     Debo Cates MA

## 2021-04-01 NOTE — LETTER
4/1/2021       RE: Willa Downey  [address]  [facility] [address]     Dear Colleague,    Thank you for referring your patient, Willa Downey, to the [facility] DERMATOLOGY [facility]IC [place] at [facility]. Please see a copy of my visit note below.    [facility] Dermatology Note  Encounter Date: Apr 1, 2021  Office Visit     Dermatology Problem List:  1. Psoriasis with psoriatic arthritis   - mainly on the hands, fingertips, nails   - humira per rheumatology   - clobetasol for fingertips and proximal nail folds  - future: consider alternative biologic   2. Cyst of skin, posterior neck midline  - referral for excision      ____________________________________________    Assessment & Plan:     # Psoriasis with psoriatic arthritis, mainly located on the hands and fingertips.  Sees rheumatology recently started on Humira in February.  We will wait another 6-12 weeks to see if she has any benefit from the humira for her fingernails and tips of the fingers.  Then we would consider starting another agent that would also work for her PsA.    - continue Humira per rheumatology   - continue clobetasol to proximal nail folds and fingertips     # EIC posterior neck midline   - referral for excision     Procedures Performed:   None    Follow-up: 2 month(s) in-person, or earlier for new or changing lesions    Staff and Resident:     Ale Mclaughlin MD     STAFF: NICOL    I have personally examined this patient and agree with Dr. Mclaughlin's documentation and plan of care. I have reviewed and amended the resident's note above. The documentation accurately reflects my clinical observations, diagnoses, treatment and follow-up plans.     Celine Morales MD  Dermatology Staff    ____________________________________________    CC: Derm Problem (Nail disease. Concern for fungal vs psoriasiatic nail involvement, X 2018 off and  on)    HPI:  Ms. Willa Downey is a(n) 60 year old female who presents today as a new patient for psoriasis of the fingertips and dystrophic nails.  She was started on Humira by rheumatology in February for PsA and is also using clobetasol and mometasone for finger tips and the proximal nail folds where psoriasis is the worse.  Her psoriasis is confined to the fingertips.  She also has dystrophic nails.  She states humira has helped significantly, however 2 days before she is due for injection, she notices flaring of the nails.   Patient is otherwise feeling well, without additional skin concerns.    Labs Reviewed:  N/A    Physical Exam:  Vitals: There were no vitals taken for this visit.  SKIN: examined the nails and hands bilaterally and the neck   - distal onycholysis right 4th digit   - Longitudinal melanonyuchia  - First right with oil spot   - First left, 3rd, 4th, 5th - thinning of the nails nail spicules   - Inflammation at proximal nail fold   - posterior neck midline with dome shaped nodule with central punctum   - No other lesions of concern on areas examined.     Medications:  Current Outpatient Medications   Medication     adalimumab (HUMIRA PEN) 40 MG/0.8ML pen kit     CALCIUM CARBONATE-VIT D-MIN PO     clobetasol (TEMOVATE) 0.05 % external ointment     gabapentin (NEURONTIN) 300 MG capsule     loratadine (CLARITIN) 10 MG capsule     meloxicam (MOBIC) 15 MG tablet     Multiple Vitamins-Minerals (MULTIVITAMIN ADULTS 50+) TABS     mometasone (ELOCON) 0.1 % external ointment     No current facility-administered medications for this visit.       Past Medical History:   Patient Active Problem List   Diagnosis     Hyperlipidemia     Low-tension glaucoma of both eyes, severe stage     Myopia     Osteoarthritis     Screening for malignant neoplasm of cervix     Psoriasis of nail     Spinal stenosis of lumbar region, unspecified whether neurogenic claudication present     Neuroforaminal stenosis of lumbar  spine     Bilateral low back pain with right-sided sciatica, unspecified chronicity     Past Medical History:   Diagnosis Date     Nuclear senile cataract of both eyes 8/16/2018     Psoriatic arthropathy (H) 10/19/2020       CC Marc Arias MD  [address] [place] [id number] on close of this encounter.

## 2021-04-02 ENCOUNTER — VIRTUAL VISIT (OUTPATIENT)
Dept: RHEUMATOLOGY | Facility: CLINIC | Age: 61
End: 2021-04-02
Attending: INTERNAL MEDICINE
Payer: COMMERCIAL

## 2021-04-02 DIAGNOSIS — Z79.1 NSAID LONG-TERM USE: ICD-10-CM

## 2021-04-02 DIAGNOSIS — L40.50 PSORIATIC ARTHRITIS (H): Primary | ICD-10-CM

## 2021-04-02 DIAGNOSIS — Z79.899 HIGH RISK MEDICATION USE: ICD-10-CM

## 2021-04-02 PROCEDURE — 99214 OFFICE O/P EST MOD 30 MIN: CPT | Mod: 95 | Performed by: INTERNAL MEDICINE

## 2021-04-02 NOTE — LETTER
4/2/2021       RE: Willa Downey  [address]  [facility] [address]     Dear Colleague,    Thank you for referring your patient, Willa Downey, to the [facility] RHEUMATOLOGY CLINIC [place] at [facility]. Please see a copy of my visit note below.        Outpatient Rheumatology Follow-up  This visit was conducted via synchronous video visit due to the current COVID-19 crisis to reduce patient risk.  Verbal consent was obtained and is documented below.    Name: Willa Downey    MRN [id number]   Today's date: 4/2/2021         Reason for consult: psoriatic arthritis   Requesting physician: Kitty Mendez MD        Assessment & Plan:   #negative RF  #psoriasis, nail predominant  #DIP Left hand 3rd, 4th, 5th and right hand 4th digit inflammatory arthritis    60 year old female with hx of advanced glaucoma followed closely by ophthalmology presented to rheumatology on 2/5/21 with 5 month of progressive left hand 3rd, 4th, 5th digit and right hand 4th digit DIP inflammatory arthritis in the context of psoriasis of the nails of these same fingers taken together is consistent with psoriatic arthritis. Tried on 2 NSAIDs and failed. Currently on Mobic 7.5mg daily and omeprazole due to GERD symptoms. Tried methotrexate by prior rheumatologist and disease not controlled with this therapy. Humira started Mid February 2021 due to lack of efficacy of these other therapies.     Disease remains active, though is improving since starting humira. She has not had any noticeable side effects from humira therapy. She does note that on day 12/13 of her 14 day cycle she has return of psoriatic digital lesions which then improves in the days following her injection, though with each cycle (now only 3 of them), it has been more and more mild. As only taken 3 injections, will need more time to reach maximum effect though could consider increasing frequency if  needed or switching to another TNF.    1) Continue mobic 7.5mg daily and omeprazole 20mg daily  2) Continue humira q2 weeks  3) Follow-up in 2-3 months  4) Continue follow-up with dermatology as planned    Marc Arias MD  Rheumatology       Subjective:   4/2/21  Saw derm yesterday. Has taken 3 humira injections so far. 2 days prior to injection, notices worsening of psoriasis on fingertips, skin gets hard, peels, then resolves. Though as it happens each time symptoms are less severe. The swelling in her DIPs is now almost completely resolved. Left 3rd, 4th, 5th DIP erythema gone. 0/10 pain now. Mild left hand CMC pain, chronic. Unchanged. Has continued on meloxicam 7.5mg daily with omeprazole without any gerd symptoms. Also with mild symptoms on right hand CMC. Right hand 4th digit nail changes as well.  Next shot on 4/13. No fever, chills, weight loss. No interval infections.     ROS otherwise negative.    2/18/21  Within about 3-4 days of starting mobic, noticed improvement in pain and flexibility. Though from about last Wednesday, within about 10 hours after taking her medication (ie that night) she notices increases swelling/stiffness/pain. Also having diarrhea (occasional, indigestion, stomach pain). The pain is after dinner each night, before bed, sitting on couch watching TV. Today, did not take her mobic, and if still, no pain. If flexes her fingers has 5/10 pain at the DIPs. Still with nail changes, unchanged. No new joints affected. Still with swelling and redness, though some improved since starting mobic. No radiation of the pain. Continues to spare the MCPs. ROS otherwise negative. Still without any other skin involvement. ROS otherwise negative.    HPI from initial consult  Previously followed by Dr. Owens at  for psoriatic arthritis on methotrexate. Pain was initially in her CMCs, dx with hand OA. Then in 2019, had right hand 5th digit nail changes which was consistent with psoriasis. Was  started on methotrexate at that time, was on it for 9 months. In 2020, switched to [facility] and stopped methotrexate. Then in September 2020, left hand middle finger became swollen. Tried to get into [facility], though could not. So went to [facility] instead with Dr. Orourke, there was concern for infection so started on antibiotics, then a second. Then [facility] No improvement after this second course. Went back to Dr. Orourke, who told her that she likely should not have been on methotrexate, though did not explain why. Could not determine the etiology of her swollen digit. With ongoing swelling/pain, was seen by hand ortho. Around that time lost her nail. The swelling never improved. Has pronounced ongoing DIP swelling, warmth, redness. Then went back to ortho hand, who ordered xray/mri of hands, and there was concern for infection.     Right hand only 4th digit nail involved. Left hand 3rd, 4th, 5th digit nail and DIP erythema/edema/warmth. Has night time pain that wakes her from sleep. No pain in her PIPs/MCPs. No psoriasis elsewhere. Has sister with psoriasis of nails as well. Has severe glaucoma and has been following q6 months with ophthalmology. No other inflammatory eye disease. Has a history of low back pain, dx with herniated disc and sciatic nerve pain. Takes gabapentin. Back pain does worse at the end of the day/ more on her feet. Gabapentin is helpful. No other skin lesions. No ulcers in nose or mouth. No recurrent sinus or lung infections. No epistaxis/hemoptysis. No chest pain/SOB. No longer taking long walks due to back pain starting in 2020. No change in strength or sensation in arm or legs. No raynauds. Told by eye doctor that she has dry eyes. Not symptomatic. No dry mouth. No fever/chills/weight loss. No dactylitis. No trauma or known infection prior to onset. Unable to hold glass due to decreased . Pain is rated 6/10. Sharp at times. Dull at times. Worse  gets up to 8/10. No radiation. Has tried ibuprofen/acetaminophin from time to time, which she is unclear if it is helpful. Some improvement in pain. Swelling not changed. Has been using stationary bike, which she finds helpful. At least 30 minutes daily. No GI complaints. No blood in stool.     Past Medical History  Past Medical History:   Diagnosis Date     Nuclear senile cataract of both eyes 8/16/2018     Psoriatic arthropathy (H) 10/19/2020     Past Surgical History  Past Surgical History:   Procedure Laterality Date     BIOPSY  07/30/2019    the right breast tissue(benign)     EYE SURGERY      3 surgeries for glaucoma treatment     Medications  Current Outpatient Medications   Medication     adalimumab (HUMIRA PEN) 40 MG/0.8ML pen kit     CALCIUM CARBONATE-VIT D-MIN PO     clobetasol (TEMOVATE) 0.05 % external ointment     clobetasol (TEMOVATE) 0.05 % external ointment     gabapentin (NEURONTIN) 300 MG capsule     loratadine (CLARITIN) 10 MG capsule     meloxicam (MOBIC) 15 MG tablet     mometasone (ELOCON) 0.1 % external ointment     Multiple Vitamins-Minerals (MULTIVITAMIN ADULTS 50+) TABS     No current facility-administered medications for this visit.      Allergies   Allergies   Allergen Reactions     Adhesive Tape Blisters, Itching and Swelling     Dust Mites      Latex Blisters, Itching, Other (See Comments) and Swelling     Skin sensitivity       Morphine Sulfate (Concentrate) Itching     Other reaction(s): Chills  heart palpatations     Seasonal Allergies      Codeine Palpitations     chills     Family History:   No family history of autoimmune diseases.     Social History  , with children. 2 kids and they are healthy. No ETOH, smoking, drug use.      Objective:    Physical exam:  No vitals for this video visit. Vitals reviewed in Epic.  GEN: Sitting up at table. NAD  HEENT: no facial rash, sclera clear, no inflammatory nose/ external ear changes  CV: no upper extremity dependent edema  Pulm:  speaking in full sentences, no cough, no audible wheezing, no use of accessory muscles  Abdomen: not distended  Skin: no acute cutaneous lesions  MSK: full active ROM of bilateral shoulders, elbows, wrists, MCPs, PIPs, DIPs. Has psoriatic changes of fingernails of digits 3-5 on the left hand and digit 3 on the right hand. Since her last visit has had complete resolution of erythema/edema and tenderness to self palpation of DIP joints of these same hands    Labs:  2/5/21  HIV negative  Hep C AB negative  Hep B s AG negative  Hep B c AB reactive  Quant gold negative  Quant gold negative  RF negative  CRP <2.9  ESR 10  Cr 0.62  WBC 7.1  HGB 13.1    HLA b27 negative  Hep B virus DNA negative    2/2/21  ESR 9  CRP <2.9  WBC 6.1  HGB 12.8      11/11/20  A1C 5.3  Hep C/ HIV negative    Imaging:  MR left hand  Impression:  1a. Correlating to the marker at the third digit interphalangeal  joint, there is a focal erosion along the volar aspect of the distal  phalangeal base with intense bone marrow edema and abnormal T1 marrow  signal. Findings are concerning for osteomyelitis and infection should  be ruled out. Also on the differential is sequelae of long-standing  active inflammatory arthropathy (including psoriatic arthritis given  patient's clinical history) where marginal erosions and marrow signal  abnormality are expected though the degree of T1 marrow replacement is  out of proportion to typical findings.   b. Subtle bone marrow edema in the middle phalanx without T1 marrow  signal abnormality, this is favored to represent reactive edema.  c. Joint effusion at the third digit interphalangeal joint,  potentially infectious or inflammatory. Consider aspiration for  definitive diagnosis.  d. The partially visualized fifth digit shows edema within the distal  phalanx and head of the middle phalanx with regional soft tissue  edema. Given predominant findings in the third digit, similar changes  in the fifth  digit could support a polyarticular inflammatory  arthropathy. Correlate clinically.  2. Tenosynovitis of the third digit flexor and extensor tendons. There  is also tendinosis with high-grade (near full-thickness) tearing of  the extensor tendon/extensor saul at the distal phalangeal base with  slight proximal retraction of the residual fibers.  3. Diffusely thickened, edematous ulnar and radial collateral  ligaments, likely combination of sprain and reactive edema.  4. Extensive soft tissue edema centered about the interphalangeal  joint.         Per patient, she has now had 3 humira treatment and feels that it has helped. She also established care with dermatology as of yesterday 4/1/21.      Willa is a 60 year old who is being evaluated via a billable video visit.      How would you like to obtain your AVS? MyChart    Will anyone else be joining your video visit? No    Video-Visit Details    Type of service:  Video Visit    Start: 04/02/2021 10:05 am   Stop: 04/02/2021 10:22 am    Originating Location (pt. Location): Home    Distant Location (provider location):  [facility] RHEUMATOLOGY CLINIC [place]     Platform used for Video Visit: Tiburcio Arias MD  Rheumatology

## 2021-04-02 NOTE — PROGRESS NOTES
Outpatient Rheumatology Follow-up  This visit was conducted via synchronous video visit due to the current COVID-19 crisis to reduce patient risk.  Verbal consent was obtained and is documented below.    Name: Willa Downey    MRN [id number]   Today's date: 4/2/2021         Reason for consult: psoriatic arthritis   Requesting physician: Kitty Mendez MD        Assessment & Plan:   #negative RF  #psoriasis, nail predominant  #DIP Left hand 3rd, 4th, 5th and right hand 4th digit inflammatory arthritis    60 year old female with hx of advanced glaucoma followed closely by ophthalmology presented to rheumatology on 2/5/21 with 5 month of progressive left hand 3rd, 4th, 5th digit and right hand 4th digit DIP inflammatory arthritis in the context of psoriasis of the nails of these same fingers taken together is consistent with psoriatic arthritis. Tried on 2 NSAIDs and failed. Currently on Mobic 7.5mg daily and omeprazole due to GERD symptoms. Tried methotrexate by prior rheumatologist and disease not controlled with this therapy. Humira started Mid February 2021 due to lack of efficacy of these other therapies.     Disease remains active, though is improving since starting humira. She has not had any noticeable side effects from humira therapy. She does note that on day 12/13 of her 14 day cycle she has return of psoriatic digital lesions which then improves in the days following her injection, though with each cycle (now only 3 of them), it has been more and more mild. As only taken 3 injections, will need more time to reach maximum effect though could consider increasing frequency if needed or switching to another TNF.    1) Continue mobic 7.5mg daily and omeprazole 20mg daily  2) Continue humira q2 weeks  3) Follow-up in 2-3 months  4) Continue follow-up with dermatology as planned    Marc Arias MD  Rheumatology       Subjective:   4/2/21  Saw derm yesterday. Has taken 3 humira injections so far. 2  days prior to injection, notices worsening of psoriasis on fingertips, skin gets hard, peels, then resolves. Though as it happens each time symptoms are less severe. The swelling in her DIPs is now almost completely resolved. Left 3rd, 4th, 5th DIP erythema gone. 0/10 pain now. Mild left hand CMC pain, chronic. Unchanged. Has continued on meloxicam 7.5mg daily with omeprazole without any gerd symptoms. Also with mild symptoms on right hand CMC. Right hand 4th digit nail changes as well.  Next shot on 4/13. No fever, chills, weight loss. No interval infections.     ROS otherwise negative.    2/18/21  Within about 3-4 days of starting mobic, noticed improvement in pain and flexibility. Though from about last Wednesday, within about 10 hours after taking her medication (ie that night) she notices increases swelling/stiffness/pain. Also having diarrhea (occasional, indigestion, stomach pain). The pain is after dinner each night, before bed, sitting on couch watching TV. Today, did not take her mobic, and if still, no pain. If flexes her fingers has 5/10 pain at the DIPs. Still with nail changes, unchanged. No new joints affected. Still with swelling and redness, though some improved since starting mobic. No radiation of the pain. Continues to spare the MCPs. ROS otherwise negative. Still without any other skin involvement. ROS otherwise negative.    HPI from initial consult  Previously followed by Dr. Owens at  for psoriatic arthritis on methotrexate. Pain was initially in her CMCs, dx with hand OA. Then in 2019, had right hand 5th digit nail changes which was consistent with psoriasis. Was started on methotrexate at that time, was on it for 9 months. In 2020, switched to [facility] and stopped methotrexate. Then in September 2020, left hand middle finger became swollen. Tried to get into [facility], though could not. So went to [facility] instead with Dr. Orourke, there was concern for infection so started on  antibiotics, then a second. Then [facility] No improvement after this second course. Went back to Dr. Orourke, who told her that she likely should not have been on methotrexate, though did not explain why. Could not determine the etiology of her swollen digit. With ongoing swelling/pain, was seen by hand ortho. Around that time lost her nail. The swelling never improved. Has pronounced ongoing DIP swelling, warmth, redness. Then went back to ortho hand, who ordered xray/mri of hands, and there was concern for infection.     Right hand only 4th digit nail involved. Left hand 3rd, 4th, 5th digit nail and DIP erythema/edema/warmth. Has night time pain that wakes her from sleep. No pain in her PIPs/MCPs. No psoriasis elsewhere. Has sister with psoriasis of nails as well. Has severe glaucoma and has been following q6 months with ophthalmology. No other inflammatory eye disease. Has a history of low back pain, dx with herniated disc and sciatic nerve pain. Takes gabapentin. Back pain does worse at the end of the day/ more on her feet. Gabapentin is helpful. No other skin lesions. No ulcers in nose or mouth. No recurrent sinus or lung infections. No epistaxis/hemoptysis. No chest pain/SOB. No longer taking long walks due to back pain starting in 2020. No change in strength or sensation in arm or legs. No raynauds. Told by eye doctor that she has dry eyes. Not symptomatic. No dry mouth. No fever/chills/weight loss. No dactylitis. No trauma or known infection prior to onset. Unable to hold glass due to decreased . Pain is rated 6/10. Sharp at times. Dull at times. Worse gets up to 8/10. No radiation. Has tried ibuprofen/acetaminophin from time to time, which she is unclear if it is helpful. Some improvement in pain. Swelling not changed. Has been using stationary bike, which she finds helpful. At least 30 minutes daily. No GI complaints. No blood in stool.     Past Medical History  Past  Medical History:   Diagnosis Date     Nuclear senile cataract of both eyes 8/16/2018     Psoriatic arthropathy (H) 10/19/2020     Past Surgical History  Past Surgical History:   Procedure Laterality Date     BIOPSY  07/30/2019    the right breast tissue(benign)     EYE SURGERY      3 surgeries for glaucoma treatment     Medications  Current Outpatient Medications   Medication     adalimumab (HUMIRA PEN) 40 MG/0.8ML pen kit     CALCIUM CARBONATE-VIT D-MIN PO     clobetasol (TEMOVATE) 0.05 % external ointment     clobetasol (TEMOVATE) 0.05 % external ointment     gabapentin (NEURONTIN) 300 MG capsule     loratadine (CLARITIN) 10 MG capsule     meloxicam (MOBIC) 15 MG tablet     mometasone (ELOCON) 0.1 % external ointment     Multiple Vitamins-Minerals (MULTIVITAMIN ADULTS 50+) TABS     No current facility-administered medications for this visit.      Allergies   Allergies   Allergen Reactions     Adhesive Tape Blisters, Itching and Swelling     Dust Mites      Latex Blisters, Itching, Other (See Comments) and Swelling     Skin sensitivity       Morphine Sulfate (Concentrate) Itching     Other reaction(s): Chills  heart palpatations     Seasonal Allergies      Codeine Palpitations     chills     Family History:   No family history of autoimmune diseases.     Social History  , with children. 2 kids and they are healthy. No ETOH, smoking, drug use.      Objective:    Physical exam:  No vitals for this video visit. Vitals reviewed in Epic.  GEN: Sitting up at table. NAD  HEENT: no facial rash, sclera clear, no inflammatory nose/ external ear changes  CV: no upper extremity dependent edema  Pulm: speaking in full sentences, no cough, no audible wheezing, no use of accessory muscles  Abdomen: not distended  Skin: no acute cutaneous lesions  MSK: full active ROM of bilateral shoulders, elbows, wrists, MCPs, PIPs, DIPs. Has psoriatic changes of fingernails of digits 3-5 on the left hand and digit 3 on the right hand.  Since her last visit has had complete resolution of erythema/edema and tenderness to self palpation of DIP joints of these same hands    Labs:  2/5/21  HIV negative  Hep C AB negative  Hep B s AG negative  Hep B c AB reactive  Quant gold negative  Quant gold negative  RF negative  CRP <2.9  ESR 10  Cr 0.62  WBC 7.1  HGB 13.1    HLA b27 negative  Hep B virus DNA negative    2/2/21  ESR 9  CRP <2.9  WBC 6.1  HGB 12.8      11/11/20  A1C 5.3  Hep C/ HIV negative    Imaging:  MR left hand  Impression:  1a. Correlating to the marker at the third digit interphalangeal  joint, there is a focal erosion along the volar aspect of the distal  phalangeal base with intense bone marrow edema and abnormal T1 marrow  signal. Findings are concerning for osteomyelitis and infection should  be ruled out. Also on the differential is sequelae of long-standing  active inflammatory arthropathy (including psoriatic arthritis given  patient's clinical history) where marginal erosions and marrow signal  abnormality are expected though the degree of T1 marrow replacement is  out of proportion to typical findings.   b. Subtle bone marrow edema in the middle phalanx without T1 marrow  signal abnormality, this is favored to represent reactive edema.  c. Joint effusion at the third digit interphalangeal joint,  potentially infectious or inflammatory. Consider aspiration for  definitive diagnosis.  d. The partially visualized fifth digit shows edema within the distal  phalanx and head of the middle phalanx with regional soft tissue  edema. Given predominant findings in the third digit, similar changes  in the fifth digit could support a polyarticular inflammatory  arthropathy. Correlate clinically.  2. Tenosynovitis of the third digit flexor and extensor tendons. There  is also tendinosis with high-grade (near full-thickness) tearing of  the extensor tendon/extensor saul at the distal phalangeal base with  slight proximal retraction  of the residual fibers.  3. Diffusely thickened, edematous ulnar and radial collateral  ligaments, likely combination of sprain and reactive edema.  4. Extensive soft tissue edema centered about the interphalangeal  joint.         Per patient, she has now had 3 humira treatment and feels that it has helped. She also established care with dermatology as of yesterday 4/1/21.      Willa is a 60 year old who is being evaluated via a billable video visit.      How would you like to obtain your AVS? MyChart    Will anyone else be joining your video visit? No    Video-Visit Details    Type of service:  Video Visit    Start: 04/02/2021 10:05 am   Stop: 04/02/2021 10:22 am    Originating Location (pt. Location): Home    Distant Location (provider location):  [facility] [place]     Platform used for Video Visit: Tiburcio Arias MD  Rheumatology

## 2021-04-04 NOTE — PATIENT INSTRUCTIONS
1) Continue humira, mobic, omeprazole    2) We will follow-up in about 2-3 months, but if anything comes up, please do not hesitate to let me know and I will see you sooner.    Thanks     Marc Arias

## 2021-04-09 ENCOUNTER — IMMUNIZATION (OUTPATIENT)
Dept: NURSING | Facility: CLINIC | Age: 61
End: 2021-04-09
Payer: COMMERCIAL

## 2021-04-09 PROCEDURE — 0001A PR COVID VAC PFIZER DIL RECON 30 MCG/0.3 ML IM: CPT

## 2021-04-09 PROCEDURE — 91300 PR COVID VAC PFIZER DIL RECON 30 MCG/0.3 ML IM: CPT

## 2021-04-30 ENCOUNTER — IMMUNIZATION (OUTPATIENT)
Dept: NURSING | Facility: CLINIC | Age: 61
End: 2021-04-30
Attending: INTERNAL MEDICINE
Payer: COMMERCIAL

## 2021-04-30 PROCEDURE — 91300 PR COVID VAC PFIZER DIL RECON 30 MCG/0.3 ML IM: CPT

## 2021-04-30 PROCEDURE — 0002A PR COVID VAC PFIZER DIL RECON 30 MCG/0.3 ML IM: CPT

## 2021-05-14 ENCOUNTER — TELEPHONE (OUTPATIENT)
Dept: RHEUMATOLOGY | Facility: CLINIC | Age: 61
End: 2021-05-14

## 2021-05-14 ENCOUNTER — VIRTUAL VISIT (OUTPATIENT)
Dept: RHEUMATOLOGY | Facility: CLINIC | Age: 61
End: 2021-05-14
Attending: INTERNAL MEDICINE
Payer: COMMERCIAL

## 2021-05-14 DIAGNOSIS — L40.50 PSORIATIC ARTHRITIS (H): Primary | ICD-10-CM

## 2021-05-14 DIAGNOSIS — Z79.899 HIGH RISK MEDICATION USE: ICD-10-CM

## 2021-05-14 DIAGNOSIS — Z79.1 NSAID LONG-TERM USE: ICD-10-CM

## 2021-05-14 PROCEDURE — 99214 OFFICE O/P EST MOD 30 MIN: CPT | Mod: 95 | Performed by: INTERNAL MEDICINE

## 2021-05-14 RX ORDER — MELOXICAM 7.5 MG/1
15 TABLET ORAL DAILY
Qty: 180 TABLET | Refills: 0 | Status: SHIPPED | OUTPATIENT
Start: 2021-05-14 | End: 2022-05-16

## 2021-05-14 RX ORDER — MELOXICAM 7.5 MG/1
15 TABLET ORAL DAILY
Qty: 180 TABLET | Refills: 0 | Status: SHIPPED | OUTPATIENT
Start: 2021-05-14 | End: 2021-05-14

## 2021-05-14 RX ORDER — MEDROXYPROGESTERONE ACETATE 150 MG/ML
50 INJECTION, SUSPENSION INTRAMUSCULAR
Qty: 24 ML | Refills: 0 | Status: SHIPPED | OUTPATIENT
Start: 2021-05-17 | End: 2021-06-11

## 2021-05-14 NOTE — PROGRESS NOTES
Outpatient Rheumatology Follow-up  This visit was conducted via synchronous video visit due to the current COVID-19 crisis to reduce patient risk.  Verbal consent was obtained and is documented below.    Name: Willa Downey    MRN [id number]   Today's date: May 14, 2021           Reason for follow-up: psoriatic arthritis   Requesting physician: Kitty Mendez MD        Assessment & Plan:   #negative RF  #psoriasis, cutaneous and nail involvement  #DIP Left hand 3rd, 4th, 5th and right hand 4th digit inflammatory arthritis  #Psoriatic arthritis  #humira failure  #methotrexate failure  #high risk drug therapy: Humira   #long term NSAID use, current: Mobic    60 year old female with hx of advanced glaucoma followed closely by ophthalmology presented to rheumatology on 2/5/21 with 5 month of progressive left hand 3rd, 4th, 5th digit and right hand 4th digit DIP inflammatory arthritis in the context of psoriasis, psoriatic changes of the nails. Taken together most consistent with psoriatic arthritis. Tried on 2 NSAIDs which failed to control symptoms, and had severe GERD symptoms. Tried methotrexate by prior rheumatologist and disease not controlled. Humira started Mid February 2021 due to lack of efficacy of these other therapies and continued through May 14th, though has had severe/ rapidly progressive disease after some improvement in the first 4 weeks. Her DIP inflammatory arthritis and nail disease is active, severe, progressive.    With ongoing disease activity and adequate length of time on humira/ lack of inflammatory eye disease associated with PsA, have discussed risks and benefits of switching to enbrel, with loading for the first 12 weeks. Though this loading dose was not approved. So will proceed with switch the enbrel, though on once weekly dosing. As a bridge, will continue on mobic 7.5-15mg daily with PPI given GERD symptoms. Will follow-up in 6-8 weeks. If not turning the corner, will  consider switch to Mario inhibitor vs IL-17 inhibitor.      1) Continue mobic 7.5mg-15mg daily and omeprazole 20mg daily  2) Switch humira to enbrel, PA approved for once weekly dosing (denied for the loading dose)   3) Follow-up in 6-8 weeks  4) Continue follow-up with dermatology as planned    Marc Arias MD  Rheumatology     Subjective:   5/14/21  Patient initially was going to wait to contact, though has been having progression of symptoms of nails/joint pain/ skin. Last injection was on Tuesday. Had a flare in the days following her injection. First 2 doses, she did notice some improvement. Though after that time, with each dose, has had less and less of a positive change in nails/joints/skin. Has instead been getting worse and worse response. Has stiffness and pain in her DIPs that has been progressive. Has redness as well. Also with radiation of the pain proximally which is new. Also some days with severe fatigue. No fevers/chills. No weight changes. No interval infections.       Past Medical History  Past Medical History:   Diagnosis Date     Nuclear senile cataract of both eyes 8/16/2018     Psoriatic arthropathy (H) 10/19/2020     Past Surgical History  Past Surgical History:   Procedure Laterality Date     BIOPSY  07/30/2019    the right breast tissue(benign)     EYE SURGERY      3 surgeries for glaucoma treatment     Medications  Current Outpatient Medications   Medication     adalimumab (HUMIRA PEN) 40 MG/0.8ML pen kit     CALCIUM CARBONATE-VIT D-MIN PO     clobetasol (TEMOVATE) 0.05 % external ointment     clobetasol (TEMOVATE) 0.05 % external ointment     gabapentin (NEURONTIN) 300 MG capsule     loratadine (CLARITIN) 10 MG capsule     meloxicam (MOBIC) 15 MG tablet     mometasone (ELOCON) 0.1 % external ointment     Multiple Vitamins-Minerals (MULTIVITAMIN ADULTS 50+) TABS     No current facility-administered medications for this visit.      Allergies   Allergies   Allergen Reactions     Adhesive  Tape Blisters, Itching and Swelling     Dust Mites      Latex Blisters, Itching, Other (See Comments) and Swelling     Skin sensitivity       Morphine Sulfate (Concentrate) Itching     Other reaction(s): Chills  heart palpatations     Seasonal Allergies      Codeine Palpitations     chills     Family History:   No family history of autoimmune diseases.     Social History  , with children. 2 kids and they are healthy. No ETOH, smoking, drug use.      Objective:    Physical exam:  No vitals for this video visit. Vitals reviewed in Epic.  GEN: Sitting up at table. NAD  HEENT: no facial rash, sclera clear, no inflammatory nose/ external ear changes  CV: no upper extremity dependent edema  Pulm: speaking in full sentences, no cough, no audible wheezing, no use of accessory muscles  Abdomen: not distended  Skin: no acute cutaneous lesions  MSK: full active ROM of bilateral shoulders, elbows, wrists, MCPs, PIPs. DIPs of Digits 3-5 on the left hand and digit 3 on the right hand with erythema/edema and tenderness to self palpation.  Has psoriatic changes of fingernails of digits 3-5 on the left hand and digit 3 on the right hand.    Labs:  2/5/21  HIV negative  Hep C AB negative  Hep B s AG negative  Hep B c AB reactive  Quant gold negative  Quant gold negative  RF negative  CRP <2.9  ESR 10  Cr 0.62  WBC 7.1  HGB 13.1    HLA b27 negative  Hep B virus DNA negative    2/2/21  ESR 9  CRP <2.9  WBC 6.1  HGB 12.8      11/11/20  A1C 5.3  Hep C/ HIV negative    Imaging:  MR left hand  Impression:  1a. Correlating to the marker at the third digit interphalangeal  joint, there is a focal erosion along the volar aspect of the distal  phalangeal base with intense bone marrow edema and abnormal T1 marrow  signal. Findings are concerning for osteomyelitis and infection should  be ruled out. Also on the differential is sequelae of long-standing  active inflammatory arthropathy (including psoriatic arthritis  given  patient's clinical history) where marginal erosions and marrow signal  abnormality are expected though the degree of T1 marrow replacement is  out of proportion to typical findings.   b. Subtle bone marrow edema in the middle phalanx without T1 marrow  signal abnormality, this is favored to represent reactive edema.  c. Joint effusion at the third digit interphalangeal joint,  potentially infectious or inflammatory. Consider aspiration for  definitive diagnosis.  d. The partially visualized fifth digit shows edema within the distal  phalanx and head of the middle phalanx with regional soft tissue  edema. Given predominant findings in the third digit, similar changes  in the fifth digit could support a polyarticular inflammatory  arthropathy. Correlate clinically.  2. Tenosynovitis of the third digit flexor and extensor tendons. There  is also tendinosis with high-grade (near full-thickness) tearing of  the extensor tendon/extensor saul at the distal phalangeal base with  slight proximal retraction of the residual fibers.  3. Diffusely thickened, edematous ulnar and radial collateral  ligaments, likely combination of sprain and reactive edema.  4. Extensive soft tissue edema centered about the interphalangeal  joint.     Patient doesn't think Humira is working.    Willa is a 60 year old who is being evaluated via a billable video visit.      How would you like to obtain your AVS? MyChart    Will anyone else be joining your video visit? No      Video Start Time: 9:59AM  Video-Visit Details    Type of service:  Video Visit    Video End Time: 10:29AM    Originating Location (pt. Location): Home    Distant Location (provider location):  [facility] [place]     Platform used for Video Visit: Tiburcio Arias MD  Rheumatology

## 2021-05-14 NOTE — LETTER
5/14/2021       RE: Willa Downey  [address]  [facility] [address]     Dear Colleague,    Thank you for referring your patient, Willa Downey, to the [facility]HEUMATOLOG[facility]INIC [place] at [facility]. Please see a copy of my visit note below.      Outpatient Rheumatology Follow-up  This visit was conducted via synchronous video visit due to the current COVID-19 crisis to reduce patient risk.  Verbal consent was obtained and is documented below.    Name: Willa Downey    MRN [id number]   Today's date: May 14, 2021           Reason for follow-up: psoriatic arthritis   Requesting physician: Kitty Mendez MD        Assessment & Plan:   #negative RF  #psoriasis, cutaneous and nail involvement  #DIP Left hand 3rd, 4th, 5th and right hand 4th digit inflammatory arthritis  #Psoriatic arthritis  #humira failure  #methotrexate failure  #high risk drug therapy: Humira   #long term NSAID use, current: Mobic    60 year old female with hx of advanced glaucoma followed closely by ophthalmology presented to rheumatology on 2/5/21 with 5 month of progressive left hand 3rd, 4th, 5th digit and right hand 4th digit DIP inflammatory arthritis in the context of psoriasis, psoriatic changes of the nails. Taken together most consistent with psoriatic arthritis. Tried on 2 NSAIDs which failed to control symptoms, and had severe GERD symptoms. Tried methotrexate by prior rheumatologist and disease not controlled. Humira started Mid February 2021 due to lack of efficacy of these other therapies and continued through May 14th, though has had severe/ rapidly progressive disease after some improvement in the first 4 weeks. Her DIP inflammatory arthritis and nail disease is active, severe, progressive.    With ongoing disease activity and adequate length of time on humira/ lack of inflammatory eye disease associated with PsA, have discussed risks and  benefits of switching to enbrel, with loading for the first 12 weeks. Though this loading dose was not approved. So will proceed with switch the enbrel, though on once weekly dosing. As a bridge, will continue on mobic 7.5-15mg daily with PPI given GERD symptoms. Will follow-up in 6-8 weeks. If not turning the corner, will consider switch to Mario inhibitor vs IL-17 inhibitor.      1) Continue mobic 7.5mg-15mg daily and omeprazole 20mg daily  2) Switch humira to enbrel, PA approved for once weekly dosing (denied for the loading dose)   3) Follow-up in 6-8 weeks  4) Continue follow-up with dermatology as planned    Marc Arias MD  Rheumatology     Subjective:   5/14/21  Patient initially was going to wait to contact, though has been having progression of symptoms of nails/joint pain/ skin. Last injection was on Tuesday. Had a flare in the days following her injection. First 2 doses, she did notice some improvement. Though after that time, with each dose, has had less and less of a positive change in nails/joints/skin. Has instead been getting worse and worse response. Has stiffness and pain in her DIPs that has been progressive. Has redness as well. Also with radiation of the pain proximally which is new. Also some days with severe fatigue. No fevers/chills. No weight changes. No interval infections.       Past Medical History  Past Medical History:   Diagnosis Date     Nuclear senile cataract of both eyes 8/16/2018     Psoriatic arthropathy (H) 10/19/2020     Past Surgical History  Past Surgical History:   Procedure Laterality Date     BIOPSY  07/30/2019    the right breast tissue(benign)     EYE SURGERY      3 surgeries for glaucoma treatment     Medications  Current Outpatient Medications   Medication     adalimumab (HUMIRA PEN) 40 MG/0.8ML pen kit     CALCIUM CARBONATE-VIT D-MIN PO     clobetasol (TEMOVATE) 0.05 % external ointment     clobetasol (TEMOVATE) 0.05 % external ointment     gabapentin (NEURONTIN)  300 MG capsule     loratadine (CLARITIN) 10 MG capsule     meloxicam (MOBIC) 15 MG tablet     mometasone (ELOCON) 0.1 % external ointment     Multiple Vitamins-Minerals (MULTIVITAMIN ADULTS 50+) TABS     No current facility-administered medications for this visit.      Allergies   Allergies   Allergen Reactions     Adhesive Tape Blisters, Itching and Swelling     Dust Mites      Latex Blisters, Itching, Other (See Comments) and Swelling     Skin sensitivity       Morphine Sulfate (Concentrate) Itching     Other reaction(s): Chills  heart palpatations     Seasonal Allergies      Codeine Palpitations     chills     Family History:   No family history of autoimmune diseases.     Social History  , with children. 2 kids and they are healthy. No ETOH, smoking, drug use.      Objective:    Physical exam:  No vitals for this video visit. Vitals reviewed in Epic.  GEN: Sitting up at table. NAD  HEENT: no facial rash, sclera clear, no inflammatory nose/ external ear changes  CV: no upper extremity dependent edema  Pulm: speaking in full sentences, no cough, no audible wheezing, no use of accessory muscles  Abdomen: not distended  Skin: no acute cutaneous lesions  MSK: full active ROM of bilateral shoulders, elbows, wrists, MCPs, PIPs. DIPs of Digits 3-5 on the left hand and digit 3 on the right hand with erythema/edema and tenderness to self palpation.  Has psoriatic changes of fingernails of digits 3-5 on the left hand and digit 3 on the right hand.    Labs:  2/5/21  HIV negative  Hep C AB negative  Hep B s AG negative  Hep B c AB reactive  Quant gold negative  Quant gold negative  RF negative  CRP <2.9  ESR 10  Cr 0.62  WBC 7.1  HGB 13.1    HLA b27 negative  Hep B virus DNA negative    2/2/21  ESR 9  CRP <2.9  WBC 6.1  HGB 12.8      11/11/20  A1C 5.3  Hep C/ HIV negative    Imaging:  MR left hand  Impression:  1a. Correlating to the marker at the third digit interphalangeal  joint, there is a focal  erosion along the volar aspect of the distal  phalangeal base with intense bone marrow edema and abnormal T1 marrow  signal. Findings are concerning for osteomyelitis and infection should  be ruled out. Also on the differential is sequelae of long-standing  active inflammatory arthropathy (including psoriatic arthritis given  patient's clinical history) where marginal erosions and marrow signal  abnormality are expected though the degree of T1 marrow replacement is  out of proportion to typical findings.   b. Subtle bone marrow edema in the middle phalanx without T1 marrow  signal abnormality, this is favored to represent reactive edema.  c. Joint effusion at the third digit interphalangeal joint,  potentially infectious or inflammatory. Consider aspiration for  definitive diagnosis.  d. The partially visualized fifth digit shows edema within the distal  phalanx and head of the middle phalanx with regional soft tissue  edema. Given predominant findings in the third digit, similar changes  in the fifth digit could support a polyarticular inflammatory  arthropathy. Correlate clinically.  2. Tenosynovitis of the third digit flexor and extensor tendons. There  is also tendinosis with high-grade (near full-thickness) tearing of  the extensor tendon/extensor saul at the distal phalangeal base with  slight proximal retraction of the residual fibers.  3. Diffusely thickened, edematous ulnar and radial collateral  ligaments, likely combination of sprain and reactive edema.  4. Extensive soft tissue edema centered about the interphalangeal  joint.     Patient doesn't think Humira is working.    Willa is a 60 year old who is being evaluated via a billable video visit.      How would you like to obtain your AVS? MyChart    Will anyone else be joining your video visit? No      Video Start Time: 9:59AM  Video-Visit Details    Type of service:  Video Visit    Video End Time: 10:29AM    Originating Location (pt. Location):  Home    Distant Location (provider location):  [facility] RHEUMATOLOGY [facility]IN[facility] [place]     Platform used for Video Visit: Tiburcio Arias MD  Rheumatology

## 2021-05-14 NOTE — TELEPHONE ENCOUNTER
PA Initiation    Medication: ENBREL SURECLICK PENDING  Insurance Company: [employer] - Phone [phone] Fax [phone]  Pharmacy Filling the Rx: [facility] - [place], [place] - [facility] [address]  Filling Pharmacy Phone:    Filling Pharmacy Fax:    Start Date: 5/14/2021

## 2021-05-16 NOTE — TELEPHONE ENCOUNTER
Prior Authorization Approval    Authorization Effective Date: 5/14/2021  Authorization Expiration Date: 5/14/2022  Medication: ENBREL SURECLICK Approved  Approved Dose/Quantity:  4 for 28 days   Reference #:     Insurance Company: [employer] - Phone [phone] Fax [phone]  Expected CoPay: $5     CoPay Card Available: Yes    Foundation Assistance Needed:    Which Pharmacy is filling the prescription (Not needed for infusion/clinic administered): [facility] - [place], [place] - [address]  Pharmacy Notified: Yes  Patient Notified: Yes

## 2021-05-18 RX ORDER — MEDROXYPROGESTERONE ACETATE 150 MG/ML
50 INJECTION, SUSPENSION INTRAMUSCULAR WEEKLY
Qty: 4 ML | Refills: 11 | Status: SHIPPED | OUTPATIENT
Start: 2021-05-18 | End: 2021-10-22

## 2021-05-18 NOTE — TELEPHONE ENCOUNTER
PRIOR AUTHORIZATION DENIED    Medication: ENBREL SURECLICK - Denied     Denial Date: 5/18/2021    Denial Rational:  8 pens exceeds FDA approved amount     Appeal Information:  Can appeal

## 2021-05-18 NOTE — TELEPHONE ENCOUNTER
PA submitted incorrectly - re-submitted for #8 pens as it was written for loading dose    FLOR CONTRERAS (Key: [id number])

## 2021-05-18 NOTE — TELEPHONE ENCOUNTER
[facility] is calling to clarify the orders for the Enbrel and loading dose. Please call a pharmacist back at [phone].

## 2021-05-26 NOTE — TELEPHONE ENCOUNTER
Medication Appeal Initiation    We have initiated an appeal for the requested medication:  Medication: ENBREL SURECLICK - Appeal initiated   Appeal Start Date:  5/26/2021  Insurance Company: [employer] - Phone [phone] Fax [phone]  Comments:  Letter in letters tab

## 2021-05-27 NOTE — TELEPHONE ENCOUNTER
MEDICATION APPEAL DENIED    Medication: ENBREL SURECLICK - Appeal Denied     Denial Date: 5/27/2021    Denial Rational: Above dosing limits - PA denial upheld    Second Level Appeal Information: Can appeal with new information

## 2021-06-03 DIAGNOSIS — M54.41 ACUTE RIGHT-SIDED LOW BACK PAIN WITH RIGHT-SIDED SCIATICA: ICD-10-CM

## 2021-06-03 RX ORDER — GABAPENTIN 300 MG/1
300 CAPSULE ORAL 3 TIMES DAILY
Qty: 90 CAPSULE | Refills: 1 | Status: SHIPPED | OUTPATIENT
Start: 2021-06-03 | End: 2021-08-16

## 2021-06-09 ENCOUNTER — MYC MEDICAL ADVICE (OUTPATIENT)
Dept: RHEUMATOLOGY | Facility: CLINIC | Age: 61
End: 2021-06-09

## 2021-06-11 ENCOUNTER — OFFICE VISIT (OUTPATIENT)
Dept: FAMILY MEDICINE | Facility: CLINIC | Age: 61
End: 2021-06-11
Payer: COMMERCIAL

## 2021-06-11 VITALS
RESPIRATION RATE: 16 BRPM | SYSTOLIC BLOOD PRESSURE: 126 MMHG | WEIGHT: 144 LBS | BODY MASS INDEX: 24.72 KG/M2 | TEMPERATURE: 97.1 F | OXYGEN SATURATION: 98 % | HEART RATE: 74 BPM | DIASTOLIC BLOOD PRESSURE: 78 MMHG

## 2021-06-11 DIAGNOSIS — D84.9 IMMUNOSUPPRESSION (H): ICD-10-CM

## 2021-06-11 DIAGNOSIS — Z12.4 SCREENING FOR MALIGNANT NEOPLASM OF CERVIX: ICD-10-CM

## 2021-06-11 DIAGNOSIS — R53.83 FATIGUE, UNSPECIFIED TYPE: ICD-10-CM

## 2021-06-11 DIAGNOSIS — R60.0 SUBMANDIBULAR GLAND SWELLING: Primary | ICD-10-CM

## 2021-06-11 DIAGNOSIS — L40.50 PSORIATIC ARTHROPATHY (H): ICD-10-CM

## 2021-06-11 DIAGNOSIS — E78.5 HYPERLIPIDEMIA, UNSPECIFIED HYPERLIPIDEMIA TYPE: ICD-10-CM

## 2021-06-11 DIAGNOSIS — Z23 NEED FOR DIPHTHERIA-TETANUS-PERTUSSIS (TDAP) VACCINE: ICD-10-CM

## 2021-06-11 DIAGNOSIS — Z00.00 HEALTHCARE MAINTENANCE: ICD-10-CM

## 2021-06-11 LAB
BASOPHILS # BLD AUTO: 0 10E9/L (ref 0–0.2)
BASOPHILS NFR BLD AUTO: 0.4 %
DIFFERENTIAL METHOD BLD: NORMAL
EOSINOPHIL # BLD AUTO: 0.3 10E9/L (ref 0–0.7)
EOSINOPHIL NFR BLD AUTO: 3.1 %
ERYTHROCYTE [DISTWIDTH] IN BLOOD BY AUTOMATED COUNT: 13 % (ref 10–15)
HCT VFR BLD AUTO: 38.2 % (ref 35–47)
HETEROPH AB SER QL: NEGATIVE
HGB BLD-MCNC: 12.9 G/DL (ref 11.7–15.7)
LYMPHOCYTES # BLD AUTO: 3.6 10E9/L (ref 0.8–5.3)
LYMPHOCYTES NFR BLD AUTO: 38.3 %
MCH RBC QN AUTO: 29.3 PG (ref 26.5–33)
MCHC RBC AUTO-ENTMCNC: 33.8 G/DL (ref 31.5–36.5)
MCV RBC AUTO: 87 FL (ref 78–100)
MONOCYTES # BLD AUTO: 0.6 10E9/L (ref 0–1.3)
MONOCYTES NFR BLD AUTO: 6.6 %
NEUTROPHILS # BLD AUTO: 4.9 10E9/L (ref 1.6–8.3)
NEUTROPHILS NFR BLD AUTO: 51.6 %
PLATELET # BLD AUTO: 369 10E9/L (ref 150–450)
RBC # BLD AUTO: 4.41 10E12/L (ref 3.8–5.2)
WBC # BLD AUTO: 9.4 10E9/L (ref 4–11)

## 2021-06-11 PROCEDURE — 82607 VITAMIN B-12: CPT | Performed by: FAMILY MEDICINE

## 2021-06-11 PROCEDURE — 80050 GENERAL HEALTH PANEL: CPT | Performed by: FAMILY MEDICINE

## 2021-06-11 PROCEDURE — 80061 LIPID PANEL: CPT | Performed by: FAMILY MEDICINE

## 2021-06-11 PROCEDURE — 82728 ASSAY OF FERRITIN: CPT | Performed by: FAMILY MEDICINE

## 2021-06-11 PROCEDURE — 99215 OFFICE O/P EST HI 40 MIN: CPT | Performed by: FAMILY MEDICINE

## 2021-06-11 PROCEDURE — 83550 IRON BINDING TEST: CPT | Performed by: FAMILY MEDICINE

## 2021-06-11 PROCEDURE — 86308 HETEROPHILE ANTIBODY SCREEN: CPT | Performed by: FAMILY MEDICINE

## 2021-06-11 PROCEDURE — 83540 ASSAY OF IRON: CPT | Performed by: FAMILY MEDICINE

## 2021-06-11 PROCEDURE — 36415 COLL VENOUS BLD VENIPUNCTURE: CPT | Performed by: FAMILY MEDICINE

## 2021-06-11 NOTE — PATIENT INSTRUCTIONS
Submandibular swelling bilaterally  Does not seem like strep or mumps   Will check labs and u/s neck and go from there   If should have fever or worsening symptoms go to the er  Chew on something sour  Warm pack to swelling  Ibuprofen 400 mg twice a day with food for pain  See dentist if not better   Remain in touch with your rhuematologist   healthcare maintenance  Defer pap and Tdap to another time

## 2021-06-11 NOTE — RESULT ENCOUNTER NOTE
Tram MckeonRoberto Shayan,  Some of your results came back and are within acceptable limits. -Normal red blood cell (hgb) levels, normal white blood cell count and normal platelet levels. Mono negative. If you have any further concerns please do not hesitate to contact us by message, phone or making an appointment.  Have a good day   Dr Bobby ROSE

## 2021-06-11 NOTE — PROGRESS NOTES
Assessment & Plan     Submandibular gland swelling  Bilateral mildly tender swollen submandibular glands started 3 days prior insidiously with no constitutional symptoms in back ground of psoriatic arthritis nail predominant disease off any immunosuppressant since 5/11.  Exam other wise benign. No fever or symptoms to suggest tim's angina or cellulitis or mumps. Strep and mono seem less likely. Sialadenitis seems low possibility given bilaterality of symptoms. Diff include viral illness, mumps, versus sialadenitis, obstructed sialolith, verus reactive form tooth / oral issues, versus thyroid issues, versus Sjogren, versus cat scratch versus mono versus lymphoma ( less likely ) versus amyloidosis versus something else.  Unclear etiology/diagnosis with uncertain prognosis requiring further workup below.  Will check basic labs and u/S neck and go from there   If no answer check for Sjogren etc  If should have fever or worsening symptoms go to the ER  Chew on something sour  Warm pack to swelling  Ibuprofen 400 mg twice a day with food for pain  See dentist if not better   Remain in touch with her rheumatologist   Healthcare maintenance reviewed  Due for pap, mammogram and Tdap but given current struggles with PA and new gland swelling would like to defer these till her physical in the fall.   Defer pap and Tdap to another time  Follow up in 1 to 2 weeks to recheck from today   - CBC with platelets differential  - Comprehensive metabolic panel  - TSH with free T4 reflex  - Mononucleosis screen  - US Head Neck Soft Tissue; Future  - Mumps Immune Status, IgM; Future    Fatigue, unspecified type  Chronic unchanged from before, felt related to her dx of PA. Not new but given current submandibular swelling will do the following.   - CBC with platelets differential  - Comprehensive metabolic panel  - TSH with free T4 reflex  - Mononucleosis screen  - Ferritin  - Iron and iron binding capacity  - Vitamin B12  - Mumps  HPI     Here for Preop Cataract Surgery  Right Eye 12/16 - discussed need for   medical clearance pt to schedule appt.    Last edited by Pamela Russell on 12/3/2020  3:18 PM. (History)               1. Nuclear sclerotic cataract, bilateral  Visually significant OD, hx of PPV for MH  Patient with visually significant cataract impacting abiliity ADLs (reading, driving, PM driving, glare).  Discussed options, R & B, expectations, patient voices good understanding and wishes to proceed with procedure. Patient will likely benefit from sx.  Pt aware that hx of MH can limit BCVA and that his risks are higher due to s/p PPV.  Plan for monofocal IOL distance OD  Consent signed  Good dilation      2. Full thickness macular hole, stage 2, right  S/p PPV repair with Mazzulla        Assessment /Plan     For exam results, see Encounter Report.    There are no diagnoses linked to this encounter.  1. Nuclear sclerotic cataract, bilateral  Visually significant OD, hx of PPV for MH  Patient with visually significant cataract impacting abiliity ADLs (reading, driving, PM driving, glare).  Discussed options, R & B, expectations, patient voices good understanding and wishes to proceed with procedure. Patient will likely benefit from sx.  Pt aware that hx of MH can limit BCVA and that his risks are higher due to s/p PPV.  Plan for monofocal IOL distance OD  Consent signed  Good dilation      2. Full thickness macular hole, stage 2, right  S/p PPV repair with Mazzulla                Immune Status, IgM; Future    Thirst  Unclear etiology/diagnosis with uncertain prognosis requiring further workup below.  - Comprehensive metabolic panel    Psoriatic arthropathy (H)  Under car of rheum & derm. Failed methotrexate, NSAID's, Humira. Hopes to start Enbrel but need to clear up current issue of submandibular swelling first.   - CBC with platelets differential    Immunosuppression (H)  Not immunosuppressed currently. Last dose was 5/11. Not sure if this is playing a role in current symptoms.   - CBC with platelets differential    Hyperlipidemia, unspecified hyperlipidemia type  - Lipid panel reflex to direct LDL Non-fasting    Healthcare maintenance  - REVIEW OF HEALTH MAINTENANCE PROTOCOL ORDERS    Screening for malignant neoplasm of cervix  Need for diphtheria-tetanus-pertussis (Tdap) vaccine  Defer to another time.    Ordering of each unique test  47 minutes spent on the date of the encounter doing chart review, history and exam, documentation and further activities per the note     Regular exercise  See Patient Instructions    Return in about 2 weeks (around 6/25/2021) for Follow up, in person, using a video visit, using a phone visit.    Geovanna Rosales MD  [facility]    Dahiana Crouch is a 60 year old who presents for the following health issues     HPI     Concern - Swollen Glands   Onset: June 8th  Description: Swollen and sore in the from on neck area  Intensity: moderate  Progression of Symptoms:  worsening and intermittent  Accompanying Signs & Symptoms: increase thirst with fatigue.  Previous history of similar problem: none  Therapies tried and outcome: Took pain med and IBU for the soreness.    Since last seen in nov 2020 seen ortho, hand surgeon, had an MRI finger and seen by rheum and derm at [facility] and given dx of Psoriatic arthropathy for progressive left hand 3rd, 4th, 5th digit and right hand 4th digit DIP inflammatory arthritis in the context  of psoriasis of the nails of these same fingers. Her presentation appeared to be consistent with the diagnosis given to the patient in 2018 by Dr. Owens of  rheumatology, for which she was prescribed methotrexate though self discontinued when she changed insurance and was loss to follow-up in early 2020. It was felt her presentation was most likely secondary to psoriatic arthritis, though discussed with the patient that without aspiration or Bx of an affected joint/bone that they could not rule out infection.   She was initially started on mobic & then Humira added but continued to have active disease, having also failed methotrexate in the past & prescribed Enbrel.      Notes was taking Humira till last month but was not working & last dose was 5/11/ only using mobic at most 1 a week or so when will take Prilosec at that time. Also has been taking gabapentin bid for her sciatica.     Her Enbrel finally got approved and was to start this past Tuesday 6/8 but when she woke up that day she noted swelling bilaterally under her jaw on each side with left looking slightly bigger than the right.  She contacted her rheumatologist who felt it could be from a viral.  infection like a cold. She didn't have much pain on the 8th but starting that evening and since 6/9 started to feel more discomfort in the area of the swellings.  On 6/9 she took ibuprofen then on 6/10 had one dose of meloxicam with omeprazole but meloxicam usually gives her diarrhea so took ibuprofen since then 2 tabs of 200 mg = 400 mg twice a day. And has held off on starting the Enbrel but has been having more Psoriatic arthritis joint pain in her finger tips and her nails look bad and feeling flare up starting in the skin as they are peeling near the affected nails for which she uses clobetasol as needed , so would like to know what's going on with her swollen glands so she can start Enbrel soon.     She has not developed any cold symptoms. And other  than feeling a bit more thirsty than usual and feeling tired which is not new to her as that has bene going on with her PA, she feels generally well.   Has had no new meds, last dose of Humira was a month ago. Report No throat pain & Weight has been stable. covid vaccine is utd. Has had no fever or chills, no headache or dizziness, no double or blurry vision, no facial pain, earache, sore throat, runny nose, post nasal drip, no trouble hearing, smelling, tasting or swallowing, or eating or chewing, has had no cough, no chest pain, trouble breathing or palpitations, No abdominal pain, heart burn, reflux, nausea or vomiting or diarrhea or constipation, no blood in stools or black stools, no weight loss or night sweats. No dysuria, hematuria, frequency, urgency, hesitancy, incontinence, No pelvic complaints. No leg swelling. No rash.  Reports no sick contacts.     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,  with two children youngest is 14, in [place] since 1989, parents diseased, older sister also in the [place], Hx of OA back and thumb on gabapentin tid for low back pain, hx of psoriatic arthropathy and psoriasis of nail, hx of migraines, gum disease, glaucoma S/p laser in 2009 & surgery 2012 & 2016, on eye drops, , HLD on no meds, , insomnia , seasonal allergies, dust mites, on loratadine prn, allergic to codeine, morphine, latex & adhesive tape, hx of prior C section in 2001 secondary to a fibroid blocking the birth canal, went into [facility] after the surgery and uterine artery embolization done and adopted daughter in 2006, prior cataract surgery 2015 & 2018, prior right breast biopsy ( benign),      Seen for chest pain in 2012 and EKG and CXR and exam was normal. Seen by cardiology and stress echo test done was normal. Under care of eye. Seen by dentist , for URI in 2014, seen for a physical in 2014. In 2016 seen for ETD after a flight. Seen by audiology ( normal test) ,  & ENT, in 2017 seen for acute back pain  and multiple small hand joint pain DIP & PIP advised PT and NSAID'S.      In 5/2018 noted nail changes of right ring finger and right thumb. Felt nail was  from nail bed. Nail clippings sent for fungal culture. Seen 5/9/2018 and gyn was normal and pap done.      Seen 7/2018 by rheumatology at [facility] for a one year history of intermittent bilateral first CMC joint tenderness consistent with osteoarthritis, who presented with right fourth fingernail changes beginning March 2018 with onycholysis and dystrophic changes, fungal culture negative, mild onycholysis in the right thumbnail, and acute swelling, redness and tenderness of the right fourth DIP joint since May. This had been associated with some swelling and tenderness in her finger pad with tender blister-type sores. Dermatology gave her triamcinolone which caused burning of the skin. Her primary physician gave her naproxen which caused daytime lethargy but gave her some partial relief. Exam revealed nail changes and right fourth DIP joint swelling, most consistent with psoriatic arthritis. Laboratories to include CBC with differential, basic metabolic panel, liver panel, ESR, CRP, TSH, RF, CCP and hepatitis C antibody (on two occasions she has positive hepatitis B core antibody and surface antibody suggestive of prior infection which she cleared). Was given desonide for perioral dermatitis. testing was done and X-ray of hands showed soft tissue swelling at the ring finger DIP joints. No bony changes. X-ray of feet were normal. RF and CCP negative. Normal ESR and CRP. She was started on methotrexate four tablets weekly, tapered up to 6 tablets weekly. &  received prednisone 30 mg, 20 mg, 10 mg, 5 mg each ×5 days, then off.  Seen oct 2018 noted improved and methotrexate increase dto manage symptoms and felt had OA of left knee and referred to derm for fungal scrapings of toenail concerns.      Seen by dermatology at [facility] for nail  changes right fourth fingernail suspected to be psoriasis & had tac injection to the nail bed, , Pincer nail deformity B/l 5th toes & epidermal inclusion cyst on neck.      Seen by rheum at [facility] on 2/222019 & noted improved and continued on  eight tablets weekly.   Seen by derm at [facility] 3/13/19 for psoriatic nail disease and continue don clobetasol ointment bid  prn and TAC injection done of nail bed.   Seen by derm 6/2019 and declined soriatane or Humira or kenalog injections and was to continue clobetasol ointment twice a day.  Had PVD left eye 8/2019.  Seen by rheum 9/13/19 & stable & continued on methotrexate, refilled  weekly. & advised thumb splints & OTC meds for B/l OA 1st CMC & meds refilled in 12/2019.  methotrexate      Next seen at [facility] with insurance changes  Seen by Chad Peck 7/9/20 for acute back pain , referred to ortho and given flexeril. Seen by ortho 7/13 & given steroids and advised PT & to use flexeril sparingly. Seen by PT 7/15, then by back doctor and xray was unremarkable. On 8/16 due to persistent lumbar radiculopathy MRI done showed lumbar stenosis. Had an epidural 9/8 & noted pain improved and continued on gabapentin and home care exercise program & advised to increase activity and follow up as needed.      Seen first time by this writer on 10/15/20 for swelling of left middle finger pulp and onycholysis of the nail with prior hx of psoriatic arthropathy / psoriatic arthritis & psoriasis of right fourth finger nail treated in 2018 with Kenalog  injections by derm, clobetasol bid & on methotrexate by rheum, no longer on methotrexate since 12/2019 with new onset left middle finger nail changes, separation from nail bed with DIP to pulp swelling pain & tenderness similar to when dx with psoriasis & psoriatic arthritis previously.   [facility] but since insurance changed & didn't have a PCP did see rheum out of system  Specialists in [place]  & given two courses of abx with no improvement & reports rheum concerned about dactylitis & advised to get an mri & see the hand surgeon. Had not done the imaging yet & needed help on what to do next.   Had no sign of sepsis & looked well. Records reviewed after visit in more detail. Was advised & given referral to see a hand surgeon & to hang on to mri order until seen by the  hand surgeon to make further recommendations on that. Advised to Go to the ER if had a fever     Derm notes received and reviewed noting psoriasis. No notes received from rheum. [facility]  but they were not able to see her. Seen by hand specialist at [facility] on 1/22, by then nail had fallen off and pain had subsided and was told had simple OA and opted not to see another rheumatologist.     Seen 11/11/20 for a physical. Breast exam was normal. Mammogram due 5/2021 as dx imaging normal in 5/2020. Pelvic/ pap due next year 2021. Discussed working on advance directives. HLD on no meds. Likely familial. Hah not had much activity due to sciatica and spinal stenosis but was going to work on her exercise. Had OA CMC B/l thumb & Spinal stenosis/ spondylolisthesis L 4-5 with neuro foraminal stenosis and sciatica on right, not much helped with epidural. Did PT, seen by Dr Zhou on gabapentin 300 mg tid taking bid which helped. Had started using a stationary bike. Hoped to defer surgery for as long as possible. Had no side effects from gabapentin to date. Was to continue care with ortho.noted   prior hx of right finger documented psoriatic arthropathy treated in 2018 with methotrexate, but not had in over 1 yr. Hx of psoriatic nail changes seen by derm in past and recently for it. Recently had new onset left middle finger DIP to pulp swelling with loosening and subsequent loss of a dystrophic looking nail. Was ruled out for fungal infection and treated for paronychia with two different antibiotics, used clobetasol on  nail folds and seen by a different rheumatologist who told her it was not psoriatic arthritis and advised seeing a hand surgeon & doing an mri as was concerned about possibility of dactylitis. Seen by [facility] hand surgeon on 10/22/20 who also reviewed xray's and said joints were not involved and did not look like psoriatic arthritis , symptoms improved after nail fell off mid October and advised  MRI was not needed and to monitor. At the physical felt was doing better and opted to wait to see a new derm and rheum for a third opinion. Was to follow up with dermatology and ortho back as needed & to get us records from her recent visit with [facility]. Prior records had been a bit confusing as some stated she had psoriatic arthritis and usually it is not a diagnosis that goes away. But recently outside ortho and outside rheum said it was not psoriatic arthritis & felt was just localized skin/ nail psoriasis. Was to continue care with her eye doc for her glaucoma & return in  1 yr for a preventive physical or sooner in an office visit or virtual visit for any new or persistent concerns.  Labs came back showing negative HIV, Hep C, normal HBA1c, normal cbc, CMP, TSH & negative FIT with elevated TG & LDL & ASCVD risk of 3.5 %  & dietary & lifestyle changes discussed & to recheck in 6 months.     Seen by ortho 1/15/21 for pain and swelling left third finger & CMC OA left thumb and MRI ordered. MRI 3rd digit showed bony erosions and differential was polyarticular inflammatory arthropathy versus osteomyelitis. Labs showed normal esr, CRP & cbc & referred to hand surgeon. Seen by the hand surgeon on 2/3/21, felt didn't have osteomyelitis and referred to rheumatology.     Seen by rheumatology on 2/5/21 & noted 5 month of progressive left hand 3rd, 4th, 5th digit and right hand 4th digit DIP inflammatory arthritis in the context of psoriasis of the nails of these same fingers. Had had evaluation by PCP, derm, ortho, rheumatology with this  presentation without obvious etiology found. Possibility of infection by exam and MRI raised, though normal inflammatory markers, lack of improvement with antibiotics, and now chronic small joint symmetric polyarthritis was felt somewhat atypical for infection. Her presentation appeared to be consistent with the diagnosis given to the patient in 2018 by Dr. Owens of  rheumatology, for which she was prescribed methotrexate though self discontinued when she changed insurance and was loss to follow-up in early 2020. It was felt her presentation was most likely secondary to psoriatic arthritis, though discussed with the patient that without aspiration or Bx of an affected joint/bone that they could not rule out infection.   Started on mobic. On 2/18 rheum decreased mobic to 7.5 mg & given omeprazole to use when used mobic. Started on Humira and referred to dermatology. Seen by derm on 4/1 and given clobetasol to use for skin around psoriatic nails & referred to get a epidermal cyst on her neck excised.on 4/2/21 seen by rheum and noted continued active Psoriatic arthritis disease and continued on mobic 7.5 mg, omeprazole 20 & Humira. On 5/11/21 noted active disease having failed NSAID, methotrexate and Humira and to start Enbrel.     On 6/9 message noted that she reported that 2 days prior she woke up and was washing her face and noticed what sounded like bilateral submandibular lymphadenopathy.  Not tender or painful that day so continued and nothing done about it.  Then the day before  she did have some development of pain and noticed that they were tender to palpation.  She denied any other symptoms at the time other than her ongoing psoriatic arthritis.  Denied any fever, chills, cough, shortness of breath, oral infection, weight loss.  No viral symptoms.  She felt otherwise well.  It was discussed with her that her symptoms were most likely the sequelae of reactive lymphadenopathy and  that they would either  evolve into typical viral URI symptoms or continue to regress and resolve without intervention.  Given the bilateral nature, tenderness, and overnight development there was less suspicion for malignancy like lymphoma.  Rheum counseled her that she could use NSAID's/tylenol for symptoms.     [place]  shows getting gabapentin 300 mg # 90 on 8/7, 8/18 & 9/16/20, 10/20, 11/17, 12/21, 1/25/21, 2/22,3/22, 6/7/21 by Dr Zhou.      Review of Systems   Constitutional, HEENT, cardiovascular, pulmonary, GI, , musculoskeletal, neuro, skin, endocrine and psych systems are negative, except as otherwise noted.      Objective    /78 (BP Location: Left arm, Patient Position: Sitting, Cuff Size: Adult Regular)   Pulse 74   Temp 97.1  F (36.2  C) (Tympanic)   Resp 16   Wt 65.3 kg (144 lb)   SpO2 98%   Breastfeeding No   BMI 24.72 kg/m    Body mass index is 24.72 kg/m .  Physical Exam   GENERAL: healthy, alert and no distress  EYES: Eyes grossly normal to inspection, mild haziness ring like around anterior chambers B/l eyes PERRL and conjunctivae and sclerae normal  HENT: ear canals and TM's normal, nose and mouth without ulcers or lesions, no CENTOR criteria for strep noted  NECK: B/l mildly tender, non erythematous, mobile submandibular glands with visible swelling bilaterally under lower jaw. No parotid swelling or lymphadenopathy noted. Thyroid feels normal.   RESP: lungs clear to auscultation - no rales, rhonchi or wheezes  CV: regular rate and rhythm, normal S1 S2, no S3 or S4, no murmur, click or rub, no peripheral edema and peripheral pulses strong  ABDOMEN: soft, non tender, no hepatosplenomegaly, no masses and bowel sounds normal  MS: no gross musculoskeletal defects noted, no edema  SKIN: no suspicious lesions or rashes, distal onycholysis right 4th digit, Longitudinal melanonychia, First left, 3rd, 4th, 5th - thinning of the nails left 3, 4, 5th finger ends look mildly broad.  NEURO: Normal strength and tone,  mentation intact and speech normal  PSYCH: mentation appears normal, affect normal/bright  LYMPH: no cervical, supraclavicular, axillary, or inguinal adenopathy    Results for orders placed or performed in visit on 06/11/21   CBC with platelets differential     Status: None   Result Value Ref Range    WBC 9.4 4.0 - 11.0 10e9/L    RBC Count 4.41 3.8 - 5.2 10e12/L    Hemoglobin 12.9 11.7 - 15.7 g/dL    Hematocrit 38.2 35.0 - 47.0 %    MCV 87 78 - 100 fl    MCH 29.3 26.5 - 33.0 pg    MCHC 33.8 31.5 - 36.5 g/dL    RDW 13.0 10.0 - 15.0 %    Platelet Count 369 150 - 450 10e9/L    % Neutrophils 51.6 %    % Lymphocytes 38.3 %    % Monocytes 6.6 %    % Eosinophils 3.1 %    % Basophils 0.4 %    Absolute Neutrophil 4.9 1.6 - 8.3 10e9/L    Absolute Lymphocytes 3.6 0.8 - 5.3 10e9/L    Absolute Monocytes 0.6 0.0 - 1.3 10e9/L    Absolute Eosinophils 0.3 0.0 - 0.7 10e9/L    Absolute Basophils 0.0 0.0 - 0.2 10e9/L    Diff Method Automated Method    Mononucleosis screen     Status: None   Result Value Ref Range    Mononucleosis Screen Negative NEG^Negative

## 2021-06-11 NOTE — Clinical Note
Ron Downey is a 60 year old female who presents today for saw her yesterday. Wondering now possibility of sjogrens, I forgot to do those tests but waiting on mumps and rest of basic labs ordered & see what u/s shows

## 2021-06-12 LAB
ALBUMIN SERPL-MCNC: 4.4 G/DL (ref 3.4–5)
ALP SERPL-CCNC: 76 U/L (ref 40–150)
ALT SERPL W P-5'-P-CCNC: 19 U/L (ref 0–50)
ANION GAP SERPL CALCULATED.3IONS-SCNC: 8 MMOL/L (ref 3–14)
AST SERPL W P-5'-P-CCNC: 14 U/L (ref 0–45)
BILIRUB SERPL-MCNC: 0.3 MG/DL (ref 0.2–1.3)
BUN SERPL-MCNC: 10 MG/DL (ref 7–30)
CALCIUM SERPL-MCNC: 9.9 MG/DL (ref 8.5–10.1)
CHLORIDE SERPL-SCNC: 104 MMOL/L (ref 94–109)
CHOLEST SERPL-MCNC: 250 MG/DL
CO2 SERPL-SCNC: 26 MMOL/L (ref 20–32)
CREAT SERPL-MCNC: 0.63 MG/DL (ref 0.52–1.04)
FERRITIN SERPL-MCNC: 39 NG/ML (ref 8–252)
GFR SERPL CREATININE-BSD FRML MDRD: >90 ML/MIN/{1.73_M2}
GLUCOSE SERPL-MCNC: 66 MG/DL (ref 70–99)
HDLC SERPL-MCNC: 56 MG/DL
IRON SATN MFR SERPL: 10 % (ref 15–46)
IRON SERPL-MCNC: 36 UG/DL (ref 35–180)
LDLC SERPL CALC-MCNC: 153 MG/DL
NONHDLC SERPL-MCNC: 195 MG/DL
POTASSIUM SERPL-SCNC: 4.3 MMOL/L (ref 3.4–5.3)
PROT SERPL-MCNC: 8.1 G/DL (ref 6.8–8.8)
SODIUM SERPL-SCNC: 137 MMOL/L (ref 133–144)
TIBC SERPL-MCNC: 359 UG/DL (ref 240–430)
TRIGL SERPL-MCNC: 208 MG/DL
TSH SERPL DL<=0.005 MIU/L-ACNC: 1.48 MU/L (ref 0.4–4)
VIT B12 SERPL-MCNC: 430 PG/ML (ref 193–986)

## 2021-06-12 NOTE — TELEPHONE ENCOUNTER
Called Willa. Discussed her visit with Dr. Rosales. Labs overall unrevealing. She is reassured by this. US ordered, she is going to schedule. Due to side effects with mobic, will not use. She will use ibuprofen as she is tolerating well. Using this for joint pain, not LN pain which has resolved. Continues without any infectious signs/symptoms. Looks forward to appointment next week. Will let me know if anything comes up prior to her appointment.    Marc Arias MD  Rheumatology

## 2021-06-12 NOTE — RESULT ENCOUNTER NOTE
Tram Ms. Downey,  Your results came back and are within acceptable limits.   -Liver and gallbladder tests are normal (ALT,AST, Alk phos, bilirubin), kidney function is normal (Cr, GFR), sodium is normal, potassium is normal, calcium is normal, glucose is normal.  -TSH (thyroid stimulating hormone) level is normal which indicates normal thyroid function.  -Ferritin (iron) level is normal..   -LDL(bad) cholesterol level is elevated, and your triglycerides are elevated which can increase your heart disease risk.  A diet high in fat and simple carbohydrates, genetics and being overweight can contribute to this. ADVISE: exercising 150 minutes of aerobic exercise per week (30 minutes for 5 days per week or 50 minutes for 3 days per week are options), eating a low saturated fat/low carbohydrate diet, and omega-3 fatty acids (fish oil) 8349-3613 mg daily are helpful to improve this. In 6 months, you should recheck your cholesterol panel.  The 10-year ASCVD risk score (Lacy CASSIE Jr., et al., 2013) is: 3.1%    Values used to calculate the score:      Age: 60 years      Sex: Female      Is Non- : No      Diabetic: No      Tobacco smoker: No      Systolic Blood Pressure: 126 mmHg      Is BP treated: No      HDL Cholesterol: 56 mg/dL      Total Cholesterol: 250 mg/dL  the mumps test is not back. Waiting on that  If that too is normal I may recommend doing additional tests based on some more research I am doing on the symptom.    If you have any further concerns please do not hesitate to contact us by message, phone or making an appointment.  Have a good day   Dr Bobby ROSE Fall

## 2021-06-13 NOTE — RESULT ENCOUNTER NOTE
Tram MckeonRoberto Shayan,  Your results came back with a normal Vit B 12.  If you have any further concerns please do not hesitate to contact us by message, phone or making an appointment.  Have a good day   Dr Bobby ROSE

## 2021-06-14 ENCOUNTER — ANCILLARY PROCEDURE (OUTPATIENT)
Dept: ULTRASOUND IMAGING | Facility: CLINIC | Age: 61
End: 2021-06-14
Attending: FAMILY MEDICINE
Payer: COMMERCIAL

## 2021-06-14 DIAGNOSIS — R60.0 SUBMANDIBULAR GLAND SWELLING: Primary | ICD-10-CM

## 2021-06-14 DIAGNOSIS — R60.0 SUBMANDIBULAR GLAND SWELLING: ICD-10-CM

## 2021-06-14 PROCEDURE — 76536 US EXAM OF HEAD AND NECK: CPT | Mod: GC | Performed by: RADIOLOGY

## 2021-06-14 NOTE — RESULT ENCOUNTER NOTE
Tram Ms. Downey,  How are you doing?     Your results came back and the ultrasound showed non specific enlargement of the submandibular glands bit no abscess or fluid seen and no suspicious looking lymphnodes noted.   I see now that the mumps testng did not not get done. I would like to reorder that along with test for sjogrens an autoimmune condition that can sometimes cause salivary gland swelling. Can get them done when you see the rheumatologist or make a lab only apt,      If you have any further concerns please do not hesitate to contact us by message, phone or making an appointment.  Have a good day   Dr Bobby ROSE

## 2021-06-16 DIAGNOSIS — R60.0 SUBMANDIBULAR GLAND SWELLING: ICD-10-CM

## 2021-06-16 DIAGNOSIS — R53.83 FATIGUE, UNSPECIFIED TYPE: ICD-10-CM

## 2021-06-16 LAB
CRP SERPL-MCNC: 5.2 MG/L (ref 0–8)
ERYTHROCYTE [SEDIMENTATION RATE] IN BLOOD BY WESTERGREN METHOD: 15 MM/H (ref 0–30)

## 2021-06-16 PROCEDURE — 36415 COLL VENOUS BLD VENIPUNCTURE: CPT | Performed by: FAMILY MEDICINE

## 2021-06-16 PROCEDURE — 86735 MUMPS ANTIBODY: CPT | Mod: 90 | Performed by: FAMILY MEDICINE

## 2021-06-16 PROCEDURE — 85652 RBC SED RATE AUTOMATED: CPT | Performed by: FAMILY MEDICINE

## 2021-06-16 PROCEDURE — 86235 NUCLEAR ANTIGEN ANTIBODY: CPT | Mod: 59 | Performed by: FAMILY MEDICINE

## 2021-06-16 PROCEDURE — 86140 C-REACTIVE PROTEIN: CPT | Performed by: FAMILY MEDICINE

## 2021-06-16 PROCEDURE — 99000 SPECIMEN HANDLING OFFICE-LAB: CPT | Performed by: FAMILY MEDICINE

## 2021-06-16 PROCEDURE — 86235 NUCLEAR ANTIGEN ANTIBODY: CPT | Performed by: FAMILY MEDICINE

## 2021-06-16 NOTE — RESULT ENCOUNTER NOTE
Tram Downey,  Some of your results came back and inflammation indicator called esr is normal. If you have any further concerns please do not hesitate to contact us by message, phone or making an appointment.  Have a good day   Dr Bobby ROSE

## 2021-06-17 ENCOUNTER — VIRTUAL VISIT (OUTPATIENT)
Dept: RHEUMATOLOGY | Facility: CLINIC | Age: 61
End: 2021-06-17
Attending: INTERNAL MEDICINE
Payer: COMMERCIAL

## 2021-06-17 DIAGNOSIS — Z79.899 HIGH RISK MEDICATION USE: ICD-10-CM

## 2021-06-17 DIAGNOSIS — R59.0 SUBMANDIBULAR LYMPHADENOPATHY: ICD-10-CM

## 2021-06-17 DIAGNOSIS — L40.50 PSORIATIC ARTHRITIS (H): Primary | ICD-10-CM

## 2021-06-17 LAB
ENA SS-A IGG SER IA-ACNC: <0.2 AI (ref 0–0.9)
ENA SS-B IGG SER IA-ACNC: <0.2 AI (ref 0–0.9)
MUV IGG SER QL IA: 2.9 AI (ref 0–0.8)

## 2021-06-17 PROCEDURE — 99214 OFFICE O/P EST MOD 30 MIN: CPT | Mod: 95 | Performed by: INTERNAL MEDICINE

## 2021-06-17 ASSESSMENT — PAIN SCALES - GENERAL: PAINLEVEL: MILD PAIN (2)

## 2021-06-17 NOTE — LETTER
6/17/2021       RE: Willa Downey  [address]  [facility] [address]     Dear Colleague,    Thank you for referring your patient, Willa Downey, to the [facility] RHEUMATOLOGY [facility]INIC [place] at [facility]. Please see a copy of my visit note below.    Willa is a 60 year old who is being evaluated via a billable video visit.      How would you like to obtain your AVS? MyChart  If the video visit is dropped, the invitation should be resent by: Send to e-mail at: [email or web address]  Will anyone else be joining your video visit? No      Video-Visit Details    Type of service:  Video Visit  Start: 06/17/2021 10:40 am   Stop: 06/17/2021 11:03 am    Originating Location (pt. Location): Home    Distant Location (provider location):  [facility] RHEUMATOLOGY [facility]     Platform used for Video Visit: Link Arias MD  Rheumatology        Outpatient Rheumatology Follow-up  This visit was conducted via synchronous video visit due to the current COVID-19 crisis to reduce patient risk.  Verbal consent was obtained and is documented below.    Name: Willa Downey    MRN [id number]   Today's date: 6/17/21           Reason for follow-up: psoriatic arthritis   Requesting physician: Kitty Mendez MD        Assessment & Plan:   #negative RF  #psoriasis, cutaneous and nail involvement  #DIP Left hand 3rd, 4th, 5th and right hand 4th digit inflammatory arthritis  #Psoriatic arthritis  #humira failure  #methotrexate failure  #high risk drug therapy: Enbrel  #Submandibular LAD      60 year old female with hx of advanced glaucoma followed closely by ophthalmology presented to rheumatology on 2/5/21 with 5 month of progressive left hand 3rd, 4th, 5th digit and right hand 4th digit DIP inflammatory arthritis in the context of psoriasis, psoriatic changes of the nails. Taken together most consistent with psoriatic arthritis. Tried on 2  NSAIDs which failed to control symptoms, and had severe GERD symptoms. Tried methotrexate by prior rheumatologist and disease not controlled. Humira started Mid February 2021 due to lack of efficacy of these other therapies and continued through May 14th, though has had severe/ rapidly progressive disease after some improvement in the first 4 weeks. Her DIP inflammatory arthritis and nail disease is active, severe, progressive. Willa has received enbrel but we have waited to start due to the development of impressive acute bilateral submandibular swelling/tenderness in the absence of other infectious symptoms. She had US done which did not show any suspicious findings. Additional serologic work-up done by PCP which showed negative/normal SSA, SSB, ESR, CRP.     Submandibular LAD: Favoring infectious process given the acute onset, bilateral nature, tenderness and spontaneous improvement by 50+% over the last week. Also US without concerning findings. Sjogrens is a consideration though less likely with negative Abs. Malignancy thought to be less likely with features above but will need to consider if it does not continue to resolve.    Psoriatic arthritis: very active: as we weigh the risks and benefits of starting TNFi in the context of a now improving submandibular LAD from likely infectious process, we have decided to wait until Sunday and if ongoing improvement then Willa may start her Enbrel.    High risk medication use: enbrel    Will plan to follow-up in 6-8 weeks. Will know at that point if improving and we could consider Deb vs IL-17 inhibitor if needed.    Marc Arias MD  Rheumatology     Subjective:   6/17/21  Has advanced glaucoma/ dry eyes. Chronic. No new head/neck symptoms. Dry mouth during submandibular gland swelling, dry mouth now resolved. Reports the swelling has mproved now by 50+%. No more pain, so no longer using ibuprofen. No fever/chills/cough or other signs/symptoms of infection. With  ongoing progression of nail/distal digit predominant psoriasis and DIP arthritis. Severe pain. Unable to tolerate NSAIDs given GI upset despite PPI. ROS otherwise negative       5/14/21  Patient initially was going to wait to contact, though has been having progression of symptoms of nails/joint pain/ skin. Last injection was on Tuesday. Had a flare in the days following her injection. First 2 doses, she did notice some improvement. Though after that time, with each dose, has had less and less of a positive change in nails/joints/skin. Has instead been getting worse and worse response. Has stiffness and pain in her DIPs that has been progressive. Has redness as well. Also with radiation of the pain proximally which is new. Also some days with severe fatigue. No fevers/chills. No weight changes. No interval infections.       Past Medical History  Past Medical History:   Diagnosis Date     Nuclear senile cataract of both eyes 8/16/2018     Psoriatic arthropathy (H) 10/19/2020     Past Surgical History  Past Surgical History:   Procedure Laterality Date     BIOPSY  07/30/2019    the right breast tissue(benign)     EYE SURGERY      3 surgeries for glaucoma treatment     Medications  Current Outpatient Medications   Medication     CALCIUM CARBONATE-VIT D-MIN PO     clobetasol (TEMOVATE) 0.05 % external ointment     gabapentin (NEURONTIN) 300 MG capsule     loratadine (CLARITIN) 10 MG capsule     meloxicam (MOBIC) 7.5 MG tablet     mometasone (ELOCON) 0.1 % external ointment     Multiple Vitamins-Minerals (MULTIVITAMIN ADULTS 50+) TABS     omeprazole (PRILOSEC) 20 MG DR capsule     etanercept (ENBREL SURECLICK) 50 MG/ML autoinjector     No current facility-administered medications for this visit.      Allergies   Allergies   Allergen Reactions     Adhesive Tape Blisters, Itching and Swelling     Dust Mites      Latex Blisters, Itching, Other (See Comments) and Swelling     Skin sensitivity       Morphine Sulfate  (Concentrate) Itching     Other reaction(s): Chills  heart palpatations     Seasonal Allergies      Codeine Palpitations     chills     Family History:   No family history of autoimmune diseases.     Social History  , with children. 2 kids and they are healthy. No ETOH, smoking, drug use.      Objective:    Physical exam:  No vitals for this video visit. Vitals reviewed in Epic.  GEN: Sitting up at table. NAD  HEENT: no facial rash, sclera clear, no inflammatory nose/ external ear changes  CV: no upper extremity dependent edema  Pulm: speaking in full sentences, no cough, no audible wheezing, no use of accessory muscles  Abdomen: not distended  Skin: no acute cutaneous lesions  MSK: full active ROM of bilateral shoulders, elbows, wrists, MCPs, PIPs. DIPs of Digits 3-5 on the left hand and digit 3 on the right hand with erythema/edema and tenderness to self palpation.  Has psoriatic changes of fingernails of digits 3-5 on the left hand and digit 3 on the right hand.    Labs:  2/5/21  HIV negative  Hep C AB negative  Hep B s AG negative  Hep B c AB reactive  Quant gold negative  Quant gold negative  RF negative  CRP <2.9  ESR 10  Cr 0.62  WBC 7.1  HGB 13.1    HLA b27 negative  Hep B virus DNA negative    2/2/21  ESR 9  CRP <2.9  WBC 6.1  HGB 12.8      11/11/20  A1C 5.3  Hep C/ HIV negative    Imaging:  MR left hand  Impression:  1a. Correlating to the marker at the third digit interphalangeal  joint, there is a focal erosion along the volar aspect of the distal  phalangeal base with intense bone marrow edema and abnormal T1 marrow  signal. Findings are concerning for osteomyelitis and infection should  be ruled out. Also on the differential is sequelae of long-standing  active inflammatory arthropathy (including psoriatic arthritis given  patient's clinical history) where marginal erosions and marrow signal  abnormality are expected though the degree of T1 marrow replacement is  out of proportion  to typical findings.   b. Subtle bone marrow edema in the middle phalanx without T1 marrow  signal abnormality, this is favored to represent reactive edema.  c. Joint effusion at the third digit interphalangeal joint,  potentially infectious or inflammatory. Consider aspiration for  definitive diagnosis.  d. The partially visualized fifth digit shows edema within the distal  phalanx and head of the middle phalanx with regional soft tissue  edema. Given predominant findings in the third digit, similar changes  in the fifth digit could support a polyarticular inflammatory  arthropathy. Correlate clinically.  2. Tenosynovitis of the third digit flexor and extensor tendons. There  is also tendinosis with high-grade (near full-thickness) tearing of  the extensor tendon/extensor saul at the distal phalangeal base with  slight proximal retraction of the residual fibers.  3. Diffusely thickened, edematous ulnar and radial collateral  ligaments, likely combination of sprain and reactive edema.  4. Extensive soft tissue edema centered about the interphalangeal  joint.

## 2021-06-17 NOTE — RESULT ENCOUNTER NOTE
Tram Downey,  Some of your results came back and are within acceptable limits. Normal inflammation marker CRP.  If you have any further concerns please do not hesitate to contact us by message, phone or making an appointment.  Have a good day   Dr Bobby ROSE

## 2021-06-17 NOTE — RESULT ENCOUNTER NOTE
Tram Ms. Downey,  Your results came back and testing negative for exposure, testing indicates prior mumps exposure but IgM which would indicate a acute illness is not back yet. If you have any further concerns please do not hesitate to contact us by message, phone or making an appointment.  Have a good day   Dr Bobby ROSE

## 2021-06-17 NOTE — PROGRESS NOTES
Willa is a 60 year old who is being evaluated via a billable video visit.      How would you like to obtain your AVS? MyChart  If the video visit is dropped, the invitation should be resent by: Send to e-mail at: [email or web address]  Will anyone else be joining your video visit? No      Video-Visit Details    Type of service:  Video Visit  Start: 06/17/2021 10:40 am   Stop: 06/17/2021 11:03 am    Originating Location (pt. Location): Home    Distant Location (provider location):  [facility] RHEUMATOLOGY CLINIC [place]     Platform used for Video Visit: Link Arias MD  Rheumatology        Outpatient Rheumatology Follow-up  This visit was conducted via synchronous video visit due to the current COVID-19 crisis to reduce patient risk.  Verbal consent was obtained and is documented below.    Name: Willa Downey    MRN [id number]   Today's date: 6/17/21           Reason for follow-up: psoriatic arthritis   Requesting physician: Kitty Mednez MD        Assessment & Plan:   #negative RF  #psoriasis, cutaneous and nail involvement  #DIP Left hand 3rd, 4th, 5th and right hand 4th digit inflammatory arthritis  #Psoriatic arthritis  #humira failure  #methotrexate failure  #high risk drug therapy: Enbrel  #Submandibular LAD      60 year old female with hx of advanced glaucoma followed closely by ophthalmology presented to rheumatology on 2/5/21 with 5 month of progressive left hand 3rd, 4th, 5th digit and right hand 4th digit DIP inflammatory arthritis in the context of psoriasis, psoriatic changes of the nails. Taken together most consistent with psoriatic arthritis. Tried on 2 NSAIDs which failed to control symptoms, and had severe GERD symptoms. Tried methotrexate by prior rheumatologist and disease not controlled. Humira started Mid February 2021 due to lack of efficacy of these other therapies and continued through May 14th, though has had severe/ rapidly progressive disease after some  improvement in the first 4 weeks. Her DIP inflammatory arthritis and nail disease is active, severe, progressive. Willa has received enbrel but we have waited to start due to the development of impressive acute bilateral submandibular swelling/tenderness in the absence of other infectious symptoms. She had US done which did not show any suspicious findings. Additional serologic work-up done by PCP which showed negative/normal SSA, SSB, ESR, CRP.     Submandibular LAD: Favoring infectious process given the acute onset, bilateral nature, tenderness and spontaneous improvement by 50+% over the last week. Also US without concerning findings. Sjogrens is a consideration though less likely with negative Abs. Malignancy thought to be less likely with features above but will need to consider if it does not continue to resolve.    Psoriatic arthritis: very active: as we weigh the risks and benefits of starting TNFi in the context of a now improving submandibular LAD from likely infectious process, we have decided to wait until Sunday and if ongoing improvement then Willa may start her Enbrel.    High risk medication use: enbrel    Will plan to follow-up in 6-8 weeks. Will know at that point if improving and we could consider Deb vs IL-17 inhibitor if needed.    Marc Arias MD  Rheumatology     Subjective:   6/17/21  Has advanced glaucoma/ dry eyes. Chronic. No new head/neck symptoms. Dry mouth during submandibular gland swelling, dry mouth now resolved. Reports the swelling has mproved now by 50+%. No more pain, so no longer using ibuprofen. No fever/chills/cough or other signs/symptoms of infection. With ongoing progression of nail/distal digit predominant psoriasis and DIP arthritis. Severe pain. Unable to tolerate NSAIDs given GI upset despite PPI. ROS otherwise negative       5/14/21  Patient initially was going to wait to contact, though has been having progression of symptoms of nails/joint pain/ skin. Last  injection was on Tuesday. Had a flare in the days following her injection. First 2 doses, she did notice some improvement. Though after that time, with each dose, has had less and less of a positive change in nails/joints/skin. Has instead been getting worse and worse response. Has stiffness and pain in her DIPs that has been progressive. Has redness as well. Also with radiation of the pain proximally which is new. Also some days with severe fatigue. No fevers/chills. No weight changes. No interval infections.       Past Medical History  Past Medical History:   Diagnosis Date     Nuclear senile cataract of both eyes 8/16/2018     Psoriatic arthropathy (H) 10/19/2020     Past Surgical History  Past Surgical History:   Procedure Laterality Date     BIOPSY  07/30/2019    the right breast tissue(benign)     EYE SURGERY      3 surgeries for glaucoma treatment     Medications  Current Outpatient Medications   Medication     CALCIUM CARBONATE-VIT D-MIN PO     clobetasol (TEMOVATE) 0.05 % external ointment     gabapentin (NEURONTIN) 300 MG capsule     loratadine (CLARITIN) 10 MG capsule     meloxicam (MOBIC) 7.5 MG tablet     mometasone (ELOCON) 0.1 % external ointment     Multiple Vitamins-Minerals (MULTIVITAMIN ADULTS 50+) TABS     omeprazole (PRILOSEC) 20 MG DR capsule     etanercept (ENBREL SURECLICK) 50 MG/ML autoinjector     No current facility-administered medications for this visit.      Allergies   Allergies   Allergen Reactions     Adhesive Tape Blisters, Itching and Swelling     Dust Mites      Latex Blisters, Itching, Other (See Comments) and Swelling     Skin sensitivity       Morphine Sulfate (Concentrate) Itching     Other reaction(s): Chills  heart palpatations     Seasonal Allergies      Codeine Palpitations     chills     Family History:   No family history of autoimmune diseases.     Social History  , with children. 2 kids and they are healthy. No ETOH, smoking, drug use.      Objective:     Physical exam:  No vitals for this video visit. Vitals reviewed in Epic.  GEN: Sitting up at table. NAD  HEENT: no facial rash, sclera clear, no inflammatory nose/ external ear changes  CV: no upper extremity dependent edema  Pulm: speaking in full sentences, no cough, no audible wheezing, no use of accessory muscles  Abdomen: not distended  Skin: no acute cutaneous lesions  MSK: full active ROM of bilateral shoulders, elbows, wrists, MCPs, PIPs. DIPs of Digits 3-5 on the left hand and digit 3 on the right hand with erythema/edema and tenderness to self palpation.  Has psoriatic changes of fingernails of digits 3-5 on the left hand and digit 3 on the right hand.    Labs:  2/5/21  HIV negative  Hep C AB negative  Hep B s AG negative  Hep B c AB reactive  Quant gold negative  Quant gold negative  RF negative  CRP <2.9  ESR 10  Cr 0.62  WBC 7.1  HGB 13.1    HLA b27 negative  Hep B virus DNA negative    2/2/21  ESR 9  CRP <2.9  WBC 6.1  HGB 12.8      11/11/20  A1C 5.3  Hep C/ HIV negative    Imaging:  MR left hand  Impression:  1a. Correlating to the marker at the third digit interphalangeal  joint, there is a focal erosion along the volar aspect of the distal  phalangeal base with intense bone marrow edema and abnormal T1 marrow  signal. Findings are concerning for osteomyelitis and infection should  be ruled out. Also on the differential is sequelae of long-standing  active inflammatory arthropathy (including psoriatic arthritis given  patient's clinical history) where marginal erosions and marrow signal  abnormality are expected though the degree of T1 marrow replacement is  out of proportion to typical findings.   b. Subtle bone marrow edema in the middle phalanx without T1 marrow  signal abnormality, this is favored to represent reactive edema.  c. Joint effusion at the third digit interphalangeal joint,  potentially infectious or inflammatory. Consider aspiration for  definitive diagnosis.  d. The  partially visualized fifth digit shows edema within the distal  phalanx and head of the middle phalanx with regional soft tissue  edema. Given predominant findings in the third digit, similar changes  in the fifth digit could support a polyarticular inflammatory  arthropathy. Correlate clinically.  2. Tenosynovitis of the third digit flexor and extensor tendons. There  is also tendinosis with high-grade (near full-thickness) tearing of  the extensor tendon/extensor saul at the distal phalangeal base with  slight proximal retraction of the residual fibers.  3. Diffusely thickened, edematous ulnar and radial collateral  ligaments, likely combination of sprain and reactive edema.  4. Extensive soft tissue edema centered about the interphalangeal  joint.

## 2021-06-18 LAB — MUV IGM SER IA-ACNC: 0.93 IV

## 2021-06-19 DIAGNOSIS — R60.0 SUBMANDIBULAR GLAND SWELLING: Primary | ICD-10-CM

## 2021-06-23 ENCOUNTER — TELEPHONE (OUTPATIENT)
Dept: FAMILY MEDICINE | Facility: CLINIC | Age: 61
End: 2021-06-23

## 2021-06-23 DIAGNOSIS — R60.0 SUBMANDIBULAR GLAND SWELLING: Primary | ICD-10-CM

## 2021-06-23 NOTE — TELEPHONE ENCOUNTER
Geovanna Rosales MD     Writer huddled with lab personnel  It would be best for patient to get Buccal swab at [facility]--as this would eliminate a step( lab would have to ship to [facility] to have it sent to [facility])    The order number in Epic for buccal swab for mumps is [id number]    The swab must take place 1-5 days after swelling    Please see pended--sign if agree do you want buccal brushes or throat swab?    Once signed we can direct patient to [facility] for lab.      Thanks! Pearl Dowling RN

## 2021-06-23 NOTE — TELEPHONE ENCOUNTER
----- Message from Brigid Melendez RN sent at 6/22/2021 11:08 AM CDT -----  Regarding: Suspected mumps  Good morning,     Infection prevention performs routine surveillance of specific infectious diseases. After reviewing this patient with the Infectious Disease Medical Director, it would be pertinent to obtain a buccal PCR swab for mumps for this patient to comply with CDC guidelines for mumps testing.     Please let me know if you have any questions or if I can be of further assistance. Thanks!    Brigid

## 2021-06-23 NOTE — TELEPHONE ENCOUNTER
Please call patient   How is she doing?  Are the submandibular swellings better    Her Mumps Ig M testing came back equivocal    The infectious disease lab / consulted with the infectious disease director and they recommended that  We do a buccal mucosa pcr for mumps to be certain if had mumps to follow CDC guidelines    Can you check if our lab can have this test available and when so we can get it done by MA or nurse or lab in a lab only apt ? & how to order ir? And then can inform patient on when to come in for it thanks. She will need respiratory droplet protection when getting testing done to be safe.   At time of apt both of us were masked ( hers was off briefly for an exam) and I had a shield on.    I saw her first on 6/11, at that time had under jaw swelling about 3 to 4 days  Mumps can be highly infectious transmitted by respiratory droplet and contact  Viral shedding in respiratory secretions precedes the onset of symptomatic illness. The incubation period is usually 16 to 18 days (range 12 to 25 days) from exposure to onset of symptoms. In one review, the highest rate of infectivity was present immediately preceding the onset of parotitis, with rapid decrease of virus shedding over the next five days.     Mumps typically begins with a few days of fever, headache, myalgia, fatigue, and anorexia; these symptoms and signs are usually followed by development of salivary gland swelling within 48 hours. Parotitis may be unilateral or bilateral; initial unilateral involvement may be followed by contralateral involvement a few days later. Parotid swelling can last up to 10 days. Mumps is usually self-limited; most individuals recover completely within a few weeks.  ?Complications of mumps include orchitis or oophoritis, neurologic manifestations (including meningitis, encephalitis and deafness), and other less common complications. Complications of mumps may occur in the absence of parotitis    There is no  specific antiviral therapy for treatment of mumps. Management consists of supportive care and may include use of an analgesic/antipyretic agent such as acetaminophen. Parotid discomfort may be managed by application of warm or cold packs.   ?For individuals who are part of a group identified by public health authorities as being at increased risk for mumps because of an outbreak and who previously completed a two-dose series of mumps virus-containing vaccine prior to outbreak onset, we suggest a third MMR dose.  Incompletely immunized individuals should receive the standard two-dose MMR series.  ?Mumps infection is largely preventable via immunization prior to exposure. Following mumps exposure, neither postexposure vaccination nor immune globulin has been shown to prevent disease or lessen disease severity    Let see what pcr test shows and go from there  Please update clinic manager once pcr testing is back. If positive may have to detremine with [facility] exposure etc    May close encounter when done.

## 2021-06-24 ENCOUNTER — TELEPHONE (OUTPATIENT)
Dept: FAMILY MEDICINE | Facility: CLINIC | Age: 61
End: 2021-06-24
Payer: COMMERCIAL

## 2021-06-24 NOTE — TELEPHONE ENCOUNTER
Geovanna Rosales MD     [facility] lab calling stating they do not collect buccal swabs--they had no idea where to get it done. [facility] lab does not know either--they thought [facility] collected them--    Please advise.    Thanks! Pearl Dowling RN

## 2021-06-24 NOTE — TELEPHONE ENCOUNTER
Swelling has gotten smaller to the point where she can hardly feel it.  Patient did agree to go to [facility] out patient lab for the buccal swab.  Transferred call to Central Scheduling to schedule test.  Eden Graves RN  [facility]

## 2021-06-24 NOTE — TELEPHONE ENCOUNTER
Its been more than 5 days since swelling started but I think still ok to go ahead. With buccal swab for mumps at [facility] lab. Thanks     The Infectious disease RN did speak with JOYCELYN yesterday and reported it as a suspected case, so there is nothing further that needs to done besides ordering the PCR.   There is no exposure workup for staff, as the patient was wearing a mask and the staff were properly protected with the mask and the faceshield.     I suspect that JOYCELYN is more concerned about in mumps in the community, and that's why they would like the PCR done. The PCR is the gold-standard for confirming.

## 2021-06-25 ENCOUNTER — TELEPHONE (OUTPATIENT)
Dept: FAMILY MEDICINE | Facility: CLINIC | Age: 61
End: 2021-06-25

## 2021-06-25 NOTE — TELEPHONE ENCOUNTER
Why cant she make a AM only apt and ma collects the appropriate buccal swab that can be sent to lab

## 2021-06-25 NOTE — TELEPHONE ENCOUNTER
Please call patient  I tried to call her to let her know whats going on  Since her mumps serology blood test was equivocal , and was reported to md as suspected mumps they requested I order a buccal mucosa pcr swab for mumps so they can assess community spread better  I sent her to lab at the  on suggestion by our lab and they told her yesterday it couldn't be done when she got there  Please apologize to her regarding this  Her swelling is down and its been more than 15 days now since symptom onset and not sure if test is still recommended or not  Per [facility] buccal swab is indicated mostly in first 5 days of illness  Also looks like the swab has to be transported to [facility] with a [facility] lab submission form and our labs is saying they are not sure who does that transportation.  So I have been trying to contact md about it but could only leave a message for them to call me back  We do have ability to do swab here but given timing and how to transport to md are now the issue  I would just like you update patient on this and apologize for any inconvenience

## 2021-06-25 NOTE — TELEPHONE ENCOUNTER
Writer huddled with Geovanna Rosales MD  Info. Regarding buccal swab given. Unclear why this cannot be collected at [facility] labs the following info. Given to Geovanna Rosales MD. Patient will be called by provider to come in for swab completed by MA or provider.    Appropriate swabs and media:  BBL Culture Swab, Culturettes, Dacron swabs  Viral transport medium (VTM), Universal transport medium (UTM), M5, M4, Minimum Essential Medium (MEM), Saline, Balanced salt solutions (BSS), Sterile isotonic solutions, Phosphate buffered salines (PBS), Liquid Eldon s Medium  Inappropriate swabs and media:  Wood-tipped applicators, Cotton-tipped swabs, Calcium-alginate tipped swabs, Charcoal swabs, Gel swabs  Anaerobic media  Collection instructions: PCR  A buccal swab is preferred for mumps PCR. A throat swab is also acceptable but not preferred.    Buccal swab: Massage the buccal cavity (the space near the upper rear molars between the cheek and the teeth) for 30 and swab the area; obtain a generous amount of saliva. Place swab in a sterile tube containing 2-3 ml transport media.    Throat swab: Vigorously swab tonsillar areas. Use tongue blade to depress tongue to prevent contamination of swab with saliva. Place swab into 2-3 ml of transport media.    Urine specimen: Collect 10-40 ml of urine in a sterile urine specimen container. Have patient void directly into container, collecting from the first part of the urine stream if possible. First-morning voided specimens are ideal, but any urine collection is adequate.    Thanks! Pearl Dowling RN

## 2021-06-25 NOTE — TELEPHONE ENCOUNTER
Spoke to artemio in lab  He said we can print a label and arrange special  to md   So I will just need to confirm with [facility] given so much time since symptom onset & then we can update patient and do test if still indicated and get transported to [facility] after that

## 2021-06-26 NOTE — TELEPHONE ENCOUNTER
Spoke to [facility]  Elizabeth Osorio in detail about the case  Noted no travel hx for patient and family in 25 days before swelling noted on 6/8.  Advised possibly needing buccal swab and urine specimen since out of first 5 days bu also beyond 9 days so will have her supervisor call to clarify if testing still needed or not.  Spoke to Willa   She was very upset about yesterdays failed lab apt and the whole process and the three trasnfers with central scheduling that it took to get a lab apt in the first place. It inconvenienced her and her family and she was quiet upset   She appreciated this writers call and apology  Noted had been immunized against mmr as a child in [place]  Notes no systemic symptoms  Swelling is all gone and now 16 days or so since symptoms began  Has started her enbrel and no side effects noted  She strongly feels never had mumps with no fever, systemic symptoms or travel hx  Has had a difficult year with many apts, specialists care and blood draws which have bene difficult to get   Is tired of testing and afrid to come to clinic as is on immunosuppressant as afraid will  some thing in clinic  Has chosen to decline doing additional buccal swab and urine test   Did call and speak to  with updated information  They acknowledged patient cannot be forced to do any testing   will send inofrmation to [facility] supervisor who will call me on Monday to let me know their final  recommendations  We will get back to Willa at that point to decide what to do  Even if declines the additional pcr testing they might still like a copy of her Ig M sent to them & I can ask her permission to do so at that point.  So I apologize for the many notes and messages but for now dont call patient and wait till I hear back from the [facility] supervisor Willa was informed that I may not get back to her until Monday Tuesday etc.

## 2021-06-29 NOTE — TELEPHONE ENCOUNTER
Patient informed as below and will notify contacts to watch for symptoms.  Eden Graves RN  [facility]

## 2021-06-29 NOTE — TELEPHONE ENCOUNTER
Today I heard indirectly from [facility] via the [place] infectious disease nurse who had contacted me originally about this situation.  She had contacted [facility] and they told her the following    [facility] told her the buccal swabs need to actually be collected prior to day 16 after symptom onset. At this point the results may not be accurate, so there is no further action required for this particular patient.    The person they spoke with did mention that close contacts (family and friends) should monitor for symptoms and get tested if they develop.     Please update Willa regarding this and can close encounter.

## 2021-06-30 ENCOUNTER — MYC MEDICAL ADVICE (OUTPATIENT)
Dept: FAMILY MEDICINE | Facility: CLINIC | Age: 61
End: 2021-06-30

## 2021-06-30 NOTE — TELEPHONE ENCOUNTER
Writer responded via [employer].    GERALD KebedeN, RN  [facility]

## 2021-06-30 NOTE — TELEPHONE ENCOUNTER
Writer responded via [employer].    GERALD KebedeN, RN  [facility]

## 2021-07-02 ENCOUNTER — OFFICE VISIT (OUTPATIENT)
Dept: FAMILY MEDICINE | Facility: CLINIC | Age: 61
End: 2021-07-02
Payer: COMMERCIAL

## 2021-07-02 VITALS
SYSTOLIC BLOOD PRESSURE: 115 MMHG | HEART RATE: 65 BPM | DIASTOLIC BLOOD PRESSURE: 74 MMHG | BODY MASS INDEX: 24.89 KG/M2 | TEMPERATURE: 97.1 F | OXYGEN SATURATION: 98 % | WEIGHT: 145 LBS

## 2021-07-02 DIAGNOSIS — D84.9 IMMUNOSUPPRESSION (H): ICD-10-CM

## 2021-07-02 DIAGNOSIS — L29.9 EAR ITCHING: Primary | ICD-10-CM

## 2021-07-02 DIAGNOSIS — L40.50 PSORIATIC ARTHROPATHY (H): ICD-10-CM

## 2021-07-02 PROCEDURE — 99214 OFFICE O/P EST MOD 30 MIN: CPT | Performed by: FAMILY MEDICINE

## 2021-07-02 RX ORDER — CIPROFLOXACIN AND DEXAMETHASONE 3; 1 MG/ML; MG/ML
4 SUSPENSION/ DROPS AURICULAR (OTIC) 2 TIMES DAILY
Qty: 2.8 ML | Refills: 0 | Status: SHIPPED | OUTPATIENT
Start: 2021-07-02 | End: 2021-07-09

## 2021-07-02 RX ORDER — HYDROCORTISONE AND ACETIC ACID 20.75; 10.375 MG/ML; MG/ML
SOLUTION AURICULAR (OTIC) 2 TIMES DAILY
Status: CANCELLED | OUTPATIENT
Start: 2021-07-02

## 2021-07-02 ASSESSMENT — PAIN SCALES - GENERAL: PAINLEVEL: NO PAIN (0)

## 2021-07-02 NOTE — PROGRESS NOTES
Assessment & Plan     Ear itching  - H/o psoriatic arthropathy, Immunosuppression  - We discussed that her exam showed no otitis media or externa. She has mild cerumen which pt declined removal. We discussed that due to itching we can use dexamethasone but it would need to be with ciprofloxacin as it doesn't come alone. Pt agreeable to do trial of medication. Advised to use 3-7 days and to avoid Qtips.  - ciprofloxacin-dexamethasone (CIPRODEX) 0.3-0.1 % otic suspension; Place 4 drops into both ears 2 times daily for 7 days  Dispense: 2.8 mL; Refill: 0          Deqa Gloria Vaughn MD  [facility]    Dahiana Crouch is a 60 year old who presents for the following health issues     HPI     This Wednesday she had b/l ear irritation.  She got some scab out of the right ear. She got some discharge out of the left ear which smelt bad.  She has noticed some ear discomfort over the past winter.  She has psoriasis and contacted her rheumatologist. She was told that ear canal is a common place to develop psoariasis.   She has been using qtip after shower to help with wax build up.   No ear pain.     Review of Systems         Objective    There were no vitals taken for this visit.  There is no height or weight on file to calculate BMI.  Physical Exam   B/l ear canal with mild cerumen, mild ear canal erythema, normal TM

## 2021-07-02 NOTE — PATIENT INSTRUCTIONS
- ciprofloxacin-dexamethasone (CIPRODEX) 0.3-0.1 % otic suspension; Place 4 drops into both ears 2 times daily for 3 to 7 days    - Follow if symptoms worsen or fail to improve.

## 2021-07-05 ENCOUNTER — MYC MEDICAL ADVICE (OUTPATIENT)
Dept: DERMATOLOGY | Facility: CLINIC | Age: 61
End: 2021-07-05

## 2021-07-13 ENCOUNTER — OFFICE VISIT (OUTPATIENT)
Dept: DERMATOLOGY | Facility: CLINIC | Age: 61
End: 2021-07-13
Payer: COMMERCIAL

## 2021-07-13 DIAGNOSIS — L40.50 PSORIATIC ARTHRITIS (H): Primary | ICD-10-CM

## 2021-07-13 DIAGNOSIS — L40.9 PSORIASIS: ICD-10-CM

## 2021-07-13 PROCEDURE — 99214 OFFICE O/P EST MOD 30 MIN: CPT | Performed by: DERMATOLOGY

## 2021-07-13 RX ORDER — CLOBETASOL PROPIONATE 0.5 MG/G
OINTMENT TOPICAL
Qty: 60 G | Refills: 11 | Status: SHIPPED | OUTPATIENT
Start: 2021-07-13 | End: 2023-04-28

## 2021-07-13 RX ORDER — FLUOCINONIDE TOPICAL SOLUTION USP, 0.05% 0.5 MG/ML
SOLUTION TOPICAL
Qty: 60 ML | Refills: 11 | Status: SHIPPED | OUTPATIENT
Start: 2021-07-13 | End: 2022-07-06

## 2021-07-13 RX ORDER — CLOBETASOL PROPIONATE 0.5 MG/G
CREAM TOPICAL
Qty: 60 G | Refills: 11 | Status: SHIPPED | OUTPATIENT
Start: 2021-07-13 | End: 2022-05-16

## 2021-07-13 NOTE — LETTER
7/13/2021         RE: Willa Downey  [address]  [place] [address]        Dear Colleague,    Thank you for referring your patient, Willa Downey, to the [facility]. Please see a copy of my visit note below.    [facility] Dermatology Note  Encounter Date: Jul 13, 2021  Office Visit     Dermatology Problem List:  1. Psoriasis with psoriatic arthritis: mainly on the hands, fingertips, nails   - past tx: methotrexate (not effective), humira per rheumatology (started 2/2021, stoppped 5/2021, not effective)  - current tx: enbrel (started 6/2021), clobetasol 0.05% ointment/cream for hands and trunk, lidex 0.05% solution scalp and external genitals (one week at a time), t-gel and t-sal shampoo  - future: consider alternative biologic   2. Cyst of skin, posterior neck midline  - referral for excision  ____________________________________________    Assessment & Plan:     # Psoriasis in the setting of psoriatic arthritis: Chronic, flared skin today. I suspect flare is related to coming off humira and enbrel not yet kicking in.   - Reviewed last rheum note from 6/17/21. Plan was to start enbrel. If not effecitve, next step would be to consider TERESA-I or IL-17 inhibitor.   - Continue clobetasol 0.05% ointment for the palms and fingertips. Okay to try clobetasol 0.05% cream on the back for ease of applications. If not helping, recommended switching to clobetasol ointment.  - Lidex 0.05% solution for scalp daily.  - Lidex 0.05% solution once daily for one week to the genital area. Hold one week. Then continue this cycle as needed. A liquid steroid is needed here due to hair.  - Clobetasol 0.05% ointment/cream to the gluteal cleft for a week at a time. Then hold for one week, then repeat cycle as needed.   - Recommended T-gel and T-sal shampoos.  - Continue dove soap body wash.    Procedures Performed:   None.    Follow-up: as needed for new or changing lesions,  1 year for refills.    Staff and Scribe:     Scribe Disclosure:   I, Shannen Newman, am serving as a scribe to document services personally performed by this physician, Dr. Yvette Flores, based on data collection and the provider's statements to me.     Provider Disclosure:   The documentation recorded by the scribe accurately reflects the services I personally performed and the decisions made by me.    Yvette Flores MD    Department of Dermatology  [facility]: Phone: [phone], Fax:[phone]  [facility]: Phone: [phone], Fax: [phone]    ____________________________________________    CC: RECHECK (Psoriasis- patient of Dr. Mclaughlin. Patient reports psoriasis is spreading to scalp, ears, and back. Patient is currently applying clobetasol ointment to fingertips. Patient is on Enbrel injections weekly.)    HPI:  Ms. Willa Downey is a(n) 60 year old female who presents today as a return patient for psoriasis.    Last seen by Dr. Mclaughlin 4/1/21 with clobetasol 0.05% ointment was prescribed for psoriasis at fingertips/nails. Since this visit, rheumatology has switched from humira to enbrel in hopes of improving psoriatic arthritis.    Today, Willa notes concern of psoriasis spreading to the scalp, ears, and back. Patient has been applying clobetasol ointment to the fingertips. Has also used keto shampoo. Patient started Enbrel in June after being off humira for 4-6 weeks due to insurance issues.    Patient is otherwise feeling well, without additional skin concerns.     Labs Reviewed:  N/A    Physical Exam:  Vitals: There were no vitals taken for this visit.  SKIN: Focused exam of the scalp, back, hands, genital skin, and buttocks.  -There is a half dollar size pink plaque with silver scale on the left vertex scalp.  - There is a dime size pink plaque with silver scale  on right vertex scalp  - There is a quarter size pink plaque with silver scale on the right frontal scalp  - There is a dime size pink plaque with silver scale on left occipital scalp scalp  - One psoriasis plaque that is 6mm in size on the central mid back  - There are some pink erythema with peripheral scale on the pretty hands bilateral  - Fissures with pustules on several fingertips.  - On the right hands, there is distolateral onycholysis on the right 4th finger  - Significant dystrophy on fingers on the left hand except the second finger.  - External ears are clear today.  - Non-blanching erythema with induration on the left anterior thigh.  - Pink erythema with scale on the external genitalia.  - Pink erythema on the superior gluteal cleft without scale.  - No other lesions of concern on areas examined.       Medications:  Current Outpatient Medications   Medication     CALCIUM CARBONATE-VIT D-MIN PO     clobetasol (TEMOVATE) 0.05 % external ointment     etanercept (ENBREL SURECLICK) 50 MG/ML autoinjector     gabapentin (NEURONTIN) 300 MG capsule     loratadine (CLARITIN) 10 MG capsule     meloxicam (MOBIC) 7.5 MG tablet     Multiple Vitamins-Minerals (MULTIVITAMIN ADULTS 50+) TABS     omeprazole (PRILOSEC) 20 MG DR capsule     mometasone (ELOCON) 0.1 % external ointment     No current facility-administered medications for this visit.      Past Medical History:   Patient Active Problem List   Diagnosis     Hyperlipidemia     Low-tension glaucoma of both eyes, severe stage     Myopia     Osteoarthritis     Screening for malignant neoplasm of cervix     Psoriasis of nail     Psoriatic arthropathy (H)     Spinal stenosis of lumbar region, unspecified whether neurogenic claudication present     Neuroforaminal stenosis of lumbar spine     Bilateral low back pain with right-sided sciatica, unspecified chronicity     Immunosuppression (H)     Past Medical History:   Diagnosis Date     Nuclear senile cataract of both  eyes 8/16/2018     Psoriatic arthropathy (H) 10/19/2020       CC No referring provider defined for this encounter. on close of this encounter.        Again, thank you for allowing me to participate in the care of your patient.        Sincerely,        Yvette Flores MD

## 2021-07-13 NOTE — NURSING NOTE
Willa Downey's goals for this visit include:   Chief Complaint   Patient presents with     RECHECK     Psoriasis- patient of Dr. Mclaughlin. Patient reports psoriasis is spreading to scalp, ears, and back. Patient is currently applying clobetasol ointment to fingertips. Patient is on Enbrel injections weekly.       She requests these members of her care team be copied on today's visit information:     PCP: Geovanna Rosales    Referring Provider:  No referring provider defined for this encounter.    There were no vitals taken for this visit.    Do you need any medication refills at today's visit? No  Zuly Andrade CMA

## 2021-07-13 NOTE — PROGRESS NOTES
[facility] Dermatology Note  Encounter Date: Jul 13, 2021  Office Visit     Dermatology Problem List:  1. Psoriasis with psoriatic arthritis: mainly on the hands, fingertips, nails   - past tx: methotrexate (not effective), humira per rheumatology (started 2/2021, stoppped 5/2021, not effective)  - current tx: enbrel (started 6/2021), clobetasol 0.05% ointment/cream for hands and trunk, lidex 0.05% solution scalp and external genitals (one week at a time), t-gel and t-sal shampoo  - future: consider alternative biologic   2. Cyst of skin, posterior neck midline  - referral for excision  ____________________________________________    Assessment & Plan:     # Psoriasis in the setting of psoriatic arthritis: Chronic, flared skin today. I suspect flare is related to coming off humira and enbrel not yet kicking in.   - Reviewed last rheum note from 6/17/21. Plan was to start enbrel. If not effecitve, next step would be to consider TERESA-I or IL-17 inhibitor.   - Continue clobetasol 0.05% ointment for the palms and fingertips. Okay to try clobetasol 0.05% cream on the back for ease of applications. If not helping, recommended switching to clobetasol ointment.  - Lidex 0.05% solution for scalp daily.  - Lidex 0.05% solution once daily for one week to the genital area. Hold one week. Then continue this cycle as needed. A liquid steroid is needed here due to hair.  - Clobetasol 0.05% ointment/cream to the gluteal cleft for a week at a time. Then hold for one week, then repeat cycle as needed.   - Recommended T-gel and T-sal shampoos.  - Continue dove soap body wash.    Procedures Performed:   None.    Follow-up: as needed for new or changing lesions, 1 year for refills.    Staff and Scribe:     Scribe Disclosure:   Shannen CASPER, am serving as a scribe to document services personally performed by this physician, Dr. Yvette Flores, based on data collection and the provider's statements to me.      Provider Disclosure:   The documentation recorded by the scribe accurately reflects the services I personally performed and the decisions made by me.    Yvette Flores MD    Department of Dermatology  [facility]: Phone: [phone], Fax:[phone]  [facility]: Phone: [phone], Fax: [phone]    ____________________________________________    CC: RECHECK (Psoriasis- patient of Dr. Mclaughlin. Patient reports psoriasis is spreading to scalp, ears, and back. Patient is currently applying clobetasol ointment to fingertips. Patient is on Enbrel injections weekly.)    HPI:  Ms. Willa Downey is a(n) 60 year old female who presents today as a return patient for psoriasis.    Last seen by Dr. Mclaughlin 4/1/21 with clobetasol 0.05% ointment was prescribed for psoriasis at fingertips/nails. Since this visit, rheumatology has switched from humira to enbrel in hopes of improving psoriatic arthritis.    Today, Willa notes concern of psoriasis spreading to the scalp, ears, and back. Patient has been applying clobetasol ointment to the fingertips. Has also used keto shampoo. Patient started Enbrel in June after being off humira for 4-6 weeks due to insurance issues.    Patient is otherwise feeling well, without additional skin concerns.     Labs Reviewed:  N/A    Physical Exam:  Vitals: There were no vitals taken for this visit.  SKIN: Focused exam of the scalp, back, hands, genital skin, and buttocks.  -There is a half dollar size pink plaque with silver scale on the left vertex scalp.  - There is a dime size pink plaque with silver scale on right vertex scalp  - There is a quarter size pink plaque with silver scale on the right frontal scalp  - There is a dime size pink plaque with silver scale on left occipital scalp scalp  - One psoriasis plaque that is 6mm in size on the central mid  back  - There are some pink erythema with peripheral scale on the pretty hands bilateral  - Fissures with pustules on several fingertips.  - On the right hands, there is distolateral onycholysis on the right 4th finger  - Significant dystrophy on fingers on the left hand except the second finger.  - External ears are clear today.  - Non-blanching erythema with induration on the left anterior thigh.  - Pink erythema with scale on the external genitalia.  - Pink erythema on the superior gluteal cleft without scale.  - No other lesions of concern on areas examined.       Medications:  Current Outpatient Medications   Medication     CALCIUM CARBONATE-VIT D-MIN PO     clobetasol (TEMOVATE) 0.05 % external ointment     etanercept (ENBREL SURECLICK) 50 MG/ML autoinjector     gabapentin (NEURONTIN) 300 MG capsule     loratadine (CLARITIN) 10 MG capsule     meloxicam (MOBIC) 7.5 MG tablet     Multiple Vitamins-Minerals (MULTIVITAMIN ADULTS 50+) TABS     omeprazole (PRILOSEC) 20 MG DR capsule     mometasone (ELOCON) 0.1 % external ointment     No current facility-administered medications for this visit.      Past Medical History:   Patient Active Problem List   Diagnosis     Hyperlipidemia     Low-tension glaucoma of both eyes, severe stage     Myopia     Osteoarthritis     Screening for malignant neoplasm of cervix     Psoriasis of nail     Psoriatic arthropathy (H)     Spinal stenosis of lumbar region, unspecified whether neurogenic claudication present     Neuroforaminal stenosis of lumbar spine     Bilateral low back pain with right-sided sciatica, unspecified chronicity     Immunosuppression (H)     Past Medical History:   Diagnosis Date     Nuclear senile cataract of both eyes 8/16/2018     Psoriatic arthropathy (H) 10/19/2020       CC No referring provider defined for this encounter. on close of this encounter.

## 2021-07-13 NOTE — PATIENT INSTRUCTIONS
For the palms and back:   Apply clobetasol ointment twice daily. It is okay to use the clobetasol cream, but if you notice no improvement, I recommend switching to the ointment.    For the scalp:   Apply lidex to the scalp once daily.    For the genitals:  Apply lidex to the area once daily for one week at a time. Take a break for one week. You can continue this cycle of one week on and one week on. It is important to take breaks, as continued use of this medication can thin the skin.    For the buttock area:  Apply clobetasol cream to the area once daily for one week on and one week off.    Shampoo:  I recommend using Neutrogena T-gel or T-sal shampoo.

## 2021-08-12 ENCOUNTER — VIRTUAL VISIT (OUTPATIENT)
Dept: RHEUMATOLOGY | Facility: CLINIC | Age: 61
End: 2021-08-12
Attending: INTERNAL MEDICINE
Payer: COMMERCIAL

## 2021-08-12 DIAGNOSIS — T50.905A MEDICATION SIDE EFFECT, INITIAL ENCOUNTER: ICD-10-CM

## 2021-08-12 DIAGNOSIS — L40.50 PSORIATIC ARTHRITIS (H): Primary | ICD-10-CM

## 2021-08-12 DIAGNOSIS — Z79.899 HIGH RISK MEDICATION USE: ICD-10-CM

## 2021-08-12 PROCEDURE — 99215 OFFICE O/P EST HI 40 MIN: CPT | Mod: 95 | Performed by: INTERNAL MEDICINE

## 2021-08-12 RX ORDER — PREDNISONE 2.5 MG/1
TABLET ORAL
Qty: 156 TABLET | Refills: 0 | Status: SHIPPED | OUTPATIENT
Start: 2021-08-12 | End: 2021-10-23

## 2021-08-12 ASSESSMENT — PAIN SCALES - GENERAL: PAINLEVEL: MILD PAIN (3)

## 2021-08-12 NOTE — PROGRESS NOTES
Willa is a 60 year old who is being evaluated via a billable video visit.      How would you like to obtain your AVS? MyChart  If the video visit is dropped, the invitation should be resent by: Text to cell phone: [phone]  Will anyone else be joining your video visit? No      Video-Visit Details    Type of service:  Video Visit    Start: 08/12/2021 07:57 am   Stop: 08/12/2021 08:36 am    Originating Location (pt. Location): Home    Distant Location (provider location):  [facility] [place]     Platform used for Video Visit: Tiburcio Arias MD  Rheumatology

## 2021-08-12 NOTE — PROGRESS NOTES
Outpatient Rheumatology Follow-up  This visit was conducted via synchronous video visit due to the current COVID-19 crisis to reduce patient risk.  Verbal consent was obtained and is documented below.    Name: Willa Downey    MRN [id number]   Today's date: August 12, 2021           Reason for follow-up: psoriatic arthritis   Requesting physician: Kitty Mendez MD        Assessment & Plan:   #Psoriatic arthritis  #negative RF  #psoriasis, cutaneous and nail involvement  #DIP Left hand 3rd, 4th, 5th and right hand 4th digit inflammatory arthritis  #humira failure  #methotrexate failure  #high risk drug therapy: Enbrel  #Submandibular LAD- resolved    60 year old female with hx of advanced glaucoma followed closely by ophthalmology presented to rheumatology on 2/5/21 with 5 month of progressive left hand 3rd, 4th, 5th digit and right hand 4th digit DIP inflammatory arthritis in the context of psoriasis, psoriatic changes of the nails. Taken together most consistent with psoriatic arthritis. Tried on 2 NSAIDs which failed to control symptoms, and had severe GERD symptoms. Tried methotrexate by prior rheumatologist and disease not controlled. Humira started Mid February 2021 due to lack of efficacy of these other therapies and continued through May 14th, though has had severe/ rapidly progressive disease after some improvement in the first 4 weeks involving DIP inflammatory arthritis and nail disease. We then ordered Enbrel, though starting was delayed due to development of impressive bilateral submandibular lymphadenopathy which resolved within about 1 week and thought likely secondary to viral infection. She started enbrel on 6/20/2021 which she has continued on.    Psoriatic arthritis: Both inflammatory arthritis and cutaneous disease is active. Has taken 8 doses of enbrel as of today. Has had some improvement in inflammatory arthritis but still active with pain/stiffness/swelling/redness of DIPs  predominantly. Cutaneous disease has progressed to involve trunk/palms/scalp/external auditory canal/groin/gluteal cleft. Has seen dermatology and was started on topical therapy which is keeping disease stable active at these sites. Given progression and not enough time on the enbrel, discussed short course/low dose prednisone in this context. Discussed rebound flare of cutaneous manifestations, though with low dose/slow taper she should be okay. Will follow-up in 6 weeks. At that time will be sufficient trial of enbrel. If disease not better controlled will switch to cosentyx.  PLAN:  1) Continue enbrel q7 days  2) Prednisone 10mg x2 weeks, then 7.5mg x2 weeks, then 5mg x2 weeks, then 2.5mg until follow-up  3) If doing well at follow-up will discontinue prednisone  4) If not doing well will switch to cosentyx  5) Follow-up in 6 weeks    Medication side effect: has had injection site reaction for the last 5 injections of enbrel despite claritin, ice, topical hydrocortisone. Lasts for 3-4 days instead of 5-6 days. Prednisone should be helpful for this, though if it returns/worsens, we may need to consider another therapy as is very symptomatic to the patient. Pain/erythema/pruritis.    High risk medication use: Enbrel. Risks and benefits again discussed. Labs q6 months. No interval infection or side effects other than injection site above    Current systemic steroid use: starting 6+ weeks of low dose prednisone today. Using lower dose / slow taper due to hx of occular disease and concern for rebound of skin disease with abrupt taper    Marc Arias MD  Rheumatology    I spent a total of 40 minutes on the date of service on chart review, video encounter, , documentation.      Subjective:   August 12, 2021  Interval history  Had ?ear infection early July. Given ear drops by PCP. Had developed more psoriasis in new locations (palm of hand/scalp (10-15%)/lower stomach/low back/ and groin/gluteal cleft)  where previously was on fingertips/nails only. Saw dermatology who prescribed topical, the topical has been somewhat helpful though has not resolves symptoms. She thinks that she has had 10-15% improvement with the topical. Spoke to pharmacist, who counseled her than can take up to 4 months for enbrel to take full effect. Gives herself injection every Sunday. After 5th injection, has itching/burning at the site. Takes Claritin already. Iced. Was told by pharmacist to try hydrocortisone, which she did. Decreased from 4-5 days of symptoms down to 3 days. From 5th injection onwards has had this reaction. Total of 8 injections so far. Joints much less of an issue compared to cutaneous involvement. If she has any joint pain, takes mobic. Roughly 1x per week. Around once per 7 -10days has extreme fatigue. Yesterday was significant. Slept until noon. Usually gets up at 630. Usually trouble sleeping, so this was unusual for her. No return of submandibular LAD. No fever, chills. No cough/sore throat. Some stiffness in the DIPs, morning predominant. Improves throughout the day. Some increase in pain this week. ROS otherwise negative.      6/17/21  Has advanced glaucoma/ dry eyes. Chronic. No new head/neck symptoms. Dry mouth during submandibular gland swelling, dry mouth now resolved. Reports the swelling has mproved now by 50+%. No more pain, so no longer using ibuprofen. No fever/chills/cough or other signs/symptoms of infection. With ongoing progression of nail/distal digit predominant psoriasis and DIP arthritis. Severe pain. Unable to tolerate NSAIDs given GI upset despite PPI. ROS otherwise negative       5/14/21  Patient initially was going to wait to contact, though has been having progression of symptoms of nails/joint pain/ skin. Last injection was on Tuesday. Had a flare in the days following her injection. First 2 doses, she did notice some improvement. Though after that time, with each dose, has had less and less  of a positive change in nails/joints/skin. Has instead been getting worse and worse response. Has stiffness and pain in her DIPs that has been progressive. Has redness as well. Also with radiation of the pain proximally which is new. Also some days with severe fatigue. No fevers/chills. No weight changes. No interval infections.     Past Medical History  Past Medical History:   Diagnosis Date     Nuclear senile cataract of both eyes 8/16/2018     Psoriatic arthropathy (H) 10/19/2020     Past Surgical History  Past Surgical History:   Procedure Laterality Date     BIOPSY  07/30/2019    the right breast tissue(benign)     EYE SURGERY      3 surgeries for glaucoma treatment     Medications  Current Outpatient Medications   Medication     CALCIUM CARBONATE-VIT D-MIN PO     clobetasol (TEMOVATE) 0.05 % external cream     clobetasol (TEMOVATE) 0.05 % external ointment     etanercept (ENBREL SURECLICK) 50 MG/ML autoinjector     fluocinonide (LIDEX) 0.05 % external solution     gabapentin (NEURONTIN) 300 MG capsule     loratadine (CLARITIN) 10 MG capsule     meloxicam (MOBIC) 7.5 MG tablet     Multiple Vitamins-Minerals (MULTIVITAMIN ADULTS 50+) TABS     omeprazole (PRILOSEC) 20 MG DR capsule     No current facility-administered medications for this visit.     Allergies   Allergies   Allergen Reactions     Adhesive Tape Blisters, Itching and Swelling     Dust Mites      Latex Blisters, Itching, Other (See Comments) and Swelling     Skin sensitivity       Morphine Sulfate (Concentrate) Itching     Other reaction(s): Chills  heart palpatations     Seasonal Allergies      Codeine Palpitations     chills     Family History:   No family history of autoimmune diseases.     Social History  , with children. 2 kids and they are healthy. No ETOH, smoking, drug use.      Objective:    Physical exam:  No vitals for this video visit. Vitals reviewed in Epic.  GEN: Sitting up at table. NAD  HEENT: no facial rash, sclera clear, no  inflammatory nose/ external ear changes  CV: no upper extremity dependent edema  Pulm: speaking in full sentences, no cough, no audible wheezing, no use of accessory muscles  Abdomen: not distended  Skin: no acute cutaneous lesions  MSK: full active ROM of bilateral shoulders, elbows, wrists, MCPs, PIPs. DIPs of Digits 3-5 on the left hand and digit 3 on the right hand with erythema/edema and tenderness to self palpation.  Has psoriatic changes of fingernails of digits 3-5 on the left hand and digit 3 on the right hand. Has developed psoriasis on the palm, scalp, back. Reports in gluteal cleft and groin as well.     Labs:  6/16/21  SSA, SSB negative  Mumps IgG and IgM positive  Creatinine 0.63  LFTs normal  CBC WNL    2/5/21  HIV negative  Hep C AB negative  Hep B s AG negative  Hep B c AB reactive  Quant gold negative  Quant gold negative  RF negative  CRP <2.9  ESR 10  Cr 0.62  WBC 7.1  HGB 13.1    HLA b27 negative  Hep B virus DNA negative    2/2/21  ESR 9  CRP <2.9  WBC 6.1  HGB 12.8      11/11/20  A1C 5.3  Hep C/ HIV negative    Imaging:  MR left hand  Impression:  1a. Correlating to the marker at the third digit interphalangeal  joint, there is a focal erosion along the volar aspect of the distal  phalangeal base with intense bone marrow edema and abnormal T1 marrow  signal. Findings are concerning for osteomyelitis and infection should  be ruled out. Also on the differential is sequelae of long-standing  active inflammatory arthropathy (including psoriatic arthritis given  patient's clinical history) where marginal erosions and marrow signal  abnormality are expected though the degree of T1 marrow replacement is  out of proportion to typical findings.   b. Subtle bone marrow edema in the middle phalanx without T1 marrow  signal abnormality, this is favored to represent reactive edema.  c. Joint effusion at the third digit interphalangeal joint,  potentially infectious or inflammatory. Consider  aspiration for  definitive diagnosis.  d. The partially visualized fifth digit shows edema within the distal  phalanx and head of the middle phalanx with regional soft tissue  edema. Given predominant findings in the third digit, similar changes  in the fifth digit could support a polyarticular inflammatory  arthropathy. Correlate clinically.  2. Tenosynovitis of the third digit flexor and extensor tendons. There  is also tendinosis with high-grade (near full-thickness) tearing of  the extensor tendon/extensor saul at the distal phalangeal base with  slight proximal retraction of the residual fibers.  3. Diffusely thickened, edematous ulnar and radial collateral  ligaments, likely combination of sprain and reactive edema.  4. Extensive soft tissue edema centered about the interphalangeal  joint.

## 2021-08-12 NOTE — LETTER
8/12/2021       RE: Willa Downey  [address]  [facility] [address]     Dear Colleague,    Thank you for referring your patient, Willa Downey, to the [facility]HEUMATOLOGY [facility] [place] at [facility]. Please see a copy of my visit note below.      Outpatient Rheumatology Follow-up  This visit was conducted via synchronous video visit due to the current COVID-19 crisis to reduce patient risk.  Verbal consent was obtained and is documented below.    Name: Willa Downey    MRN [id number]   Today's date: August 12, 2021           Reason for follow-up: psoriatic arthritis   Requesting physician: Kitty Mendez MD        Assessment & Plan:   #Psoriatic arthritis  #negative RF  #psoriasis, cutaneous and nail involvement  #DIP Left hand 3rd, 4th, 5th and right hand 4th digit inflammatory arthritis  #humira failure  #methotrexate failure  #high risk drug therapy: Enbrel  #Submandibular LAD- resolved    60 year old female with hx of advanced glaucoma followed closely by ophthalmology presented to rheumatology on 2/5/21 with 5 month of progressive left hand 3rd, 4th, 5th digit and right hand 4th digit DIP inflammatory arthritis in the context of psoriasis, psoriatic changes of the nails. Taken together most consistent with psoriatic arthritis. Tried on 2 NSAIDs which failed to control symptoms, and had severe GERD symptoms. Tried methotrexate by prior rheumatologist and disease not controlled. Humira started Mid February 2021 due to lack of efficacy of these other therapies and continued through May 14th, though has had severe/ rapidly progressive disease after some improvement in the first 4 weeks involving DIP inflammatory arthritis and nail disease. We then ordered Enbrel, though starting was delayed due to development of impressive bilateral submandibular lymphadenopathy which resolved within about 1 week and thought likely secondary  to viral infection. She started enbrel on 6/20/2021 which she has continued on.    Psoriatic arthritis: Both inflammatory arthritis and cutaneous disease is active. Has taken 8 doses of enbrel as of today. Has had some improvement in inflammatory arthritis but still active with pain/stiffness/swelling/redness of DIPs predominantly. Cutaneous disease has progressed to involve trunk/palms/scalp/external auditory canal/groin/gluteal cleft. Has seen dermatology and was started on topical therapy which is keeping disease stable active at these sites. Given progression and not enough time on the enbrel, discussed short course/low dose prednisone in this context. Discussed rebound flare of cutaneous manifestations, though with low dose/slow taper she should be okay. Will follow-up in 6 weeks. At that time will be sufficient trial of enbrel. If disease not better controlled will switch to cosentyx.  PLAN:  1) Continue enbrel q7 days  2) Prednisone 10mg x2 weeks, then 7.5mg x2 weeks, then 5mg x2 weeks, then 2.5mg until follow-up  3) If doing well at follow-up will discontinue prednisone  4) If not doing well will switch to cosentyx  5) Follow-up in 6 weeks    Medication side effect: has had injection site reaction for the last 5 injections of enbrel despite claritin, ice, topical hydrocortisone. Lasts for 3-4 days instead of 5-6 days. Prednisone should be helpful for this, though if it returns/worsens, we may need to consider another therapy as is very symptomatic to the patient. Pain/erythema/pruritis.    High risk medication use: Enbrel. Risks and benefits again discussed. Labs q6 months. No interval infection or side effects other than injection site above    Current systemic steroid use: starting 6+ weeks of low dose prednisone today. Using lower dose / slow taper due to hx of occular disease and concern for rebound of skin disease with abrupt taper    Marc Arias MD  Rheumatology    I spent a total of 40 minutes on  the date of service on chart review, video encounter, , documentation.      Subjective:   August 12, 2021  Interval history  Had ?ear infection early July. Given ear drops by PCP. Had developed more psoriasis in new locations (palm of hand/scalp (10-15%)/lower stomach/low back/ and groin/gluteal cleft) where previously was on fingertips/nails only. Saw dermatology who prescribed topical, the topical has been somewhat helpful though has not resolves symptoms. She thinks that she has had 10-15% improvement with the topical. Spoke to pharmacist, who counseled her than can take up to 4 months for enbrel to take full effect. Gives herself injection every Sunday. After 5th injection, has itching/burning at the site. Takes Claritin already. Iced. Was told by pharmacist to try hydrocortisone, which she did. Decreased from 4-5 days of symptoms down to 3 days. From 5th injection onwards has had this reaction. Total of 8 injections so far. Joints much less of an issue compared to cutaneous involvement. If she has any joint pain, takes mobic. Roughly 1x per week. Around once per 7 -10days has extreme fatigue. Yesterday was significant. Slept until noon. Usually gets up at 630. Usually trouble sleeping, so this was unusual for her. No return of submandibular LAD. No fever, chills. No cough/sore throat. Some stiffness in the DIPs, morning predominant. Improves throughout the day. Some increase in pain this week. ROS otherwise negative.      6/17/21  Has advanced glaucoma/ dry eyes. Chronic. No new head/neck symptoms. Dry mouth during submandibular gland swelling, dry mouth now resolved. Reports the swelling has mproved now by 50+%. No more pain, so no longer using ibuprofen. No fever/chills/cough or other signs/symptoms of infection. With ongoing progression of nail/distal digit predominant psoriasis and DIP arthritis. Severe pain. Unable to tolerate NSAIDs given GI upset despite PPI. ROS otherwise negative        5/14/21  Patient initially was going to wait to contact, though has been having progression of symptoms of nails/joint pain/ skin. Last injection was on Tuesday. Had a flare in the days following her injection. First 2 doses, she did notice some improvement. Though after that time, with each dose, has had less and less of a positive change in nails/joints/skin. Has instead been getting worse and worse response. Has stiffness and pain in her DIPs that has been progressive. Has redness as well. Also with radiation of the pain proximally which is new. Also some days with severe fatigue. No fevers/chills. No weight changes. No interval infections.     Past Medical History  Past Medical History:   Diagnosis Date     Nuclear senile cataract of both eyes 8/16/2018     Psoriatic arthropathy (H) 10/19/2020     Past Surgical History  Past Surgical History:   Procedure Laterality Date     BIOPSY  07/30/2019    the right breast tissue(benign)     EYE SURGERY      3 surgeries for glaucoma treatment     Medications  Current Outpatient Medications   Medication     CALCIUM CARBONATE-VIT D-MIN PO     clobetasol (TEMOVATE) 0.05 % external cream     clobetasol (TEMOVATE) 0.05 % external ointment     etanercept (ENBREL SURECLICK) 50 MG/ML autoinjector     fluocinonide (LIDEX) 0.05 % external solution     gabapentin (NEURONTIN) 300 MG capsule     loratadine (CLARITIN) 10 MG capsule     meloxicam (MOBIC) 7.5 MG tablet     Multiple Vitamins-Minerals (MULTIVITAMIN ADULTS 50+) TABS     omeprazole (PRILOSEC) 20 MG DR capsule     No current facility-administered medications for this visit.     Allergies   Allergies   Allergen Reactions     Adhesive Tape Blisters, Itching and Swelling     Dust Mites      Latex Blisters, Itching, Other (See Comments) and Swelling     Skin sensitivity       Morphine Sulfate (Concentrate) Itching     Other reaction(s): Chills  heart palpatations     Seasonal Allergies      Codeine Palpitations     chills      Family History:   No family history of autoimmune diseases.     Social History  , with children. 2 kids and they are healthy. No ETOH, smoking, drug use.      Objective:    Physical exam:  No vitals for this video visit. Vitals reviewed in Epic.  GEN: Sitting up at table. NAD  HEENT: no facial rash, sclera clear, no inflammatory nose/ external ear changes  CV: no upper extremity dependent edema  Pulm: speaking in full sentences, no cough, no audible wheezing, no use of accessory muscles  Abdomen: not distended  Skin: no acute cutaneous lesions  MSK: full active ROM of bilateral shoulders, elbows, wrists, MCPs, PIPs. DIPs of Digits 3-5 on the left hand and digit 3 on the right hand with erythema/edema and tenderness to self palpation.  Has psoriatic changes of fingernails of digits 3-5 on the left hand and digit 3 on the right hand. Has developed psoriasis on the palm, scalp, back. Reports in gluteal cleft and groin as well.     Labs:  6/16/21  SSA, SSB negative  Mumps IgG and IgM positive  Creatinine 0.63  LFTs normal  CBC WNL    2/5/21  HIV negative  Hep C AB negative  Hep B s AG negative  Hep B c AB reactive  Quant gold negative  Quant gold negative  RF negative  CRP <2.9  ESR 10  Cr 0.62  WBC 7.1  HGB 13.1    HLA b27 negative  Hep B virus DNA negative    2/2/21  ESR 9  CRP <2.9  WBC 6.1  HGB 12.8      11/11/20  A1C 5.3  Hep C/ HIV negative    Imaging:  MR left hand  Impression:  1a. Correlating to the marker at the third digit interphalangeal  joint, there is a focal erosion along the volar aspect of the distal  phalangeal base with intense bone marrow edema and abnormal T1 marrow  signal. Findings are concerning for osteomyelitis and infection should  be ruled out. Also on the differential is sequelae of long-standing  active inflammatory arthropathy (including psoriatic arthritis given  patient's clinical history) where marginal erosions and marrow signal  abnormality are expected  though the degree of T1 marrow replacement is  out of proportion to typical findings.   b. Subtle bone marrow edema in the middle phalanx without T1 marrow  signal abnormality, this is favored to represent reactive edema.  c. Joint effusion at the third digit interphalangeal joint,  potentially infectious or inflammatory. Consider aspiration for  definitive diagnosis.  d. The partially visualized fifth digit shows edema within the distal  phalanx and head of the middle phalanx with regional soft tissue  edema. Given predominant findings in the third digit, similar changes  in the fifth digit could support a polyarticular inflammatory  arthropathy. Correlate clinically.  2. Tenosynovitis of the third digit flexor and extensor tendons. There  is also tendinosis with high-grade (near full-thickness) tearing of  the extensor tendon/extensor saul at the distal phalangeal base with  slight proximal retraction of the residual fibers.  3. Diffusely thickened, edematous ulnar and radial collateral  ligaments, likely combination of sprain and reactive edema.  4. Extensive soft tissue edema centered about the interphalangeal  joint.       Willa is a 60 year old who is being evaluated via a billable video visit.      How would you like to obtain your AVS? MyChart  If the video visit is dropped, the invitation should be resent by: Text to cell phone: [phone]  Will anyone else be joining your video visit? No      Video-Visit Details    Type of service:  Video Visit    Start: 08/12/2021 07:57 am   Stop: 08/12/2021 08:36 am    Originating Location (pt. Location): Home    Distant Location (provider location):  [facility] RHEUMATOLOGY [facility] [place]     Platform used for Video Visit: Tiburcio Arias MD  Rheumatology

## 2021-08-16 ENCOUNTER — OFFICE VISIT (OUTPATIENT)
Dept: ORTHOPEDICS | Facility: CLINIC | Age: 61
End: 2021-08-16
Payer: COMMERCIAL

## 2021-08-16 VITALS — BODY MASS INDEX: 23.73 KG/M2 | WEIGHT: 139 LBS | HEIGHT: 64 IN

## 2021-08-16 DIAGNOSIS — L40.50 PSORIATIC ARTHRITIS (H): ICD-10-CM

## 2021-08-16 DIAGNOSIS — M54.41 ACUTE RIGHT-SIDED LOW BACK PAIN WITH RIGHT-SIDED SCIATICA: ICD-10-CM

## 2021-08-16 DIAGNOSIS — M48.061 SPINAL STENOSIS OF LUMBAR REGION, UNSPECIFIED WHETHER NEUROGENIC CLAUDICATION PRESENT: Primary | ICD-10-CM

## 2021-08-16 PROCEDURE — 99214 OFFICE O/P EST MOD 30 MIN: CPT | Performed by: FAMILY MEDICINE

## 2021-08-16 RX ORDER — GABAPENTIN 300 MG/1
300 CAPSULE ORAL 3 TIMES DAILY
Qty: 90 CAPSULE | Refills: 2 | Status: SHIPPED | OUTPATIENT
Start: 2021-08-16 | End: 2022-01-03

## 2021-08-16 ASSESSMENT — MIFFLIN-ST. JEOR: SCORE: 1195.5

## 2021-08-16 NOTE — LETTER
"  8/16/2021      RE: Willa Downey  [address]  [facility] [address]       ESTABLISHED PATIENT FOLLOW-UP:  No chief complaint on file.       HISTORY OF PRESENT ILLNESS  Ms. Downey is a pleasant 60 year old year old female who presents to clinic today for follow-up of  Low back pain    Date of injury: Chronic  Date last seen: 9/8/21  Following Therapeutic Plan: Yes  Pain: Improved  Function: Improved  Interval History: Noticing new left sided low back pain with prolonged standing.  Currently using gabapentin twice daily and she had tried a trial off of this over the winter without success.  She notes that gabapentin has been a \"life saver\".  She is working with rheumatology on controlling her psoriatic arthritis and is currently on Enbrel.  She is also taking a prednisone taper to help control things better.    No radicular pain of the right lower extremity as she experienced last fall.  She notes no significant \"pain\" but tightness is a concern because she does not want this to progress to her previous state of radiculitis.    Additional medical/Social/Surgical histories reviewed in [facility] and updated as appropriate.    REVIEW OF SYSTEMS (8/16/2021)  CONSTITUTIONAL: Denies fever and weight loss  GASTROINTESTINAL: Denies abdominal pain, nausea, vomiting  MUSCULOSKELETAL: See HPI  SKIN: Denies any recent rash or lesion  NEUROLOGICAL: Denies numbness or focal weakness     PHYSICAL EXAM  There were no vitals taken for this visit.    General  - normal appearance, in no obvious distress  Musculoskeletal - lumbar spine  - stance: slow to rise and sit  - inspection: normal bone and joint alignment, no obvious scoliosis  - palpation: bilateral lumbar paravertebral spasm  - ROM: Decreased flexion, right-sided buttock and low back pain with left side bending and rotation.  - strength: lower extremities 5/5 in all planes  - special tests:  (-) straight leg raise bilaterally  (-) slump test  Neuro  -no sensory or " motor deficit, grossly normal coordination, normal muscle tone  Skin  - no ecchymosis, erythema, warmth, or induration, no obvious rash  Psych  - interactive, appropriate, normal mood and affect    IMAGING :   MR LUMBAR SPINE W/O CONTRAST 8/14/2020 8:16 AM     Provided History: Back pain, > 6wks conservative tx, persistent sx;  Acute right-sided low back pain with right-sided sciatica     ICD-10: Acute right-sided low back pain with right-sided sciatica     Comparison: None available.     Technique: Sagittal T1-weighted, sagittal STIR, 3D volumetric axial  and sagittal reconstructed T2-weighted images of the lumbar spine were  obtained without intravenous contrast.      Findings: There are 5 lumbar-type vertebrae assumed for the purposes  of this dictation.  The tip of the conus medullaris is at L1.  Grade 1  anterolisthesis of L4. Mild loss of disc height at L4-5.  Normal  marrow signal.     On a level by level basis:     T12-L1: No spinal canal or neuroforaminal stenosis.     L1-2: No spinal canal or neuroforaminal stenosis.     L2-3: No spinal canal or neuroforaminal stenosis.     L3-4: No spinal canal or neuroforaminal stenosis.     L4-5: Grade 1 anterolisthesis and uncovering of the disc. Superimposed  central disc extrusion with cephalad migration. Bilateral facet  hypertrophy and ligamentum flavum thickening. Severe spinal canal  stenosis. Moderate bilateral neural foraminal stenosis. Periarticular  edema surrounding the bilateral facet joints.     L5-S1: Posterior disc bulge and bilateral facet hypertrophy. Mild  spinal canal narrowing. Mild bilateral neural foraminal narrowing.     Paraspinous tissues are within normal limits.                                          Impression:   1. Spondylosis, particularly at L4-5 where there is severe spinal  canal stenosis and moderate bilateral neural foraminal stenosis.  2. Active inflammatory arthropathy of the bilateral L4-5 facet joints.     VANDANA HUBBARD MD      ASSESSMENT & PLAN  Ms. Downey is a 60 year old year old female who presents to clinic today with No chief complaint on file.    Diagnosis: Spinal stenosis of lumbar region, Psoriatic arthritis fusion    Willa is experiencing increasing low back tightness, likely stemming from her L4-L5 spinal stenosis.  Fortunately does not have any significant pain at this time.  We discussed initiating a lumbar and gluteal stretching and strengthening program.  She had underwent formal physical therapy last year, reportedly without any significant relief.  In addition to this we discussed indications for epidural steroid injection, she would like to hold off at this time.  In lieu of this, we discussed increasing her gabapentin dose to 300 mg 3 times daily over the next month or so and she may titrate down as she sees fit.  She is currently at a twice daily dosing which has been working for her over the past several months.      In addition to this, she is working with her rheumatologist on immunotherapy medications to control her psoriatic arthritis.  She has had some mixed results with initiating medications thus far.  She will continue with her prednisone dosage and this may actually help her back pain as well.  Follow-up with rheumatology as scheduled.    It was a pleasure seeing Willa.    Dimitri Zhou DO, [facility]  Primary Care Sports Medicine          Dimitri Zhou DO

## 2021-08-16 NOTE — PATIENT INSTRUCTIONS
Low Back Pain Exercises    EXERCISES  These exercises are intended only as suggestions. Be sure to check with your provider before starting the exercises.    Standing hamstring stretch: Put the heel of one leg on a stool about 15 inches high. Keep your leg straight. Lean forward, bending at the hips until you feel a mild stretch in the back of your thigh. Make sure you do not roll your shoulders or bend at the waist when doing this. You want to stretch your leg, not your lower back. Hold the stretch for 15 to 30 seconds. Repeat with each leg 3 times.    Cat and camel: Get down on your hands and knees. Let your stomach sag, allowing your back to curve downward. Hold this position for 5 seconds. Then arch your back and hold for 5 seconds. Do 2 sets of 15.    Quadruped arm and leg raise: Get down on your hands and knees. Pull in your belly button and tighten your abdominal muscles to stiffen your spine. While keeping your abdominals tight, raise one arm and the opposite leg away from you. Hold this position for 5 seconds. Lower your arm and leg slowly and change sides. Do this 10 times on each side.    Pelvic tilt: Lie on your back with your knees bent and your feet flat on the floor. Pull your belly button in towards your spine and push your lower back into the floor, flattening your back. Hold this position for 15 seconds, then relax. Repeat 5 to 10 times.  Partial curl: Lie on your back with your knees bent and your feet flat on the floor. Draw in your abdomen and tighten your stomach muscles. With your hands stretched out in front of you, curl your upper body forward until your shoulders clear the floor. Hold this position for 3 seconds. Don't hold your breath. It helps to breathe out as you lift your shoulders. Relax back to the floor. Repeat 10 times. Build to 2 sets of 15. To challenge yourself, clasp your hands behind your head and keep your elbows out to your sides.    Gluteal stretch: Lie on your back with  both knees bent. Rest your right ankle over the knee of your left leg. Grasp the thigh of the left leg and pull toward your chest. You will feel a stretch along the buttocks and possibly along the outside of your hip. Hold the stretch for 15 to 30 seconds. Then repeat the exercise with your left ankle over your right knee. Do the exercise 3 times with each leg.    Extension exercise  Lie face down on the floor for 5 minutes. If this hurts too much, lie face down with a pillow under your stomach. This should relieve your leg or back pain. When you can lie on your stomach for 5 minutes without a pillow, you can continue with Part B of this exercise.    After lying on your stomach for 5 minutes, prop yourself up on your elbows for another 5 minutes. If you can do this without having more leg or buttock pain, you can start doing part C of this exercise.    Lie on your stomach with your hands under your shoulders. Then press down on your hands and extend your elbows while keeping your hips flat on the floor. Hold for 1 second and lower yourself to the floor. Do 3 to 5 sets of 10 repetitions. Rest for 1 minute between sets. You should have no pain in your legs when you do this, but it is normal to feel some pain in your lower back.    Do this exercise several times a day.    Side plank: Lie on your side with your legs, hips, and shoulders in a straight line. Prop yourself up onto your forearm with your elbow directly under your shoulder. Lift your hips off the floor and balance on your forearm and the outside of your foot. Try to hold this position for 15 seconds and then slowly lower your hip to the ground. Switch sides and repeat. Work up to holding for 1 minute. This exercise can be made easier by starting with your knees and hips flexed toward your chest.    EXERCISES TO AVOID     It s best to avoid the following exercises because they strain the lower back:    Exercises in which you lie on your back and raise and  lower both legs together    Full sit-ups or sit-ups with straight legs    Hip twists    SPORTS AND OTHER ACTIVITIES    In addition to strengthening your back muscles, it s helpful to keep your entire body in shape. Good activities for people with back problems include:      Walking    Bicycling    Swimming    Cross-country skiing    Yoga    Cecil Chi    Pilates    Some sports can hurt your back because of rough contact, twisting, sudden impact, or direct stress on your back. Sports that may be dangerous to your back include:      Football    Soccer    Volleyball    Handball    Golf    Weight lifting    Trampoline    Tobogganing    Sledding    Snowmobiling    Snowboarding    Ice hockey

## 2021-08-16 NOTE — PROGRESS NOTES
"ESTABLISHED PATIENT FOLLOW-UP:  No chief complaint on file.       HISTORY OF PRESENT ILLNESS  Ms. Downey is a pleasant 60 year old year old female who presents to clinic today for follow-up of  Low back pain    Date of injury: Chronic  Date last seen: 9/8/21  Following Therapeutic Plan: Yes  Pain: Improved  Function: Improved  Interval History: Noticing new left sided low back pain with prolonged standing.  Currently using gabapentin twice daily and she had tried a trial off of this over the winter without success.  She notes that gabapentin has been a \"life saver\".  She is working with rheumatology on controlling her psoriatic arthritis and is currently on Enbrel.  She is also taking a prednisone taper to help control things better.    No radicular pain of the right lower extremity as she experienced last fall.  She notes no significant \"pain\" but tightness is a concern because she does not want this to progress to her previous state of radiculitis.    Additional medical/Social/Surgical histories reviewed in [facility] and updated as appropriate.    REVIEW OF SYSTEMS (8/16/2021)  CONSTITUTIONAL: Denies fever and weight loss  GASTROINTESTINAL: Denies abdominal pain, nausea, vomiting  MUSCULOSKELETAL: See HPI  SKIN: Denies any recent rash or lesion  NEUROLOGICAL: Denies numbness or focal weakness     PHYSICAL EXAM  There were no vitals taken for this visit.    General  - normal appearance, in no obvious distress  Musculoskeletal - lumbar spine  - stance: slow to rise and sit  - inspection: normal bone and joint alignment, no obvious scoliosis  - palpation: bilateral lumbar paravertebral spasm  - ROM: Decreased flexion, right-sided buttock and low back pain with left side bending and rotation.  - strength: lower extremities 5/5 in all planes  - special tests:  (-) straight leg raise bilaterally  (-) slump test  Neuro  -no sensory or motor deficit, grossly normal coordination, normal muscle tone  Skin  - no ecchymosis, " erythema, warmth, or induration, no obvious rash  Psych  - interactive, appropriate, normal mood and affect    IMAGING :   MR LUMBAR SPINE W/O CONTRAST 8/14/2020 8:16 AM     Provided History: Back pain, > 6wks conservative tx, persistent sx;  Acute right-sided low back pain with right-sided sciatica     ICD-10: Acute right-sided low back pain with right-sided sciatica     Comparison: None available.     Technique: Sagittal T1-weighted, sagittal STIR, 3D volumetric axial  and sagittal reconstructed T2-weighted images of the lumbar spine were  obtained without intravenous contrast.      Findings: There are 5 lumbar-type vertebrae assumed for the purposes  of this dictation.  The tip of the conus medullaris is at L1.  Grade 1  anterolisthesis of L4. Mild loss of disc height at L4-5.  Normal  marrow signal.     On a level by level basis:     T12-L1: No spinal canal or neuroforaminal stenosis.     L1-2: No spinal canal or neuroforaminal stenosis.     L2-3: No spinal canal or neuroforaminal stenosis.     L3-4: No spinal canal or neuroforaminal stenosis.     L4-5: Grade 1 anterolisthesis and uncovering of the disc. Superimposed  central disc extrusion with cephalad migration. Bilateral facet  hypertrophy and ligamentum flavum thickening. Severe spinal canal  stenosis. Moderate bilateral neural foraminal stenosis. Periarticular  edema surrounding the bilateral facet joints.     L5-S1: Posterior disc bulge and bilateral facet hypertrophy. Mild  spinal canal narrowing. Mild bilateral neural foraminal narrowing.     Paraspinous tissues are within normal limits.                                          Impression:   1. Spondylosis, particularly at L4-5 where there is severe spinal  canal stenosis and moderate bilateral neural foraminal stenosis.  2. Active inflammatory arthropathy of the bilateral L4-5 facet joints.     VANDANA HUBBARD MD     ASSESSMENT & PLAN  Ms. Downey is a 60 year old year old female who presents to  clinic today with No chief complaint on file.    Diagnosis: Spinal stenosis of lumbar region, Psoriatic arthritis fusion    Willa is experiencing increasing low back tightness, likely stemming from her L4-L5 spinal stenosis.  Fortunately does not have any significant pain at this time.  We discussed initiating a lumbar and gluteal stretching and strengthening program.  She had underwent formal physical therapy last year, reportedly without any significant relief.  In addition to this we discussed indications for epidural steroid injection, she would like to hold off at this time.  In lieu of this, we discussed increasing her gabapentin dose to 300 mg 3 times daily over the next month or so and she may titrate down as she sees fit.  She is currently at a twice daily dosing which has been working for her over the past several months.      In addition to this, she is working with her rheumatologist on immunotherapy medications to control her psoriatic arthritis.  She has had some mixed results with initiating medications thus far.  She will continue with her prednisone dosage and this may actually help her back pain as well.  Follow-up with rheumatology as scheduled.    It was a pleasure seeing Willa.    Dimitri Zhou DO, [facility]  Primary Care Sports Medicine

## 2021-08-25 ENCOUNTER — OFFICE VISIT (OUTPATIENT)
Dept: DERMATOLOGY | Facility: CLINIC | Age: 61
End: 2021-08-25
Payer: COMMERCIAL

## 2021-08-25 VITALS — DIASTOLIC BLOOD PRESSURE: 84 MMHG | SYSTOLIC BLOOD PRESSURE: 124 MMHG | HEART RATE: 99 BPM

## 2021-08-25 DIAGNOSIS — L72.9 CYST OF SKIN: Primary | ICD-10-CM

## 2021-08-25 PROCEDURE — 11421 EXC H-F-NK-SP B9+MARG 0.6-1: CPT | Mod: GC | Performed by: DERMATOLOGY

## 2021-08-25 PROCEDURE — 12041 INTMD RPR N-HF/GENIT 2.5CM/<: CPT | Mod: GC | Performed by: DERMATOLOGY

## 2021-08-25 PROCEDURE — 88304 TISSUE EXAM BY PATHOLOGIST: CPT | Mod: TC | Performed by: DERMATOLOGY

## 2021-08-25 ASSESSMENT — PAIN SCALES - GENERAL: PAINLEVEL: NO PAIN (0)

## 2021-08-25 NOTE — LETTER
8/25/2021       RE: Willa Downey  [address]  [facility] [address]     Dear Colleague,    Thank you for referring your patient, Willa Downey, to the [facility] [place] at [facility]. Please see a copy of my visit note below.    [facility] Dermatologic Surgery Excision Note    Case #: [id number]  Date of Service:  Aug 25, 2021  Surgery: Wide local excision  Staff Surgeon: Justus Bell MD  Fellow Surgeon: Gautam Sam MD  Resident Surgeon: None  Nurse: Mia Corcoran RN    Tumor Type: Epidermoid cyst (clinically diagnosed)  Location: Posterior neck  Derm-Path Accession #: [id number]    Skin Lesion Size:  1.0 cm x 1.0 cm  Margin: 0 mm  Excision size: 1.0 cm x 1.0 cm  Level of Defect: Fat  Repair Type: Intermediate linear  Repair Size: 1.2 cm  Suture Material: 4-0 Monocryl; 5-0 fast absorbing gut    INDICATIONS:  The patient was scheduled for wide local excision of the skin lesion documented above. We discussed the principles of treatment and most likely complications including scarring, bleeding, infection, incomplete excision, wound dehiscence, pain, nerve damage, and recurrence. Informed consent was obtained and the patient underwent the procedure as follows:    PROCEDURE:  With the patient in a prone position, the lesion was outlined with the margin documented above.  The lesion and the necessary margin (excised diameter) was measured and documented as above.  An ellipse was designed around the lesion to conform to relaxed skin tension lines in an effort to minimize scarring and deformity.  The lesion and surrounding skin were prepped with chlorhexidine, draped, and anesthetized with lidocaine with epinephrine. Using a #15 blade, the skin was excised along premarked lines.  The level of the defect is documented as above.  Wound margins were undermined to limit functional deformity/impairment of adjacent  structures. Bleeding vessels were controlled with electrocoagulation.  The dermis and subcutaneous tissue were closed with buried vertical mattress sutures using 4-0 Monocryl.  Epidermal approximation was meticulously refined with 5-0 fast absorbing gut simple running sutures, resulting in a linear closure with little to no wound tension.  Blood loss was estimated to be less than 5 mL.  The area was coated with petrolatum and covered with a non-adherent dressing followed by gauze and tape. Postoperative instructions were reviewed per protocol.  The patient left alert and fully oriented.    Follow-up for suture removal: Not applicable as only dissolving sutures used    Justus Bell MD was immediately available for the entire surgery and was physicially present for the key portions of the procedure.    Gautam Sam MD  Micrographic Surgery and Dermatologic Oncology (MSDO) Fellow    Scribe Disclosure:  I, Esther Simmons, am serving as a scribe to document services personally performed by Justus Bell MD based on data collection and the provider's statements to me.     Attestation signed by Justus Bell MD at 9/2/2021  9:03 AM:    Attending attestation:  I was present for key elements of the procedure and immediately available for all other portions of the procedure.  I have reviewed the note and edited it as necessary.    uJstus Bell M.D.  Professor  Director of Dermatologic Surgery  Department of Dermatology  [facility]    Dermatology Surgery Clinic  [facility]  [address] [place] [id number]

## 2021-08-25 NOTE — PATIENT INSTRUCTIONS
Excision/Mohs Wound Care Instructions  I will experience scar, altered skin color, bleeding, swelling, pain, crusting and redness. I may experience altered sensation. Risks are excessive bleeding, infection, muscle weakness, thick (hypertrophic or keloidal) scar, and recurrence. A second procedure may be recommended to obtain the best cosmetic or functional result.  Possible complications of any surgical procedure are bleeding, infection, scarring, alteration in skin color and sensation, muscle weakness in the area, wound dehiscence or seperation, or recurrence of the lesion or disease. On occasion, after healing, a secondary procedure or revision may be recommended in order to obtain the best cosmetic or functional result.   After your surgery, a pressure bandage will be placed over the area that has sutures. This will help prevent bleeding. Please follow these instructions until you come back to clinic for suture removal on N/A, as they will help you to prevent complications as your wound heals.  For the First 48 hours After Surgery:  1. Leave the pressure bandage on and keep it dry. If it should come loose, you may retape it, but do not take it off.  2. Relax and take it easy. Do not do any vigorous exercise, heavy lifting, or bending forward. This could cause the wound to bleed.  3. Post-operative pain is usually mild. You may take plain or extra strength Tylenol and ibuprofen every 4 hours as needed (do not take more than 4,000mg in one day). .  4. You may put an ice pack around the bandaged area for 20 minutes every 2-3 hours. This may help reduce swelling, bruising, and pain. Make sure the ice pack is waterproof so that the pressure bandage does not get wet.   5. You may see a small amount of drainage or blood on your pressure bandage. This is normal. However, if drainage or bleeding continues or saturates the bandage, you will need to apply firm pressure over the bandage with a washcloth for 15 minutes. If  bleeding continues after applying pressure for 15 minutes then go to the nearest emergency room.  48 Hours After Surgery  Carefully remove the bandage and start daily wound care and dressing changes. You may also now shower and get the wound wet.  Daily Wound Care:  1. Wash wound with a mild soap and water.  Use caution when washing the wound, be gentle and do not let the forceful shower stream hit the wound directly.  2. Pat dry.  3. Apply Vaseline (from a new container or tube) over the suture line with a Q-tip. It is very important to keep the wound continuously moist, as wounds heal best in a moist environment.  4. Keep the site covered until sutures dissolve, this should take 10-14 days. You can cover it with a Telfa (non-stick) dressing and tape or a band-aid.    5. If you are unable to keep wound covered, you must apply Vaseline every 2-3 hours (while awake) to ensure it is being kept moist for optimal healing. A dressing overnight is recommended to keep the area moist.  Call Us If:  1. You have pain that is not controlled with Tylenol.  2. You have signs or symptoms of an infection, such as: fever over 100 degrees F, redness, warmth, or foul-smelling or yellow/creamy drainage from the wound.  Who should I call with questions?    [facility]: [phone]     [facility]: [phone]    For urgent needs outside of business hours call the [facility] at [phone] and ask to speak with the dermatology resident on call

## 2021-08-27 LAB
PATH REPORT.COMMENTS IMP SPEC: NORMAL
PATH REPORT.COMMENTS IMP SPEC: NORMAL
PATH REPORT.FINAL DX SPEC: NORMAL
PATH REPORT.GROSS SPEC: NORMAL
PATH REPORT.MICROSCOPIC SPEC OTHER STN: NORMAL
PATH REPORT.RELEVANT HX SPEC: NORMAL

## 2021-08-31 ENCOUNTER — TELEPHONE (OUTPATIENT)
Dept: RHEUMATOLOGY | Facility: CLINIC | Age: 61
End: 2021-08-31

## 2021-08-31 DIAGNOSIS — L40.0 PLAQUE PSORIASIS: ICD-10-CM

## 2021-08-31 DIAGNOSIS — L40.50 PSORIATIC ARTHRITIS (H): Primary | ICD-10-CM

## 2021-08-31 RX ORDER — SECUKINUMAB 150 MG/ML
150 INJECTION SUBCUTANEOUS
Qty: 12 ML | Refills: 0 | Status: SHIPPED | OUTPATIENT
Start: 2021-08-31 | End: 2021-11-12

## 2021-08-31 RX ORDER — SECUKINUMAB 150 MG/ML
150 INJECTION SUBCUTANEOUS WEEKLY
Qty: 5 ML | Refills: 0 | Status: SHIPPED | OUTPATIENT
Start: 2021-08-31 | End: 2021-09-29

## 2021-08-31 NOTE — TELEPHONE ENCOUNTER
PA Initiation    Medication: Cosentyx- PENDING  Insurance Company: [employer] - Phone [phone] Fax [phone]  Pharmacy Filling the Rx: [facility] - [place], [place] - [facility] [address]  Filling Pharmacy Phone:    Filling Pharmacy Fax:    Start Date: 8/31/2021

## 2021-08-31 NOTE — TELEPHONE ENCOUNTER
Prior Authorization Approval    Authorization Effective Date: 8/31/2021  Authorization Expiration Date: 8/31/2022  Medication: Cosentyx- Approved  Approved Dose/Quantity:150mg  Reference #: [id number]   Insurance Company: [employer] - Phone [phone] Fax [phone]  Expected CoPay: $60     CoPay Card Available: Yes    Foundation Assistance Needed: N/A  Which Pharmacy is filling the prescription (Not needed for infusion/clinic administered): [facility] - [facility] [address]  Pharmacy Notified: Yes  Patient Notified: Yes

## 2021-09-11 ENCOUNTER — HEALTH MAINTENANCE LETTER (OUTPATIENT)
Age: 61
End: 2021-09-11

## 2021-09-29 ENCOUNTER — MYC MEDICAL ADVICE (OUTPATIENT)
Dept: DERMATOLOGY | Facility: CLINIC | Age: 61
End: 2021-09-29

## 2021-09-29 DIAGNOSIS — L72.9 CYST OF SKIN: Primary | ICD-10-CM

## 2021-09-29 RX ORDER — MUPIROCIN 20 MG/G
OINTMENT TOPICAL
Qty: 30 G | Refills: 0 | Status: SHIPPED | OUTPATIENT
Start: 2021-09-29 | End: 2022-05-16

## 2021-09-29 NOTE — TELEPHONE ENCOUNTER
Prescribed mupirocin ointment BID x 2 weeks for possible superficial skin infection  Nursing staff to relay my message about hyperemia and slow regeneration of nerve endings

## 2021-09-30 ENCOUNTER — OFFICE VISIT (OUTPATIENT)
Dept: RHEUMATOLOGY | Facility: CLINIC | Age: 61
End: 2021-09-30
Attending: INTERNAL MEDICINE
Payer: COMMERCIAL

## 2021-09-30 VITALS
DIASTOLIC BLOOD PRESSURE: 83 MMHG | OXYGEN SATURATION: 97 % | WEIGHT: 141.1 LBS | BODY MASS INDEX: 24.22 KG/M2 | SYSTOLIC BLOOD PRESSURE: 133 MMHG | HEART RATE: 73 BPM

## 2021-09-30 DIAGNOSIS — L40.50 PSORIATIC ARTHRITIS (H): Primary | ICD-10-CM

## 2021-09-30 DIAGNOSIS — Z79.52 LONG TERM (CURRENT) USE OF SYSTEMIC STEROIDS: ICD-10-CM

## 2021-09-30 DIAGNOSIS — Z79.899 HIGH RISK MEDICATION USE: ICD-10-CM

## 2021-09-30 DIAGNOSIS — L40.0 PLAQUE PSORIASIS: ICD-10-CM

## 2021-09-30 PROCEDURE — 99214 OFFICE O/P EST MOD 30 MIN: CPT | Performed by: INTERNAL MEDICINE

## 2021-09-30 RX ORDER — PREDNISONE 1 MG/1
1 TABLET ORAL DAILY
Qty: 14 TABLET | Refills: 0 | Status: SHIPPED | OUTPATIENT
Start: 2021-09-30 | End: 2021-10-14

## 2021-09-30 NOTE — LETTER
9/30/2021       RE: Willa Downey  [address]  [facility] [address]     Dear Colleague,    Thank you for referring your patient, Willa Downey, to the [facility] RHEUMATOLOG[facility] [place] at [facility]. Please see a copy of my visit note below.      Outpatient Rheumatology Follow-up    Name: Willa Downey    MRN [id number]   Today's date: 9/30/21           Reason for follow-up: psoriatic arthritis   Requesting physician: Kitty Mendez MD        Assessment & Plan:   #Psoriatic arthritis  #negative RF  #psoriasis, cutaneous and nail involvement  #DIP Left hand 3rd, 4th, 5th and right hand 4th digit inflammatory arthritis  #humira, enbrel, methotrexate failure  #Submandibular LAD- resolved  #High risk drug therapy: cosentyx  #Long term, current, systemic steroid use    61 year old female with hx of advanced glaucoma followed closely by ophthalmology presented to rheumatology on 2/5/21 with 5 month of progressive left hand 3rd, 4th, 5th digit and right hand 4th digit DIP inflammatory arthritis in the context of psoriasis, psoriatic changes of the nails. Taken together most consistent with psoriatic arthritis. Tried on 2 NSAIDs which failed to control symptoms, and had severe GERD symptoms. Tried methotrexate by prior rheumatologist and disease not controlled. Humira started Mid February 2021 due to lack of efficacy of these other therapies and continued through May 14th, though had severe/ rapidly progressive disease after some improvement in the first 4 weeks involving DIP inflammatory arthritis and nail disease. We then ordered Enbrel, though starting was delayed due to development of impressive bilateral submandibular lymphadenopathy which resolved within about 1 week and thought likely secondary to viral infection. She started enbrel on 6/20/2021 which she had continued on until switching to cosentyx after approval on 8/31/21 due  "to lack of efficacy of enbrel and injection site reaction. Has been on cosentyx since 8/31/21 with resolution of inflammatory DIP arthritis, improvement of psoriatic lesions, and expectedly stable nail disease though not progressing as it had been.     Psoriatic arthritis: Inflammatory arthritis/ cutaneous disease improved for the first time now that she is on cosentyx. Important to note that she is also on prednisone course. Counseled again that her nail disease will be last to improve. She has not had any side effects from the cosentyx or prednisone at this dose.   PLAN:  -Continue cosentyx monthly after completion of loading doses  -After completing 2.5mg daily x14 days, take 1mg daily x14 days then stop. Doing very slow taper given her overly sensitive skin disease and concern for rebound    High risk medication: cosentyx  -No side effects by history/exam/available serologies  -Risks and benefits again discussed today  -Labs q6 months    Marc Arias MD  Rheumatology       Subjective:   9/30/21 Interval history  Has been taking cosentyx without issues. Next dose on Friday and then switching to monthly. Has found that her skin is some improved over the fingertips. Though still some painful/burning sensation at the sites of prior lesions. This is improving. When she started prednisone, did have dramatic improvement. Though with taper improvement has been slower though has continued. Currently on 2.5mg daily. No side effects from low dose. Energy still \"not great\" though she thinks it is slowly improving. Going for walks 5 days per week 30-40 minutes per day. Reports that the pain is OK when doing activities, but has been avoiding activities that are painful like weeding/carrying heavy items with grocery shopping. Has continued with mobic 3-4x week. 15mg tabs each time. No GI side effects at this frequency/dose. Still some stiff at DIPs. Though improved from prior. Did have lesion mid upper back and front groin " which have improved since cosentyx/ prednisone. No fevers/chills. No interval infections. ROS otherwise negative.     August 12, 2021  Interval history  Had ?ear infection early July. Given ear drops by PCP. Had developed more psoriasis in new locations (palm of hand/scalp (10-15%)/lower stomach/low back/ and groin/gluteal cleft) where previously was on fingertips/nails only. Saw dermatology who prescribed topical, the topical has been somewhat helpful though has not resolves symptoms. She thinks that she has had 10-15% improvement with the topical. Spoke to pharmacist, who counseled her than can take up to 4 months for enbrel to take full effect. Gives herself injection every Sunday. After 5th injection, has itching/burning at the site. Takes Claritin already. Iced. Was told by pharmacist to try hydrocortisone, which she did. Decreased from 4-5 days of symptoms down to 3 days. From 5th injection onwards has had this reaction. Total of 8 injections so far. Joints much less of an issue compared to cutaneous involvement. If she has any joint pain, takes mobic. Roughly 1x per week. Around once per 7 -10days has extreme fatigue. Yesterday was significant. Slept until noon. Usually gets up at 630. Usually trouble sleeping, so this was unusual for her. No return of submandibular LAD. No fever, chills. No cough/sore throat. Some stiffness in the DIPs, morning predominant. Improves throughout the day. Some increase in pain this week. ROS otherwise negative.      6/17/21  Has advanced glaucoma/ dry eyes. Chronic. No new head/neck symptoms. Dry mouth during submandibular gland swelling, dry mouth now resolved. Reports the swelling has mproved now by 50+%. No more pain, so no longer using ibuprofen. No fever/chills/cough or other signs/symptoms of infection. With ongoing progression of nail/distal digit predominant psoriasis and DIP arthritis. Severe pain. Unable to tolerate NSAIDs given GI upset despite PPI. ROS otherwise  negative       5/14/21  Patient initially was going to wait to contact, though has been having progression of symptoms of nails/joint pain/ skin. Last injection was on Tuesday. Had a flare in the days following her injection. First 2 doses, she did notice some improvement. Though after that time, with each dose, has had less and less of a positive change in nails/joints/skin. Has instead been getting worse and worse response. Has stiffness and pain in her DIPs that has been progressive. Has redness as well. Also with radiation of the pain proximally which is new. Also some days with severe fatigue. No fevers/chills. No weight changes. No interval infections.     Past Medical History  Past Medical History:   Diagnosis Date     Nuclear senile cataract of both eyes 8/16/2018     Psoriatic arthropathy (H) 10/19/2020     Past Surgical History  Past Surgical History:   Procedure Laterality Date     BIOPSY  07/30/2019    the right breast tissue(benign)     EYE SURGERY      3 surgeries for glaucoma treatment     Medications  Current Outpatient Medications   Medication     CALCIUM CARBONATE-VIT D-MIN PO     clobetasol (TEMOVATE) 0.05 % external cream     clobetasol (TEMOVATE) 0.05 % external ointment     etanercept (ENBREL SURECLICK) 50 MG/ML autoinjector     fluocinonide (LIDEX) 0.05 % external solution     gabapentin (NEURONTIN) 300 MG capsule     loratadine (CLARITIN) 10 MG capsule     meloxicam (MOBIC) 7.5 MG tablet     Multiple Vitamins-Minerals (MULTIVITAMIN ADULTS 50+) TABS     mupirocin (BACTROBAN) 2 % external ointment     omeprazole (PRILOSEC) 20 MG DR capsule     predniSONE (DELTASONE) 2.5 MG tablet     secukinumab (COSENTYX SENSOREADY PEN) 150 MG/ML Sensoready pen     No current facility-administered medications for this visit.     Allergies   Allergies   Allergen Reactions     Adhesive Tape Blisters, Itching and Swelling     Dust Mites      Latex Blisters, Itching, Other (See Comments) and Swelling     Skin  sensitivity       Morphine Sulfate (Concentrate) Itching     Other reaction(s): Chills  heart palpatations     Seasonal Allergies      Codeine Palpitations     chills     Family History:   No family history of autoimmune diseases.     Social History  , with children. 2 kids and they are healthy. No ETOH, smoking, drug use.      Objective:    Physical exam:  /83   Pulse 73   Wt 64 kg (141 lb 1.6 oz)   SpO2 97%   Breastfeeding No   BMI 24.22 kg/m    GEN: Sitting up unassisted. NAD  HEENT: no facial rash, sclera clear, no inflammatory nose/ external ear changes  CV: RRR, no m/r/g  Pulm: CTAB, no crackles, wheezing, rhonchi  Abdomen: Soft, non tender, not distended  Skin: psoriatic nail disease. Has right groin psoriatic lesion.  MSK: full active ROM of bilateral shoulders, elbows, wrists, MCPs, PIPs. DIPs of Digits 3-5 on the left hand and digit 3 on the right hand without active synovitis. No TTP. Has psoriatic changes of fingernails of digits 3-5 on the left hand and digit 3 on the right hand. Resolution of psoriasis on the palm, scalp, back as was previously seen.     Labs  6/16/21  SSA, SSB negative  Mumps IgG and IgM positive  Creatinine 0.63  LFTs normal  CBC WNL    2/5/21  HIV negative  Hep C AB negative  Hep B s AG negative  Hep B c AB reactive  Quant gold negative  Quant gold negative  RF negative  CRP <2.9  ESR 10  Cr 0.62  WBC 7.1  HGB 13.1    HLA b27 negative  Hep B virus DNA negative    2/2/21  ESR 9  CRP <2.9  WBC 6.1  HGB 12.8      11/11/20  A1C 5.3  Hep C/ HIV negative    Imaging:  MR left hand  Impression:  1a. Correlating to the marker at the third digit interphalangeal  joint, there is a focal erosion along the volar aspect of the distal  phalangeal base with intense bone marrow edema and abnormal T1 marrow  signal. Findings are concerning for osteomyelitis and infection should  be ruled out. Also on the differential is sequelae of long-standing  active inflammatory  arthropathy (including psoriatic arthritis given  patient's clinical history) where marginal erosions and marrow signal  abnormality are expected though the degree of T1 marrow replacement is  out of proportion to typical findings.   b. Subtle bone marrow edema in the middle phalanx without T1 marrow  signal abnormality, this is favored to represent reactive edema.  c. Joint effusion at the third digit interphalangeal joint,  potentially infectious or inflammatory. Consider aspiration for  definitive diagnosis.  d. The partially visualized fifth digit shows edema within the distal  phalanx and head of the middle phalanx with regional soft tissue  edema. Given predominant findings in the third digit, similar changes  in the fifth digit could support a polyarticular inflammatory  arthropathy. Correlate clinically.  2. Tenosynovitis of the third digit flexor and extensor tendons. There  is also tendinosis with high-grade (near full-thickness) tearing of  the extensor tendon/extensor saul at the distal phalangeal base with  slight proximal retraction of the residual fibers.  3. Diffusely thickened, edematous ulnar and radial collateral  ligaments, likely combination of sprain and reactive edema.  4. Extensive soft tissue edema centered about the interphalangeal  joint.

## 2021-09-30 NOTE — PROGRESS NOTES
Outpatient Rheumatology Follow-up    Name: Willa Downey    MRN [id number]   Today's date: 9/30/21           Reason for follow-up: psoriatic arthritis   Requesting physician: Kitty Mendez MD        Assessment & Plan:   #Psoriatic arthritis  #negative RF  #psoriasis, cutaneous and nail involvement  #DIP Left hand 3rd, 4th, 5th and right hand 4th digit inflammatory arthritis  #humira, enbrel, methotrexate failure  #Submandibular LAD- resolved  #High risk drug therapy: cosentyx  #Long term, current, systemic steroid use    61 year old female with hx of advanced glaucoma followed closely by ophthalmology presented to rheumatology on 2/5/21 with 5 month of progressive left hand 3rd, 4th, 5th digit and right hand 4th digit DIP inflammatory arthritis in the context of psoriasis, psoriatic changes of the nails. Taken together most consistent with psoriatic arthritis. Tried on 2 NSAIDs which failed to control symptoms, and had severe GERD symptoms. Tried methotrexate by prior rheumatologist and disease not controlled. Humira started Mid February 2021 due to lack of efficacy of these other therapies and continued through May 14th, though had severe/ rapidly progressive disease after some improvement in the first 4 weeks involving DIP inflammatory arthritis and nail disease. We then ordered Enbrel, though starting was delayed due to development of impressive bilateral submandibular lymphadenopathy which resolved within about 1 week and thought likely secondary to viral infection. She started enbrel on 6/20/2021 which she had continued on until switching to cosentyx after approval on 8/31/21 due to lack of efficacy of enbrel and injection site reaction. Has been on cosentyx since 8/31/21 with resolution of inflammatory DIP arthritis, improvement of psoriatic lesions, and expectedly stable nail disease though not progressing as it had been.     Psoriatic arthritis: Inflammatory arthritis/ cutaneous disease improved  "for the first time now that she is on cosentyx. Important to note that she is also on prednisone course. Counseled again that her nail disease will be last to improve. She has not had any side effects from the cosentyx or prednisone at this dose.   PLAN:  -Continue cosentyx monthly after completion of loading doses  -After completing 2.5mg daily x14 days, take 1mg daily x14 days then stop. Doing very slow taper given her overly sensitive skin disease and concern for rebound    High risk medication: cosentyx  -No side effects by history/exam/available serologies  -Risks and benefits again discussed today  -Labs q6 months    Marc Arias MD  Rheumatology       Subjective:   9/30/21 Interval history  Has been taking cosentyx without issues. Next dose on Friday and then switching to monthly. Has found that her skin is some improved over the fingertips. Though still some painful/burning sensation at the sites of prior lesions. This is improving. When she started prednisone, did have dramatic improvement. Though with taper improvement has been slower though has continued. Currently on 2.5mg daily. No side effects from low dose. Energy still \"not great\" though she thinks it is slowly improving. Going for walks 5 days per week 30-40 minutes per day. Reports that the pain is OK when doing activities, but has been avoiding activities that are painful like weeding/carrying heavy items with grocery shopping. Has continued with mobic 3-4x week. 15mg tabs each time. No GI side effects at this frequency/dose. Still some stiff at DIPs. Though improved from prior. Did have lesion mid upper back and front groin which have improved since cosentyx/ prednisone. No fevers/chills. No interval infections. ROS otherwise negative.     August 12, 2021  Interval history  Had ?ear infection early July. Given ear drops by PCP. Had developed more psoriasis in new locations (palm of hand/scalp (10-15%)/lower stomach/low back/ and groin/gluteal " cleft) where previously was on fingertips/nails only. Saw dermatology who prescribed topical, the topical has been somewhat helpful though has not resolves symptoms. She thinks that she has had 10-15% improvement with the topical. Spoke to pharmacist, who counseled her than can take up to 4 months for enbrel to take full effect. Gives herself injection every Sunday. After 5th injection, has itching/burning at the site. Takes Claritin already. Iced. Was told by pharmacist to try hydrocortisone, which she did. Decreased from 4-5 days of symptoms down to 3 days. From 5th injection onwards has had this reaction. Total of 8 injections so far. Joints much less of an issue compared to cutaneous involvement. If she has any joint pain, takes mobic. Roughly 1x per week. Around once per 7 -10days has extreme fatigue. Yesterday was significant. Slept until noon. Usually gets up at 630. Usually trouble sleeping, so this was unusual for her. No return of submandibular LAD. No fever, chills. No cough/sore throat. Some stiffness in the DIPs, morning predominant. Improves throughout the day. Some increase in pain this week. ROS otherwise negative.      6/17/21  Has advanced glaucoma/ dry eyes. Chronic. No new head/neck symptoms. Dry mouth during submandibular gland swelling, dry mouth now resolved. Reports the swelling has mproved now by 50+%. No more pain, so no longer using ibuprofen. No fever/chills/cough or other signs/symptoms of infection. With ongoing progression of nail/distal digit predominant psoriasis and DIP arthritis. Severe pain. Unable to tolerate NSAIDs given GI upset despite PPI. ROS otherwise negative       5/14/21  Patient initially was going to wait to contact, though has been having progression of symptoms of nails/joint pain/ skin. Last injection was on Tuesday. Had a flare in the days following her injection. First 2 doses, she did notice some improvement. Though after that time, with each dose, has had less  and less of a positive change in nails/joints/skin. Has instead been getting worse and worse response. Has stiffness and pain in her DIPs that has been progressive. Has redness as well. Also with radiation of the pain proximally which is new. Also some days with severe fatigue. No fevers/chills. No weight changes. No interval infections.     Past Medical History  Past Medical History:   Diagnosis Date     Nuclear senile cataract of both eyes 8/16/2018     Psoriatic arthropathy (H) 10/19/2020     Past Surgical History  Past Surgical History:   Procedure Laterality Date     BIOPSY  07/30/2019    the right breast tissue(benign)     EYE SURGERY      3 surgeries for glaucoma treatment     Medications  Current Outpatient Medications   Medication     CALCIUM CARBONATE-VIT D-MIN PO     clobetasol (TEMOVATE) 0.05 % external cream     clobetasol (TEMOVATE) 0.05 % external ointment     etanercept (ENBREL SURECLICK) 50 MG/ML autoinjector     fluocinonide (LIDEX) 0.05 % external solution     gabapentin (NEURONTIN) 300 MG capsule     loratadine (CLARITIN) 10 MG capsule     meloxicam (MOBIC) 7.5 MG tablet     Multiple Vitamins-Minerals (MULTIVITAMIN ADULTS 50+) TABS     mupirocin (BACTROBAN) 2 % external ointment     omeprazole (PRILOSEC) 20 MG DR capsule     predniSONE (DELTASONE) 2.5 MG tablet     secukinumab (COSENTYX SENSOREADY PEN) 150 MG/ML Sensoready pen     No current facility-administered medications for this visit.     Allergies   Allergies   Allergen Reactions     Adhesive Tape Blisters, Itching and Swelling     Dust Mites      Latex Blisters, Itching, Other (See Comments) and Swelling     Skin sensitivity       Morphine Sulfate (Concentrate) Itching     Other reaction(s): Chills  heart palpatations     Seasonal Allergies      Codeine Palpitations     chills     Family History:   No family history of autoimmune diseases.     Social History  , with children. 2 kids and they are healthy. No ETOH, smoking, drug use.       Objective:    Physical exam:  /83   Pulse 73   Wt 64 kg (141 lb 1.6 oz)   SpO2 97%   Breastfeeding No   BMI 24.22 kg/m    GEN: Sitting up unassisted. NAD  HEENT: no facial rash, sclera clear, no inflammatory nose/ external ear changes  CV: RRR, no m/r/g  Pulm: CTAB, no crackles, wheezing, rhonchi  Abdomen: Soft, non tender, not distended  Skin: psoriatic nail disease. Has right groin psoriatic lesion.  MSK: full active ROM of bilateral shoulders, elbows, wrists, MCPs, PIPs. DIPs of Digits 3-5 on the left hand and digit 3 on the right hand without active synovitis. No TTP. Has psoriatic changes of fingernails of digits 3-5 on the left hand and digit 3 on the right hand. Resolution of psoriasis on the palm, scalp, back as was previously seen.     Labs  6/16/21  SSA, SSB negative  Mumps IgG and IgM positive  Creatinine 0.63  LFTs normal  CBC WNL    2/5/21  HIV negative  Hep C AB negative  Hep B s AG negative  Hep B c AB reactive  Quant gold negative  Quant gold negative  RF negative  CRP <2.9  ESR 10  Cr 0.62  WBC 7.1  HGB 13.1    HLA b27 negative  Hep B virus DNA negative    2/2/21  ESR 9  CRP <2.9  WBC 6.1  HGB 12.8      11/11/20  A1C 5.3  Hep C/ HIV negative    Imaging:  MR left hand  Impression:  1a. Correlating to the marker at the third digit interphalangeal  joint, there is a focal erosion along the volar aspect of the distal  phalangeal base with intense bone marrow edema and abnormal T1 marrow  signal. Findings are concerning for osteomyelitis and infection should  be ruled out. Also on the differential is sequelae of long-standing  active inflammatory arthropathy (including psoriatic arthritis given  patient's clinical history) where marginal erosions and marrow signal  abnormality are expected though the degree of T1 marrow replacement is  out of proportion to typical findings.   b. Subtle bone marrow edema in the middle phalanx without T1 marrow  signal abnormality, this is  favored to represent reactive edema.  c. Joint effusion at the third digit interphalangeal joint,  potentially infectious or inflammatory. Consider aspiration for  definitive diagnosis.  d. The partially visualized fifth digit shows edema within the distal  phalanx and head of the middle phalanx with regional soft tissue  edema. Given predominant findings in the third digit, similar changes  in the fifth digit could support a polyarticular inflammatory  arthropathy. Correlate clinically.  2. Tenosynovitis of the third digit flexor and extensor tendons. There  is also tendinosis with high-grade (near full-thickness) tearing of  the extensor tendon/extensor saul at the distal phalangeal base with  slight proximal retraction of the residual fibers.  3. Diffusely thickened, edematous ulnar and radial collateral  ligaments, likely combination of sprain and reactive edema.  4. Extensive soft tissue edema centered about the interphalangeal  joint.

## 2021-11-12 ENCOUNTER — MYC MEDICAL ADVICE (OUTPATIENT)
Dept: RHEUMATOLOGY | Facility: CLINIC | Age: 61
End: 2021-11-12
Payer: COMMERCIAL

## 2021-11-12 DIAGNOSIS — L40.0 PLAQUE PSORIASIS: Primary | ICD-10-CM

## 2021-11-15 NOTE — TELEPHONE ENCOUNTER
Called Willa on 11/14/21.     Had return/worsening of fingertip pain/burning skin lesions with reduction of prednisone from 7.5mg daily to 5mg daily. Have advised that she increase back up to 7.5mg daily for 1 week and then reduce to 5mg daily until follow-up. If she has return of symptoms with dose reduction again, she knows to go back up to 7.5mg daily for another week and then try to reduce to 5mg daily again. She will send a message and let me know if this happens so we can track along.     I let her know that the order for cosentyx increased dose was entered at the end of Sept for the 300mg dose. She has not yet received her typical shipment so unclear if it has been approved. I let her know that I would reach out to Mer Reyna to see if anything else needs to be done. We will reach out with outcome.    Marc Arias MD  Rheumatology

## 2021-11-16 ENCOUNTER — TELEPHONE (OUTPATIENT)
Dept: RHEUMATOLOGY | Facility: CLINIC | Age: 61
End: 2021-11-16
Payer: COMMERCIAL

## 2021-11-16 RX ORDER — SECUKINUMAB 150 MG/ML
300 INJECTION SUBCUTANEOUS
Qty: 2 ML | Refills: 11 | Status: SHIPPED | OUTPATIENT
Start: 2021-11-16 | End: 2022-05-16 | Stop reason: ALTCHOICE

## 2021-11-16 NOTE — TELEPHONE ENCOUNTER
PA Initiation    Medication: COSENTYX 300MG   Insurance Company: [employer] - Phone [phone] Fax [phone]  Pharmacy Filling the Rx: [facility] - [place], [place] - [facility] [address]  Filling Pharmacy Phone:    Filling Pharmacy Fax:    Start Date: 11/16/2021    FLOR CONTRERAS ([place]: [id number])

## 2021-11-17 NOTE — TELEPHONE ENCOUNTER
Prior Authorization Approval    Authorization Effective Date: 8/31/2021  Authorization Expiration Date: 8/31/2022  Medication: COSENTYX 300MG - APPROVED   Approved Dose/Quantity:  2 FOR 28 DAYS   Reference #:     Insurance Company: [employer] - Phone [phone] Fax [phone]  Expected CoPay: $30     CoPay Card Available: Yes    Foundation Assistance Needed:    Which Pharmacy is filling the prescription (Not needed for infusion/clinic administered): [facility] - [place], [place] - [address]  Pharmacy Notified: Yes  Patient Notified: Yes

## 2021-12-02 ENCOUNTER — VIRTUAL VISIT (OUTPATIENT)
Dept: RHEUMATOLOGY | Facility: CLINIC | Age: 61
End: 2021-12-02
Attending: INTERNAL MEDICINE
Payer: COMMERCIAL

## 2021-12-02 DIAGNOSIS — L40.50 PSORIATIC ARTHRITIS (H): Primary | ICD-10-CM

## 2021-12-02 PROCEDURE — 99215 OFFICE O/P EST HI 40 MIN: CPT | Mod: 95 | Performed by: INTERNAL MEDICINE

## 2021-12-02 RX ORDER — PREDNISONE 2.5 MG/1
10 TABLET ORAL DAILY
Qty: 360 TABLET | Refills: 0 | Status: SHIPPED | OUTPATIENT
Start: 2021-12-02 | End: 2022-03-02

## 2021-12-02 RX ORDER — CELECOXIB 100 MG/1
100 CAPSULE ORAL DAILY
Qty: 60 CAPSULE | Refills: 1 | Status: SHIPPED | OUTPATIENT
Start: 2021-12-02 | End: 2022-05-16

## 2021-12-02 RX ORDER — TOFACITINIB 11 MG/1
11 TABLET, FILM COATED, EXTENDED RELEASE ORAL DAILY
Qty: 30 TABLET | Refills: 5 | Status: SHIPPED | OUTPATIENT
Start: 2021-12-02 | End: 2022-06-21

## 2021-12-02 ASSESSMENT — PAIN SCALES - GENERAL: PAINLEVEL: MODERATE PAIN (4)

## 2021-12-02 NOTE — LETTER
12/2/2021       RE: Willa Downey  [address]  [facility] [id number]     Dear Colleague,    Thank you for referring your patient, Willa Downey, to the [facility]HEUMATOLOG[facility] [place] at [facility]. Please see a copy of my visit note below.      Outpatient Rheumatology Follow-up    Name: Willa Downey    MRN [id number]   Today's date: December 2, 2021           Reason for follow-up: psoriatic arthritis   Requesting physician: Kitty Mendez MD        Assessment & Plan:   #Psoriatic arthritis  #negative RF  #psoriasis, cutaneous and nail involvement  #DIP Left hand 3rd, 4th, 5th and right hand 4th digit inflammatory arthritis  #humira, enbrel, methotrexate failure  #Submandibular LAD- resolved  #High risk drug therapy: cosentyx  #Long term, current, systemic steroid use    61 year old female with hx of advanced glaucoma followed closely by ophthalmology presented to rheumatology on 2/5/21 with 5 month of progressive left hand 3rd, 4th, 5th digit and right hand 4th digit DIP inflammatory arthritis in the context of psoriasis, psoriatic changes of the nails. Taken together most consistent with psoriatic arthritis. Tried on 2 NSAIDs which failed to control symptoms, and had severe GERD symptoms. Tried methotrexate by prior rheumatologist and disease not controlled. Humira started Mid February 2021 due to lack of efficacy of these other therapies and continued through May 14th, though had severe/ rapidly progressive disease after some improvement in the first 4 weeks involving DIP inflammatory arthritis and nail disease. We then ordered Enbrel, though starting was delayed due to development of impressive bilateral submandibular lymphadenopathy which resolved within about 1 week and thought likely secondary to viral infection. She started enbrel on 6/20/2021 which she had continued on until switching to cosentyx after approval on  8/31/21 due to lack of efficacy of enbrel and injection site reaction. Has been on cosentyx since 8/31/21 with initial improvement in inflammatory DIP arthritis, psoriatic lesions, stabilization of her nail disease though she then had progression of her cutaneous nail and joint inflammatory process which prompted restarting prednisone at 10 mg daily along with doubling her dose of Cosentyx to 300 mg each month.  She took her first dose of 300 mg on 11/23/2021.  She did try to decrease her prednisone shortly after that down to 7.5 though noticed return of her skin peeling/psoriatic skin lesions worsening and so went back up to 10 mg daily which has improved these.    Psoriatic arthritis: Inflammatory arthritis/ cutaneous disease with initial improvement but then progression on Cosentyx 150 mg once monthly.  This progression required restarting prednisone at 10 mg daily and doubling her Cosentyx dose to 300 mg once monthly.  She took her first dose on 11/23/2021.  Have been unable to taper below 10 mg daily of prednisone.  She has her next dose of Cosentyx on December 24, 2021.  At that time she will again try to decrease her prednisone from 10 mg daily down to 7.5 mg daily.  If she is not successful in this dose reduction she knows to go back up to 10 mg daily and send me a Oyster message to let me know.  We will plan for follow-up in mid January at which time we need to assess for efficacy of this maximum dose Cosentyx.  If not sufficient in allowing us to come down on her prednisone we will consider switch to a Mario inhibitor considering Xeljanz at this time.  Introduced this idea to her today.    As a bridge to control some of her inflammatory neuritis symptoms discussed addition of celecoxib with omeprazole daily.  Previously not able to tolerate meloxicam due to gastritis symptoms despite use of PPI.  Interestingly was able to tolerate ibuprofen.    Plan:  -Continue Cosentyx 300 mg once monthly  -Continue  prednisone 10 mg daily until 12/24/2021 and then decrease down to 7.5 mg daily.  Knows to increase back up to 10 mg daily if symptoms return.  Will send me a Viveve message if this occurs.  -Start Celebrex 100 mg daily and omeprazole daily  -Follow-up mid January to assess efficacy of Cosentyx and will switch to Xeljanz if not able to make any progress with prednisone taper by that time    High risk medication: cosentyx  -No side effects by history/exam/available serologies  -Risks and benefits again discussed today  -Labs q6 months    Marc Arias MD  Rheumatology    I spent a total of 40 minutes on the date of service on chart review, patient video encounter, , documentation, coordination of care with pharmacy liaison.      Subjective:   12/2/2021 interval history  Took her first dose of Cosentyx 200 mg daily on 11/23/2021.  Around that time tried to decrease her prednisone from 10 mg daily down to 7.5 mg daily though written to discuss she had return of joint pain, joint swelling and stiffness particularly of her DIPs, worsening of her nail disease and psoriatic lesions on the tips of her fingers.  Fatigue also more significant.  As a result she went back up to 10 mg daily and this improved some.  She is not been taking any NSAIDs since her last appointment despite her joint pain as the meloxicam previously prescribed it was giving her reflux/gastritis symptoms.  No injection site reaction from her Cosentyx.  No fevers or chills.  No weight changes.  Tolerating prednisone without issues.  Review of systems otherwise negative.    9/30/21 Interval history  Has been taking cosentyx without issues. Next dose on Friday and then switching to monthly. Has found that her skin is some improved over the fingertips. Though still some painful/burning sensation at the sites of prior lesions. This is improving. When she started prednisone, did have dramatic improvement. Though with taper improvement has been  "slower though has continued. Currently on 2.5mg daily. No side effects from low dose. Energy still \"not great\" though she thinks it is slowly improving. Going for walks 5 days per week 30-40 minutes per day. Reports that the pain is OK when doing activities, but has been avoiding activities that are painful like weeding/carrying heavy items with grocery shopping. Has continued with mobic 3-4x week. 15mg tabs each time. No GI side effects at this frequency/dose. Still some stiff at DIPs. Though improved from prior. Did have lesion mid upper back and front groin which have improved since cosentyx/ prednisone. No fevers/chills. No interval infections. ROS otherwise negative.     August 12, 2021  Interval history  Had ?ear infection early July. Given ear drops by PCP. Had developed more psoriasis in new locations (palm of hand/scalp (10-15%)/lower stomach/low back/ and groin/gluteal cleft) where previously was on fingertips/nails only. Saw dermatology who prescribed topical, the topical has been somewhat helpful though has not resolves symptoms. She thinks that she has had 10-15% improvement with the topical. Spoke to pharmacist, who counseled her than can take up to 4 months for enbrel to take full effect. Gives herself injection every Sunday. After 5th injection, has itching/burning at the site. Takes Claritin already. Iced. Was told by pharmacist to try hydrocortisone, which she did. Decreased from 4-5 days of symptoms down to 3 days. From 5th injection onwards has had this reaction. Total of 8 injections so far. Joints much less of an issue compared to cutaneous involvement. If she has any joint pain, takes mobic. Roughly 1x per week. Around once per 7 -10days has extreme fatigue. Yesterday was significant. Slept until noon. Usually gets up at 630. Usually trouble sleeping, so this was unusual for her. No return of submandibular LAD. No fever, chills. No cough/sore throat. Some stiffness in the DIPs, morning " predominant. Improves throughout the day. Some increase in pain this week. ROS otherwise negative.      6/17/21  Has advanced glaucoma/ dry eyes. Chronic. No new head/neck symptoms. Dry mouth during submandibular gland swelling, dry mouth now resolved. Reports the swelling has mproved now by 50+%. No more pain, so no longer using ibuprofen. No fever/chills/cough or other signs/symptoms of infection. With ongoing progression of nail/distal digit predominant psoriasis and DIP arthritis. Severe pain. Unable to tolerate NSAIDs given GI upset despite PPI. ROS otherwise negative       5/14/21  Patient initially was going to wait to contact, though has been having progression of symptoms of nails/joint pain/ skin. Last injection was on Tuesday. Had a flare in the days following her injection. First 2 doses, she did notice some improvement. Though after that time, with each dose, has had less and less of a positive change in nails/joints/skin. Has instead been getting worse and worse response. Has stiffness and pain in her DIPs that has been progressive. Has redness as well. Also with radiation of the pain proximally which is new. Also some days with severe fatigue. No fevers/chills. No weight changes. No interval infections.     Past Medical History  Past Medical History:   Diagnosis Date     Nuclear senile cataract of both eyes 8/16/2018     Psoriatic arthropathy (H) 10/19/2020     Past Surgical History  Past Surgical History:   Procedure Laterality Date     BIOPSY  07/30/2019    the right breast tissue(benign)     EYE SURGERY      3 surgeries for glaucoma treatment     Medications  Current Outpatient Medications   Medication     CALCIUM CARBONATE-VIT D-MIN PO     clobetasol (TEMOVATE) 0.05 % external cream     clobetasol (TEMOVATE) 0.05 % external ointment     fluocinonide (LIDEX) 0.05 % external solution     gabapentin (NEURONTIN) 300 MG capsule     loratadine (CLARITIN) 10 MG capsule     meloxicam (MOBIC) 7.5 MG tablet      Multiple Vitamins-Minerals (MULTIVITAMIN ADULTS 50+) TABS     mupirocin (BACTROBAN) 2 % external ointment     omeprazole (PRILOSEC) 20 MG DR capsule     predniSONE (DELTASONE) 2.5 MG tablet     predniSONE (DELTASONE) 5 MG tablet     Secukinumab, 300 MG Dose, (COSENTYX SENSOREADY, 300 MG,) 150 MG/ML SOAJ     Secukinumab, 300 MG Dose, (COSENTYX, 300 MG DOSE,) 150 MG/ML SOSY     No current facility-administered medications for this visit.     Allergies   Allergies   Allergen Reactions     Adhesive Tape Blisters, Itching and Swelling     Dust Mites      Latex Blisters, Itching, Other (See Comments) and Swelling     Skin sensitivity       Morphine Sulfate (Concentrate) Itching     Other reaction(s): Chills  heart palpatations     Seasonal Allergies      Codeine Palpitations     chills     Family History:   No family history of autoimmune diseases.     Social History  , with children. 2 kids and they are healthy. No ETOH, smoking, drug use.      Objective:    Physical exam:  There were no vitals taken for this visit.  GEN: Sitting up unassisted. NAD  HEENT: no facial rash, sclera clear, no inflammatory nose/ external ear changes  CV: No upper extremity edema  Pulm: Breathing comfortably, no audible wheezing, no use accessory muscles, speaking in full sentences  Abdomen: Not distended  Skin: psoriatic nail disease, severe  MSK: full active ROM of bilateral shoulders, elbows, wrists, MCPs, PIPs. DIPs of Digits 3-5 on the left hand and digit 3 on the right hand with erythema.  Reports tenderness to self palpation. Has psoriatic changes of fingernails of digits 3-5 on the left hand and digit 3 on the right hand.     Labs  6/16/21  SSA, SSB negative  Mumps IgG and IgM positive  Creatinine 0.63  LFTs normal  CBC WNL    2/5/21  HIV negative  Hep C AB negative  Hep B s AG negative  Hep B c AB reactive  Quant gold negative  Quant gold negative  RF negative  CRP <2.9  ESR 10  Cr 0.62  WBC 7.1  HGB 13.1    HLA b27  negative  Hep B virus DNA negative    2/2/21  ESR 9  CRP <2.9  WBC 6.1  HGB 12.8      11/11/20  A1C 5.3  Hep C/ HIV negative    Imaging:  MR left hand  Impression:  1a. Correlating to the marker at the third digit interphalangeal  joint, there is a focal erosion along the volar aspect of the distal  phalangeal base with intense bone marrow edema and abnormal T1 marrow  signal. Findings are concerning for osteomyelitis and infection should  be ruled out. Also on the differential is sequelae of long-standing  active inflammatory arthropathy (including psoriatic arthritis given  patient's clinical history) where marginal erosions and marrow signal  abnormality are expected though the degree of T1 marrow replacement is  out of proportion to typical findings.   b. Subtle bone marrow edema in the middle phalanx without T1 marrow  signal abnormality, this is favored to represent reactive edema.  c. Joint effusion at the third digit interphalangeal joint,  potentially infectious or inflammatory. Consider aspiration for  definitive diagnosis.  d. The partially visualized fifth digit shows edema within the distal  phalanx and head of the middle phalanx with regional soft tissue  edema. Given predominant findings in the third digit, similar changes  in the fifth digit could support a polyarticular inflammatory  arthropathy. Correlate clinically.  2. Tenosynovitis of the third digit flexor and extensor tendons. There  is also tendinosis with high-grade (near full-thickness) tearing of  the extensor tendon/extensor saul at the distal phalangeal base with  slight proximal retraction of the residual fibers.  3. Diffusely thickened, edematous ulnar and radial collateral  ligaments, likely combination of sprain and reactive edema.  4. Extensive soft tissue edema centered about the interphalangeal  joint.       Willa is a 61 year old who is being evaluated via a billable video visit.      How would you like to obtain your AVS?  Aneudy  If the video visit is dropped, the invitation should be resent by: Text to cell phone: [phone]  Will anyone else be joining your video visit? No      Start: 12/02/2021 08:27 am  Stop: 12/02/2021 08:51 am    Video-Visit Details    Type of service:  Video Visit    Originating Location (pt. Location): Home    Distant Location (provider location):  [facility]HE[facility] [place]     Platform used for Video Visit: Tiburcio Arias MD  Rheumatology

## 2021-12-02 NOTE — PROGRESS NOTES
Outpatient Rheumatology Follow-up    Name: Willa Downey    MRN [id number]   Today's date: December 2, 2021           Reason for follow-up: psoriatic arthritis   Requesting physician: Kitty Mendez MD        Assessment & Plan:   #Psoriatic arthritis  #negative RF  #psoriasis, cutaneous and nail involvement  #DIP Left hand 3rd, 4th, 5th and right hand 4th digit inflammatory arthritis  #humira, enbrel, methotrexate failure  #Submandibular LAD- resolved  #High risk drug therapy: cosentyx  #Long term, current, systemic steroid use    61 year old female with hx of advanced glaucoma followed closely by ophthalmology presented to rheumatology on 2/5/21 with 5 month of progressive left hand 3rd, 4th, 5th digit and right hand 4th digit DIP inflammatory arthritis in the context of psoriasis, psoriatic changes of the nails. Taken together most consistent with psoriatic arthritis. Tried on 2 NSAIDs which failed to control symptoms, and had severe GERD symptoms. Tried methotrexate by prior rheumatologist and disease not controlled. Humira started Mid February 2021 due to lack of efficacy of these other therapies and continued through May 14th, though had severe/ rapidly progressive disease after some improvement in the first 4 weeks involving DIP inflammatory arthritis and nail disease. We then ordered Enbrel, though starting was delayed due to development of impressive bilateral submandibular lymphadenopathy which resolved within about 1 week and thought likely secondary to viral infection. She started enbrel on 6/20/2021 which she had continued on until switching to cosentyx after approval on 8/31/21 due to lack of efficacy of enbrel and injection site reaction. Has been on cosentyx since 8/31/21 with initial improvement in inflammatory DIP arthritis, psoriatic lesions, stabilization of her nail disease though she then had progression of her cutaneous nail and joint inflammatory process which prompted restarting  prednisone at 10 mg daily along with doubling her dose of Cosentyx to 300 mg each month.  She took her first dose of 300 mg on 11/23/2021.  She did try to decrease her prednisone shortly after that down to 7.5 though noticed return of her skin peeling/psoriatic skin lesions worsening and so went back up to 10 mg daily which has improved these.    Psoriatic arthritis: Inflammatory arthritis/ cutaneous disease with initial improvement but then progression on Cosentyx 150 mg once monthly.  This progression required restarting prednisone at 10 mg daily and doubling her Cosentyx dose to 300 mg once monthly.  She took her first dose on 11/23/2021.  Have been unable to taper below 10 mg daily of prednisone.  She has her next dose of Cosentyx on December 24, 2021.  At that time she will again try to decrease her prednisone from 10 mg daily down to 7.5 mg daily.  If she is not successful in this dose reduction she knows to go back up to 10 mg daily and send me a [employer] message to let me know.  We will plan for follow-up in mid January at which time we need to assess for efficacy of this maximum dose Cosentyx.  If not sufficient in allowing us to come down on her prednisone we will consider switch to a Mario inhibitor considering Xeljanz at this time.  Introduced this idea to her today.    As a bridge to control some of her inflammatory neuritis symptoms discussed addition of celecoxib with omeprazole daily.  Previously not able to tolerate meloxicam due to gastritis symptoms despite use of PPI.  Interestingly was able to tolerate ibuprofen.    Plan:  -Continue Cosentyx 300 mg once monthly  -Continue prednisone 10 mg daily until 12/24/2021 and then decrease down to 7.5 mg daily.  Knows to increase back up to 10 mg daily if symptoms return.  Will send me a [employer] message if this occurs.  -Start Celebrex 100 mg daily and omeprazole daily  -Follow-up mid January to assess efficacy of Cosentyx and will switch to Xeljanz if not  "able to make any progress with prednisone taper by that time    High risk medication: cosentyx  -No side effects by history/exam/available serologies  -Risks and benefits again discussed today  -Labs q6 months    Marc Arias MD  Rheumatology    I spent a total of 40 minutes on the date of service on chart review, patient video encounter, , documentation, coordination of care with pharmacy liaison.      Subjective:   12/2/2021 interval history  Took her first dose of Cosentyx 200 mg daily on 11/23/2021.  Around that time tried to decrease her prednisone from 10 mg daily down to 7.5 mg daily though written to discuss she had return of joint pain, joint swelling and stiffness particularly of her DIPs, worsening of her nail disease and psoriatic lesions on the tips of her fingers.  Fatigue also more significant.  As a result she went back up to 10 mg daily and this improved some.  She is not been taking any NSAIDs since her last appointment despite her joint pain as the meloxicam previously prescribed it was giving her reflux/gastritis symptoms.  No injection site reaction from her Cosentyx.  No fevers or chills.  No weight changes.  Tolerating prednisone without issues.  Review of systems otherwise negative.    9/30/21 Interval history  Has been taking cosentyx without issues. Next dose on Friday and then switching to monthly. Has found that her skin is some improved over the fingertips. Though still some painful/burning sensation at the sites of prior lesions. This is improving. When she started prednisone, did have dramatic improvement. Though with taper improvement has been slower though has continued. Currently on 2.5mg daily. No side effects from low dose. Energy still \"not great\" though she thinks it is slowly improving. Going for walks 5 days per week 30-40 minutes per day. Reports that the pain is OK when doing activities, but has been avoiding activities that are painful like weeding/carrying " heavy items with grocery shopping. Has continued with mobic 3-4x week. 15mg tabs each time. No GI side effects at this frequency/dose. Still some stiff at DIPs. Though improved from prior. Did have lesion mid upper back and front groin which have improved since cosentyx/ prednisone. No fevers/chills. No interval infections. ROS otherwise negative.     August 12, 2021  Interval history  Had ?ear infection early July. Given ear drops by PCP. Had developed more psoriasis in new locations (palm of hand/scalp (10-15%)/lower stomach/low back/ and groin/gluteal cleft) where previously was on fingertips/nails only. Saw dermatology who prescribed topical, the topical has been somewhat helpful though has not resolves symptoms. She thinks that she has had 10-15% improvement with the topical. Spoke to pharmacist, who counseled her than can take up to 4 months for enbrel to take full effect. Gives herself injection every Sunday. After 5th injection, has itching/burning at the site. Takes Claritin already. Iced. Was told by pharmacist to try hydrocortisone, which she did. Decreased from 4-5 days of symptoms down to 3 days. From 5th injection onwards has had this reaction. Total of 8 injections so far. Joints much less of an issue compared to cutaneous involvement. If she has any joint pain, takes mobic. Roughly 1x per week. Around once per 7 -10days has extreme fatigue. Yesterday was significant. Slept until noon. Usually gets up at 630. Usually trouble sleeping, so this was unusual for her. No return of submandibular LAD. No fever, chills. No cough/sore throat. Some stiffness in the DIPs, morning predominant. Improves throughout the day. Some increase in pain this week. ROS otherwise negative.      6/17/21  Has advanced glaucoma/ dry eyes. Chronic. No new head/neck symptoms. Dry mouth during submandibular gland swelling, dry mouth now resolved. Reports the swelling has mproved now by 50+%. No more pain, so no longer using  ibuprofen. No fever/chills/cough or other signs/symptoms of infection. With ongoing progression of nail/distal digit predominant psoriasis and DIP arthritis. Severe pain. Unable to tolerate NSAIDs given GI upset despite PPI. ROS otherwise negative       5/14/21  Patient initially was going to wait to contact, though has been having progression of symptoms of nails/joint pain/ skin. Last injection was on Tuesday. Had a flare in the days following her injection. First 2 doses, she did notice some improvement. Though after that time, with each dose, has had less and less of a positive change in nails/joints/skin. Has instead been getting worse and worse response. Has stiffness and pain in her DIPs that has been progressive. Has redness as well. Also with radiation of the pain proximally which is new. Also some days with severe fatigue. No fevers/chills. No weight changes. No interval infections.     Past Medical History  Past Medical History:   Diagnosis Date     Nuclear senile cataract of both eyes 8/16/2018     Psoriatic arthropathy (H) 10/19/2020     Past Surgical History  Past Surgical History:   Procedure Laterality Date     BIOPSY  07/30/2019    the right breast tissue(benign)     EYE SURGERY      3 surgeries for glaucoma treatment     Medications  Current Outpatient Medications   Medication     CALCIUM CARBONATE-VIT D-MIN PO     clobetasol (TEMOVATE) 0.05 % external cream     clobetasol (TEMOVATE) 0.05 % external ointment     fluocinonide (LIDEX) 0.05 % external solution     gabapentin (NEURONTIN) 300 MG capsule     loratadine (CLARITIN) 10 MG capsule     meloxicam (MOBIC) 7.5 MG tablet     Multiple Vitamins-Minerals (MULTIVITAMIN ADULTS 50+) TABS     mupirocin (BACTROBAN) 2 % external ointment     omeprazole (PRILOSEC) 20 MG DR capsule     predniSONE (DELTASONE) 2.5 MG tablet     predniSONE (DELTASONE) 5 MG tablet     Secukinumab, 300 MG Dose, (COSENTYX SENSOREADY, 300 MG,) 150 MG/ML SOAJ     Secukinumab, 300  MG Dose, (COSENTYX, 300 MG DOSE,) 150 MG/ML SOSY     No current facility-administered medications for this visit.     Allergies   Allergies   Allergen Reactions     Adhesive Tape Blisters, Itching and Swelling     Dust Mites      Latex Blisters, Itching, Other (See Comments) and Swelling     Skin sensitivity       Morphine Sulfate (Concentrate) Itching     Other reaction(s): Chills  heart palpatations     Seasonal Allergies      Codeine Palpitations     chills     Family History:   No family history of autoimmune diseases.     Social History  , with children. 2 kids and they are healthy. No ETOH, smoking, drug use.      Objective:    Physical exam:  There were no vitals taken for this visit.  GEN: Sitting up unassisted. NAD  HEENT: no facial rash, sclera clear, no inflammatory nose/ external ear changes  CV: No upper extremity edema  Pulm: Breathing comfortably, no audible wheezing, no use accessory muscles, speaking in full sentences  Abdomen: Not distended  Skin: psoriatic nail disease, severe  MSK: full active ROM of bilateral shoulders, elbows, wrists, MCPs, PIPs. DIPs of Digits 3-5 on the left hand and digit 3 on the right hand with erythema.  Reports tenderness to self palpation. Has psoriatic changes of fingernails of digits 3-5 on the left hand and digit 3 on the right hand.     Labs  6/16/21  SSA, SSB negative  Mumps IgG and IgM positive  Creatinine 0.63  LFTs normal  CBC WNL    2/5/21  HIV negative  Hep C AB negative  Hep B s AG negative  Hep B c AB reactive  Quant gold negative  Quant gold negative  RF negative  CRP <2.9  ESR 10  Cr 0.62  WBC 7.1  HGB 13.1    HLA b27 negative  Hep B virus DNA negative    2/2/21  ESR 9  CRP <2.9  WBC 6.1  HGB 12.8      11/11/20  A1C 5.3  Hep C/ HIV negative    Imaging:  MR left hand  Impression:  1a. Correlating to the marker at the third digit interphalangeal  joint, there is a focal erosion along the volar aspect of the distal  phalangeal base with  intense bone marrow edema and abnormal T1 marrow  signal. Findings are concerning for osteomyelitis and infection should  be ruled out. Also on the differential is sequelae of long-standing  active inflammatory arthropathy (including psoriatic arthritis given  patient's clinical history) where marginal erosions and marrow signal  abnormality are expected though the degree of T1 marrow replacement is  out of proportion to typical findings.   b. Subtle bone marrow edema in the middle phalanx without T1 marrow  signal abnormality, this is favored to represent reactive edema.  c. Joint effusion at the third digit interphalangeal joint,  potentially infectious or inflammatory. Consider aspiration for  definitive diagnosis.  d. The partially visualized fifth digit shows edema within the distal  phalanx and head of the middle phalanx with regional soft tissue  edema. Given predominant findings in the third digit, similar changes  in the fifth digit could support a polyarticular inflammatory  arthropathy. Correlate clinically.  2. Tenosynovitis of the third digit flexor and extensor tendons. There  is also tendinosis with high-grade (near full-thickness) tearing of  the extensor tendon/extensor saul at the distal phalangeal base with  slight proximal retraction of the residual fibers.  3. Diffusely thickened, edematous ulnar and radial collateral  ligaments, likely combination of sprain and reactive edema.  4. Extensive soft tissue edema centered about the interphalangeal  joint.

## 2021-12-02 NOTE — PROGRESS NOTES
Willa is a 61 year old who is being evaluated via a billable video visit.      How would you like to obtain your AVS? MyChart  If the video visit is dropped, the invitation should be resent by: Text to cell phone: [phone]  Will anyone else be joining your video visit? No      Start: 12/02/2021 08:27 am  Stop: 12/02/2021 08:51 am    Video-Visit Details    Type of service:  Video Visit    Originating Location (pt. Location): Home    Distant Location (provider location):  [facility] [place]     Platform used for Video Visit: Tiburcio Arias MD  Rheumatology

## 2021-12-02 NOTE — Clinical Note
Ron Del Angel, please do not release the drug to the patient. I very much anticipate that we will need it at the time of her follow-up in Jan (she is currently failing cosentyx 300mg monthly) so getting ready to switch to xeljanz which I placed the order for today so we can start it when we follow-up in jan. Will send a message when appropriate to release.    Thanks!

## 2021-12-29 ENCOUNTER — TELEPHONE (OUTPATIENT)
Dept: RHEUMATOLOGY | Facility: CLINIC | Age: 61
End: 2021-12-29
Payer: COMMERCIAL

## 2021-12-29 NOTE — TELEPHONE ENCOUNTER
PA Initiation    Medication: XELJANZ  Insurance Company: [email or web address] - Phone [phone] Fax [phone]  Pharmacy Filling the Rx: [facility] - [place], [place] - [facility] [address]  Filling Pharmacy Phone:    Filling Pharmacy Fax:    Start Date: 12/29/2021    FLOR CONTRERAS ([place]: [email or web address])

## 2022-01-01 ENCOUNTER — HEALTH MAINTENANCE LETTER (OUTPATIENT)
Age: 62
End: 2022-01-01

## 2022-01-03 DIAGNOSIS — M54.41 ACUTE RIGHT-SIDED LOW BACK PAIN WITH RIGHT-SIDED SCIATICA: ICD-10-CM

## 2022-01-03 RX ORDER — GABAPENTIN 300 MG/1
300 CAPSULE ORAL 3 TIMES DAILY
Qty: 90 CAPSULE | Refills: 2 | Status: SHIPPED | OUTPATIENT
Start: 2022-01-03 | End: 2022-05-18

## 2022-01-04 RX ORDER — GABAPENTIN 300 MG/1
300 CAPSULE ORAL 3 TIMES DAILY
Qty: 90 CAPSULE | Refills: 2 | OUTPATIENT
Start: 2022-01-04

## 2022-01-28 ENCOUNTER — VIRTUAL VISIT (OUTPATIENT)
Dept: RHEUMATOLOGY | Facility: CLINIC | Age: 62
End: 2022-01-28
Attending: INTERNAL MEDICINE
Payer: COMMERCIAL

## 2022-01-28 DIAGNOSIS — Z79.52 LONG TERM (CURRENT) USE OF SYSTEMIC STEROIDS: ICD-10-CM

## 2022-01-28 DIAGNOSIS — L40.50 PSORIATIC ARTHRITIS (H): Primary | ICD-10-CM

## 2022-01-28 DIAGNOSIS — Z79.899 HIGH RISK MEDICATION USE: ICD-10-CM

## 2022-01-28 DIAGNOSIS — L40.0 PLAQUE PSORIASIS: ICD-10-CM

## 2022-01-28 PROCEDURE — 99215 OFFICE O/P EST HI 40 MIN: CPT | Mod: 95 | Performed by: INTERNAL MEDICINE

## 2022-01-28 RX ORDER — PREDNISONE 1 MG/1
TABLET ORAL
Qty: 385 TABLET | Refills: 0 | Status: SHIPPED | OUTPATIENT
Start: 2022-01-28 | End: 2022-04-08

## 2022-01-28 ASSESSMENT — PAIN SCALES - GENERAL: PAINLEVEL: NO PAIN (0)

## 2022-01-28 NOTE — PROGRESS NOTES
Willa is a 61 year old who is being evaluated via a billable video visit.      How would you like to obtain your AVS? MyChart  If the video visit is dropped, the invitation should be resent by: Text to cell phone: [phone]  Will anyone else be joining your video visit? No      Video-Visit Details    Type of service:  Video Visit    Start: 01/28/2022 08:30 am   Stop: 01/28/2022 09:04 am    Originating Location (pt. Location): Home    Distant Location (provider location):  [facility] [place]     Platform used for Video Visit: Kellen Arias MD  Rheumatology        Outpatient Rheumatology Follow-up    Name: Willa Downey    MRN [id number]   Today's date: 1/28/22           Reason for follow-up: psoriatic arthritis   Requesting physician: Kitty Mendez MD        Assessment & Plan:   #Psoriatic arthritis  #negative RF  #psoriasis, cutaneous and nail involvement  #DIP Left hand 3rd, 4th, 5th and right hand 4th digit inflammatory arthritis  #humira, enbrel, methotrexate failure  #Submandibular LAD- resolved  #High risk drug therapy: xeljanz  #Long term, current, systemic steroid use    61 year old female with hx of advanced glaucoma followed closely by ophthalmology presented to rheumatology on 2/5/21 with 5 month of progressive left hand 3rd, 4th, 5th digit and right hand 4th digit DIP inflammatory arthritis in the context of psoriasis, psoriatic changes of the nails. Taken together most consistent with psoriatic arthritis. Tried on 2 NSAIDs which failed to control symptoms, and had severe GERD symptoms. Tried methotrexate by prior rheumatologist and disease not controlled. Humira started Mid February 2021 due to lack of efficacy of these other therapies and continued through May 14th, though had severe/ rapidly progressive disease after some improvement in the first 4 weeks involving DIP inflammatory arthritis and nail disease. We then ordered Enbrel, though starting  was delayed due to development of impressive bilateral submandibular lymphadenopathy which resolved within about 1 week and thought likely secondary to viral infection. She started enbrel on 6/20/2021 which she had continued on until switching to cosentyx after approval on 8/31/21 due to lack of efficacy of enbrel and injection site reaction. Had been on cosentyx from 8/31/21 with initial improvement in inflammatory DIP arthritis, psoriatic lesions, stabilization of her nail disease though she then had progression of her cutaneous nail and joint inflammatory process which prompted restarting prednisone at 10 mg daily along with doubling her dose of Cosentyx to 300 mg each month.  She took her first dose of 300 mg on 11/23/2021.  She did try to decrease her prednisone shortly after that down to 7.5 though noticed return of her skin peeling/psoriatic skin lesions worsening and so went back up to 10 mg daily which has again improved these symptoms. She was again unable to taper from prednisone below 10mg daily without progression of both cutaneous and articular symptoms and so cosentyx was discontinued and xeljanz 11mg once daily started after insurance approval on 12/29/21 and has continued on this through today's appointment. Current dose or prednisone is 7.5mg daily.     Psoriatic Arthritis: Has had excellent response to xeljanz in re: to both cutaneous and inflammatory arthritis findings, though over the last week has had mild return of cutaneous disease with steroid taper from 10mg daily down to 7.5mg daily. We again discussed that psoriasis can be very steroid sensitive and can rebound with reductions, which is why we were initially hesitant to use it though given progression of symptoms and lack of biologic therapy which was at least halting therapy we decided to proceed. To lessen what is potentially this rebound phenomenon, will decrease prednisone more slowly by 1mg weekly. May need to adjust to q10 days if  "needed. Will plan to continue on xeljanz and celebrex.     Plan:  -Continue xeljanz 11mg daily  -Continue celebrex as needed for joint pain  -Prednisone increase back up from 7.5mg daily to 10mg daily and decrease by 1mg every 7 days. New script for this has been placed.   -Labs due now, ordered. She will schedule an appointment to have these drawn  -Will plan to follow-up in about 2 months    High risk medication: Xeljanz  -No side effects by history/exam/available serologies  -Risks and benefits again discussed today  -Labs q6 months    Marc Arias MD  Rheumatology    I spent a total of 40 minutes on the date of service on chart review, patient video encounter, , documentation.      Subjective:   Interval history 1/28/22  Was already on prednisone 10mg and then xeljanz was added on Jan 3rd. Had complete resolution of psoriasis on scalp/inner ear. Hands also completely clear at that time. She then decreased to 7.5mg on 1/17 and for the first week was doing \"okay\", then this week noticed some pits on fingertips, some skin peeling. Yesterday and today has noticed some more skin peeling. Some more scalp irriation 3 days ago as well. She feels that since starting xeljanz this is the best improvement that she has found. Continues to take celebrex daily due to pain in joints without it. With celebrex, pain is resolved. If she does lots of activity (cleaning etc) will have some pain. No GI upset as she had with mobic. Not taking PPI. She has noticed some more fatigue in the AM and also more so in the afternoon. Uses clobetasol ointment and cream. Also topical for scalp. Uses them all at night. Using 0.05% clobetasol. Has continued to experience rare intermittent tender lymph nodes. Had last booster on August 21st. No interval infections. No fevers, chills, weight changes. Remains active. No noticeable side effects since starting xeljanz. ROS otherwise negative.     12/2/2021 interval history  Took her first " "dose of Cosentyx 200 mg daily on 11/23/2021.  Around that time tried to decrease her prednisone from 10 mg daily down to 7.5 mg daily though written to discuss she had return of joint pain, joint swelling and stiffness particularly of her DIPs, worsening of her nail disease and psoriatic lesions on the tips of her fingers.  Fatigue also more significant.  As a result she went back up to 10 mg daily and this improved some.  She is not been taking any NSAIDs since her last appointment despite her joint pain as the meloxicam previously prescribed it was giving her reflux/gastritis symptoms.  No injection site reaction from her Cosentyx.  No fevers or chills.  No weight changes.  Tolerating prednisone without issues.  Review of systems otherwise negative.    9/30/21 Interval history  Has been taking cosentyx without issues. Next dose on Friday and then switching to monthly. Has found that her skin is some improved over the fingertips. Though still some painful/burning sensation at the sites of prior lesions. This is improving. When she started prednisone, did have dramatic improvement. Though with taper improvement has been slower though has continued. Currently on 2.5mg daily. No side effects from low dose. Energy still \"not great\" though she thinks it is slowly improving. Going for walks 5 days per week 30-40 minutes per day. Reports that the pain is OK when doing activities, but has been avoiding activities that are painful like weeding/carrying heavy items with grocery shopping. Has continued with mobic 3-4x week. 15mg tabs each time. No GI side effects at this frequency/dose. Still some stiff at DIPs. Though improved from prior. Did have lesion mid upper back and front groin which have improved since cosentyx/ prednisone. No fevers/chills. No interval infections. ROS otherwise negative.     August 12, 2021  Interval history  Had ?ear infection early July. Given ear drops by PCP. Had developed more psoriasis in new " locations (palm of hand/scalp (10-15%)/lower stomach/low back/ and groin/gluteal cleft) where previously was on fingertips/nails only. Saw dermatology who prescribed topical, the topical has been somewhat helpful though has not resolves symptoms. She thinks that she has had 10-15% improvement with the topical. Spoke to pharmacist, who counseled her than can take up to 4 months for enbrel to take full effect. Gives herself injection every Sunday. After 5th injection, has itching/burning at the site. Takes Claritin already. Iced. Was told by pharmacist to try hydrocortisone, which she did. Decreased from 4-5 days of symptoms down to 3 days. From 5th injection onwards has had this reaction. Total of 8 injections so far. Joints much less of an issue compared to cutaneous involvement. If she has any joint pain, takes mobic. Roughly 1x per week. Around once per 7 -10days has extreme fatigue. Yesterday was significant. Slept until noon. Usually gets up at 630. Usually trouble sleeping, so this was unusual for her. No return of submandibular LAD. No fever, chills. No cough/sore throat. Some stiffness in the DIPs, morning predominant. Improves throughout the day. Some increase in pain this week. ROS otherwise negative.      6/17/21  Has advanced glaucoma/ dry eyes. Chronic. No new head/neck symptoms. Dry mouth during submandibular gland swelling, dry mouth now resolved. Reports the swelling has mproved now by 50+%. No more pain, so no longer using ibuprofen. No fever/chills/cough or other signs/symptoms of infection. With ongoing progression of nail/distal digit predominant psoriasis and DIP arthritis. Severe pain. Unable to tolerate NSAIDs given GI upset despite PPI. ROS otherwise negative       5/14/21  Patient initially was going to wait to contact, though has been having progression of symptoms of nails/joint pain/ skin. Last injection was on Tuesday. Had a flare in the days following her injection. First 2 doses, she  did notice some improvement. Though after that time, with each dose, has had less and less of a positive change in nails/joints/skin. Has instead been getting worse and worse response. Has stiffness and pain in her DIPs that has been progressive. Has redness as well. Also with radiation of the pain proximally which is new. Also some days with severe fatigue. No fevers/chills. No weight changes. No interval infections.     Past Medical History  Past Medical History:   Diagnosis Date     Nuclear senile cataract of both eyes 8/16/2018     Psoriatic arthropathy (H) 10/19/2020     Past Surgical History  Past Surgical History:   Procedure Laterality Date     BIOPSY  07/30/2019    the right breast tissue(benign)     EYE SURGERY      3 surgeries for glaucoma treatment     Medications  Current Outpatient Medications   Medication     CALCIUM CARBONATE-VIT D-MIN PO     celecoxib (CELEBREX) 100 MG capsule     clobetasol (TEMOVATE) 0.05 % external cream     clobetasol (TEMOVATE) 0.05 % external ointment     fluocinonide (LIDEX) 0.05 % external solution     gabapentin (NEURONTIN) 300 MG capsule     loratadine (CLARITIN) 10 MG capsule     Multiple Vitamins-Minerals (MULTIVITAMIN ADULTS 50+) TABS     mupirocin (BACTROBAN) 2 % external ointment     predniSONE (DELTASONE) 2.5 MG tablet     predniSONE (DELTASONE) 2.5 MG tablet     tofacitinib (XELJANZ XR) 11 MG 24 hr tablet     meloxicam (MOBIC) 7.5 MG tablet     omeprazole (PRILOSEC) 20 MG DR capsule     Secukinumab, 300 MG Dose, (COSENTYX SENSOREADY, 300 MG,) 150 MG/ML SOAJ     Secukinumab, 300 MG Dose, (COSENTYX, 300 MG DOSE,) 150 MG/ML SOSY     No current facility-administered medications for this visit.     Allergies   Allergies   Allergen Reactions     Adhesive Tape Blisters, Itching and Swelling     Dust Mites      Latex Blisters, Itching, Other (See Comments) and Swelling     Skin sensitivity       Morphine Sulfate (Concentrate) Itching     Other reaction(s): Chills  heart  palpatations     Seasonal Allergies      Codeine Palpitations     chills     Family History:   No family history of autoimmune diseases.     Social History  , with children. 2 kids and they are healthy. No ETOH, smoking, drug use.      Objective:    Physical exam:  There were no vitals taken for this visit.  GEN: Sitting up unassisted. NAD  HEENT: no facial rash, sclera clear, no inflammatory nose/ external ear changes  CV: No upper extremity edema  Pulm: Breathing comfortably, no audible wheezing, no use accessory muscles, speaking in full sentences  Abdomen: Not distended  Skin: psoriatic nail disease, severe  MSK: full active ROM of bilateral shoulders, elbows, wrists, MCPs, PIPs. DIPs of Digits 3-5 on the left hand and digit 3 on the right hand without erythema as was previously seen.  Denies tenderness to self palpation as prior. Has psoriatic changes of fingernails of digits 3-5 on the left hand and digit 3 on the right hand- unchanged.    Labs  WBC   Date Value Ref Range Status   06/11/2021 9.4 4.0 - 11.0 10e9/L Final     WBC Count   Date Value Ref Range Status   02/01/2022 8.4 4.0 - 11.0 10e3/uL Final     Hemoglobin   Date Value Ref Range Status   02/01/2022 13.4 11.7 - 15.7 g/dL Final   06/11/2021 12.9 11.7 - 15.7 g/dL Final     Platelet Count   Date Value Ref Range Status   02/01/2022 255 150 - 450 10e3/uL Final   06/11/2021 369 150 - 450 10e9/L Final     Creatinine   Date Value Ref Range Status   02/01/2022 0.70 0.52 - 1.04 mg/dL Final   06/11/2021 0.63 0.52 - 1.04 mg/dL Final     Lab Results   Component Value Date    ALKPHOS 56 02/01/2022    ALKPHOS 76 06/11/2021     AST   Date Value Ref Range Status   02/01/2022 16 0 - 45 U/L Final   06/11/2021 14 0 - 45 U/L Final     Lab Results   Component Value Date    ALT 28 02/01/2022    ALT 19 06/11/2021     Sed Rate   Date Value Ref Range Status   06/16/2021 15 0 - 30 mm/h Final     Erythrocyte Sedimentation Rate   Date Value Ref Range Status   02/01/2022  4 0 - 30 mm/hr Final     CRP Inflammation   Date Value Ref Range Status   02/01/2022 <2.9 0.0 - 8.0 mg/L Final   06/16/2021 5.2 0.0 - 8.0 mg/L Final     UA RESULTS:  No results for input(s): COLOR, APPEARANCE, URINEGLC, URINEBILI, URINEKETONE, SG, UBLD, URINEPH, PROTEIN, UROBILINOGEN, NITRITE, LEUKEST, RBCU, WBCU in the last [id number] hours.   Rheumatoid Factor   Date Value Ref Range Status   02/05/2021 <7 <12 IU/mL Final         6/16/21  SSA, SSB negative  Mumps IgG and IgM positive  Creatinine 0.63  LFTs normal  CBC WNL    2/5/21  HIV negative  Hep C AB negative  Hep B s AG negative  Hep B c AB reactive  Quant gold negative  Quant gold negative  RF negative  CRP <2.9  ESR 10  Cr 0.62  WBC 7.1  HGB 13.1    HLA b27 negative  Hep B virus DNA negative    2/2/21  ESR 9  CRP <2.9  WBC 6.1  HGB 12.8      11/11/20  A1C 5.3  Hep C/ HIV negative    Imaging:  MR left hand  Impression:  1a. Correlating to the marker at the third digit interphalangeal  joint, there is a focal erosion along the volar aspect of the distal  phalangeal base with intense bone marrow edema and abnormal T1 marrow  signal. Findings are concerning for osteomyelitis and infection should  be ruled out. Also on the differential is sequelae of long-standing  active inflammatory arthropathy (including psoriatic arthritis given  patient's clinical history) where marginal erosions and marrow signal  abnormality are expected though the degree of T1 marrow replacement is  out of proportion to typical findings.   b. Subtle bone marrow edema in the middle phalanx without T1 marrow  signal abnormality, this is favored to represent reactive edema.  c. Joint effusion at the third digit interphalangeal joint,  potentially infectious or inflammatory. Consider aspiration for  definitive diagnosis.  d. The partially visualized fifth digit shows edema within the distal  phalanx and head of the middle phalanx with regional soft tissue  edema. Given predominant  findings in the third digit, similar changes  in the fifth digit could support a polyarticular inflammatory  arthropathy. Correlate clinically.  2. Tenosynovitis of the third digit flexor and extensor tendons. There  is also tendinosis with high-grade (near full-thickness) tearing of  the extensor tendon/extensor saul at the distal phalangeal base with  slight proximal retraction of the residual fibers.  3. Diffusely thickened, edematous ulnar and radial collateral  ligaments, likely combination of sprain and reactive edema.  4. Extensive soft tissue edema centered about the interphalangeal  joint.

## 2022-01-28 NOTE — LETTER
1/28/2022         RE: Willa Downey  [address]  [facility] [address]     Dear Colleague,    Thank you for referring your patient, Willa Downey, to the [facility] [place] at [facility]. Please see a copy of my visit note below.    Willa is a 61 year old who is being evaluated via a billable video visit.      How would you like to obtain your AVS? MyChart  If the video visit is dropped, the invitation should be resent by: Text to cell phone: [phone]  Will anyone else be joining your video visit? No      Video-Visit Details    Type of service:  Video Visit    Start: 01/28/2022 08:30 am   Stop: 01/28/2022 09:04 am    Originating Location (pt. Location): Home    Distant Location (provider location):  [facility] [place]     Platform used for Video Visit: Kellen Arias MD  Rheumatology        Outpatient Rheumatology Follow-up    Name: Willa Downey    MRN [id number]   Today's date: 1/28/22           Reason for follow-up: psoriatic arthritis   Requesting physician: Kitty Mendez MD        Assessment & Plan:   #Psoriatic arthritis  #negative RF  #psoriasis, cutaneous and nail involvement  #DIP Left hand 3rd, 4th, 5th and right hand 4th digit inflammatory arthritis  #humira, enbrel, methotrexate failure  #Submandibular LAD- resolved  #High risk drug therapy: xeljanz  #Long term, current, systemic steroid use    61 year old female with hx of advanced glaucoma followed closely by ophthalmology presented to rheumatology on 2/5/21 with 5 month of progressive left hand 3rd, 4th, 5th digit and right hand 4th digit DIP inflammatory arthritis in the context of psoriasis, psoriatic changes of the nails. Taken together most consistent with psoriatic arthritis. Tried on 2 NSAIDs which failed to control symptoms, and had severe GERD symptoms. Tried methotrexate by prior rheumatologist  and disease not controlled. Humira started Mid February 2021 due to lack of efficacy of these other therapies and continued through May 14th, though had severe/ rapidly progressive disease after some improvement in the first 4 weeks involving DIP inflammatory arthritis and nail disease. We then ordered Enbrel, though starting was delayed due to development of impressive bilateral submandibular lymphadenopathy which resolved within about 1 week and thought likely secondary to viral infection. She started enbrel on 6/20/2021 which she had continued on until switching to cosentyx after approval on 8/31/21 due to lack of efficacy of enbrel and injection site reaction. Had been on cosentyx from 8/31/21 with initial improvement in inflammatory DIP arthritis, psoriatic lesions, stabilization of her nail disease though she then had progression of her cutaneous nail and joint inflammatory process which prompted restarting prednisone at 10 mg daily along with doubling her dose of Cosentyx to 300 mg each month.  She took her first dose of 300 mg on 11/23/2021.  She did try to decrease her prednisone shortly after that down to 7.5 though noticed return of her skin peeling/psoriatic skin lesions worsening and so went back up to 10 mg daily which has again improved these symptoms. She was again unable to taper from prednisone below 10mg daily without progression of both cutaneous and articular symptoms and so cosentyx was discontinued and xeljanz 11mg once daily started after insurance approval on 12/29/21 and has continued on this through today's appointment. Current dose or prednisone is 7.5mg daily.     Psoriatic Arthritis: Has had excellent response to xeljanz in re: to both cutaneous and inflammatory arthritis findings, though over the last week has had mild return of cutaneous disease with steroid taper from 10mg daily down to 7.5mg daily. We again discussed that psoriasis can be very steroid sensitive and can rebound with  "reductions, which is why we were initially hesitant to use it though given progression of symptoms and lack of biologic therapy which was at least halting therapy we decided to proceed. To lessen what is potentially this rebound phenomenon, will decrease prednisone more slowly by 1mg weekly. May need to adjust to q10 days if needed. Will plan to continue on xeljanz and celebrex.     Plan:  -Continue xeljanz 11mg daily  -Continue celebrex as needed for joint pain  -Prednisone increase back up from 7.5mg daily to 10mg daily and decrease by 1mg every 7 days. New script for this has been placed.   -Labs due now, ordered. She will schedule an appointment to have these drawn  -Will plan to follow-up in about 2 months    High risk medication: Xeljanz  -No side effects by history/exam/available serologies  -Risks and benefits again discussed today  -Labs q6 months    Marc Arias MD  Rheumatology    I spent a total of 40 minutes on the date of service on chart review, patient video encounter, , documentation.      Subjective:   Interval history 1/28/22  Was already on prednisone 10mg and then xeljanz was added on Jan 3rd. Had complete resolution of psoriasis on scalp/inner ear. Hands also completely clear at that time. She then decreased to 7.5mg on 1/17 and for the first week was doing \"okay\", then this week noticed some pits on fingertips, some skin peeling. Yesterday and today has noticed some more skin peeling. Some more scalp irriation 3 days ago as well. She feels that since starting xeljanz this is the best improvement that she has found. Continues to take celebrex daily due to pain in joints without it. With celebrex, pain is resolved. If she does lots of activity (cleaning etc) will have some pain. No GI upset as she had with mobic. Not taking PPI. She has noticed some more fatigue in the AM and also more so in the afternoon. Uses clobetasol ointment and cream. Also topical for scalp. Uses them all at " "night. Using 0.05% clobetasol. Has continued to experience rare intermittent tender lymph nodes. Had last booster on August 21st. No interval infections. No fevers, chills, weight changes. Remains active. No noticeable side effects since starting xeljanz. ROS otherwise negative.     12/2/2021 interval history  Took her first dose of Cosentyx 200 mg daily on 11/23/2021.  Around that time tried to decrease her prednisone from 10 mg daily down to 7.5 mg daily though written to discuss she had return of joint pain, joint swelling and stiffness particularly of her DIPs, worsening of her nail disease and psoriatic lesions on the tips of her fingers.  Fatigue also more significant.  As a result she went back up to 10 mg daily and this improved some.  She is not been taking any NSAIDs since her last appointment despite her joint pain as the meloxicam previously prescribed it was giving her reflux/gastritis symptoms.  No injection site reaction from her Cosentyx.  No fevers or chills.  No weight changes.  Tolerating prednisone without issues.  Review of systems otherwise negative.    9/30/21 Interval history  Has been taking cosentyx without issues. Next dose on Friday and then switching to monthly. Has found that her skin is some improved over the fingertips. Though still some painful/burning sensation at the sites of prior lesions. This is improving. When she started prednisone, did have dramatic improvement. Though with taper improvement has been slower though has continued. Currently on 2.5mg daily. No side effects from low dose. Energy still \"not great\" though she thinks it is slowly improving. Going for walks 5 days per week 30-40 minutes per day. Reports that the pain is OK when doing activities, but has been avoiding activities that are painful like weeding/carrying heavy items with grocery shopping. Has continued with mobic 3-4x week. 15mg tabs each time. No GI side effects at this frequency/dose. Still some stiff " at DIPs. Though improved from prior. Did have lesion mid upper back and front groin which have improved since cosentyx/ prednisone. No fevers/chills. No interval infections. ROS otherwise negative.     August 12, 2021  Interval history  Had ?ear infection early July. Given ear drops by PCP. Had developed more psoriasis in new locations (palm of hand/scalp (10-15%)/lower stomach/low back/ and groin/gluteal cleft) where previously was on fingertips/nails only. Saw dermatology who prescribed topical, the topical has been somewhat helpful though has not resolves symptoms. She thinks that she has had 10-15% improvement with the topical. Spoke to pharmacist, who counseled her than can take up to 4 months for enbrel to take full effect. Gives herself injection every Sunday. After 5th injection, has itching/burning at the site. Takes Claritin already. Iced. Was told by pharmacist to try hydrocortisone, which she did. Decreased from 4-5 days of symptoms down to 3 days. From 5th injection onwards has had this reaction. Total of 8 injections so far. Joints much less of an issue compared to cutaneous involvement. If she has any joint pain, takes mobic. Roughly 1x per week. Around once per 7 -10days has extreme fatigue. Yesterday was significant. Slept until noon. Usually gets up at 630. Usually trouble sleeping, so this was unusual for her. No return of submandibular LAD. No fever, chills. No cough/sore throat. Some stiffness in the DIPs, morning predominant. Improves throughout the day. Some increase in pain this week. ROS otherwise negative.      6/17/21  Has advanced glaucoma/ dry eyes. Chronic. No new head/neck symptoms. Dry mouth during submandibular gland swelling, dry mouth now resolved. Reports the swelling has mproved now by 50+%. No more pain, so no longer using ibuprofen. No fever/chills/cough or other signs/symptoms of infection. With ongoing progression of nail/distal digit predominant psoriasis and DIP arthritis.  Severe pain. Unable to tolerate NSAIDs given GI upset despite PPI. ROS otherwise negative       5/14/21  Patient initially was going to wait to contact, though has been having progression of symptoms of nails/joint pain/ skin. Last injection was on Tuesday. Had a flare in the days following her injection. First 2 doses, she did notice some improvement. Though after that time, with each dose, has had less and less of a positive change in nails/joints/skin. Has instead been getting worse and worse response. Has stiffness and pain in her DIPs that has been progressive. Has redness as well. Also with radiation of the pain proximally which is new. Also some days with severe fatigue. No fevers/chills. No weight changes. No interval infections.     Past Medical History  Past Medical History:   Diagnosis Date     Nuclear senile cataract of both eyes 8/16/2018     Psoriatic arthropathy (H) 10/19/2020     Past Surgical History  Past Surgical History:   Procedure Laterality Date     BIOPSY  07/30/2019    the right breast tissue(benign)     EYE SURGERY      3 surgeries for glaucoma treatment     Medications  Current Outpatient Medications   Medication     CALCIUM CARBONATE-VIT D-MIN PO     celecoxib (CELEBREX) 100 MG capsule     clobetasol (TEMOVATE) 0.05 % external cream     clobetasol (TEMOVATE) 0.05 % external ointment     fluocinonide (LIDEX) 0.05 % external solution     gabapentin (NEURONTIN) 300 MG capsule     loratadine (CLARITIN) 10 MG capsule     Multiple Vitamins-Minerals (MULTIVITAMIN ADULTS 50+) TABS     mupirocin (BACTROBAN) 2 % external ointment     predniSONE (DELTASONE) 2.5 MG tablet     predniSONE (DELTASONE) 2.5 MG tablet     tofacitinib (XELJANZ XR) 11 MG 24 hr tablet     meloxicam (MOBIC) 7.5 MG tablet     omeprazole (PRILOSEC) 20 MG DR capsule     Secukinumab, 300 MG Dose, (COSENTYX SENSOREADY, 300 MG,) 150 MG/ML SOAJ     Secukinumab, 300 MG Dose, (COSENTYX, 300 MG DOSE,) 150 MG/ML SOSY     No current  facility-administered medications for this visit.     Allergies   Allergies   Allergen Reactions     Adhesive Tape Blisters, Itching and Swelling     Dust Mites      Latex Blisters, Itching, Other (See Comments) and Swelling     Skin sensitivity       Morphine Sulfate (Concentrate) Itching     Other reaction(s): Chills  heart palpatations     Seasonal Allergies      Codeine Palpitations     chills     Family History:   No family history of autoimmune diseases.     Social History  , with children. 2 kids and they are healthy. No ETOH, smoking, drug use.      Objective:    Physical exam:  There were no vitals taken for this visit.  GEN: Sitting up unassisted. NAD  HEENT: no facial rash, sclera clear, no inflammatory nose/ external ear changes  CV: No upper extremity edema  Pulm: Breathing comfortably, no audible wheezing, no use accessory muscles, speaking in full sentences  Abdomen: Not distended  Skin: psoriatic nail disease, severe  MSK: full active ROM of bilateral shoulders, elbows, wrists, MCPs, PIPs. DIPs of Digits 3-5 on the left hand and digit 3 on the right hand without erythema as was previously seen.  Denies tenderness to self palpation as prior. Has psoriatic changes of fingernails of digits 3-5 on the left hand and digit 3 on the right hand- unchanged.    Labs  WBC   Date Value Ref Range Status   06/11/2021 9.4 4.0 - 11.0 10e9/L Final     WBC Count   Date Value Ref Range Status   02/01/2022 8.4 4.0 - 11.0 10e3/uL Final     Hemoglobin   Date Value Ref Range Status   02/01/2022 13.4 11.7 - 15.7 g/dL Final   06/11/2021 12.9 11.7 - 15.7 g/dL Final     Platelet Count   Date Value Ref Range Status   02/01/2022 255 150 - 450 10e3/uL Final   06/11/2021 369 150 - 450 10e9/L Final     Creatinine   Date Value Ref Range Status   02/01/2022 0.70 0.52 - 1.04 mg/dL Final   06/11/2021 0.63 0.52 - 1.04 mg/dL Final     Lab Results   Component Value Date    ALKPHOS 56 02/01/2022    ALKPHOS 76 06/11/2021     AST    Date Value Ref Range Status   02/01/2022 16 0 - 45 U/L Final   06/11/2021 14 0 - 45 U/L Final     Lab Results   Component Value Date    ALT 28 02/01/2022    ALT 19 06/11/2021     Sed Rate   Date Value Ref Range Status   06/16/2021 15 0 - 30 mm/h Final     Erythrocyte Sedimentation Rate   Date Value Ref Range Status   02/01/2022 4 0 - 30 mm/hr Final     CRP Inflammation   Date Value Ref Range Status   02/01/2022 <2.9 0.0 - 8.0 mg/L Final   06/16/2021 5.2 0.0 - 8.0 mg/L Final     UA RESULTS:  No results for input(s): COLOR, APPEARANCE, URINEGLC, URINEBILI, URINEKETONE, SG, UBLD, URINEPH, PROTEIN, UROBILINOGEN, NITRITE, LEUKEST, RBCU, WBCU in the last [id number] hours.   Rheumatoid Factor   Date Value Ref Range Status   02/05/2021 <7 <12 IU/mL Final         6/16/21  SSA, SSB negative  Mumps IgG and IgM positive  Creatinine 0.63  LFTs normal  CBC WNL    2/5/21  HIV negative  Hep C AB negative  Hep B s AG negative  Hep B c AB reactive  Quant gold negative  Quant gold negative  RF negative  CRP <2.9  ESR 10  Cr 0.62  WBC 7.1  HGB 13.1    HLA b27 negative  Hep B virus DNA negative    2/2/21  ESR 9  CRP <2.9  WBC 6.1  HGB 12.8      11/11/20  A1C 5.3  Hep C/ HIV negative    Imaging:  MR left hand  Impression:  1a. Correlating to the marker at the third digit interphalangeal  joint, there is a focal erosion along the volar aspect of the distal  phalangeal base with intense bone marrow edema and abnormal T1 marrow  signal. Findings are concerning for osteomyelitis and infection should  be ruled out. Also on the differential is sequelae of long-standing  active inflammatory arthropathy (including psoriatic arthritis given  patient's clinical history) where marginal erosions and marrow signal  abnormality are expected though the degree of T1 marrow replacement is  out of proportion to typical findings.   b. Subtle bone marrow edema in the middle phalanx without T1 marrow  signal abnormality, this is favored to  represent reactive edema.  c. Joint effusion at the third digit interphalangeal joint,  potentially infectious or inflammatory. Consider aspiration for  definitive diagnosis.  d. The partially visualized fifth digit shows edema within the distal  phalanx and head of the middle phalanx with regional soft tissue  edema. Given predominant findings in the third digit, similar changes  in the fifth digit could support a polyarticular inflammatory  arthropathy. Correlate clinically.  2. Tenosynovitis of the third digit flexor and extensor tendons. There  is also tendinosis with high-grade (near full-thickness) tearing of  the extensor tendon/extensor saul at the distal phalangeal base with  slight proximal retraction of the residual fibers.  3. Diffusely thickened, edematous ulnar and radial collateral  ligaments, likely combination of sprain and reactive edema.  4. Extensive soft tissue edema centered about the interphalangeal  joint.       Again, thank you for allowing me to participate in the care of your patient.      Sincerely,    Marc Arias MD

## 2022-02-01 ENCOUNTER — LAB (OUTPATIENT)
Dept: LAB | Facility: CLINIC | Age: 62
End: 2022-02-01
Payer: COMMERCIAL

## 2022-02-01 DIAGNOSIS — L40.50 PSORIATIC ARTHRITIS (H): ICD-10-CM

## 2022-02-01 LAB
ALBUMIN SERPL-MCNC: 4.1 G/DL (ref 3.4–5)
ALP SERPL-CCNC: 56 U/L (ref 40–150)
ALT SERPL W P-5'-P-CCNC: 28 U/L (ref 0–50)
ANION GAP SERPL CALCULATED.3IONS-SCNC: 7 MMOL/L (ref 3–14)
AST SERPL W P-5'-P-CCNC: 16 U/L (ref 0–45)
BASOPHILS # BLD AUTO: 0 10E3/UL (ref 0–0.2)
BASOPHILS NFR BLD AUTO: 0 %
BILIRUB SERPL-MCNC: 0.5 MG/DL (ref 0.2–1.3)
BUN SERPL-MCNC: 16 MG/DL (ref 7–30)
CALCIUM SERPL-MCNC: 9.7 MG/DL (ref 8.5–10.1)
CHLORIDE BLD-SCNC: 107 MMOL/L (ref 94–109)
CO2 SERPL-SCNC: 24 MMOL/L (ref 20–32)
CREAT SERPL-MCNC: 0.7 MG/DL (ref 0.52–1.04)
CRP SERPL-MCNC: <2.9 MG/L (ref 0–8)
EOSINOPHIL # BLD AUTO: 0.1 10E3/UL (ref 0–0.7)
EOSINOPHIL NFR BLD AUTO: 1 %
ERYTHROCYTE [DISTWIDTH] IN BLOOD BY AUTOMATED COUNT: 13.2 % (ref 10–15)
ERYTHROCYTE [SEDIMENTATION RATE] IN BLOOD BY WESTERGREN METHOD: 4 MM/HR (ref 0–30)
GFR SERPL CREATININE-BSD FRML MDRD: >90 ML/MIN/1.73M2
GLUCOSE BLD-MCNC: 105 MG/DL (ref 70–99)
HBV SURFACE AG SERPL QL IA: NONREACTIVE
HCT VFR BLD AUTO: 40.4 % (ref 35–47)
HGB BLD-MCNC: 13.4 G/DL (ref 11.7–15.7)
HIV 1+2 AB+HIV1 P24 AG SERPL QL IA: NONREACTIVE
IMM GRANULOCYTES # BLD: 0 10E3/UL
IMM GRANULOCYTES NFR BLD: 1 %
LYMPHOCYTES # BLD AUTO: 3.3 10E3/UL (ref 0.8–5.3)
LYMPHOCYTES NFR BLD AUTO: 39 %
MCH RBC QN AUTO: 29.9 PG (ref 26.5–33)
MCHC RBC AUTO-ENTMCNC: 33.2 G/DL (ref 31.5–36.5)
MCV RBC AUTO: 90 FL (ref 78–100)
MONOCYTES # BLD AUTO: 0.5 10E3/UL (ref 0–1.3)
MONOCYTES NFR BLD AUTO: 6 %
NEUTROPHILS # BLD AUTO: 4.4 10E3/UL (ref 1.6–8.3)
NEUTROPHILS NFR BLD AUTO: 53 %
PLATELET # BLD AUTO: 255 10E3/UL (ref 150–450)
POTASSIUM BLD-SCNC: 3.9 MMOL/L (ref 3.4–5.3)
PROT SERPL-MCNC: 7.7 G/DL (ref 6.8–8.8)
RBC # BLD AUTO: 4.48 10E6/UL (ref 3.8–5.2)
SODIUM SERPL-SCNC: 138 MMOL/L (ref 133–144)
WBC # BLD AUTO: 8.4 10E3/UL (ref 4–11)

## 2022-02-01 PROCEDURE — 86140 C-REACTIVE PROTEIN: CPT

## 2022-02-01 PROCEDURE — 85025 COMPLETE CBC W/AUTO DIFF WBC: CPT

## 2022-02-01 PROCEDURE — 87389 HIV-1 AG W/HIV-1&-2 AB AG IA: CPT

## 2022-02-01 PROCEDURE — 87340 HEPATITIS B SURFACE AG IA: CPT

## 2022-02-01 PROCEDURE — 36415 COLL VENOUS BLD VENIPUNCTURE: CPT

## 2022-02-01 PROCEDURE — 86334 IMMUNOFIX E-PHORESIS SERUM: CPT | Performed by: PATHOLOGY

## 2022-02-01 PROCEDURE — 80053 COMPREHEN METABOLIC PANEL: CPT

## 2022-02-01 PROCEDURE — 85652 RBC SED RATE AUTOMATED: CPT

## 2022-02-02 LAB — PROT PATTERN SERPL IFE-IMP: NORMAL

## 2022-03-25 ENCOUNTER — VIRTUAL VISIT (OUTPATIENT)
Dept: RHEUMATOLOGY | Facility: CLINIC | Age: 62
End: 2022-03-25
Attending: INTERNAL MEDICINE
Payer: COMMERCIAL

## 2022-03-25 DIAGNOSIS — Z79.52 LONG TERM (CURRENT) USE OF SYSTEMIC STEROIDS: ICD-10-CM

## 2022-03-25 DIAGNOSIS — Z79.899 HIGH RISK MEDICATION USE: ICD-10-CM

## 2022-03-25 DIAGNOSIS — Z79.1 NSAID LONG-TERM USE: ICD-10-CM

## 2022-03-25 DIAGNOSIS — L40.50 PSORIATIC ARTHRITIS (H): Primary | ICD-10-CM

## 2022-03-25 PROCEDURE — 99214 OFFICE O/P EST MOD 30 MIN: CPT | Performed by: INTERNAL MEDICINE

## 2022-03-25 RX ORDER — CELECOXIB 100 MG/1
100 CAPSULE ORAL DAILY PRN
Qty: 90 CAPSULE | Refills: 1 | Status: SHIPPED | OUTPATIENT
Start: 2022-03-25 | End: 2023-04-28

## 2022-03-25 NOTE — LETTER
3/25/2022     RE: Willa Downey  [address]  [facility] [address]     Dear Colleague,    Thank you for referring your patient, Willa Downey, to the [facility] [place] at [facility]. Please see a copy of my visit note below.    Willa is a 61 year old who was scheduled for a billable video visit though unable to connect to [place] or [place] and so transition to phone call.  Length of phone call was 23 minutes.    I spent a total of 30 minutes on the date of service (23 minutes on the phone and 7 additional minutes outside of this phone call) on chart review, documentation, .    Marc Arias MD  Rheumatology      Outpatient Rheumatology Follow-up    Name: Willa Downey    MRN [id number]   Today's date: 3/25/2022           Reason for follow-up: psoriatic arthritis   Requesting physician: Kitty Mendez MD        Assessment & Plan:   #Psoriatic arthritis  #negative RF  #psoriasis, cutaneous and nail involvement  #DIP Left hand 3rd, 4th, 5th and right hand 4th digit inflammatory arthritis  #humira, enbrel, methotrexate failure  #Submandibular LAD- resolved  #High risk drug therapy: xeljanz  #Long term, current, systemic steroid use    61 year old female with hx of advanced glaucoma followed closely by ophthalmology presented to rheumatology on 2/5/21 with 5 month of progressive left hand 3rd, 4th, 5th digit and right hand 4th digit DIP inflammatory arthritis in the context of psoriasis, psoriatic changes of the nails. Taken together most consistent with psoriatic arthritis. Tried on 2 NSAIDs which failed to control symptoms, and had severe GERD symptoms. Tried methotrexate by prior rheumatologist and disease not controlled. Humira started Mid February 2021 due to lack of efficacy of these other therapies and continued through May 14th, though had severe/ rapidly progressive disease after some improvement  in the first 4 weeks involving DIP inflammatory arthritis and nail disease. We then ordered Enbrel, though starting was delayed due to development of impressive bilateral submandibular lymphadenopathy which resolved within about 1 week and thought likely secondary to viral infection. She started enbrel on 6/20/2021 which she had continued on until switching to cosentyx after approval on 8/31/21 due to lack of efficacy of enbrel and injection site reaction. Had been on cosentyx from 8/31/21 with initial improvement in inflammatory DIP arthritis, psoriatic lesions, stabilization of her nail disease though she then had progression of her cutaneous nail and joint inflammatory process which prompted restarting prednisone at 10 mg daily along with doubling her dose of Cosentyx to 300 mg each month.  She took her first dose of 300 mg on 11/23/2021.  She did try to decrease her prednisone shortly after that down to 7.5 though noticed return of her skin peeling/psoriatic skin lesions worsening and so went back up to 10 mg daily which has again improved these symptoms. She was again unable to taper from prednisone below 10mg daily without progression of both cutaneous and articular symptoms and so cosentyx was discontinued and xeljanz 11mg once daily started after insurance approval on 12/29/21 and has continued on this through today's appointment.     Psoriatic Arthritis: Has had excellent response to xeljanz in re: to both cutaneous and inflammatory arthritis findings. We have been able to taper her prednisone down to lowest dose (currently on 3mg daily) since meeting, which is a big improvement over other prior therapies. Still takes celebrex qAM for joint pain, which is reasonable and not uncommon. Disease activity is currently still active, though improving. Working on getting her off of prednisone and avoiding rebound in cutaneous component.    Plan:  -Continue xeljanz 11mg daily  -Continue celebrex as needed for joint  "pain- refilled  -Prednisone: 3mg daily x10 days, then 2mg daily x10 days, then 1mg daily x10 days  -She will get her covid vaccine now, while remaining on xeljanz, knowing that her AB response will be slightly less. Once eligible for evusheld, will get this.     High risk medication: Xeljanz  -No side effects by history/exam/available serologies from 2/1/22 without evidence of toxicity  -Risks and benefits again discussed today  -Labs q6 months     Long term, current, systemic steroid use: current dose of 3mg daily. Taper above  Long term NSAID use: celebrex  -Labs q6 months for monitoring. Standing orders in place. Reviewed labs from 2/1/22 without evidence of toxicity    Follow-up at the end of June     Marc Arias MD  Rheumatology     Subjective:   Interval history 3/25/2022  Currently on 3mg of prednisone daily. Still with pink/ reddish spot on her scalp and left digits at tips of digits, though much improved. Has been tolerating her prednisone and xeljanz without issues. She did by accident take an increased dose of prednisone up to 10mg for 2 days when she was to reduce from 4 to 3mg due to having a different bottle of 2.5mg tabs (instead of the 1mg tabs she was taking). Her pain in the AM is 6/10. Takes her celebrex in the AM with pain resolution. No GI side effects from her celebrex. No fevers/chills/night sweats. She has noticed slight increase in the lesions of her digits since decreasing her prednisone down to 3mg for 5 days. Reports feeling well and that this is the best she has felt with DMARD therapy and being able to reduce her dose. No interval infections. ROS otherwise negative.     Interval history 1/28/22  Was already on prednisone 10mg and then xeljanz was added on Jan 3rd. Had complete resolution of psoriasis on scalp/inner ear. Hands also completely clear at that time. She then decreased to 7.5mg on 1/17 and for the first week was doing \"okay\", then this week noticed some pits on " fingertips, some skin peeling. Yesterday and today has noticed some more skin peeling. Some more scalp irriation 3 days ago as well. She feels that since starting xeljanz this is the best improvement that she has found. Continues to take celebrex daily due to pain in joints without it. With celebrex, pain is resolved. If she does lots of activity (cleaning etc) will have some pain. No GI upset as she had with mobic. Not taking PPI. She has noticed some more fatigue in the AM and also more so in the afternoon. Uses clobetasol ointment and cream. Also topical for scalp. Uses them all at night. Using 0.05% clobetasol. Has continued to experience rare intermittent tender lymph nodes. Had last booster on August 21st. No interval infections. No fevers, chills, weight changes. Remains active. No noticeable side effects since starting xeljanz. ROS otherwise negative.     12/2/2021 interval history  Took her first dose of Cosentyx 200 mg daily on 11/23/2021.  Around that time tried to decrease her prednisone from 10 mg daily down to 7.5 mg daily though written to discuss she had return of joint pain, joint swelling and stiffness particularly of her DIPs, worsening of her nail disease and psoriatic lesions on the tips of her fingers.  Fatigue also more significant.  As a result she went back up to 10 mg daily and this improved some.  She is not been taking any NSAIDs since her last appointment despite her joint pain as the meloxicam previously prescribed it was giving her reflux/gastritis symptoms.  No injection site reaction from her Cosentyx.  No fevers or chills.  No weight changes.  Tolerating prednisone without issues.  Review of systems otherwise negative.    9/30/21 Interval history  Has been taking cosentyx without issues. Next dose on Friday and then switching to monthly. Has found that her skin is some improved over the fingertips. Though still some painful/burning sensation at the sites of prior lesions. This is  "improving. When she started prednisone, did have dramatic improvement. Though with taper improvement has been slower though has continued. Currently on 2.5mg daily. No side effects from low dose. Energy still \"not great\" though she thinks it is slowly improving. Going for walks 5 days per week 30-40 minutes per day. Reports that the pain is OK when doing activities, but has been avoiding activities that are painful like weeding/carrying heavy items with grocery shopping. Has continued with mobic 3-4x week. 15mg tabs each time. No GI side effects at this frequency/dose. Still some stiff at DIPs. Though improved from prior. Did have lesion mid upper back and front groin which have improved since cosentyx/ prednisone. No fevers/chills. No interval infections. ROS otherwise negative.     August 12, 2021  Interval history  Had ?ear infection early July. Given ear drops by PCP. Had developed more psoriasis in new locations (palm of hand/scalp (10-15%)/lower stomach/low back/ and groin/gluteal cleft) where previously was on fingertips/nails only. Saw dermatology who prescribed topical, the topical has been somewhat helpful though has not resolves symptoms. She thinks that she has had 10-15% improvement with the topical. Spoke to pharmacist, who counseled her than can take up to 4 months for enbrel to take full effect. Gives herself injection every Sunday. After 5th injection, has itching/burning at the site. Takes Claritin already. Iced. Was told by pharmacist to try hydrocortisone, which she did. Decreased from 4-5 days of symptoms down to 3 days. From 5th injection onwards has had this reaction. Total of 8 injections so far. Joints much less of an issue compared to cutaneous involvement. If she has any joint pain, takes mobic. Roughly 1x per week. Around once per 7 -10days has extreme fatigue. Yesterday was significant. Slept until noon. Usually gets up at 630. Usually trouble sleeping, so this was unusual for her. No " return of submandibular LAD. No fever, chills. No cough/sore throat. Some stiffness in the DIPs, morning predominant. Improves throughout the day. Some increase in pain this week. ROS otherwise negative.      6/17/21  Has advanced glaucoma/ dry eyes. Chronic. No new head/neck symptoms. Dry mouth during submandibular gland swelling, dry mouth now resolved. Reports the swelling has mproved now by 50+%. No more pain, so no longer using ibuprofen. No fever/chills/cough or other signs/symptoms of infection. With ongoing progression of nail/distal digit predominant psoriasis and DIP arthritis. Severe pain. Unable to tolerate NSAIDs given GI upset despite PPI. ROS otherwise negative       5/14/21  Patient initially was going to wait to contact, though has been having progression of symptoms of nails/joint pain/ skin. Last injection was on Tuesday. Had a flare in the days following her injection. First 2 doses, she did notice some improvement. Though after that time, with each dose, has had less and less of a positive change in nails/joints/skin. Has instead been getting worse and worse response. Has stiffness and pain in her DIPs that has been progressive. Has redness as well. Also with radiation of the pain proximally which is new. Also some days with severe fatigue. No fevers/chills. No weight changes. No interval infections.     Past Medical History  Past Medical History:   Diagnosis Date     Nuclear senile cataract of both eyes 8/16/2018     Psoriatic arthropathy (H) 10/19/2020     Past Surgical History  Past Surgical History:   Procedure Laterality Date     BIOPSY  07/30/2019    the right breast tissue(benign)     EYE SURGERY      3 surgeries for glaucoma treatment     Medications  Current Outpatient Medications   Medication     CALCIUM CARBONATE-VIT D-MIN PO     celecoxib (CELEBREX) 100 MG capsule     clobetasol (TEMOVATE) 0.05 % external cream     fluocinonide (LIDEX) 0.05 % external solution     gabapentin  (NEURONTIN) 300 MG capsule     loratadine (CLARITIN) 10 MG capsule     Multiple Vitamins-Minerals (MULTIVITAMIN ADULTS 50+) TABS     predniSONE (DELTASONE) 1 MG tablet     tofacitinib (XELJANZ XR) 11 MG 24 hr tablet     clobetasol (TEMOVATE) 0.05 % external ointment     meloxicam (MOBIC) 7.5 MG tablet     mupirocin (BACTROBAN) 2 % external ointment     omeprazole (PRILOSEC) 20 MG DR capsule     Secukinumab, 300 MG Dose, (COSENTYX SENSOREADY, 300 MG,) 150 MG/ML SOAJ     Secukinumab, 300 MG Dose, (COSENTYX, 300 MG DOSE,) 150 MG/ML SOSY     No current facility-administered medications for this visit.     Allergies   Allergies   Allergen Reactions     Adhesive Tape Blisters, Itching and Swelling     Dust Mites      Latex Blisters, Itching, Other (See Comments) and Swelling     Skin sensitivity       Morphine Sulfate (Concentrate) Itching     Other reaction(s): Chills  heart palpatations     Seasonal Allergies      Codeine Palpitations     chills     Family History:   No family history of autoimmune diseases.     Social History  , with children. 2 kids and they are healthy. No ETOH, smoking, drug use.      Objective:    Physical exam:  There were no vitals taken for this visit.  GEN: Sitting up unassisted. NAD  HEENT: no facial rash, sclera clear, no inflammatory nose/ external ear changes  CV: No upper extremity edema  Pulm: Breathing comfortably, no audible wheezing, no use accessory muscles, speaking in full sentences  Abdomen: Not distended  Skin: psoriatic nail disease, severe  MSK: full active ROM of bilateral shoulders, elbows, wrists, MCPs, PIPs. DIPs of Digits 3-5 on the left hand and digit 3 on the right hand without erythema. Has psoriatic changes of fingernails of digits 3-5 on the left hand and digit 3 on the right hand- some improved.    Labs  WBC   Date Value Ref Range Status   06/11/2021 9.4 4.0 - 11.0 10e9/L Final     WBC Count   Date Value Ref Range Status   02/01/2022 8.4 4.0 - 11.0 10e3/uL  Final     Hemoglobin   Date Value Ref Range Status   02/01/2022 13.4 11.7 - 15.7 g/dL Final   06/11/2021 12.9 11.7 - 15.7 g/dL Final     Platelet Count   Date Value Ref Range Status   02/01/2022 255 150 - 450 10e3/uL Final   06/11/2021 369 150 - 450 10e9/L Final     Creatinine   Date Value Ref Range Status   02/01/2022 0.70 0.52 - 1.04 mg/dL Final   06/11/2021 0.63 0.52 - 1.04 mg/dL Final     Lab Results   Component Value Date    ALKPHOS 56 02/01/2022    ALKPHOS 76 06/11/2021     AST   Date Value Ref Range Status   02/01/2022 16 0 - 45 U/L Final   06/11/2021 14 0 - 45 U/L Final     Lab Results   Component Value Date    ALT 28 02/01/2022    ALT 19 06/11/2021     Sed Rate   Date Value Ref Range Status   06/16/2021 15 0 - 30 mm/h Final     Erythrocyte Sedimentation Rate   Date Value Ref Range Status   02/01/2022 4 0 - 30 mm/hr Final     CRP Inflammation   Date Value Ref Range Status   02/01/2022 <2.9 0.0 - 8.0 mg/L Final   06/16/2021 5.2 0.0 - 8.0 mg/L Final     UA RESULTS:  No results for input(s): COLOR, APPEARANCE, URINEGLC, URINEBILI, URINEKETONE, SG, UBLD, URINEPH, PROTEIN, UROBILINOGEN, NITRITE, LEUKEST, RBCU, WBCU in the last [id number] hours.   Rheumatoid Factor   Date Value Ref Range Status   02/05/2021 <7 <12 IU/mL Final       6/16/21  SSA, SSB negative  Mumps IgG and IgM positive  Creatinine 0.63  LFTs normal  CBC WNL    2/5/21  HIV negative  Hep C AB negative  Hep B s AG negative  Hep B c AB reactive  Quant gold negative  Quant gold negative  RF negative  CRP <2.9  ESR 10  Cr 0.62  WBC 7.1  HGB 13.1    HLA b27 negative  Hep B virus DNA negative    2/2/21  ESR 9  CRP <2.9  WBC 6.1  HGB 12.8      11/11/20  A1C 5.3  Hep C/ HIV negative    Imaging:  MR left hand  Impression:  1a. Correlating to the marker at the third digit interphalangeal  joint, there is a focal erosion along the volar aspect of the distal  phalangeal base with intense bone marrow edema and abnormal T1 marrow  signal. Findings are  concerning for osteomyelitis and infection should  be ruled out. Also on the differential is sequelae of long-standing  active inflammatory arthropathy (including psoriatic arthritis given  patient's clinical history) where marginal erosions and marrow signal  abnormality are expected though the degree of T1 marrow replacement is  out of proportion to typical findings.   b. Subtle bone marrow edema in the middle phalanx without T1 marrow  signal abnormality, this is favored to represent reactive edema.  c. Joint effusion at the third digit interphalangeal joint,  potentially infectious or inflammatory. Consider aspiration for  definitive diagnosis.  d. The partially visualized fifth digit shows edema within the distal  phalanx and head of the middle phalanx with regional soft tissue  edema. Given predominant findings in the third digit, similar changes  in the fifth digit could support a polyarticular inflammatory  arthropathy. Correlate clinically.  2. Tenosynovitis of the third digit flexor and extensor tendons. There  is also tendinosis with high-grade (near full-thickness) tearing of  the extensor tendon/extensor saul at the distal phalangeal base with  slight proximal retraction of the residual fibers.  3. Diffusely thickened, edematous ulnar and radial collateral  ligaments, likely combination of sprain and reactive edema.  4. Extensive soft tissue edema centered about the interphalangeal  joint.     Again, thank you for allowing me to participate in the care of your patient.      Sincerely,    Marc Arias MD

## 2022-03-25 NOTE — PROGRESS NOTES
Willa is a 61 year old who was scheduled for a billable video visit though unable to connect to [employer] or [employer] and so transition to phone call.  Length of phone call was 23 minutes.    I spent a total of 30 minutes on the date of service (23 minutes on the phone and 7 additional minutes outside of this phone call) on chart review, documentation, .    Marc Arias MD  Rheumatology      Outpatient Rheumatology Follow-up    Name: Willa Downey    MRN [id number]   Today's date: 3/25/2022           Reason for follow-up: psoriatic arthritis   Requesting physician: Kitty Mendez MD        Assessment & Plan:   #Psoriatic arthritis  #negative RF  #psoriasis, cutaneous and nail involvement  #DIP Left hand 3rd, 4th, 5th and right hand 4th digit inflammatory arthritis  #humira, enbrel, methotrexate failure  #Submandibular LAD- resolved  #High risk drug therapy: xeljanz  #Long term, current, systemic steroid use    61 year old female with hx of advanced glaucoma followed closely by ophthalmology presented to rheumatology on 2/5/21 with 5 month of progressive left hand 3rd, 4th, 5th digit and right hand 4th digit DIP inflammatory arthritis in the context of psoriasis, psoriatic changes of the nails. Taken together most consistent with psoriatic arthritis. Tried on 2 NSAIDs which failed to control symptoms, and had severe GERD symptoms. Tried methotrexate by prior rheumatologist and disease not controlled. Humira started Mid February 2021 due to lack of efficacy of these other therapies and continued through May 14th, though had severe/ rapidly progressive disease after some improvement in the first 4 weeks involving DIP inflammatory arthritis and nail disease. We then ordered Enbrel, though starting was delayed due to development of impressive bilateral submandibular lymphadenopathy which resolved within about 1 week and thought likely secondary to viral infection. She started enbrel on 6/20/2021 which  she had continued on until switching to cosentyx after approval on 8/31/21 due to lack of efficacy of enbrel and injection site reaction. Had been on cosentyx from 8/31/21 with initial improvement in inflammatory DIP arthritis, psoriatic lesions, stabilization of her nail disease though she then had progression of her cutaneous nail and joint inflammatory process which prompted restarting prednisone at 10 mg daily along with doubling her dose of Cosentyx to 300 mg each month.  She took her first dose of 300 mg on 11/23/2021.  She did try to decrease her prednisone shortly after that down to 7.5 though noticed return of her skin peeling/psoriatic skin lesions worsening and so went back up to 10 mg daily which has again improved these symptoms. She was again unable to taper from prednisone below 10mg daily without progression of both cutaneous and articular symptoms and so cosentyx was discontinued and xeljanz 11mg once daily started after insurance approval on 12/29/21 and has continued on this through today's appointment.     Psoriatic Arthritis: Has had excellent response to xeljanz in re: to both cutaneous and inflammatory arthritis findings. We have been able to taper her prednisone down to lowest dose (currently on 3mg daily) since meeting, which is a big improvement over other prior therapies. Still takes celebrex qAM for joint pain, which is reasonable and not uncommon. Disease activity is currently still active, though improving. Working on getting her off of prednisone and avoiding rebound in cutaneous component.    Plan:  -Continue xeljanz 11mg daily  -Continue celebrex as needed for joint pain- refilled  -Prednisone: 3mg daily x10 days, then 2mg daily x10 days, then 1mg daily x10 days  -She will get her covid vaccine now, while remaining on xeljanz, knowing that her AB response will be slightly less. Once eligible for evusheld, will get this.     High risk medication: Xeljanz  -No side effects by  "history/exam/available serologies from 2/1/22 without evidence of toxicity  -Risks and benefits again discussed today  -Labs q6 months     Long term, current, systemic steroid use: current dose of 3mg daily. Taper above  Long term NSAID use: celebrex  -Labs q6 months for monitoring. Standing orders in place. Reviewed labs from 2/1/22 without evidence of toxicity    Follow-up at the end of June     Marc Arias MD  Rheumatology     Subjective:   Interval history 3/25/2022  Currently on 3mg of prednisone daily. Still with pink/ reddish spot on her scalp and left digits at tips of digits, though much improved. Has been tolerating her prednisone and xeljanz without issues. She did by accident take an increased dose of prednisone up to 10mg for 2 days when she was to reduce from 4 to 3mg due to having a different bottle of 2.5mg tabs (instead of the 1mg tabs she was taking). Her pain in the AM is 6/10. Takes her celebrex in the AM with pain resolution. No GI side effects from her celebrex. No fevers/chills/night sweats. She has noticed slight increase in the lesions of her digits since decreasing her prednisone down to 3mg for 5 days. Reports feeling well and that this is the best she has felt with DMARD therapy and being able to reduce her dose. No interval infections. ROS otherwise negative.     Interval history 1/28/22  Was already on prednisone 10mg and then xeljanz was added on Jan 3rd. Had complete resolution of psoriasis on scalp/inner ear. Hands also completely clear at that time. She then decreased to 7.5mg on 1/17 and for the first week was doing \"okay\", then this week noticed some pits on fingertips, some skin peeling. Yesterday and today has noticed some more skin peeling. Some more scalp irriation 3 days ago as well. She feels that since starting xeljanz this is the best improvement that she has found. Continues to take celebrex daily due to pain in joints without it. With celebrex, pain is resolved. If " "she does lots of activity (cleaning etc) will have some pain. No GI upset as she had with mobic. Not taking PPI. She has noticed some more fatigue in the AM and also more so in the afternoon. Uses clobetasol ointment and cream. Also topical for scalp. Uses them all at night. Using 0.05% clobetasol. Has continued to experience rare intermittent tender lymph nodes. Had last booster on August 21st. No interval infections. No fevers, chills, weight changes. Remains active. No noticeable side effects since starting xeljanz. ROS otherwise negative.     12/2/2021 interval history  Took her first dose of Cosentyx 200 mg daily on 11/23/2021.  Around that time tried to decrease her prednisone from 10 mg daily down to 7.5 mg daily though written to discuss she had return of joint pain, joint swelling and stiffness particularly of her DIPs, worsening of her nail disease and psoriatic lesions on the tips of her fingers.  Fatigue also more significant.  As a result she went back up to 10 mg daily and this improved some.  She is not been taking any NSAIDs since her last appointment despite her joint pain as the meloxicam previously prescribed it was giving her reflux/gastritis symptoms.  No injection site reaction from her Cosentyx.  No fevers or chills.  No weight changes.  Tolerating prednisone without issues.  Review of systems otherwise negative.    9/30/21 Interval history  Has been taking cosentyx without issues. Next dose on Friday and then switching to monthly. Has found that her skin is some improved over the fingertips. Though still some painful/burning sensation at the sites of prior lesions. This is improving. When she started prednisone, did have dramatic improvement. Though with taper improvement has been slower though has continued. Currently on 2.5mg daily. No side effects from low dose. Energy still \"not great\" though she thinks it is slowly improving. Going for walks 5 days per week 30-40 minutes per day. Reports " that the pain is OK when doing activities, but has been avoiding activities that are painful like weeding/carrying heavy items with grocery shopping. Has continued with mobic 3-4x week. 15mg tabs each time. No GI side effects at this frequency/dose. Still some stiff at DIPs. Though improved from prior. Did have lesion mid upper back and front groin which have improved since cosentyx/ prednisone. No fevers/chills. No interval infections. ROS otherwise negative.     August 12, 2021  Interval history  Had ?ear infection early July. Given ear drops by PCP. Had developed more psoriasis in new locations (palm of hand/scalp (10-15%)/lower stomach/low back/ and groin/gluteal cleft) where previously was on fingertips/nails only. Saw dermatology who prescribed topical, the topical has been somewhat helpful though has not resolves symptoms. She thinks that she has had 10-15% improvement with the topical. Spoke to pharmacist, who counseled her than can take up to 4 months for enbrel to take full effect. Gives herself injection every Sunday. After 5th injection, has itching/burning at the site. Takes Claritin already. Iced. Was told by pharmacist to try hydrocortisone, which she did. Decreased from 4-5 days of symptoms down to 3 days. From 5th injection onwards has had this reaction. Total of 8 injections so far. Joints much less of an issue compared to cutaneous involvement. If she has any joint pain, takes mobic. Roughly 1x per week. Around once per 7 -10days has extreme fatigue. Yesterday was significant. Slept until noon. Usually gets up at 630. Usually trouble sleeping, so this was unusual for her. No return of submandibular LAD. No fever, chills. No cough/sore throat. Some stiffness in the DIPs, morning predominant. Improves throughout the day. Some increase in pain this week. ROS otherwise negative.      6/17/21  Has advanced glaucoma/ dry eyes. Chronic. No new head/neck symptoms. Dry mouth during submandibular gland  swelling, dry mouth now resolved. Reports the swelling has mproved now by 50+%. No more pain, so no longer using ibuprofen. No fever/chills/cough or other signs/symptoms of infection. With ongoing progression of nail/distal digit predominant psoriasis and DIP arthritis. Severe pain. Unable to tolerate NSAIDs given GI upset despite PPI. ROS otherwise negative       5/14/21  Patient initially was going to wait to contact, though has been having progression of symptoms of nails/joint pain/ skin. Last injection was on Tuesday. Had a flare in the days following her injection. First 2 doses, she did notice some improvement. Though after that time, with each dose, has had less and less of a positive change in nails/joints/skin. Has instead been getting worse and worse response. Has stiffness and pain in her DIPs that has been progressive. Has redness as well. Also with radiation of the pain proximally which is new. Also some days with severe fatigue. No fevers/chills. No weight changes. No interval infections.     Past Medical History  Past Medical History:   Diagnosis Date     Nuclear senile cataract of both eyes 8/16/2018     Psoriatic arthropathy (H) 10/19/2020     Past Surgical History  Past Surgical History:   Procedure Laterality Date     BIOPSY  07/30/2019    the right breast tissue(benign)     EYE SURGERY      3 surgeries for glaucoma treatment     Medications  Current Outpatient Medications   Medication     CALCIUM CARBONATE-VIT D-MIN PO     celecoxib (CELEBREX) 100 MG capsule     clobetasol (TEMOVATE) 0.05 % external cream     fluocinonide (LIDEX) 0.05 % external solution     gabapentin (NEURONTIN) 300 MG capsule     loratadine (CLARITIN) 10 MG capsule     Multiple Vitamins-Minerals (MULTIVITAMIN ADULTS 50+) TABS     predniSONE (DELTASONE) 1 MG tablet     tofacitinib (XELJANZ XR) 11 MG 24 hr tablet     clobetasol (TEMOVATE) 0.05 % external ointment     meloxicam (MOBIC) 7.5 MG tablet     mupirocin (BACTROBAN) 2 %  external ointment     omeprazole (PRILOSEC) 20 MG DR capsule     Secukinumab, 300 MG Dose, (COSENTYX SENSOREADY, 300 MG,) 150 MG/ML SOAJ     Secukinumab, 300 MG Dose, (COSENTYX, 300 MG DOSE,) 150 MG/ML SOSY     No current facility-administered medications for this visit.     Allergies   Allergies   Allergen Reactions     Adhesive Tape Blisters, Itching and Swelling     Dust Mites      Latex Blisters, Itching, Other (See Comments) and Swelling     Skin sensitivity       Morphine Sulfate (Concentrate) Itching     Other reaction(s): Chills  heart palpatations     Seasonal Allergies      Codeine Palpitations     chills     Family History:   No family history of autoimmune diseases.     Social History  , with children. 2 kids and they are healthy. No ETOH, smoking, drug use.      Objective:    Physical exam:  There were no vitals taken for this visit.  GEN: Sitting up unassisted. NAD  HEENT: no facial rash, sclera clear, no inflammatory nose/ external ear changes  CV: No upper extremity edema  Pulm: Breathing comfortably, no audible wheezing, no use accessory muscles, speaking in full sentences  Abdomen: Not distended  Skin: psoriatic nail disease, severe  MSK: full active ROM of bilateral shoulders, elbows, wrists, MCPs, PIPs. DIPs of Digits 3-5 on the left hand and digit 3 on the right hand without erythema. Has psoriatic changes of fingernails of digits 3-5 on the left hand and digit 3 on the right hand- some improved.    Labs  WBC   Date Value Ref Range Status   06/11/2021 9.4 4.0 - 11.0 10e9/L Final     WBC Count   Date Value Ref Range Status   02/01/2022 8.4 4.0 - 11.0 10e3/uL Final     Hemoglobin   Date Value Ref Range Status   02/01/2022 13.4 11.7 - 15.7 g/dL Final   06/11/2021 12.9 11.7 - 15.7 g/dL Final     Platelet Count   Date Value Ref Range Status   02/01/2022 255 150 - 450 10e3/uL Final   06/11/2021 369 150 - 450 10e9/L Final     Creatinine   Date Value Ref Range Status   02/01/2022 0.70 0.52 - 1.04  mg/dL Final   06/11/2021 0.63 0.52 - 1.04 mg/dL Final     Lab Results   Component Value Date    ALKPHOS 56 02/01/2022    ALKPHOS 76 06/11/2021     AST   Date Value Ref Range Status   02/01/2022 16 0 - 45 U/L Final   06/11/2021 14 0 - 45 U/L Final     Lab Results   Component Value Date    ALT 28 02/01/2022    ALT 19 06/11/2021     Sed Rate   Date Value Ref Range Status   06/16/2021 15 0 - 30 mm/h Final     Erythrocyte Sedimentation Rate   Date Value Ref Range Status   02/01/2022 4 0 - 30 mm/hr Final     CRP Inflammation   Date Value Ref Range Status   02/01/2022 <2.9 0.0 - 8.0 mg/L Final   06/16/2021 5.2 0.0 - 8.0 mg/L Final     UA RESULTS:  No results for input(s): COLOR, APPEARANCE, URINEGLC, URINEBILI, URINEKETONE, SG, UBLD, URINEPH, PROTEIN, UROBILINOGEN, NITRITE, LEUKEST, RBCU, WBCU in the last [id number] hours.   Rheumatoid Factor   Date Value Ref Range Status   02/05/2021 <7 <12 IU/mL Final       6/16/21  SSA, SSB negative  Mumps IgG and IgM positive  Creatinine 0.63  LFTs normal  CBC WNL    2/5/21  HIV negative  Hep C AB negative  Hep B s AG negative  Hep B c AB reactive  Quant gold negative  Quant gold negative  RF negative  CRP <2.9  ESR 10  Cr 0.62  WBC 7.1  HGB 13.1    HLA b27 negative  Hep B virus DNA negative    2/2/21  ESR 9  CRP <2.9  WBC 6.1  HGB 12.8      11/11/20  A1C 5.3  Hep C/ HIV negative    Imaging:  MR left hand  Impression:  1a. Correlating to the marker at the third digit interphalangeal  joint, there is a focal erosion along the volar aspect of the distal  phalangeal base with intense bone marrow edema and abnormal T1 marrow  signal. Findings are concerning for osteomyelitis and infection should  be ruled out. Also on the differential is sequelae of long-standing  active inflammatory arthropathy (including psoriatic arthritis given  patient's clinical history) where marginal erosions and marrow signal  abnormality are expected though the degree of T1 marrow replacement is  out of  proportion to typical findings.   b. Subtle bone marrow edema in the middle phalanx without T1 marrow  signal abnormality, this is favored to represent reactive edema.  c. Joint effusion at the third digit interphalangeal joint,  potentially infectious or inflammatory. Consider aspiration for  definitive diagnosis.  d. The partially visualized fifth digit shows edema within the distal  phalanx and head of the middle phalanx with regional soft tissue  edema. Given predominant findings in the third digit, similar changes  in the fifth digit could support a polyarticular inflammatory  arthropathy. Correlate clinically.  2. Tenosynovitis of the third digit flexor and extensor tendons. There  is also tendinosis with high-grade (near full-thickness) tearing of  the extensor tendon/extensor saul at the distal phalangeal base with  slight proximal retraction of the residual fibers.  3. Diffusely thickened, edematous ulnar and radial collateral  ligaments, likely combination of sprain and reactive edema.  4. Extensive soft tissue edema centered about the interphalangeal  joint.

## 2022-05-13 ASSESSMENT — ENCOUNTER SYMPTOMS
CONSTIPATION: 0
PARESTHESIAS: 0
ARTHRALGIAS: 1
HEARTBURN: 0
FREQUENCY: 0
EYE PAIN: 0
DYSURIA: 0
HEMATOCHEZIA: 0
NERVOUS/ANXIOUS: 0
SORE THROAT: 0
FEVER: 0
NAUSEA: 0
DIZZINESS: 0
MYALGIAS: 0
BREAST MASS: 0
HEADACHES: 0
PALPITATIONS: 0
COUGH: 0
JOINT SWELLING: 0
SHORTNESS OF BREATH: 0
HEMATURIA: 0
WEAKNESS: 0
DIARRHEA: 0
ABDOMINAL PAIN: 0
CHILLS: 0

## 2022-05-16 ENCOUNTER — OFFICE VISIT (OUTPATIENT)
Dept: FAMILY MEDICINE | Facility: CLINIC | Age: 62
End: 2022-05-16
Payer: COMMERCIAL

## 2022-05-16 VITALS
RESPIRATION RATE: 14 BRPM | WEIGHT: 144 LBS | BODY MASS INDEX: 24.59 KG/M2 | SYSTOLIC BLOOD PRESSURE: 130 MMHG | HEIGHT: 64 IN | TEMPERATURE: 97.3 F | DIASTOLIC BLOOD PRESSURE: 88 MMHG | HEART RATE: 72 BPM | OXYGEN SATURATION: 97 %

## 2022-05-16 DIAGNOSIS — Z12.11 SCREEN FOR COLON CANCER: ICD-10-CM

## 2022-05-16 DIAGNOSIS — Z00.00 ROUTINE GENERAL MEDICAL EXAMINATION AT A HEALTH CARE FACILITY: Primary | ICD-10-CM

## 2022-05-16 DIAGNOSIS — Z71.89 ADVANCED DIRECTIVES, COUNSELING/DISCUSSION: ICD-10-CM

## 2022-05-16 DIAGNOSIS — E78.5 HYPERLIPIDEMIA, UNSPECIFIED HYPERLIPIDEMIA TYPE: ICD-10-CM

## 2022-05-16 DIAGNOSIS — Z23 NEED FOR COVID-19 VACCINE: ICD-10-CM

## 2022-05-16 DIAGNOSIS — Z23 NEED FOR DIPHTHERIA-TETANUS-PERTUSSIS (TDAP) VACCINE: ICD-10-CM

## 2022-05-16 DIAGNOSIS — R40.0 DROWSINESS: ICD-10-CM

## 2022-05-16 DIAGNOSIS — D84.9 IMMUNOSUPPRESSION (H): ICD-10-CM

## 2022-05-16 DIAGNOSIS — H52.10 MYOPIA, UNSPECIFIED LATERALITY: ICD-10-CM

## 2022-05-16 DIAGNOSIS — M54.41 ACUTE RIGHT-SIDED LOW BACK PAIN WITH RIGHT-SIDED SCIATICA: ICD-10-CM

## 2022-05-16 DIAGNOSIS — M79.622 LEFT AXILLARY PAIN: ICD-10-CM

## 2022-05-16 DIAGNOSIS — Z13.1 SCREENING FOR DIABETES MELLITUS: ICD-10-CM

## 2022-05-16 DIAGNOSIS — M48.061 SPINAL STENOSIS OF LUMBAR REGION, UNSPECIFIED WHETHER NEUROGENIC CLAUDICATION PRESENT: ICD-10-CM

## 2022-05-16 DIAGNOSIS — L40.50 PSORIATIC ARTHROPATHY (H): ICD-10-CM

## 2022-05-16 DIAGNOSIS — L40.9 PSORIASIS OF NAIL: ICD-10-CM

## 2022-05-16 DIAGNOSIS — Z12.4 SCREENING FOR MALIGNANT NEOPLASM OF CERVIX: ICD-10-CM

## 2022-05-16 DIAGNOSIS — Z87.898 HISTORY OF SNORING: ICD-10-CM

## 2022-05-16 DIAGNOSIS — Z80.3 FAMILY HISTORY OF MALIGNANT NEOPLASM OF BREAST: ICD-10-CM

## 2022-05-16 DIAGNOSIS — H40.1233 LOW-TENSION GLAUCOMA OF BOTH EYES, SEVERE STAGE: ICD-10-CM

## 2022-05-16 DIAGNOSIS — R73.03 PREDIABETES: ICD-10-CM

## 2022-05-16 DIAGNOSIS — Z00.00 HEALTH CARE MAINTENANCE: ICD-10-CM

## 2022-05-16 DIAGNOSIS — M79.621 AXILLARY PAIN, RIGHT: ICD-10-CM

## 2022-05-16 PROBLEM — M19.90 OSTEOARTHRITIS: Status: RESOLVED | Noted: 2020-10-15 | Resolved: 2022-05-16

## 2022-05-16 LAB
ALBUMIN SERPL-MCNC: 4.2 G/DL (ref 3.4–5)
ALP SERPL-CCNC: 62 U/L (ref 40–150)
ALT SERPL W P-5'-P-CCNC: 21 U/L (ref 0–50)
ANION GAP SERPL CALCULATED.3IONS-SCNC: 3 MMOL/L (ref 3–14)
AST SERPL W P-5'-P-CCNC: 15 U/L (ref 0–45)
BASOPHILS # BLD AUTO: 0 10E3/UL (ref 0–0.2)
BASOPHILS NFR BLD AUTO: 0 %
BILIRUB SERPL-MCNC: 0.6 MG/DL (ref 0.2–1.3)
BUN SERPL-MCNC: 12 MG/DL (ref 7–30)
CALCIUM SERPL-MCNC: 10.1 MG/DL (ref 8.5–10.1)
CHLORIDE BLD-SCNC: 108 MMOL/L (ref 94–109)
CHOLEST SERPL-MCNC: 260 MG/DL
CO2 SERPL-SCNC: 28 MMOL/L (ref 20–32)
CREAT SERPL-MCNC: 0.67 MG/DL (ref 0.52–1.04)
EOSINOPHIL # BLD AUTO: 0.1 10E3/UL (ref 0–0.7)
EOSINOPHIL NFR BLD AUTO: 1 %
ERYTHROCYTE [DISTWIDTH] IN BLOOD BY AUTOMATED COUNT: 13.1 % (ref 10–15)
FASTING STATUS PATIENT QL REPORTED: NO
GFR SERPL CREATININE-BSD FRML MDRD: >90 ML/MIN/1.73M2
GLUCOSE BLD-MCNC: 95 MG/DL (ref 70–99)
HBA1C MFR BLD: 5.7 % (ref 0–5.6)
HCT VFR BLD AUTO: 38.1 % (ref 35–47)
HDLC SERPL-MCNC: 76 MG/DL
HGB BLD-MCNC: 12.4 G/DL (ref 11.7–15.7)
IMM GRANULOCYTES # BLD: 0 10E3/UL
IMM GRANULOCYTES NFR BLD: 0 %
LDLC SERPL CALC-MCNC: 147 MG/DL
LYMPHOCYTES # BLD AUTO: 2.5 10E3/UL (ref 0.8–5.3)
LYMPHOCYTES NFR BLD AUTO: 34 %
MCH RBC QN AUTO: 30.2 PG (ref 26.5–33)
MCHC RBC AUTO-ENTMCNC: 32.5 G/DL (ref 31.5–36.5)
MCV RBC AUTO: 93 FL (ref 78–100)
MONOCYTES # BLD AUTO: 0.5 10E3/UL (ref 0–1.3)
MONOCYTES NFR BLD AUTO: 6 %
NEUTROPHILS # BLD AUTO: 4.3 10E3/UL (ref 1.6–8.3)
NEUTROPHILS NFR BLD AUTO: 58 %
NONHDLC SERPL-MCNC: 184 MG/DL
PLATELET # BLD AUTO: 401 10E3/UL (ref 150–450)
POTASSIUM BLD-SCNC: 3.6 MMOL/L (ref 3.4–5.3)
PROT SERPL-MCNC: 7.6 G/DL (ref 6.8–8.8)
RBC # BLD AUTO: 4.11 10E6/UL (ref 3.8–5.2)
SODIUM SERPL-SCNC: 139 MMOL/L (ref 133–144)
TRIGL SERPL-MCNC: 186 MG/DL
TSH SERPL DL<=0.005 MIU/L-ACNC: 0.94 MU/L (ref 0.4–4)
WBC # BLD AUTO: 7.5 10E3/UL (ref 4–11)

## 2022-05-16 PROCEDURE — 99214 OFFICE O/P EST MOD 30 MIN: CPT | Mod: 25 | Performed by: FAMILY MEDICINE

## 2022-05-16 PROCEDURE — 36415 COLL VENOUS BLD VENIPUNCTURE: CPT | Performed by: FAMILY MEDICINE

## 2022-05-16 PROCEDURE — 90471 IMMUNIZATION ADMIN: CPT | Performed by: FAMILY MEDICINE

## 2022-05-16 PROCEDURE — 99396 PREV VISIT EST AGE 40-64: CPT | Mod: 25 | Performed by: FAMILY MEDICINE

## 2022-05-16 PROCEDURE — 87624 HPV HI-RISK TYP POOLED RSLT: CPT | Performed by: FAMILY MEDICINE

## 2022-05-16 PROCEDURE — 80061 LIPID PANEL: CPT | Performed by: FAMILY MEDICINE

## 2022-05-16 PROCEDURE — 80050 GENERAL HEALTH PANEL: CPT | Performed by: FAMILY MEDICINE

## 2022-05-16 PROCEDURE — G0145 SCR C/V CYTO,THINLAYER,RESCR: HCPCS | Performed by: FAMILY MEDICINE

## 2022-05-16 PROCEDURE — 83036 HEMOGLOBIN GLYCOSYLATED A1C: CPT | Performed by: FAMILY MEDICINE

## 2022-05-16 PROCEDURE — 90715 TDAP VACCINE 7 YRS/> IM: CPT | Performed by: FAMILY MEDICINE

## 2022-05-16 RX ORDER — PREDNISONE 2.5 MG/1
2.5 TABLET ORAL DAILY
COMMUNITY
End: 2023-04-18

## 2022-05-16 ASSESSMENT — ANXIETY QUESTIONNAIRES
1. FEELING NERVOUS, ANXIOUS, OR ON EDGE: NOT AT ALL
7. FEELING AFRAID AS IF SOMETHING AWFUL MIGHT HAPPEN: NOT AT ALL
6. BECOMING EASILY ANNOYED OR IRRITABLE: NOT AT ALL
7. FEELING AFRAID AS IF SOMETHING AWFUL MIGHT HAPPEN: NOT AT ALL
GAD7 TOTAL SCORE: 0
5. BEING SO RESTLESS THAT IT IS HARD TO SIT STILL: NOT AT ALL
4. TROUBLE RELAXING: NOT AT ALL
8. IF YOU CHECKED OFF ANY PROBLEMS, HOW DIFFICULT HAVE THESE MADE IT FOR YOU TO DO YOUR WORK, TAKE CARE OF THINGS AT HOME, OR GET ALONG WITH OTHER PEOPLE?: NOT DIFFICULT AT ALL
2. NOT BEING ABLE TO STOP OR CONTROL WORRYING: NOT AT ALL
GAD7 TOTAL SCORE: 0
3. WORRYING TOO MUCH ABOUT DIFFERENT THINGS: NOT AT ALL
GAD7 TOTAL SCORE: 0

## 2022-05-16 ASSESSMENT — ENCOUNTER SYMPTOMS
NAUSEA: 0
SHORTNESS OF BREATH: 0
COUGH: 0
FEVER: 0
DIARRHEA: 0
CHILLS: 0
DIZZINESS: 0
MYALGIAS: 0
EYE PAIN: 0
HEADACHES: 0
NERVOUS/ANXIOUS: 0
JOINT SWELLING: 0
SORE THROAT: 0
CONSTIPATION: 0
DYSURIA: 0
BREAST MASS: 0
FREQUENCY: 0
ARTHRALGIAS: 1
HEMATURIA: 0
PARESTHESIAS: 0
HEMATOCHEZIA: 0
ABDOMINAL PAIN: 0
PALPITATIONS: 0
WEAKNESS: 0
HEARTBURN: 0

## 2022-05-16 ASSESSMENT — PATIENT HEALTH QUESTIONNAIRE - PHQ9
SUM OF ALL RESPONSES TO PHQ QUESTIONS 1-9: 1
SUM OF ALL RESPONSES TO PHQ QUESTIONS 1-9: 1
10. IF YOU CHECKED OFF ANY PROBLEMS, HOW DIFFICULT HAVE THESE PROBLEMS MADE IT FOR YOU TO DO YOUR WORK, TAKE CARE OF THINGS AT HOME, OR GET ALONG WITH OTHER PEOPLE: NOT DIFFICULT AT ALL

## 2022-05-16 NOTE — PATIENT INSTRUCTIONS
Seen for preventive health and additional concerns today   Self breast check regularly   Mammogram due but due to left anterior armpit wall pain 1 month and right armpit wall pain 2 weeks recommend diagnostic mammogram and ultrasound bilaterally to assess symptoms.  Previously has had a biopsy on the right in 2019 that was normal and diagnostic imaging for a lump on the left in 2020 that was normal  Consider referral to a  to assess personal risk if have any family hx of breast, ovarian or bowel cancer  Pelvic / PAP. HPV obtained today.  No lesions seen on labia or perianal area.    History of hyperlipidemia.  Continue to eat healthy low-fat low carbohydrate diet.  Check labs today.  For history of drowsiness/ odalis post lunch & dinner, & hx of snoring. Post lunch drowsiness improved with exercise.  Also on gabapentin which may be contributing to drowsiness.  Recommend eating healthy, some weight loss, low carbohydrate intake, will check labs today.  And if no answer refer to a sleep specialist.  After the visit hemoglobin A1c did come back elevated in the prediabetes range.  In addition to above recommendations recommend rechecking hemoglobin A1c in about 6 months in a lab only apt.    History of osteoarthritis with spinal stenosis lumbar spine under care of sports doctor on gabapentin for pain relief taking 300 mg twice a day instead of the 3 times a day as prescribed, with good relief.    Psoriatic arthropathy currently well controlled on Xeljanz, previously has tried Humira, Enbrel, Cosentyx, also on Celebrex daily and a slow Pred taper currently down to 2.5 mg daily.  Celebrex may also be helping back pain.  Celebrex is an anti-inflammatory that can increase risk of GI bleed, decrease kidney function, raise blood pressure and prednisone can increase risk of diabetes and stomach upset.  Currently asymptomatic and doing well on these meds for psoriatic arthropathy which is well controlled.  Continue care  with your rheumatologist is managing these meds.    Psoriasis of nail and skin well controlled on clobetasol ointment to body and lidexsolution to scalp and genital area as needed.  Continue care with your dermatologist.    Continue calcium and vitamin D supplementation as at risk of osteoporosis on prednisone use and being postmenopausal.    Continue care with eye doctor for myopia and glaucoma.    Health care maintenance reviewed  Colon cancer screening due and referred for colonoscopy  Consider working on health care directives  Recommend Flu shot yearly & UTD  COVID-vaccine x4 up-to-date.  Follow-up with your rheumatologist regarding Evushield  Recommend Tdap every 10 yrs due and given today.  Vaccines may not mount a full response due to fact that you are on immunosuppressant medication but some vaccine is better than none.  Recommend Shingles vaccine starting at 50 & UTD  Pneumonia vaccine at 65 or earlier if any risk factors  If travelling out of the country recommend seeing the travel clinic to update appropriate shots etc  Labs today and will make further recommendations once reviewed    Return in 1 year for a preventive physical and sooner in an office visit for any new concerns    Preventive Health Recommendations  Female Ages 50 - 64    Yearly exam: See your health care provider every year in order to  Review health changes.   Discuss preventive care.    Review your medicines if your doctor has prescribed any.    Get a Pap test every three years (unless you have an abnormal result and your provider advises testing more often).  If you get Pap tests with HPV test, you only need to test every 5 years, unless you have an abnormal result.   You do not need a Pap test if your uterus was removed (hysterectomy) and you have not had cancer.  You should be tested each year for STDs (sexually transmitted diseases) if you're at risk.   Have a mammogram every 1 to 2 years.  Have a colonoscopy at age 50, or have a  yearly FIT test (stool test). These exams screen for colon cancer.    Have a cholesterol test every 5 years, or more often if advised.  Have a diabetes test (fasting glucose) every three years. If you are at risk for diabetes, you should have this test more often.   If you are at risk for osteoporosis (brittle bone disease), think about having a bone density scan (DEXA).    Shots: Get a flu shot each year. Get a tetanus shot every 10 years.    Nutrition:   Eat at least 5 servings of fruits and vegetables each day.  Eat whole-grain bread, whole-wheat pasta and brown rice instead of white grains and rice.  Get adequate Calcium and Vitamin D.     Lifestyle  Exercise at least 150 minutes a week (30 minutes a day, 5 days a week). This will help you control your weight and prevent disease.  Limit alcohol to one drink per day.  No smoking.   Wear sunscreen to prevent skin cancer.   See your dentist every six months for an exam and cleaning.  See your eye doctor every 1 to 2 years.

## 2022-05-16 NOTE — NURSING NOTE
Prior to immunization administration, verified patients identity using patient s name and date of birth. Please see Immunization Activity for additional information.     Screening Questionnaire for Adult Immunization    Are you sick today?   No   Do you have allergies to medications, food, a vaccine component or latex?   Yes   Have you ever had a serious reaction after receiving a vaccination?   No   Do you have a long-term health problem with heart, lung, kidney, or metabolic disease (e.g., diabetes), asthma, a blood disorder, no spleen, complement component deficiency, a cochlear implant, or a spinal fluid leak?  Are you on long-term aspirin therapy?   No   Do you have cancer, leukemia, HIV/AIDS, or any other immune system problem?   No   Do you have a parent, brother, or sister with an immune system problem?   Yes   In the past 3 months, have you taken medications that affect  your immune system, such as prednisone, other steroids, or anticancer drugs; drugs for the treatment of rheumatoid arthritis, Crohn s disease, or psoriasis; or have you had radiation treatments?   No   Have you had a seizure, or a brain or other nervous system problem?   No   During the past year, have you received a transfusion of blood or blood    products, or been given immune (gamma) globulin or antiviral drug?   No   For women: Are you pregnant or is there a chance you could become       pregnant during the next month?   No   Have you received any vaccinations in the past 4 weeks?   Yes     Immunization questionnaire was positive for at least one answer.  Notified. Bobby       Per orders of , injection of Adacel  given by Odalys Campos CMA. Patient instructed to remain in clinic for 15 minutes afterwards, and to report any adverse reaction to me immediately.       Screening performed by Odalys Campos CMA on 5/16/2022 at 10:35 AM.

## 2022-05-16 NOTE — PROGRESS NOTES
SUBJECTIVE:   CC: Willa Downey is an 61 year old woman who presents for preventive health visit.     Patient has been advised of split billing requirements and indicates understanding: Yes  Healthy Habits:     Getting at least 3 servings of Calcium per day:  Yes    Bi-annual eye exam:  Yes    Dental care twice a year:  Yes    Sleep apnea or symptoms of sleep apnea:  None    Diet:  Regular (no restrictions)    Frequency of exercise:  4-5 days/week    Duration of exercise:  45-60 minutes    Taking medications regularly:  Yes    Medication side effects:  None    PHQ-2 Total Score: 0    Additional concerns today:  Yes  Answers for HPI/ROS submitted by the patient on 2022  If you checked off any problems, how difficult have these problems made it for you to do your work, take care of things at home, or get along with other people?: Not difficult at all  PHQ9 TOTAL SCORE: 1  ANOOP 7 TOTAL SCORE: 0    CURRENTLY   Here for a physical   Couldn't  get a screening mammogram as mentioned had pain.. Noted pain in armpit left posterior axillary line, one month ago, not all the time. Comes and goes. No rash or redness, out of blue. Not lifting weights. No weight loss. No change with ROM of shoulder on left. Feels pain daily. Also past 2 weeks noting pain in right armpit.  No breast lump or skin changes,   Maternal aunt had a breast cancer, dx in 60's, and later developed leukemia and , makes her nervous. As Age 61 to be 62 this year   Biopsy in 2019 of right breast was normal  Diagnostic mammogram in  for left breast possible lump was normal   Declines  for now  Pap due today  HM reviewed  No ACP on file, honoring choices given to think about it. Been through it with her parents  Tdap due: will get today , understands being on her meds will maybe decrease immune response  Covid x 4 utd. Waiting to get evushield by rheumatology    HLD on no meds, will do labs, non fasting    If eats lunch or dinner  falls asleep  for 50 min. No complaint of snoring. Has gotten better about physical activity as weather now better. Likes her carb's, is on prednisone for PA. Will do labs today. Exercise helps prevent drowsiness after lunch but not after dinner. is on gabapentin bid. Has had No weight gain. Disappointed about no weight loss since started exercising 3 weeks ago. Eating more vege, less meat.     Lumbar stenosis and OA on gabapentin. Taking 300 mg bid instead of ordered three times a day. Feels that's enough. Slight right low back pain.     PA / immunosuppression, now on xeljinz last 4 months working better than prior humira, Enbrel, cosynt. Not perfect but better  On slow prednisone taper. Currently on 2.5 mg a day on lowered dose of 2 had symptoms so went back on 2.5 mg. Is also on Celebrex 100 mg daily. Taking with food. Not using PPI. No issues. No longer on mobic.     Psoriasis of nails better. Skin groin and gluteal cleft and scalp better. 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,  with two children youngest is 14, in [place] since 1989, parents diseased, older sister also in the [place], Hx of OA back and thumb on gabapentin tid for low back pain, hx of psoriatic arthropathy on xeljanz & Celebrex & slow prednisone taper ( previously on humira, Enbrel &cosit), under care of rheumatology and psoriasis of nails/  Skin on temovate ointment and lidex solution as needed under care of dermatology,, hx of migraines, gum disease, glaucoma S/p laser in 2009 & surgery 2012 & 2016, on eye drops, managed by eye, HLD on no meds, hx of insomnia, seasonal allergies, dust mites, on loratadine prn, allergic to codeine, morphine, latex & adhesive tape, hx of prior C section in 2001 secondary to a fibroid blocking the birth canal, went into [facility] after the surgery and uterine  artery embolization done and adopted daughter in 2006, prior cataract surgery 2015 & 2018, prior right breast biopsy ( benign) in 2019,      Seen for chest pain in 2012 and EKG and CXR and exam was normal. Seen by cardiology and stress echo test done was normal. Under care of eye. Seen by dentist , for URI in 2014, seen for a physical in 2014. In 2016 seen for ETD after a flight. Seen by audiology ( normal test) ,  & ENT, in 2017 seen for acute back pain and multiple small hand joint pain DIP & PIP advised PT and NSAID'S.      In 5/2018 noted nail changes of right ring finger and right thumb. Felt nail was  from nail bed. Nail clippings sent for fungal culture. Seen 5/9/2018 and gyn was normal and pap done.      Seen 7/2018 by rheumatology at [facility] for a one year history of intermittent bilateral first CMC joint tenderness consistent with osteoarthritis, who presented with right fourth fingernail changes beginning March 2018 with onycholysis and dystrophic changes, fungal culture negative, mild onycholysis in the right thumbnail, and acute swelling, redness and tenderness of the right fourth DIP joint since May. This had been associated with some swelling and tenderness in her finger pad with tender blister-type sores. Dermatology gave her triamcinolone which caused burning of the skin. Her primary physician gave her naproxen which caused daytime lethargy but gave her some partial relief. Exam revealed nail changes and right fourth DIP joint swelling, most consistent with psoriatic arthritis. Laboratories to include CBC with differential, basic metabolic panel, liver panel, ESR, CRP, TSH, RF, CCP and hepatitis C antibody (on two occasions she has positive hepatitis B core antibody and surface antibody suggestive of prior infection which she cleared). Was given desonide for perioral dermatitis. testing was done and X-ray of hands showed soft tissue swelling at the ring finger DIP joints. No bony  changes. X-ray of feet were normal. RF and CCP negative. Normal ESR and CRP. She was started on methotrexate four tablets weekly, tapered up to 6 tablets weekly. &  received prednisone 30 mg, 20 mg, 10 mg, 5 mg each ×5 days, then off.  Seen oct 2018 noted improved and methotrexate increased to manage symptoms and felt had OA of left knee and referred to derm for fungal scrapings of toenail concerns.      Seen by dermatology at [facility] for nail changes right fourth fingernail suspected to be psoriasis & had tac injection to the nail bed, , Pincer nail deformity B/l 5th toes & epidermal inclusion cyst on neck.      Seen by rheum at [facility] on 2/222019 & noted improved and continued on  eight tablets of methotrexate weekly.   Seen by derm at [facility] 3/13/19 for psoriatic nail disease and continue don clobetasol ointment bid  prn and TAC injection done of nail bed.   Seen by derm 6/2019 and declined soriatane or Humira or kenalog injections and was to continue clobetasol ointment twice a day.  Had PVD left eye 8/2019.  Seen by rheum 9/13/19 & stable & continued on methotrexate, refilled  weekly. & advised thumb splints & OTC meds for B/l OA 1st CMC & meds refilled in 12/2019.  methotrexate      Next seen at [place] with insurance changes  Seen by Chad Peck 7/9/20 for acute back pain , referred to ortho and given flexeril. Seen by ortho 7/13/20 & given steroids and advised PT & to use flexeril sparingly. Seen by PT 7/15, then by back doctor and xray was unremarkable. On 8/16/20 due to persistent lumbar radiculopathy MRI done showed lumbar stenosis. Had an epidural 9/8/20 & noted pain improved and continued on gabapentin and home care exercise program & advised to increase activity and follow up as needed.      Seen first time by this writer on 10/15/20 for swelling of left middle finger pulp and onycholysis of the nail with prior hx of psoriatic arthropathy / psoriatic arthritis & psoriasis of  right fourth finger nail treated in 2018 with Kenalog  injections by derm, clobetasol bid & on methotrexate by rheum, no longer on methotrexate since 12/2019 with new onset left middle finger nail changes, separation from nail bed with DIP to pulp swelling pain & tenderness similar to when dx with psoriasis & psoriatic arthritis previously.   [facility]um but since insurance changed & didn't have a PCP did see rheum [facility] in [place]  & given two courses of abx with no improvement & reported rheum concerned about dactylitis & advised to get an mri & see the hand surgeon. Had not done the imaging yet & needed help on what to do next.   Had no sign of sepsis & looked well. Records reviewed after visit in more detail. Was advised & given referral to see a hand surgeon & to hang on to mri order until seen by the  hand surgeon to make further recommendations on that. Advised to Go to the ER if had a fever     Derm notes received and reviewed noting psoriasis. No notes received from rheum. Referred to rheum at [facility] but they were not able to see her. Seen by hand specialist at [facility] on 1/22, by then nail had fallen off and pain had subsided and was told had simple OA and opted not to see another rheumatologist.      Seen 11/11/20 for a physical. Breast exam was normal. Mammogram due 5/2021 as dx imaging normal in 5/2020. Pelvic/ pap due 2021. Discussed working on advance directives. HLD on no meds. Likely familial. Had not had much activity due to sciatica and spinal stenosis but was going to work on her exercise. Had OA CMC B/l thumb & Spinal stenosis/ spondylolisthesis L 4-5 with neuro foraminal stenosis and sciatica on right, not much helped with epidural. Did PT, seen by Dr Zhou on gabapentin 300 mg tid taking bid which helped. Had started using a stationary bike. Hoped to defer surgery for as long as possible. Had no side effects from gabapentin to date.  Was to continue care with ortho.   Reviewed prior hx of right finger documented psoriatic arthropathy treated in 2018 with methotrexate, but not had in over 1 yr. Hx of psoriatic nail changes seen by derm in past and recently for it. Recently had new onset left middle finger DIP to pulp swelling with loosening and subsequent loss of a dystrophic looking nail. Was ruled out for fungal infection and treated for paronychia with two different antibiotics, used clobetasol on nail folds and seen by a different rheumatologist who told her it was not psoriatic arthritis and advised seeing a hand surgeon & doing an mri as was concerned about possibility of dactylitis. Seen by [facility] hand surgeon on 10/22/20 who also reviewed xray's and said joints were not involved and did not look like psoriatic arthritis , symptoms improved after nail fell off mid October and advised  MRI was not needed and to monitor. At the physical felt was doing better and opted to wait to see a new derm and rheum for a third opinion. Was to follow up with dermatology and ortho back as needed & to get us records from her recent visit with [facility]. Prior records had been a bit confusing as some stated she had psoriatic arthritis and usually it is not a diagnosis that goes away. But recently outside ortho and outside rheum said it was not psoriatic arthritis & felt was just localized skin/ nail psoriasis. Was to continue care with her eye doc for her glaucoma & return in  1 yr for a preventive physical or sooner in an office visit or virtual visit for any new or persistent concerns.  Labs came back showing negative HIV, Hep C, normal HBA1c, normal cbc, CMP, TSH & negative FIT with elevated TG & LDL & ASCVD risk of 3.5 %  & dietary & lifestyle changes discussed & to recheck in 6 months.      Seen by ortho 1/15/21 for pain and swelling left third finger & CMC OA left thumb and MRI ordered. MRI 3rd digit showed bony erosions and differential was polyarticular  inflammatory arthropathy versus osteomyelitis. Labs showed normal esr, CRP & cbc & referred to hand surgeon. Seen by the hand surgeon on 2/3/21, felt didn't have osteomyelitis and referred to rheumatology.      Seen by rheumatology on 2/5/21 & noted 5 month of progressive left hand 3rd, 4th, 5th digit and right hand 4th digit DIP inflammatory arthritis in the context of psoriasis of the nails of these same fingers. Had had evaluation by PCP, derm, ortho, rheumatology with this presentation without obvious etiology found. Possibility of infection by exam and MRI raised, though normal inflammatory markers, lack of improvement with antibiotics, and now chronic small joint symmetric polyarthritis was felt somewhat atypical for infection. Her presentation appeared to be consistent with the diagnosis given to the patient in 2018 by Dr. Owens of  rheumatology, for which she was prescribed methotrexate though self discontinued when she changed insurance and was lost to follow-up in early 2020. It was felt her presentation was most likely secondary to psoriatic arthritis, though discussed with the patient that without aspiration or Bx of an affected joint/bone that they could not rule out infection.   Started on mobic. On 2/18 rheum decreased mobic to 7.5 mg & given omeprazole to use when used mobic. Started on Humira and referred to dermatology. Seen by derm on 4/1 and given clobetasol to use for skin around psoriatic nails & referred to get a epidermal cyst on her neck excised. On 4/2/21 seen by rheum and noted continued active Psoriatic arthritis disease and continued on mobic 7.5 mg, omeprazole 20 & Humira. On 5/11/21 noted active disease having failed NSAID, methotrexate and Humira and to start Enbrel.      On 6/9/21 message noted that she reported that 2 days prior she woke up and was washing her face and noticed what sounded like bilateral submandibular lymphadenopathy.  Not tender or painful that day so  continued and nothing done about it.  Then the day before  she did have some development of pain and noticed that they were tender to palpation.  She denied any other symptoms at the time other than her ongoing psoriatic arthritis.  Denied any fever, chills, cough, shortness of breath, oral infection, weight loss.  No viral symptoms.  She felt otherwise well.  It was discussed with her that her symptoms were most likely the sequelae of reactive lymphadenopathy and  that they would either evolve into typical viral URI symptoms or continue to regress and resolve without intervention.  Given the bilateral nature, tenderness, and overnight development there was less suspicion for malignancy like lymphoma.  Rheum counseled her that she could use NSAID's/tylenol for symptoms.     Seen 6/11/21 for submandibular gland enlargement . Labs done showed normal b12, CMP, TSH, ferritin, & cbc. LDL was elevated. U/s showed non specific enlarged submandibular glands. Testing showed mumps Ig M positive with normal CRP and esr. MD informed and advised to get a buccal swab but due to difficulty getting labs opted to skip further resting and symptoms resolved on their own    Seen 7/2/21 by Dr Ho for ear itching , had mild cerumen and given ciprodex. Seen by derm 7/13/21 for a psoriasis flare off th ehumira and was to start enberal and given clobetasol for nail & gluteal cleft and lidex solution for scalp and genital area. & to use T gel and  Dove soap.   Seen by Rheum 8/17/21 & continue don Enbrel and started da prednisone taper. On 8/16/21 seen by sports med and continued on gabapentin for spinal stenosis. On 8/25/21 seen by derm &had a wide excision of a skin lesion. Seen by rheum on 9/30/21 & Enbrel had been switched to cosent & continued on a slow Pred taper.on 11/15/21 had a flare when prednisone decreased and went back on 7.5 mg of prednisone & coscint increased to 300. Seen 12/2/21 virtually by rheum & continued on coscint  300 & prednisone decreased from 10 to 7.5 on 12/24. Started on Celebrex, PPI given and to switched to Xeljanz. On 1/25/22 seen by rheum virtually and continued on xeljanz, Celebrex and prednisone taper. On 2/1/22 CRP, Hep B, CMP, Cbc, HIV , esr and protein was normal.    [place]  shows getting gabapentin 300 mg # 90 on 8/7, 8/18 & 9/16/20, 10/20, 11/17, 12/21, 1/25/21, 2/22,3/22, 6/7/21, 7/6/21, 8/18/21, 9/21/21,11/9/21,1/3/22, 2/1/22, 3/2/22 # 90 each by Dr Zhou.    Today's PHQ-2 Score:   PHQ-2 ( 1999 [employer]) 5/13/2022   Q1: Little interest or pleasure in doing things 0   Q2: Feeling down, depressed or hopeless 0   PHQ-2 Score 0   PHQ-2 Total Score (12-17 Years)- Positive if 3 or more points; Administer PHQ-A if positive -   Q1: Little interest or pleasure in doing things Not at all   Q2: Feeling down, depressed or hopeless Not at all   PHQ-2 Score 0     Abuse: Current or Past (Physical, Sexual or Emotional) - No  Do you feel safe in your environment? Yes    Social History     Tobacco Use     Smoking status: Never Smoker     Smokeless tobacco: Never Used   Substance Use Topics     Alcohol use: Never     If you drink alcohol do you typically have >3 drinks per day or >7 drinks per week? No    Alcohol Use 5/16/2022   Prescreen: >3 drinks/day or >7 drinks/week? -   Prescreen: >3 drinks/day or >7 drinks/week? No       Reviewed orders with patient.  Reviewed health maintenance and updated orders accordingly - Yes  Lab work is in process  Labs reviewed in EPIC  BP Readings from Last 3 Encounters:   05/16/22 130/88   09/30/21 133/83   08/25/21 124/84    Wt Readings from Last 3 Encounters:   05/16/22 65.3 kg (144 lb)   09/30/21 64 kg (141 lb 1.6 oz)   08/16/21 63 kg (139 lb)                  Patient Active Problem List   Diagnosis     Hyperlipidemia     Low-tension glaucoma of both eyes, severe stage     Myopia     Screening for malignant neoplasm of cervix     Psoriasis of nail     Psoriatic arthropathy (H)     Spinal  stenosis of lumbar region, unspecified whether neurogenic claudication present     Immunosuppression (H)     Family history of malignant neoplasm of breast     History of snoring     Prediabetes     Past Surgical History:   Procedure Laterality Date     BIOPSY  07/30/2019    the right breast tissue(benign)     EYE SURGERY      3 surgeries for glaucoma treatment       Social History     Tobacco Use     Smoking status: Never Smoker     Smokeless tobacco: Never Used   Substance Use Topics     Alcohol use: Never     Family History   Problem Relation Age of Onset     Hyperlipidemia Mother         My mother had high cholesterol.     Osteoporosis Mother      Hypertension Mother      Stomach Cancer Father         not sure where started     Cataracts Maternal Grandmother      Breast Cancer Maternal Aunt          Current Outpatient Medications   Medication Sig Dispense Refill     CALCIUM CARBONATE-VIT D-MIN PO Take 1 tablet by mouth       celecoxib (CELEBREX) 100 MG capsule Take 1 capsule (100 mg) by mouth daily as needed for pain 90 capsule 1     clobetasol (TEMOVATE) 0.05 % external ointment Apply thin layer twice daily as needed for psoriasis on the body. 60 g 11     fluocinonide (LIDEX) 0.05 % external solution Apply small amount to psoriasis on the scalp or genital skin once daily. 60 mL 11     loratadine 10 MG capsule Take 1 tablet by mouth       Multiple Vitamins-Minerals (MULTIVITAMIN ADULTS 50+) TABS daily        predniSONE (DELTASONE) 2.5 MG tablet Take 2.5 mg by mouth daily Prescribed by rheumatology       tofacitinib (XELJANZ XR) 11 MG 24 hr tablet Take 1 tablet (11 mg) by mouth daily Hold for signs of infection, then seek medical attention. 30 tablet 5     gabapentin (NEURONTIN) 300 MG capsule Take 1 capsule (300 mg) by mouth 3 times daily 90 capsule 2     Allergies   Allergen Reactions     Adhesive Tape Blisters, Itching and Swelling     Dust Mites      Latex Blisters, Itching, Other (See Comments) and Swelling      Skin sensitivity       Morphine Sulfate (Concentrate) Itching     Other reaction(s): Chills  heart palpatations     Seasonal Allergies      Codeine Palpitations     chills     Recent Labs   Lab Test 05/16/22  1033 02/01/22  0918 06/11/21  1644 02/05/21  1559 11/11/20  1023   A1C 5.7*  --   --   --  5.3   *  --  153*  --  Cannot estimate LDL when triglyceride exceeds 400 mg/dL  166*   HDL 76  --  56  --  45*   TRIG 186*  --  208*  --  452*   ALT 21 28 19 21 23   CR 0.67 0.70 0.63 0.62 0.56   GFRESTIMATED >90 >90 >90 >90 >90   GFRESTBLACK  --   --  >90 >90 >90   POTASSIUM 3.6 3.9 4.3 3.7 3.6   TSH 0.94  --  1.48  --  1.30      Breast Cancer Screening:    FHS-7: No flowsheet data found.    Mammogram Screening: Recommended mammography every 1-2 years with patient discussion and risk factor consideration  Pertinent mammograms are reviewed under the imaging tab.    History of abnormal Pap smear:   NO - age 30-65 PAP every 5 years with negative HPV co-testing recommended  Last 3 Pap and HPV Results:   PAP / HPV Latest Ref Rng & Units 5/16/2022   PAP   Negative for Intraepithelial Lesion or Malignancy (NILM)        Reviewed and updated as needed this visit by clinical staff   Tobacco  Allergies  Meds   Med Hx  Surg Hx  Fam Hx  Soc Hx          Reviewed and updated as needed this visit by Provider   Tobacco  Allergies  Meds   Med Hx  Surg Hx  Fam Hx  Soc Hx         Past Medical History:   Diagnosis Date     Bilateral low back pain with right-sided sciatica, unspecified chronicity 11/11/2020     Neuroforaminal stenosis of lumbar spine 11/11/2020     Nuclear senile cataract of both eyes 08/16/2018     Osteoarthritis 10/15/2020     Psoriatic arthropathy (H) 10/19/2020      Past Surgical History:   Procedure Laterality Date     BIOPSY  07/30/2019    the right breast tissue(benign)     EYE SURGERY      3 surgeries for glaucoma treatment     OB History   No obstetric history on file.       Review of Systems  "  Constitutional: Negative for chills and fever.   HENT: Negative for congestion, ear pain, hearing loss and sore throat.    Eyes: Negative for pain and visual disturbance.   Respiratory: Negative for cough and shortness of breath.    Cardiovascular: Negative for chest pain, palpitations and peripheral edema.   Gastrointestinal: Negative for abdominal pain, constipation, diarrhea, heartburn, hematochezia and nausea.   Breasts:  Negative for tenderness, breast mass and discharge.   Genitourinary: Negative for dysuria, frequency, genital sores, hematuria, pelvic pain, urgency, vaginal bleeding and vaginal discharge.   Musculoskeletal: Positive for arthralgias. Negative for joint swelling and myalgias.   Skin: Negative for rash.   Neurological: Negative for dizziness, weakness, headaches and paresthesias.   Psychiatric/Behavioral: Negative for mood changes. The patient is not nervous/anxious.         OBJECTIVE:   /88   Pulse 72   Temp 97.3  F (36.3  C) (Temporal)   Resp 14   Ht 1.626 m (5' 4\")   Wt 65.3 kg (144 lb)   LMP  (LMP Unknown)   SpO2 97%   Breastfeeding No   BMI 24.72 kg/m    Physical Exam  GENERAL: healthy, alert and no distress  EYES: Eyes grossly normal to inspection, PERRL and conjunctivae and sclerae normal, arcus senilus B/l, wears glasses  HENT: ear canals and TM's normal, nose and mouth without ulcers or lesions  NECK: no adenopathy, no asymmetry, masses, or scars and thyroid normal to palpation  RESP: lungs clear to auscultation - no rales, rhonchi or wheezes  BREAST: normal without masses, tenderness or nipple discharge and no palpable axillary masses or adenopathy  CV: regular rate and rhythm, normal S1 S2, no S3 or S4, no murmur, click or rub, no peripheral edema and peripheral pulses strong  ABDOMEN: soft, non tender, no hepatosplenomegaly, no masses and bowel sounds normal, PAP / HPV obtained  MS: no gross musculoskeletal defects noted, no edema  SKIN: no suspicious lesions or " rashes, dystrophic fingernails but no loosening  NEURO: Normal strength and tone, mentation intact and speech normal  PSYCH: mentation appears normal, affect normal/bright  LYMPH: no cervical, supraclavicular, axillary, or inguinal adenopathy    Diagnostic Test Results:  Labs reviewed in Epic  No results found for this or any previous visit (from the past 24 hour(s)).  Results for orders placed or performed in visit on 05/16/22   Comprehensive metabolic panel (BMP + Alb, Alk Phos, ALT, AST, Total. Bili, TP)     Status: Normal   Result Value Ref Range    Sodium 139 133 - 144 mmol/L    Potassium 3.6 3.4 - 5.3 mmol/L    Chloride 108 94 - 109 mmol/L    Carbon Dioxide (CO2) 28 20 - 32 mmol/L    Anion Gap 3 3 - 14 mmol/L    Urea Nitrogen 12 7 - 30 mg/dL    Creatinine 0.67 0.52 - 1.04 mg/dL    Calcium 10.1 8.5 - 10.1 mg/dL    Glucose 95 70 - 99 mg/dL    Alkaline Phosphatase 62 40 - 150 U/L    AST 15 0 - 45 U/L    ALT 21 0 - 50 U/L    Protein Total 7.6 6.8 - 8.8 g/dL    Albumin 4.2 3.4 - 5.0 g/dL    Bilirubin Total 0.6 0.2 - 1.3 mg/dL    GFR Estimate >90 >60 mL/min/1.73m2   Lipid Profile (Chol, Trig, HDL, LDL calc)     Status: Abnormal   Result Value Ref Range    Cholesterol 260 (H) <200 mg/dL    Triglycerides 186 (H) <150 mg/dL    Direct Measure HDL 76 >=50 mg/dL    LDL Cholesterol Calculated 147 (H) <=100 mg/dL    Non HDL Cholesterol 184 (H) <130 mg/dL    Patient Fasting > 8hrs? No     Narrative    Cholesterol  Desirable:  <200 mg/dL    Triglycerides  Normal:  Less than 150 mg/dL  Borderline High:  150-199 mg/dL  High:  200-499 mg/dL  Very High:  Greater than or equal to 500 mg/dL    Direct Measure HDL  Female:  Greater than or equal to 50 mg/dL   Male:  Greater than or equal to 40 mg/dL    LDL Cholesterol  Desirable:  <100mg/dL  Above Desirable:  100-129 mg/dL   Borderline High:  130-159 mg/dL   High:  160-189 mg/dL   Very High:  >= 190 mg/dL    Non HDL Cholesterol  Desirable:  130 mg/dL  Above Desirable:  130-159  mg/dL  Borderline High:  160-189 mg/dL  High:  190-219 mg/dL  Very High:  Greater than or equal to 220 mg/dL   TSH with free T4 reflex     Status: Normal   Result Value Ref Range    TSH 0.94 0.40 - 4.00 mU/L   Hemoglobin A1c     Status: Abnormal   Result Value Ref Range    Hemoglobin A1C 5.7 (H) 0.0 - 5.6 %   CBC with platelets and differential     Status: None   Result Value Ref Range    WBC Count 7.5 4.0 - 11.0 10e3/uL    RBC Count 4.11 3.80 - 5.20 10e6/uL    Hemoglobin 12.4 11.7 - 15.7 g/dL    Hematocrit 38.1 35.0 - 47.0 %    MCV 93 78 - 100 fL    MCH 30.2 26.5 - 33.0 pg    MCHC 32.5 31.5 - 36.5 g/dL    RDW 13.1 10.0 - 15.0 %    Platelet Count 401 150 - 450 10e3/uL    % Neutrophils 58 %    % Lymphocytes 34 %    % Monocytes 6 %    % Eosinophils 1 %    % Basophils 0 %    % Immature Granulocytes 0 %    Absolute Neutrophils 4.3 1.6 - 8.3 10e3/uL    Absolute Lymphocytes 2.5 0.8 - 5.3 10e3/uL    Absolute Monocytes 0.5 0.0 - 1.3 10e3/uL    Absolute Eosinophils 0.1 0.0 - 0.7 10e3/uL    Absolute Basophils 0.0 0.0 - 0.2 10e3/uL    Absolute Immature Granulocytes 0.0 <=0.4 10e3/uL   Pap Screen with HPV - recommended age 30 - 65 years     Status: None   Result Value Ref Range    Interpretation        Negative for Intraepithelial Lesion or Malignancy (NILM)    Comment         Papanicolaou Test Limitations:  Cervical cytology is a screening test with limited sensitivity, and regular screening is critical for cancer prevention.  Pap tests are primarily effective for the diagnosis/prevention of squamous cell carcinoma, not adenocarcinoma or other cancers.        Specimen Adequacy       Satisfactory for evaluation, endocerv/transformation zone component absent, atrophy    Clinical Information       post-menopausal      Reflex Testing Yes regardless of result     Previous Abnormal?       No      Performing Labs       The technical component of this testing was completed at [facility]  [facility]     CBC with platelets and differential     Status: None    Narrative    The following orders were created for panel order CBC with platelets and differential.  Procedure                               Abnormality         Status                     ---------                               -----------         ------                     CBC with platelets and d...[[id number]]                      Final result                 Please view results for these tests on the individual orders.       ASSESSMENT/PLAN:       ICD-10-CM    1. Routine general medical examination at a health care facility  Z00.00 Pap Screen with HPV - recommended age 30 - 65 years     Adult Gastro Ref - Procedure Only     Comprehensive metabolic panel (BMP + Alb, Alk Phos, ALT, AST, Total. Bili, TP)     Hemoglobin A1c     Comprehensive metabolic panel (BMP + Alb, Alk Phos, ALT, AST, Total. Bili, TP)     Hemoglobin A1c   2. Left axillary pain  M79.622 MA Diagnostic Digital Bilateral     US Breast Bilateral Limited 1-3 Quadrants    posterior axillary pain   3. Axillary pain, right  M79.621 MA Diagnostic Digital Bilateral     US Breast Bilateral Limited 1-3 Quadrants    2 weeks   4. Family history of malignant neoplasm of breast  Z80.3 MA Diagnostic Digital Bilateral     US Breast Bilateral Limited 1-3 Quadrants    maternal aunt dx age 60's    5. Screening for malignant neoplasm of cervix  Z12.4 Pap Screen with HPV - recommended age 30 - 65 years   6. Hyperlipidemia, unspecified hyperlipidemia type  E78.5 Lipid Profile (Chol, Trig, HDL, LDL calc)     TSH with free T4 reflex     Lipid Profile (Chol, Trig, HDL, LDL calc)     TSH with free T4 reflex   7. Drowsiness  R40.0 TSH with free T4 reflex     TSH with free T4 reflex    post lunch and dinner, improved with exercise, loves carbs, rare snoring   8. History of snoring  Z87.898 TSH with free T4 reflex     TSH with free T4 reflex   9. Spinal stenosis of lumbar region, unspecified whether  neurogenic claudication present  M48.061    10. Psoriatic arthropathy (H)  L40.50 CBC with platelets and differential     CBC with platelets and differential   11. Immunosuppression (H)  D84.9 CBC with platelets and differential     CBC with platelets and differential   12. Psoriasis of nail  L40.9 CBC with platelets and differential     CBC with platelets and differential   13. Myopia, unspecified laterality  H52.10    14. Low-tension glaucoma of both eyes, severe stage  H40.1233    15. Screen for colon cancer  Z12.11 Adult Gastro Ref - Procedure Only   16. Health care maintenance  Z00.00 REVIEW OF HEALTH MAINTENANCE PROTOCOL ORDERS   17. Advanced directives, counseling/discussion  Z71.89    18. Need for COVID-19 vaccine  Z23     x 4 utd , awaiting evushield by rheumatology   19. Need for diphtheria-tetanus-pertussis (Tdap) vaccine  Z23 TDAP VACCINE (Adacel, Boostrix)   20. Screening for diabetes mellitus  Z13.1 Comprehensive metabolic panel (BMP + Alb, Alk Phos, ALT, AST, Total. Bili, TP)     Hemoglobin A1c     Comprehensive metabolic panel (BMP + Alb, Alk Phos, ALT, AST, Total. Bili, TP)     Hemoglobin A1c   21. Prediabetes  R73.03 **A1C FUTURE 6mo     Seen for preventive health and additional concerns today   Self breast check regularly   Mammogram due but due to left anterior armpit wall pain 1 month and right armpit wall pain 2 weeks recommend diagnostic mammogram and ultrasound bilaterally to assess symptoms.  Previously has had a biopsy on the right in 2019 that was normal and diagnostic imaging for a lump on the left in 2020 that was normal  Consider referral to a  to assess personal risk if have any family hx of breast, ovarian or bowel cancer ( aunt with breast cancer) , currently declines  Pelvic / PAP. HPV obtained today.  No lesions seen on labia or perianal area.    History of hyperlipidemia.  Continue to eat healthy low-fat low carbohydrate diet.  Check labs today.  For history of  drowsiness/ odalis post lunch & dinner, & hx of snoring. Post lunch drowsiness improved with exercise.  Also on gabapentin which may be contributing to drowsiness.  Recommend eating healthy, some weight loss, low carbohydrate intake, will check labs today.  And if no answer can refer to a sleep specialist.  After the visit hemoglobin A1c did come back elevated in the prediabetes range.  In addition to above recommendations recommend rechecking hemoglobin A1c in about 6 months in a lab only apt.    History of osteoarthritis with spinal stenosis lumbar spine under care of sports doctor on gabapentin for pain relief taking 300 mg twice a day instead of the 3 times a day as prescribed, with good relief.    Psoriatic arthropathy currently well controlled on Xeljanz, previously has tried Humira, Enbrel, Cosentyx, also on Celebrex daily and a slow Pred taper currently down to 2.5 mg daily.  Celebrex may also be helping back pain.  Celebrex is an anti-inflammatory that can increase risk of GI bleed, decrease kidney function, raise blood pressure and prednisone can increase risk of diabetes and stomach upset.  Currently asymptomatic and doing well on these meds for psoriatic arthropathy which is well controlled.  Continue care with her rheumatologist who is managing these meds.    Psoriasis of nail and skin well controlled on clobetasol ointment to body and lidex  solution to scalp and genital area as needed.  Continue care with her dermatologist.    Continue calcium and vitamin D supplementation as at risk of osteoporosis on prednisone use and being postmenopausal.    Continue care with eye doctor for myopia and glaucoma.    Health care maintenance reviewed  Colon cancer screening due and referred for colonoscopy  Consider working on health care directives  Recommend Flu shot yearly & UTD  COVID-vaccine x4 up-to-date.  Follow-up with  her rheumatologist regarding Evushield. shield.  Recommend Tdap every 10 yrs due and given  "today.  Vaccines may not mount a full response due to fact that she is on immunosuppressant medication, but some vaccine effect is better than none.  Recommend Shingles vaccine starting at 50 & UTD  Pneumonia vaccine at 65 or earlier if any risk factors  If travelling out of the country recommend seeing the travel clinic to update appropriate shots etc  Labs today and will make further recommendations once reviewed    Return in 1 year for a preventive physical and sooner in an office visit for any new concerns  Patient has been advised of split billing requirements and indicates understanding: Yes    COUNSELING:  Reviewed preventive health counseling, as reflected in patient instructions       Regular exercise       Healthy diet/nutrition       Vision screening       Immunizations    Vaccinated for: TDAP         Alcohol Use       Osteoporosis prevention/bone health       Colorectal Cancer Screening       (Zahra)menopause management       The 10-year ASCVD risk score (Lacy MATTHEWS Jr., et al., 2013) is: 3%    Values used to calculate the score:      Age: 61 years      Sex: Female      Is Non- : No      Diabetic: No      Tobacco smoker: No      Systolic Blood Pressure: 130 mmHg      Is BP treated: No      HDL Cholesterol: 76 mg/dL      Total Cholesterol: 260 mg/dL       Advance Care Planning    Estimated body mass index is 24.72 kg/m  as calculated from the following:    Height as of this encounter: 1.626 m (5' 4\").    Weight as of this encounter: 65.3 kg (144 lb).    She reports that she has never smoked. She has never used smokeless tobacco.      Counseling Resources:  ATP IV Guidelines  Pooled Cohorts Equation Calculator  Breast Cancer Risk Calculator  BRCA-Related Cancer Risk Assessment: FHS-7 Tool  FRAX Risk Assessment  ICSI Preventive Guidelines  Dietary Guidelines for Americans, 2010  USDA's MyPlate  ASA Prophylaxis  Lung CA Screening    Geovanna Rosales MD  [facility] " [place]

## 2022-05-16 NOTE — RESULT ENCOUNTER NOTE
Tram Downey,  Some of your results came back and show you have prediabetes. Decrease carbs in diet and recheck HBA1c in 6 months in a lab only apt to assess trends  If you have any further concerns please do not hesitate to contact us by message, phone or making an appointment.  Have a good day   Dr Bobby ROSE

## 2022-05-16 NOTE — RESULT ENCOUNTER NOTE
Tram Ms. Downey,  Your results came back showing   -LDL(bad) cholesterol level is elevated, and your triglycerides are elevated similar to prior, which can increase your heart disease risk.    The 10-year ASCVD risk score (Lacy MATTHEWS Jr., et al., 2013) is: 3% ( low overall cardiovascular risk)    Values used to calculate the score:      Age: 61 years      Sex: Female      Is Non- : No      Diabetic: No      Tobacco smoker: No      Systolic Blood Pressure: 130 mmHg      Is BP treated: No      HDL Cholesterol: 76 mg/dL      Total Cholesterol: 260 mg/dL  A diet high in fat and simple carbohydrates, genetics and being overweight can contribute to this. ADVISE: exercising 150 minutes of aerobic exercise per week (30 minutes for 5 days per week or 50 minutes for 3 days per week are options), eating a low saturated fat/low carbohydrate diet, and omega-3 fatty acids (fish oil) 1612-2304 mg daily are helpful to improve this. In 12 months, you should recheck your fasting cholesterol panel by scheduling a lab-only appointment.  -Liver and gallbladder tests are normal (ALT,AST, Alk phos, bilirubin), kidney function is normal (Cr, GFR), sodium is normal, potassium is normal, calcium is normal, glucose is normal.  -TSH (thyroid stimulating hormone) level is normal which indicates normal thyroid function.  . If you have any further concerns please do not hesitate to contact us by message, phone or making an appointment.  Have a good day   Dr Bobby ROSE

## 2022-05-17 ASSESSMENT — ANXIETY QUESTIONNAIRES: GAD7 TOTAL SCORE: 0

## 2022-05-18 ENCOUNTER — TELEPHONE (OUTPATIENT)
Dept: GASTROENTEROLOGY | Facility: CLINIC | Age: 62
End: 2022-05-18
Payer: COMMERCIAL

## 2022-05-18 LAB
BKR LAB AP GYN ADEQUACY: NORMAL
BKR LAB AP GYN INTERPRETATION: NORMAL
BKR LAB AP HPV REFLEX: NORMAL
BKR LAB AP PREVIOUS ABNORMAL: NORMAL
PATH REPORT.COMMENTS IMP SPEC: NORMAL
PATH REPORT.COMMENTS IMP SPEC: NORMAL
PATH REPORT.RELEVANT HX SPEC: NORMAL

## 2022-05-18 RX ORDER — GABAPENTIN 300 MG/1
300 CAPSULE ORAL 3 TIMES DAILY
Qty: 90 CAPSULE | Refills: 2 | Status: SHIPPED | OUTPATIENT
Start: 2022-05-18 | End: 2022-10-03

## 2022-05-18 NOTE — TELEPHONE ENCOUNTER
gabapentin (NEURONTIN) 300 MG capsule      Last Written Prescription Date:  1/3/2022  Last Fill Quantity: 90,   # refills: 2  Last Office Visit : 8/16/2021  Future Office visit:  none    Routing refill request to provider for review/approval because:  Medication not on the Ortho/Sports Med refill protocol.

## 2022-05-20 ENCOUNTER — ANCILLARY PROCEDURE (OUTPATIENT)
Dept: MAMMOGRAPHY | Facility: CLINIC | Age: 62
End: 2022-05-20
Attending: FAMILY MEDICINE
Payer: COMMERCIAL

## 2022-05-20 DIAGNOSIS — M79.622 LEFT AXILLARY PAIN: ICD-10-CM

## 2022-05-20 DIAGNOSIS — Z80.3 FAMILY HISTORY OF MALIGNANT NEOPLASM OF BREAST: ICD-10-CM

## 2022-05-20 DIAGNOSIS — M79.621 AXILLARY PAIN, RIGHT: ICD-10-CM

## 2022-05-20 LAB
HUMAN PAPILLOMA VIRUS 16 DNA: NEGATIVE
HUMAN PAPILLOMA VIRUS 18 DNA: NEGATIVE
HUMAN PAPILLOMA VIRUS FINAL DIAGNOSIS: NORMAL
HUMAN PAPILLOMA VIRUS OTHER HR: NEGATIVE

## 2022-05-20 PROCEDURE — 77067 SCR MAMMO BI INCL CAD: CPT | Performed by: STUDENT IN AN ORGANIZED HEALTH CARE EDUCATION/TRAINING PROGRAM

## 2022-05-20 PROCEDURE — 77063 BREAST TOMOSYNTHESIS BI: CPT | Performed by: STUDENT IN AN ORGANIZED HEALTH CARE EDUCATION/TRAINING PROGRAM

## 2022-06-16 ENCOUNTER — ANCILLARY PROCEDURE (OUTPATIENT)
Dept: ULTRASOUND IMAGING | Facility: CLINIC | Age: 62
End: 2022-06-16
Attending: FAMILY MEDICINE
Payer: COMMERCIAL

## 2022-06-16 DIAGNOSIS — M79.89 LEFT ARM SWELLING: ICD-10-CM

## 2022-06-16 DIAGNOSIS — M79.622 LEFT AXILLARY PAIN: ICD-10-CM

## 2022-06-16 PROCEDURE — 93971 EXTREMITY STUDY: CPT | Mod: LT | Performed by: RADIOLOGY

## 2022-06-17 DIAGNOSIS — L40.50 PSORIATIC ARTHRITIS (H): ICD-10-CM

## 2022-06-17 NOTE — RESULT ENCOUNTER NOTE
Tram Ms. Downey,  Your results came back and thankfully ultrasound did not show a deep or a superficial blood clot. There was no indication of infection or inflammation  Its possible you have a fatty tissue called a lipoma  If symptoms are persisting, getting worse or you remani concerned and since ultrasound does not completely explain symptoms next step would be to do a Ct scan or an MRI to further evaluate. Let me know and we can order that if desired.  . If you have any further concerns please do not hesitate to contact us by message, phone or making an appointment.  Have a good day   Dr Bobby ROSE

## 2022-06-21 RX ORDER — TOFACITINIB 11 MG/1
11 TABLET, FILM COATED, EXTENDED RELEASE ORAL DAILY
Qty: 90 TABLET | Refills: 1 | Status: SHIPPED | OUTPATIENT
Start: 2022-06-21 | End: 2022-09-27

## 2022-06-21 NOTE — TELEPHONE ENCOUNTER
tofacitinib (XELJANZ XR) 11 MG 24 hr tablet        Last Written Prescription Date:  12/2/21  Last Fill Quantity: 30,   # refills: 5  Last Office Visit: 3/25/22  Future Office visit:  6/24/22    CBC RESULTS: Recent Labs   Lab Test 05/16/22  1033   WBC 7.5   RBC 4.11   HGB 12.4   HCT 38.1   MCV 93   MCH 30.2   MCHC 32.5   RDW 13.1          Creatinine   Date Value Ref Range Status   05/16/2022 0.67 0.52 - 1.04 mg/dL Final   06/11/2021 0.63 0.52 - 1.04 mg/dL Final   ]    Liver Function Studies -   Recent Labs   Lab Test 05/16/22  1033   PROTTOTAL 7.6   ALBUMIN 4.2   BILITOTAL 0.6   ALKPHOS 62   AST 15   ALT 21       Routing refill request to provider for review/approval because:  Per Rheumatology Refill Protocol.    GERALD SchmittN, RN

## 2022-06-24 ENCOUNTER — VIRTUAL VISIT (OUTPATIENT)
Dept: RHEUMATOLOGY | Facility: CLINIC | Age: 62
End: 2022-06-24
Attending: INTERNAL MEDICINE
Payer: COMMERCIAL

## 2022-06-24 DIAGNOSIS — L40.0 PLAQUE PSORIASIS: ICD-10-CM

## 2022-06-24 DIAGNOSIS — Z79.899 HIGH RISK MEDICATION USE: ICD-10-CM

## 2022-06-24 DIAGNOSIS — Z79.52 LONG TERM (CURRENT) USE OF SYSTEMIC STEROIDS: ICD-10-CM

## 2022-06-24 DIAGNOSIS — Z79.1 NSAID LONG-TERM USE: ICD-10-CM

## 2022-06-24 DIAGNOSIS — M46.90 DACTYLITIS DUE TO SPONDYLOARTHRITIC DISORDER (H): ICD-10-CM

## 2022-06-24 DIAGNOSIS — L40.50 PSORIATIC ARTHRITIS (H): Primary | ICD-10-CM

## 2022-06-24 DIAGNOSIS — L40.9 PSORIASIS OF NAIL: ICD-10-CM

## 2022-06-24 PROCEDURE — 99214 OFFICE O/P EST MOD 30 MIN: CPT | Mod: 95 | Performed by: INTERNAL MEDICINE

## 2022-06-24 PROCEDURE — G0463 HOSPITAL OUTPT CLINIC VISIT: HCPCS | Mod: PN,RTG | Performed by: INTERNAL MEDICINE

## 2022-06-24 NOTE — PROGRESS NOTES
Willa is a 61 year old who is being evaluated via a billable video visit.      How would you like to obtain your AVS? MyChart  If the video visit is dropped, the invitation should be resent by: Text to cell phone: [phone]  Will anyone else be joining your video visit? No        Video-Visit Details    Type of service:  Video Visit    Start: 06/24/2022 10:28 am   Stop: 06/24/2022 11:00 am    Originating Location (pt. Location): Home    Distant Location (provider location):  [facility] [place]     Platform used for Video Visit: 2 minutes video, then 23 minutes phone, then 7 minutes outside of video and phone for total of 32 minutes spent on date of service on chart review, documentation.      Willa is a 61 year old who was scheduled for a billable video visit though unable to connect with reliable connection to WebVisible or P[employer] (though physical exam performed while she was connected) and so transition to phone call.    Marc Arias MD  Rheumatology      Outpatient Rheumatology Follow-up    Name: Willa Downey    MRN [id number]  Date of service: 6/24/2022            Reason for follow-up: psoriatic arthritis   Requesting physician: Kitty Mendez MD        Assessment & Plan:   #Psoriatic arthritis  #negative RF  #psoriasis, cutaneous and nail involvement  #DIP Left hand 3rd, 4th, 5th and right hand 4th digit inflammatory arthritis  #humira, enbrel, methotrexate failure  #Submandibular LAD- resolved  #High risk drug therapy: xeljanz  #Long term, current, systemic steroid use    61 year old female with hx of advanced glaucoma followed closely by ophthalmology presented to rheumatology on 2/5/21 with 5 month of progressive left hand 3rd, 4th, 5th digit and right hand 4th digit DIP inflammatory arthritis in the context of psoriasis, psoriatic changes of the nails. Taken together most consistent with psoriatic arthritis. Tried on 2 NSAIDs which failed to control symptoms, and had  severe GERD symptoms. Tried methotrexate by prior rheumatologist and disease not controlled. Humira started Mid February 2021 due to lack of efficacy of these other therapies and continued through May 14th, though had severe/ rapidly progressive disease after some improvement in the first 4 weeks involving DIP inflammatory arthritis and nail disease. We then ordered Enbrel, though starting was delayed due to development of impressive bilateral submandibular lymphadenopathy which resolved within about 1 week and thought likely secondary to viral infection. She started enbrel on 6/20/2021 which she had continued on until switching to cosentyx after approval on 8/31/21 due to lack of efficacy of enbrel and injection site reaction. Had been on cosentyx from 8/31/21 with initial improvement in inflammatory DIP arthritis, psoriatic lesions, stabilization of her nail disease though she then had progression of her cutaneous nail and joint inflammatory process which prompted restarting prednisone at 10 mg daily along with doubling her dose of Cosentyx to 300 mg each month.  She took her first dose of 300 mg on 11/23/2021.  She did try to decrease her prednisone shortly after that down to 7.5 though noticed return of her skin peeling/psoriatic skin lesions worsening and so went back up to 10 mg daily which has again improved these symptoms. She was again unable to taper from prednisone below 10mg daily without progression of both cutaneous and articular symptoms and so cosentyx was discontinued and xeljanz 11mg once daily started after insurance approval on 12/29/21 and has continued on this through today's appointment.     Psoriatic Arthritis: Has had excellent response to xeljanz in re: to both cutaneous and inflammatory arthritis findings. We have been able to taper her prednisone down to lowest dose (currently on 2.5mg daily) since meeting, which is a big improvement over other prior therapies. Still takes celebrex qAM  for joint pain, which is reasonable and not uncommon to need NSAIDs with psoriatic arthritis. Disease activity is currently mild/moderate, though continues improving. Working on getting her off of prednisone and avoiding rebound in cutaneous component.     Plan:  -Continue xeljanz 11mg daily  -Continue celebrex as needed for joint pain- refilled  -Prednisone: Currently on 2.5mg of prednisone. She will attempt to decrease as able.     High risk medication: Xeljanz  -No side effects by history/exam/available serologies from 5/16/22 without evidence of toxicity  -Risks and benefits again discussed today  -Labs q6 months while on xeljanz for routine drug toxicity screening    Long term, current, systemic steroid use: current dose of 2.5mg daily. Taper as able above  Long term NSAID use: celebrex  -Labs q6 months for monitoring. Standing orders in place. Reviewed labs from 2/1/22 without evidence of toxicity    Follow-up in 3 months    Marc Arias MD  Rheumatology    Total of 32 minutes spent on date of service on chart review, patient encounter, documentation.      Subjective:   Interval history 6/24/2022  2.5mg prednisone daily for about 3 weeks. She had flare of psoriasis when trying to decrease from 3mg to 2mg daily, multiple times. Flares each time. Has left>right CMC pain which occurred when waving hands. No other activity causes this. Has increased her walking to over 1 hour per day (about 9k steps). Has intentionally lost about 4 pounds. With walking, she has noticed right foot greater MTP discomfort. No redness/swelling/warmth. Has not affected her ability perform all of this activity. No side effects from xeljanz. Continues to provide benefit, though still some pain. Nails have been much improved. Still has intermittent DIP pain which with celebrex, she has not pain in the AM, though later in the day has slowly returns. She rates the pain in her greater MTP at about a 3-4/10. No pain at rest. Comes and  "goes, not associated with walking. Not daily. When it occurs will last for a few hours. And then resolves spontaneously. Thinks that this is occurring a few days per week- less than half the days.  14 point review of systems collected and otherwise negative.      Interval history 3/25/2022  Currently on 3mg of prednisone daily. Still with pink/ reddish spot on her scalp and left digits at tips of digits, though much improved. Has been tolerating her prednisone and xeljanz without issues. She did by accident take an increased dose of prednisone up to 10mg for 2 days when she was to reduce from 4 to 3mg due to having a different bottle of 2.5mg tabs (instead of the 1mg tabs she was taking). Her pain in the AM is 6/10. Takes her celebrex in the AM with pain resolution. No GI side effects from her celebrex. No fevers/chills/night sweats. She has noticed slight increase in the lesions of her digits since decreasing her prednisone down to 3mg for 5 days. Reports feeling well and that this is the best she has felt with DMARD therapy and being able to reduce her dose. No interval infections. ROS otherwise negative.     Interval history 1/28/22  Was already on prednisone 10mg and then xeljanz was added on Jan 3rd. Had complete resolution of psoriasis on scalp/inner ear. Hands also completely clear at that time. She then decreased to 7.5mg on 1/17 and for the first week was doing \"okay\", then this week noticed some pits on fingertips, some skin peeling. Yesterday and today has noticed some more skin peeling. Some more scalp irriation 3 days ago as well. She feels that since starting xeljanz this is the best improvement that she has found. Continues to take celebrex daily due to pain in joints without it. With celebrex, pain is resolved. If she does lots of activity (cleaning etc) will have some pain. No GI upset as she had with mobic. Not taking PPI. She has noticed some more fatigue in the AM and also more so in the " "afternoon. Uses clobetasol ointment and cream. Also topical for scalp. Uses them all at night. Using 0.05% clobetasol. Has continued to experience rare intermittent tender lymph nodes. Had last booster on August 21st. No interval infections. No fevers, chills, weight changes. Remains active. No noticeable side effects since starting xeljanz. ROS otherwise negative.     12/2/2021 interval history  Took her first dose of Cosentyx 200 mg daily on 11/23/2021.  Around that time tried to decrease her prednisone from 10 mg daily down to 7.5 mg daily though written to discuss she had return of joint pain, joint swelling and stiffness particularly of her DIPs, worsening of her nail disease and psoriatic lesions on the tips of her fingers.  Fatigue also more significant.  As a result she went back up to 10 mg daily and this improved some.  She is not been taking any NSAIDs since her last appointment despite her joint pain as the meloxicam previously prescribed it was giving her reflux/gastritis symptoms.  No injection site reaction from her Cosentyx.  No fevers or chills.  No weight changes.  Tolerating prednisone without issues.  Review of systems otherwise negative.    9/30/21 Interval history  Has been taking cosentyx without issues. Next dose on Friday and then switching to monthly. Has found that her skin is some improved over the fingertips. Though still some painful/burning sensation at the sites of prior lesions. This is improving. When she started prednisone, did have dramatic improvement. Though with taper improvement has been slower though has continued. Currently on 2.5mg daily. No side effects from low dose. Energy still \"not great\" though she thinks it is slowly improving. Going for walks 5 days per week 30-40 minutes per day. Reports that the pain is OK when doing activities, but has been avoiding activities that are painful like weeding/carrying heavy items with grocery shopping. Has continued with mobic 3-4x " week. 15mg tabs each time. No GI side effects at this frequency/dose. Still some stiff at DIPs. Though improved from prior. Did have lesion mid upper back and front groin which have improved since cosentyx/ prednisone. No fevers/chills. No interval infections. ROS otherwise negative.     August 12, 2021  Interval history  Had ?ear infection early July. Given ear drops by PCP. Had developed more psoriasis in new locations (palm of hand/scalp (10-15%)/lower stomach/low back/ and groin/gluteal cleft) where previously was on fingertips/nails only. Saw dermatology who prescribed topical, the topical has been somewhat helpful though has not resolves symptoms. She thinks that she has had 10-15% improvement with the topical. Spoke to pharmacist, who counseled her than can take up to 4 months for enbrel to take full effect. Gives herself injection every Sunday. After 5th injection, has itching/burning at the site. Takes Claritin already. Iced. Was told by pharmacist to try hydrocortisone, which she did. Decreased from 4-5 days of symptoms down to 3 days. From 5th injection onwards has had this reaction. Total of 8 injections so far. Joints much less of an issue compared to cutaneous involvement. If she has any joint pain, takes mobic. Roughly 1x per week. Around once per 7 -10days has extreme fatigue. Yesterday was significant. Slept until noon. Usually gets up at 630. Usually trouble sleeping, so this was unusual for her. No return of submandibular LAD. No fever, chills. No cough/sore throat. Some stiffness in the DIPs, morning predominant. Improves throughout the day. Some increase in pain this week. ROS otherwise negative.      6/17/21  Has advanced glaucoma/ dry eyes. Chronic. No new head/neck symptoms. Dry mouth during submandibular gland swelling, dry mouth now resolved. Reports the swelling has mproved now by 50+%. No more pain, so no longer using ibuprofen. No fever/chills/cough or other signs/symptoms of infection.  With ongoing progression of nail/distal digit predominant psoriasis and DIP arthritis. Severe pain. Unable to tolerate NSAIDs given GI upset despite PPI. ROS otherwise negative       5/14/21  Patient initially was going to wait to contact, though has been having progression of symptoms of nails/joint pain/ skin. Last injection was on Tuesday. Had a flare in the days following her injection. First 2 doses, she did notice some improvement. Though after that time, with each dose, has had less and less of a positive change in nails/joints/skin. Has instead been getting worse and worse response. Has stiffness and pain in her DIPs that has been progressive. Has redness as well. Also with radiation of the pain proximally which is new. Also some days with severe fatigue. No fevers/chills. No weight changes. No interval infections.     Past Medical History  Past Medical History:   Diagnosis Date     Bilateral low back pain with right-sided sciatica, unspecified chronicity 11/11/2020     Neuroforaminal stenosis of lumbar spine 11/11/2020     Nuclear senile cataract of both eyes 08/16/2018     Osteoarthritis 10/15/2020     Psoriatic arthropathy (H) 10/19/2020     Past Surgical History  Past Surgical History:   Procedure Laterality Date     BIOPSY  07/30/2019    the right breast tissue(benign)     EYE SURGERY      3 surgeries for glaucoma treatment     Medications  Current Outpatient Medications   Medication     CALCIUM CARBONATE-VIT D-MIN PO     celecoxib (CELEBREX) 100 MG capsule     clobetasol (TEMOVATE) 0.05 % external ointment     fluocinonide (LIDEX) 0.05 % external solution     gabapentin (NEURONTIN) 300 MG capsule     loratadine 10 MG capsule     Multiple Vitamins-Minerals (MULTIVITAMIN ADULTS 50+) TABS     predniSONE (DELTASONE) 2.5 MG tablet     tofacitinib (XELJANZ XR) 11 MG 24 hr tablet     No current facility-administered medications for this visit.     Allergies   Allergies   Allergen Reactions     Adhesive Tape  Blisters, Itching and Swelling     Dust Mites      Latex Blisters, Itching, Other (See Comments) and Swelling     Skin sensitivity       Morphine Sulfate (Concentrate) Itching     Other reaction(s): Chills  heart palpatations     Seasonal Allergies      Codeine Palpitations     chills     Family History:   No family history of autoimmune diseases.     Social History  , with children. 2 kids and they are healthy. No ETOH, smoking, drug use.      Objective:    Physical exam:  LMP  (LMP Unknown)   GEN: Sitting up unassisted. NAD  HEENT: no facial rash, sclera clear, no inflammatory nose/ external ear changes  CV: No upper extremity edema  Pulm: Breathing comfortably, no audible wheezing, no use accessory muscles, speaking in full sentences  Abdomen: Not distended  Skin: psoriatic nail disease, improved though not resolved.  This looks the best it has since established care with me.  MSK: full active ROM of bilateral shoulders, elbows, wrists, MCPs, PIPs. DIPs of Digits 3-5 on the left hand and digit 3 on the right hand without erythema. Has psoriatic changes of fingernails of digits 3-5 on the left hand and digit 3 on the right hand-much improved.    Labs  WBC   Date Value Ref Range Status   06/11/2021 9.4 4.0 - 11.0 10e9/L Final     WBC Count   Date Value Ref Range Status   05/16/2022 7.5 4.0 - 11.0 10e3/uL Final     Hemoglobin   Date Value Ref Range Status   05/16/2022 12.4 11.7 - 15.7 g/dL Final   06/11/2021 12.9 11.7 - 15.7 g/dL Final     Platelet Count   Date Value Ref Range Status   05/16/2022 401 150 - 450 10e3/uL Final   06/11/2021 369 150 - 450 10e9/L Final     Creatinine   Date Value Ref Range Status   05/16/2022 0.67 0.52 - 1.04 mg/dL Final   06/11/2021 0.63 0.52 - 1.04 mg/dL Final     Lab Results   Component Value Date    ALKPHOS 62 05/16/2022    ALKPHOS 76 06/11/2021     AST   Date Value Ref Range Status   05/16/2022 15 0 - 45 U/L Final   06/11/2021 14 0 - 45 U/L Final     Lab Results   Component  Value Date    ALT 21 05/16/2022    ALT 19 06/11/2021     Sed Rate   Date Value Ref Range Status   06/16/2021 15 0 - 30 mm/h Final     Erythrocyte Sedimentation Rate   Date Value Ref Range Status   02/01/2022 4 0 - 30 mm/hr Final     CRP Inflammation   Date Value Ref Range Status   02/01/2022 <2.9 0.0 - 8.0 mg/L Final   06/16/2021 5.2 0.0 - 8.0 mg/L Final     UA RESULTS:  No results for input(s): COLOR, APPEARANCE, URINEGLC, URINEBILI, URINEKETONE, SG, UBLD, URINEPH, PROTEIN, UROBILINOGEN, NITRITE, LEUKEST, RBCU, WBCU in the last [id number] hours.   Rheumatoid Factor   Date Value Ref Range Status   02/05/2021 <7 <12 IU/mL Final     6/16/21  SSA, SSB negative  Mumps IgG and IgM positive  Creatinine 0.63  LFTs normal  CBC WNL    2/5/21  HIV negative  Hep C AB negative  Hep B s AG negative  Hep B c AB reactive  Quant gold negative  Quant gold negative  RF negative  CRP <2.9  ESR 10  Cr 0.62  WBC 7.1  HGB 13.1    HLA b27 negative  Hep B virus DNA negative    2/2/21  ESR 9  CRP <2.9  WBC 6.1  HGB 12.8      11/11/20  A1C 5.3  Hep C/ HIV negative    Imaging:  MR left hand  Impression:  1a. Correlating to the marker at the third digit interphalangeal  joint, there is a focal erosion along the volar aspect of the distal  phalangeal base with intense bone marrow edema and abnormal T1 marrow  signal. Findings are concerning for osteomyelitis and infection should  be ruled out. Also on the differential is sequelae of long-standing  active inflammatory arthropathy (including psoriatic arthritis given  patient's clinical history) where marginal erosions and marrow signal  abnormality are expected though the degree of T1 marrow replacement is  out of proportion to typical findings.   b. Subtle bone marrow edema in the middle phalanx without T1 marrow  signal abnormality, this is favored to represent reactive edema.  c. Joint effusion at the third digit interphalangeal joint,  potentially infectious or inflammatory.  Consider aspiration for  definitive diagnosis.  d. The partially visualized fifth digit shows edema within the distal  phalanx and head of the middle phalanx with regional soft tissue  edema. Given predominant findings in the third digit, similar changes  in the fifth digit could support a polyarticular inflammatory  arthropathy. Correlate clinically.  2. Tenosynovitis of the third digit flexor and extensor tendons. There  is also tendinosis with high-grade (near full-thickness) tearing of  the extensor tendon/extensor saul at the distal phalangeal base with  slight proximal retraction of the residual fibers.  3. Diffusely thickened, edematous ulnar and radial collateral  ligaments, likely combination of sprain and reactive edema.  4. Extensive soft tissue edema centered about the interphalangeal  joint.

## 2022-06-24 NOTE — LETTER
6/24/2022       RE: Willa Downey  [address]  [facility] [address]     Dear Colleague,    Thank you for referring your patient, Willa Downey, to the [facility] [place] at [facility]. Please see a copy of my visit note below.    Willa is a 61 year old who is being evaluated via a billable video visit.      How would you like to obtain your AVS? MyChart  If the video visit is dropped, the invitation should be resent by: Text to cell phone: [phone]  Will anyone else be joining your video visit? No        Video-Visit Details    Type of service:  Video Visit    Start: 06/24/2022 10:28 am   Stop: 06/24/2022 11:00 am    Originating Location (pt. Location): Home    Distant Location (provider location):  [facility] [place]     Platform used for Video Visit: 2 minutes video, then 23 minutes phone, then 7 minutes outside of video and phone for total of 32 minutes spent on date of service on chart review, documentation.      Willa is a 61 year old who was scheduled for a billable video visit though unable to connect with reliable connection to [employer] or [employer] (though physical exam performed while she was connected) and so transition to phone call.    Marc Arias MD  Rheumatology      Outpatient Rheumatology Follow-up    Name: Willa Downey    MRN [id number]  Date of service: 6/24/2022            Reason for follow-up: psoriatic arthritis   Requesting physician: Kitty Mendez MD        Assessment & Plan:   #Psoriatic arthritis  #negative RF  #psoriasis, cutaneous and nail involvement  #DIP Left hand 3rd, 4th, 5th and right hand 4th digit inflammatory arthritis  #humira, enbrel, methotrexate failure  #Submandibular LAD- resolved  #High risk drug therapy: xeljanz  #Long term, current, systemic steroid use    61 year old female with hx of advanced glaucoma followed closely by  ophthalmology presented to rheumatology on 2/5/21 with 5 month of progressive left hand 3rd, 4th, 5th digit and right hand 4th digit DIP inflammatory arthritis in the context of psoriasis, psoriatic changes of the nails. Taken together most consistent with psoriatic arthritis. Tried on 2 NSAIDs which failed to control symptoms, and had severe GERD symptoms. Tried methotrexate by prior rheumatologist and disease not controlled. Humira started Mid February 2021 due to lack of efficacy of these other therapies and continued through May 14th, though had severe/ rapidly progressive disease after some improvement in the first 4 weeks involving DIP inflammatory arthritis and nail disease. We then ordered Enbrel, though starting was delayed due to development of impressive bilateral submandibular lymphadenopathy which resolved within about 1 week and thought likely secondary to viral infection. She started enbrel on 6/20/2021 which she had continued on until switching to cosentyx after approval on 8/31/21 due to lack of efficacy of enbrel and injection site reaction. Had been on cosentyx from 8/31/21 with initial improvement in inflammatory DIP arthritis, psoriatic lesions, stabilization of her nail disease though she then had progression of her cutaneous nail and joint inflammatory process which prompted restarting prednisone at 10 mg daily along with doubling her dose of Cosentyx to 300 mg each month.  She took her first dose of 300 mg on 11/23/2021.  She did try to decrease her prednisone shortly after that down to 7.5 though noticed return of her skin peeling/psoriatic skin lesions worsening and so went back up to 10 mg daily which has again improved these symptoms. She was again unable to taper from prednisone below 10mg daily without progression of both cutaneous and articular symptoms and so cosentyx was discontinued and xeljanz 11mg once daily started after insurance approval on 12/29/21 and has continued on this  through today's appointment.     Psoriatic Arthritis: Has had excellent response to xeljanz in re: to both cutaneous and inflammatory arthritis findings. We have been able to taper her prednisone down to lowest dose (currently on 2.5mg daily) since meeting, which is a big improvement over other prior therapies. Still takes celebrex qAM for joint pain, which is reasonable and not uncommon to need NSAIDs with psoriatic arthritis. Disease activity is currently mild/moderate, though continues improving. Working on getting her off of prednisone and avoiding rebound in cutaneous component.     Plan:  -Continue xeljanz 11mg daily  -Continue celebrex as needed for joint pain- refilled  -Prednisone: Currently on 2.5mg of prednisone. She will attempt to decrease as able.     High risk medication: Xeljanz  -No side effects by history/exam/available serologies from 5/16/22 without evidence of toxicity  -Risks and benefits again discussed today  -Labs q6 months while on xeljanz for routine drug toxicity screening    Long term, current, systemic steroid use: current dose of 2.5mg daily. Taper as able above  Long term NSAID use: celebrex  -Labs q6 months for monitoring. Standing orders in place. Reviewed labs from 2/1/22 without evidence of toxicity    Follow-up in 3 months    Marc Arias MD  Rheumatology    Total of 32 minutes spent on date of service on chart review, patient encounter, documentation.      Subjective:   Interval history 6/24/2022  2.5mg prednisone daily for about 3 weeks. She had flare of psoriasis when trying to decrease from 3mg to 2mg daily, multiple times. Flares each time. Has left>right CMC pain which occurred when waving hands. No other activity causes this. Has increased her walking to over 1 hour per day (about 9k steps). Has intentionally lost about 4 pounds. With walking, she has noticed right foot greater MTP discomfort. No redness/swelling/warmth. Has not affected her ability perform all of this  "activity. No side effects from xeljanz. Continues to provide benefit, though still some pain. Nails have been much improved. Still has intermittent DIP pain which with celebrex, she has not pain in the AM, though later in the day has slowly returns. She rates the pain in her greater MTP at about a 3-4/10. No pain at rest. Comes and goes, not associated with walking. Not daily. When it occurs will last for a few hours. And then resolves spontaneously. Thinks that this is occurring a few days per week- less than half the days.  14 point review of systems collected and otherwise negative.      Interval history 3/25/2022  Currently on 3mg of prednisone daily. Still with pink/ reddish spot on her scalp and left digits at tips of digits, though much improved. Has been tolerating her prednisone and xeljanz without issues. She did by accident take an increased dose of prednisone up to 10mg for 2 days when she was to reduce from 4 to 3mg due to having a different bottle of 2.5mg tabs (instead of the 1mg tabs she was taking). Her pain in the AM is 6/10. Takes her celebrex in the AM with pain resolution. No GI side effects from her celebrex. No fevers/chills/night sweats. She has noticed slight increase in the lesions of her digits since decreasing her prednisone down to 3mg for 5 days. Reports feeling well and that this is the best she has felt with DMARD therapy and being able to reduce her dose. No interval infections. ROS otherwise negative.     Interval history 1/28/22  Was already on prednisone 10mg and then xeljanz was added on Jan 3rd. Had complete resolution of psoriasis on scalp/inner ear. Hands also completely clear at that time. She then decreased to 7.5mg on 1/17 and for the first week was doing \"okay\", then this week noticed some pits on fingertips, some skin peeling. Yesterday and today has noticed some more skin peeling. Some more scalp irriation 3 days ago as well. She feels that since starting xeljanz this is " the best improvement that she has found. Continues to take celebrex daily due to pain in joints without it. With celebrex, pain is resolved. If she does lots of activity (cleaning etc) will have some pain. No GI upset as she had with mobic. Not taking PPI. She has noticed some more fatigue in the AM and also more so in the afternoon. Uses clobetasol ointment and cream. Also topical for scalp. Uses them all at night. Using 0.05% clobetasol. Has continued to experience rare intermittent tender lymph nodes. Had last booster on August 21st. No interval infections. No fevers, chills, weight changes. Remains active. No noticeable side effects since starting xeljanz. ROS otherwise negative.     12/2/2021 interval history  Took her first dose of Cosentyx 200 mg daily on 11/23/2021.  Around that time tried to decrease her prednisone from 10 mg daily down to 7.5 mg daily though written to discuss she had return of joint pain, joint swelling and stiffness particularly of her DIPs, worsening of her nail disease and psoriatic lesions on the tips of her fingers.  Fatigue also more significant.  As a result she went back up to 10 mg daily and this improved some.  She is not been taking any NSAIDs since her last appointment despite her joint pain as the meloxicam previously prescribed it was giving her reflux/gastritis symptoms.  No injection site reaction from her Cosentyx.  No fevers or chills.  No weight changes.  Tolerating prednisone without issues.  Review of systems otherwise negative.    9/30/21 Interval history  Has been taking cosentyx without issues. Next dose on Friday and then switching to monthly. Has found that her skin is some improved over the fingertips. Though still some painful/burning sensation at the sites of prior lesions. This is improving. When she started prednisone, did have dramatic improvement. Though with taper improvement has been slower though has continued. Currently on 2.5mg daily. No side effects  "from low dose. Energy still \"not great\" though she thinks it is slowly improving. Going for walks 5 days per week 30-40 minutes per day. Reports that the pain is OK when doing activities, but has been avoiding activities that are painful like weeding/carrying heavy items with grocery shopping. Has continued with mobic 3-4x week. 15mg tabs each time. No GI side effects at this frequency/dose. Still some stiff at DIPs. Though improved from prior. Did have lesion mid upper back and front groin which have improved since cosentyx/ prednisone. No fevers/chills. No interval infections. ROS otherwise negative.     August 12, 2021  Interval history  Had ?ear infection early July. Given ear drops by PCP. Had developed more psoriasis in new locations (palm of hand/scalp (10-15%)/lower stomach/low back/ and groin/gluteal cleft) where previously was on fingertips/nails only. Saw dermatology who prescribed topical, the topical has been somewhat helpful though has not resolves symptoms. She thinks that she has had 10-15% improvement with the topical. Spoke to pharmacist, who counseled her than can take up to 4 months for enbrel to take full effect. Gives herself injection every Sunday. After 5th injection, has itching/burning at the site. Takes Claritin already. Iced. Was told by pharmacist to try hydrocortisone, which she did. Decreased from 4-5 days of symptoms down to 3 days. From 5th injection onwards has had this reaction. Total of 8 injections so far. Joints much less of an issue compared to cutaneous involvement. If she has any joint pain, takes mobic. Roughly 1x per week. Around once per 7 -10days has extreme fatigue. Yesterday was significant. Slept until noon. Usually gets up at 630. Usually trouble sleeping, so this was unusual for her. No return of submandibular LAD. No fever, chills. No cough/sore throat. Some stiffness in the DIPs, morning predominant. Improves throughout the day. Some increase in pain this week. ROS " otherwise negative.      6/17/21  Has advanced glaucoma/ dry eyes. Chronic. No new head/neck symptoms. Dry mouth during submandibular gland swelling, dry mouth now resolved. Reports the swelling has mproved now by 50+%. No more pain, so no longer using ibuprofen. No fever/chills/cough or other signs/symptoms of infection. With ongoing progression of nail/distal digit predominant psoriasis and DIP arthritis. Severe pain. Unable to tolerate NSAIDs given GI upset despite PPI. ROS otherwise negative       5/14/21  Patient initially was going to wait to contact, though has been having progression of symptoms of nails/joint pain/ skin. Last injection was on Tuesday. Had a flare in the days following her injection. First 2 doses, she did notice some improvement. Though after that time, with each dose, has had less and less of a positive change in nails/joints/skin. Has instead been getting worse and worse response. Has stiffness and pain in her DIPs that has been progressive. Has redness as well. Also with radiation of the pain proximally which is new. Also some days with severe fatigue. No fevers/chills. No weight changes. No interval infections.     Past Medical History  Past Medical History:   Diagnosis Date     Bilateral low back pain with right-sided sciatica, unspecified chronicity 11/11/2020     Neuroforaminal stenosis of lumbar spine 11/11/2020     Nuclear senile cataract of both eyes 08/16/2018     Osteoarthritis 10/15/2020     Psoriatic arthropathy (H) 10/19/2020     Past Surgical History  Past Surgical History:   Procedure Laterality Date     BIOPSY  07/30/2019    the right breast tissue(benign)     EYE SURGERY      3 surgeries for glaucoma treatment     Medications  Current Outpatient Medications   Medication     CALCIUM CARBONATE-VIT D-MIN PO     celecoxib (CELEBREX) 100 MG capsule     clobetasol (TEMOVATE) 0.05 % external ointment     fluocinonide (LIDEX) 0.05 % external solution     gabapentin (NEURONTIN)  300 MG capsule     loratadine 10 MG capsule     Multiple Vitamins-Minerals (MULTIVITAMIN ADULTS 50+) TABS     predniSONE (DELTASONE) 2.5 MG tablet     tofacitinib (XELJANZ XR) 11 MG 24 hr tablet     No current facility-administered medications for this visit.     Allergies   Allergies   Allergen Reactions     Adhesive Tape Blisters, Itching and Swelling     Dust Mites      Latex Blisters, Itching, Other (See Comments) and Swelling     Skin sensitivity       Morphine Sulfate (Concentrate) Itching     Other reaction(s): Chills  heart palpatations     Seasonal Allergies      Codeine Palpitations     chills     Family History:   No family history of autoimmune diseases.     Social History  , with children. 2 kids and they are healthy. No ETOH, smoking, drug use.      Objective:    Physical exam:  LMP  (LMP Unknown)   GEN: Sitting up unassisted. NAD  HEENT: no facial rash, sclera clear, no inflammatory nose/ external ear changes  CV: No upper extremity edema  Pulm: Breathing comfortably, no audible wheezing, no use accessory muscles, speaking in full sentences  Abdomen: Not distended  Skin: psoriatic nail disease, improved though not resolved.  This looks the best it has since established care with me.  MSK: full active ROM of bilateral shoulders, elbows, wrists, MCPs, PIPs. DIPs of Digits 3-5 on the left hand and digit 3 on the right hand without erythema. Has psoriatic changes of fingernails of digits 3-5 on the left hand and digit 3 on the right hand-much improved.    Labs  WBC   Date Value Ref Range Status   06/11/2021 9.4 4.0 - 11.0 10e9/L Final     WBC Count   Date Value Ref Range Status   05/16/2022 7.5 4.0 - 11.0 10e3/uL Final     Hemoglobin   Date Value Ref Range Status   05/16/2022 12.4 11.7 - 15.7 g/dL Final   06/11/2021 12.9 11.7 - 15.7 g/dL Final     Platelet Count   Date Value Ref Range Status   05/16/2022 401 150 - 450 10e3/uL Final   06/11/2021 369 150 - 450 10e9/L Final     Creatinine   Date Value  Ref Range Status   05/16/2022 0.67 0.52 - 1.04 mg/dL Final   06/11/2021 0.63 0.52 - 1.04 mg/dL Final     Lab Results   Component Value Date    ALKPHOS 62 05/16/2022    ALKPHOS 76 06/11/2021     AST   Date Value Ref Range Status   05/16/2022 15 0 - 45 U/L Final   06/11/2021 14 0 - 45 U/L Final     Lab Results   Component Value Date    ALT 21 05/16/2022    ALT 19 06/11/2021     Sed Rate   Date Value Ref Range Status   06/16/2021 15 0 - 30 mm/h Final     Erythrocyte Sedimentation Rate   Date Value Ref Range Status   02/01/2022 4 0 - 30 mm/hr Final     CRP Inflammation   Date Value Ref Range Status   02/01/2022 <2.9 0.0 - 8.0 mg/L Final   06/16/2021 5.2 0.0 - 8.0 mg/L Final     UA RESULTS:  No results for input(s): COLOR, APPEARANCE, URINEGLC, URINEBILI, URINEKETONE, SG, UBLD, URINEPH, PROTEIN, UROBILINOGEN, NITRITE, LEUKEST, RBCU, WBCU in the last [id number] hours.   Rheumatoid Factor   Date Value Ref Range Status   02/05/2021 <7 <12 IU/mL Final     6/16/21  SSA, SSB negative  Mumps IgG and IgM positive  Creatinine 0.63  LFTs normal  CBC WNL    2/5/21  HIV negative  Hep C AB negative  Hep B s AG negative  Hep B c AB reactive  Quant gold negative  Quant gold negative  RF negative  CRP <2.9  ESR 10  Cr 0.62  WBC 7.1  HGB 13.1    HLA b27 negative  Hep B virus DNA negative    2/2/21  ESR 9  CRP <2.9  WBC 6.1  HGB 12.8      11/11/20  A1C 5.3  Hep C/ HIV negative    Imaging:  MR left hand  Impression:  1a. Correlating to the marker at the third digit interphalangeal  joint, there is a focal erosion along the volar aspect of the distal  phalangeal base with intense bone marrow edema and abnormal T1 marrow  signal. Findings are concerning for osteomyelitis and infection should  be ruled out. Also on the differential is sequelae of long-standing  active inflammatory arthropathy (including psoriatic arthritis given  patient's clinical history) where marginal erosions and marrow signal  abnormality are expected though  the degree of T1 marrow replacement is  out of proportion to typical findings.   b. Subtle bone marrow edema in the middle phalanx without T1 marrow  signal abnormality, this is favored to represent reactive edema.  c. Joint effusion at the third digit interphalangeal joint,  potentially infectious or inflammatory. Consider aspiration for  definitive diagnosis.  d. The partially visualized fifth digit shows edema within the distal  phalanx and head of the middle phalanx with regional soft tissue  edema. Given predominant findings in the third digit, similar changes  in the fifth digit could support a polyarticular inflammatory  arthropathy. Correlate clinically.  2. Tenosynovitis of the third digit flexor and extensor tendons. There  is also tendinosis with high-grade (near full-thickness) tearing of  the extensor tendon/extensor saul at the distal phalangeal base with  slight proximal retraction of the residual fibers.  3. Diffusely thickened, edematous ulnar and radial collateral  ligaments, likely combination of sprain and reactive edema.  4. Extensive soft tissue edema centered about the interphalangeal  joint.       Marc Arias MD

## 2022-07-06 ENCOUNTER — OFFICE VISIT (OUTPATIENT)
Dept: DERMATOLOGY | Facility: CLINIC | Age: 62
End: 2022-07-06
Payer: COMMERCIAL

## 2022-07-06 DIAGNOSIS — L40.9 PSORIASIS: Primary | ICD-10-CM

## 2022-07-06 DIAGNOSIS — L40.50 PSORIATIC ARTHRITIS (H): ICD-10-CM

## 2022-07-06 PROCEDURE — 99214 OFFICE O/P EST MOD 30 MIN: CPT | Mod: GC | Performed by: DERMATOLOGY

## 2022-07-06 RX ORDER — CLOBETASOL PROPIONATE 0.5 MG/ML
SOLUTION TOPICAL DAILY
Qty: 50 ML | Refills: 2 | Status: SHIPPED | OUTPATIENT
Start: 2022-07-06

## 2022-07-06 ASSESSMENT — PAIN SCALES - GENERAL: PAINLEVEL: MILD PAIN (3)

## 2022-07-06 NOTE — PROGRESS NOTES
[facility] Dermatology Note  Encounter Date: Jul 6, 2022  Office Visit     Dermatology Problem List:  1. Psoriasis with psoriatic arthritis: mainly on the hands, fingertips, nails   - current tx: xeljanz per rheumatology (12/2021 to present), prednisone 2.5 mg daily per rheumatology, clobetasol 0.05% ointment/cream for hands and trunk, clobetasol solution, t-gel and t-sal shampoo  - past tx: methotrexate (not effective), humira per rheumatology (started 2/2021, stoppped 5/2021, not effective), enbrel (6/2021 to 8/2021, not effective), cosentyx (8/2021-12/2021, unable to taper off prednisone while on cosentyx), lidex solution  - future: consider alternative biologic   2. Cyst of skin, posterior neck midline, s/p excision 8/25/21  ____________________________________________    Assessment & Plan:    # Psoriasis in the setting of psoriatic arthritis: chronic, active, improving since starting tofacitinib, yet not completely clear.   - For ongoing activity, discussed possibility of adding Otezla. I'm not sure if has been combined with tofacitinib in psoriasis but we certainly have some evidence of benefit when it has been added to other biologics. Risks (GI upset, mood changes, weight loss) and benefits were discussed. Patient is amenable to try this medication if rheum feels is reasonable. Will reach out to the patient's rheumatologist regarding adding on this medication.   - Continue xeljanz, prednisone 2.5 mg daily per rheumatology  - She can temporarily hold clobetasol 0.05% ointment for the palms and fingertips, as she questions this medication's efficacy. If it flares, she can restart the clobetasol. Also discussed soaking the fingers prior to application of topical steroids.   - Will change Lidex 0.05% solution for scalp daily to clobetasol 0.05% scalp solution daily.   - Continue T-gel and T-sal shampoos.  - Continue dove soap body wash.    # Inflammatory papule of the right dorsal foot:  suspect psoriasis vs insect bite  - She can use clobetasol PRN for this  - Continue to monitor    Follow-up: 1 year(s) in-person, or earlier for new or changing lesions; sooner also if psoriasis worsening or not responding    Staff and Scribe:     Scribe Disclosure:  I, Esther Lakhaniroughs, am serving as a scribe to document services personally performed by Delilah Isidro MD based on data collection and the provider's statements to me.     Lamin Hill MD PGY2  Dermatology Resident    Staff Physician Comments:   I saw and evaluated the patient with the resident and I agree with the assessment and plan.  I was present for the examination.    Delilah Isidro MD    Department of Dermatology  [facility]: Phone: [phone], Fax: [phone]  7/11/2022     ____________________________________________    CC: Skin Check (Has a spot on her right foot she is concerned about.  ) and Psoriasis (Psoriatic arthritis.  Scalp, nails, finger tips.  Slight pain on fingertips and irritation on scalp when lying down.)    HPI:  Ms. Willa Downey is a(n) 61 year old female who presents today for follow-up  for psoriasis. Last seen by Dr. Bell on 8/25/21 at which point a cyst on the posterior neck was excised. Prior to this she was switched from Humira to Enbrel in June 2021, trialed cosyntyx from 8/21 to 12/21, then was started on tofacitinib for psoriasis with psoriatic arthritis.     Today, the patient states that both her skin and joints have improved since starting the Tofacitinib approximately 6 months ago. Her joint pain still exists, but has improved much more than in the past with the injectable biologics.     Her skin is mainly clear other than three patches on the vertex and right occipital regions of the scalp, the tip of her right 4th finger, and her nails. She has been using lidex solution to the scalp daily as well  as t-gel. She has also been applying clobetasol ointment to the fingertips daily. Her nails are slowly improving but still appear somewhat dystrophic to the patient. She doesn't think that exposure to sunlight helps improve her skin.     There is a red papule on her left dorsal foot that she is wondering about. She doesn't think it's a bug bite and wonders if it may be psoriasis. It has been present for the past few days.     Patient is otherwise feeling well, without additional skin concerns.    Labs Reviewed:  N/A    Physical Exam:  Vitals: LMP  (LMP Unknown)   SKIN: Total skin excluding the undergarment areas was performed. The exam included the head/face, neck, both arms, chest, back, abdomen, both legs, digits and/or nails.   - three erythematous well-defined patches with silvery scale on the crown and right occipital scalp  - Mild erythema with xerosis and peeling of the right distal 4th fingertip  - Erythematous papule on the left dorsal foot, no scale present  - No other lesions of concern on areas examined.     Medications:  Current Outpatient Medications   Medication     CALCIUM CARBONATE-VIT D-MIN PO     celecoxib (CELEBREX) 100 MG capsule     clobetasol (TEMOVATE) 0.05 % external ointment     fluocinonide (LIDEX) 0.05 % external solution     gabapentin (NEURONTIN) 300 MG capsule     loratadine 10 MG capsule     Multiple Vitamins-Minerals (MULTIVITAMIN ADULTS 50+) TABS     predniSONE (DELTASONE) 2.5 MG tablet     tofacitinib (XELJANZ XR) 11 MG 24 hr tablet     No current facility-administered medications for this visit.      Past Medical History:   Patient Active Problem List   Diagnosis     Hyperlipidemia     Low-tension glaucoma of both eyes, severe stage     Myopia     Screening for malignant neoplasm of cervix     Psoriasis of nail     Psoriatic arthropathy (H)     Spinal stenosis of lumbar region, unspecified whether neurogenic claudication present     Immunosuppression (H)     Family history of  malignant neoplasm of breast     History of snoring     Prediabetes     Past Medical History:   Diagnosis Date     Bilateral low back pain with right-sided sciatica, unspecified chronicity 11/11/2020     Neuroforaminal stenosis of lumbar spine 11/11/2020     Nuclear senile cataract of both eyes 08/16/2018     Osteoarthritis 10/15/2020     Psoriatic arthropathy (H) 10/19/2020        CC Referred Self, MD  No address on file on close of this encounter.

## 2022-07-06 NOTE — NURSING NOTE
Dermatology Rooming Note    Willa Downey's goals for this visit include:   Chief Complaint   Patient presents with     Skin Check     Has a spot on her right foot she is concerned about.       Psoriasis     Psoriatic arthritis.  Scalp, nails, finger tips.  Slight pain on fingertips and irritation on scalp when lying down.     Purvi Kumar, CMA

## 2022-07-06 NOTE — H&P (VIEW-ONLY)
[facility] Dermatology Note  Encounter Date: Jul 6, 2022  Office Visit     Dermatology Problem List:  1. Psoriasis with psoriatic arthritis: mainly on the hands, fingertips, nails   - current tx: xeljanz per rheumatology (12/2021 to present), prednisone 2.5 mg daily per rheumatology, clobetasol 0.05% ointment/cream for hands and trunk, clobetasol solution, t-gel and t-sal shampoo  - past tx: methotrexate (not effective), humira per rheumatology (started 2/2021, stoppped 5/2021, not effective), enbrel (6/2021 to 8/2021, not effective), cosentyx (8/2021-12/2021, unable to taper off prednisone while on cosentyx), lidex solution  - future: consider alternative biologic   2. Cyst of skin, posterior neck midline, s/p excision 8/25/21  ____________________________________________    Assessment & Plan:    # Psoriasis in the setting of psoriatic arthritis: chronic, active, improving since starting tofacitinib, yet not completely clear.   - For ongoing activity, discussed possibility of adding Otezla. I'm not sure if has been combined with tofacitinib in psoriasis but we certainly have some evidence of benefit when it has been added to other biologics. Risks (GI upset, mood changes, weight loss) and benefits were discussed. Patient is amenable to try this medication if rheum feels is reasonable. Will reach out to the patient's rheumatologist regarding adding on this medication.   - Continue xeljanz, prednisone 2.5 mg daily per rheumatology  - She can temporarily hold clobetasol 0.05% ointment for the palms and fingertips, as she questions this medication's efficacy. If it flares, she can restart the clobetasol. Also discussed soaking the fingers prior to application of topical steroids.   - Will change Lidex 0.05% solution for scalp daily to clobetasol 0.05% scalp solution daily.   - Continue T-gel and T-sal shampoos.  - Continue dove soap body wash.    # Inflammatory papule of the right dorsal foot:  suspect psoriasis vs insect bite  - She can use clobetasol PRN for this  - Continue to monitor    Follow-up: 1 year(s) in-person, or earlier for new or changing lesions; sooner also if psoriasis worsening or not responding    Staff and Scribe:     Scribe Disclosure:  I, Esther Lakhaniroughs, am serving as a scribe to document services personally performed by Delilah Isidro MD based on data collection and the provider's statements to me.     Lamin Hill MD PGY2  Dermatology Resident    Staff Physician Comments:   I saw and evaluated the patient with the resident and I agree with the assessment and plan.  I was present for the examination.    Delilah Isidro MD    Department of Dermatology  [facility]: Phone: [phone], Fax: [phone]  7/11/2022     ____________________________________________    CC: Skin Check (Has a spot on her right foot she is concerned about.  ) and Psoriasis (Psoriatic arthritis.  Scalp, nails, finger tips.  Slight pain on fingertips and irritation on scalp when lying down.)    HPI:  Ms. Willa Downey is a(n) 61 year old female who presents today for follow-up  for psoriasis. Last seen by Dr. Bell on 8/25/21 at which point a cyst on the posterior neck was excised. Prior to this she was switched from Humira to Enbrel in June 2021, trialed cosyntyx from 8/21 to 12/21, then was started on tofacitinib for psoriasis with psoriatic arthritis.     Today, the patient states that both her skin and joints have improved since starting the Tofacitinib approximately 6 months ago. Her joint pain still exists, but has improved much more than in the past with the injectable biologics.     Her skin is mainly clear other than three patches on the vertex and right occipital regions of the scalp, the tip of her right 4th finger, and her nails. She has been using lidex solution to the scalp daily as well  as t-gel. She has also been applying clobetasol ointment to the fingertips daily. Her nails are slowly improving but still appear somewhat dystrophic to the patient. She doesn't think that exposure to sunlight helps improve her skin.     There is a red papule on her left dorsal foot that she is wondering about. She doesn't think it's a bug bite and wonders if it may be psoriasis. It has been present for the past few days.     Patient is otherwise feeling well, without additional skin concerns.    Labs Reviewed:  N/A    Physical Exam:  Vitals: LMP  (LMP Unknown)   SKIN: Total skin excluding the undergarment areas was performed. The exam included the head/face, neck, both arms, chest, back, abdomen, both legs, digits and/or nails.   - three erythematous well-defined patches with silvery scale on the crown and right occipital scalp  - Mild erythema with xerosis and peeling of the right distal 4th fingertip  - Erythematous papule on the left dorsal foot, no scale present  - No other lesions of concern on areas examined.     Medications:  Current Outpatient Medications   Medication     CALCIUM CARBONATE-VIT D-MIN PO     celecoxib (CELEBREX) 100 MG capsule     clobetasol (TEMOVATE) 0.05 % external ointment     fluocinonide (LIDEX) 0.05 % external solution     gabapentin (NEURONTIN) 300 MG capsule     loratadine 10 MG capsule     Multiple Vitamins-Minerals (MULTIVITAMIN ADULTS 50+) TABS     predniSONE (DELTASONE) 2.5 MG tablet     tofacitinib (XELJANZ XR) 11 MG 24 hr tablet     No current facility-administered medications for this visit.      Past Medical History:   Patient Active Problem List   Diagnosis     Hyperlipidemia     Low-tension glaucoma of both eyes, severe stage     Myopia     Screening for malignant neoplasm of cervix     Psoriasis of nail     Psoriatic arthropathy (H)     Spinal stenosis of lumbar region, unspecified whether neurogenic claudication present     Immunosuppression (H)     Family history of  malignant neoplasm of breast     History of snoring     Prediabetes     Past Medical History:   Diagnosis Date     Bilateral low back pain with right-sided sciatica, unspecified chronicity 11/11/2020     Neuroforaminal stenosis of lumbar spine 11/11/2020     Nuclear senile cataract of both eyes 08/16/2018     Osteoarthritis 10/15/2020     Psoriatic arthropathy (H) 10/19/2020        CC Referred Self, MD  No address on file on close of this encounter.

## 2022-07-06 NOTE — LETTER
7/6/2022       RE: Willa Downey  [address]  [place] [place] [address]     Dear Colleague,    Thank you for referring your patient, Willa Downey, to the [facility] [place] at [facility]. Please see a copy of my visit note below.    [facility] Dermatology Note  Encounter Date: Jul 6, 2022  Office Visit     Dermatology Problem List:  1. Psoriasis with psoriatic arthritis: mainly on the hands, fingertips, nails   - current tx: xeljanz per rheumatology (12/2021 to present), prednisone 2.5 mg daily per rheumatology, clobetasol 0.05% ointment/cream for hands and trunk, clobetasol solution, t-gel and t-sal shampoo  - past tx: methotrexate (not effective), humira per rheumatology (started 2/2021, stoppped 5/2021, not effective), enbrel (6/2021 to 8/2021, not effective), cosentyx (8/2021-12/2021, unable to taper off prednisone while on cosentyx), lidex solution  - future: consider alternative biologic   2. Cyst of skin, posterior neck midline, s/p excision 8/25/21  ____________________________________________    Assessment & Plan:    # Psoriasis in the setting of psoriatic arthritis: chronic, active, improving since starting tofacitinib, yet not completely clear.   - For ongoing activity, discussed possibility of adding Otezla. I'm not sure if has been combined with tofacitinib in psoriasis but we certainly have some evidence of benefit when it has been added to other biologics. Risks (GI upset, mood changes, weight loss) and benefits were discussed. Patient is amenable to try this medication if rheum feels is reasonable. Will reach out to the patient's rheumatologist regarding adding on this medication.   - Continue xeljanz, prednisone 2.5 mg daily per rheumatology  - She can temporarily hold clobetasol 0.05% ointment for the palms and fingertips, as she questions this medication's efficacy. If it flares, she can  restart the clobetasol. Also discussed soaking the fingers prior to application of topical steroids.   - Will change Lidex 0.05% solution for scalp daily to clobetasol 0.05% scalp solution daily.   - Continue T-gel and T-sal shampoos.  - Continue dove soap body wash.    # Inflammatory papule of the right dorsal foot: suspect psoriasis vs insect bite  - She can use clobetasol PRN for this  - Continue to monitor    Follow-up: 1 year(s) in-person, or earlier for new or changing lesions; sooner also if psoriasis worsening or not responding    Staff and Scribe:     Scribe Disclosure:  I, Esther Simmons, am serving as a scribe to document services personally performed by Delilah Isidro MD based on data collection and the provider's statements to me.     Lamin Hill MD PGY2  Dermatology Resident    Staff Physician Comments:   I saw and evaluated the patient with the resident and I agree with the assessment and plan.  I was present for the examination.    Delilah Isidro MD    Department of Dermatology  [facility]: Phone: [phone], Fax: [phone]  7/11/2022     ____________________________________________    CC: Skin Check (Has a spot on her right foot she is concerned about.  ) and Psoriasis (Psoriatic arthritis.  Scalp, nails, finger tips.  Slight pain on fingertips and irritation on scalp when lying down.)    HPI:  Ms. Willa Downey is a(n) 61 year old female who presents today for follow-up  for psoriasis. Last seen by Dr. Bell on 8/25/21 at which point a cyst on the posterior neck was excised. Prior to this she was switched from Humira to Enbrel in June 2021, trialed cosyntyx from 8/21 to 12/21, then was started on tofacitinib for psoriasis with psoriatic arthritis.     Today, the patient states that both her skin and joints have improved since starting the Tofacitinib approximately 6 months ago. Her  joint pain still exists, but has improved much more than in the past with the injectable biologics.     Her skin is mainly clear other than three patches on the vertex and right occipital regions of the scalp, the tip of her right 4th finger, and her nails. She has been using lidex solution to the scalp daily as well as t-gel. She has also been applying clobetasol ointment to the fingertips daily. Her nails are slowly improving but still appear somewhat dystrophic to the patient. She doesn't think that exposure to sunlight helps improve her skin.     There is a red papule on her left dorsal foot that she is wondering about. She doesn't think it's a bug bite and wonders if it may be psoriasis. It has been present for the past few days.     Patient is otherwise feeling well, without additional skin concerns.    Labs Reviewed:  N/A    Physical Exam:  Vitals: LMP  (LMP Unknown)   SKIN: Total skin excluding the undergarment areas was performed. The exam included the head/face, neck, both arms, chest, back, abdomen, both legs, digits and/or nails.   - three erythematous well-defined patches with silvery scale on the crown and right occipital scalp  - Mild erythema with xerosis and peeling of the right distal 4th fingertip  - Erythematous papule on the left dorsal foot, no scale present  - No other lesions of concern on areas examined.     Medications:  Current Outpatient Medications   Medication     CALCIUM CARBONATE-VIT D-MIN PO     celecoxib (CELEBREX) 100 MG capsule     clobetasol (TEMOVATE) 0.05 % external ointment     fluocinonide (LIDEX) 0.05 % external solution     gabapentin (NEURONTIN) 300 MG capsule     loratadine 10 MG capsule     Multiple Vitamins-Minerals (MULTIVITAMIN ADULTS 50+) TABS     predniSONE (DELTASONE) 2.5 MG tablet     tofacitinib (XELJANZ XR) 11 MG 24 hr tablet     No current facility-administered medications for this visit.      Past Medical History:   Patient Active Problem List   Diagnosis      Hyperlipidemia     Low-tension glaucoma of both eyes, severe stage     Myopia     Screening for malignant neoplasm of cervix     Psoriasis of nail     Psoriatic arthropathy (H)     Spinal stenosis of lumbar region, unspecified whether neurogenic claudication present     Immunosuppression (H)     Family history of malignant neoplasm of breast     History of snoring     Prediabetes     Past Medical History:   Diagnosis Date     Bilateral low back pain with right-sided sciatica, unspecified chronicity 11/11/2020     Neuroforaminal stenosis of lumbar spine 11/11/2020     Nuclear senile cataract of both eyes 08/16/2018     Osteoarthritis 10/15/2020     Psoriatic arthropathy (H) 10/19/2020        CC Referred Self, MD  No address on file on close of this encounter.

## 2022-07-08 ENCOUNTER — TELEPHONE (OUTPATIENT)
Dept: GASTROENTEROLOGY | Facility: CLINIC | Age: 62
End: 2022-07-08

## 2022-07-08 NOTE — TELEPHONE ENCOUNTER
Patient returned call.     Pre assessment questions completed for upcoming colonoscopy  procedure scheduled on 7/14/22    COVID policy reviewed. Patient to complete rapid antigen test one to two days before their scheduled procedure. Patient to bring photo of the results when they come in for their procedure.    Reviewed procedural arrival time 0630 and facility location [facility].    Designated  policy reviewed. Instructed to have someone stay 6 hours post procedure.     Reviewed Miralax prep instructions with patient. No fiber/iron supplements or foods that contain nuts/seeds 7 days prior to procedure.    Patient verbalized understanding and had no questions or concerns at this time.    Gwendolyn Bolton RN

## 2022-07-08 NOTE — TELEPHONE ENCOUNTER
Attempted to contact patient regarding upcoming colonoscopy procedure on 7.14.2022 for pre assessment questions. No answer.     Left message to return call to [phone] #3    Discuss at home rapid antigen COVID test 1-2 days prior to procedure.    Arrival time: 0630    Facility location: [facility]    Sedation type: MAC    Indication for procedure: screening colonoscopy    Bowel prep recommendation: Miralax/Magnesium citrate/Dulcolax    Haylee Mac RN

## 2022-07-13 NOTE — TELEPHONE ENCOUNTER
The Pt had questions about her prep. Wanting to know if she truly has to drink the entire prep. I did advise her the goal would be to complete the entire prep to ensure a clean colon for her procedure.   Also verified arrival time, and estimated discharge timing.     No further questions at this time.     Genesis Kelsey RN   -[facility]

## 2022-07-14 ENCOUNTER — ANESTHESIA EVENT (OUTPATIENT)
Dept: SURGERY | Facility: AMBULATORY SURGERY CENTER | Age: 62
End: 2022-07-14
Payer: COMMERCIAL

## 2022-07-14 ENCOUNTER — HOSPITAL ENCOUNTER (OUTPATIENT)
Facility: AMBULATORY SURGERY CENTER | Age: 62
Discharge: HOME | End: 2022-07-14
Attending: INTERNAL MEDICINE
Payer: COMMERCIAL

## 2022-07-14 ENCOUNTER — ANESTHESIA (OUTPATIENT)
Dept: SURGERY | Facility: AMBULATORY SURGERY CENTER | Age: 62
End: 2022-07-14
Payer: COMMERCIAL

## 2022-07-14 VITALS
TEMPERATURE: 97.4 F | HEART RATE: 84 BPM | DIASTOLIC BLOOD PRESSURE: 60 MMHG | SYSTOLIC BLOOD PRESSURE: 101 MMHG | OXYGEN SATURATION: 100 % | RESPIRATION RATE: 16 BRPM

## 2022-07-14 VITALS — HEART RATE: 73 BPM

## 2022-07-14 DIAGNOSIS — Z12.11 SCREENING FOR MALIGNANT NEOPLASM OF COLON: Primary | ICD-10-CM

## 2022-07-14 PROBLEM — K57.30 DIVERTICULOSIS OF LARGE INTESTINE: Status: ACTIVE | Noted: 2022-07-14

## 2022-07-14 LAB — COLONOSCOPY: NORMAL

## 2022-07-14 PROCEDURE — 45378 DIAGNOSTIC COLONOSCOPY: CPT | Mod: 33

## 2022-07-14 RX ORDER — SODIUM CHLORIDE, SODIUM LACTATE, POTASSIUM CHLORIDE, CALCIUM CHLORIDE 600; 310; 30; 20 MG/100ML; MG/100ML; MG/100ML; MG/100ML
INJECTION, SOLUTION INTRAVENOUS CONTINUOUS PRN
Status: DISCONTINUED | OUTPATIENT
Start: 2022-07-14 | End: 2022-07-14

## 2022-07-14 RX ORDER — LIDOCAINE 40 MG/G
CREAM TOPICAL
Status: DISCONTINUED | OUTPATIENT
Start: 2022-07-14 | End: 2022-07-15 | Stop reason: HOSPADM

## 2022-07-14 RX ORDER — ONDANSETRON 2 MG/ML
4 INJECTION INTRAMUSCULAR; INTRAVENOUS
Status: DISCONTINUED | OUTPATIENT
Start: 2022-07-14 | End: 2022-07-15 | Stop reason: HOSPADM

## 2022-07-14 RX ORDER — PROPOFOL 10 MG/ML
INJECTION, EMULSION INTRAVENOUS PRN
Status: DISCONTINUED | OUTPATIENT
Start: 2022-07-14 | End: 2022-07-14

## 2022-07-14 RX ORDER — PROPOFOL 10 MG/ML
INJECTION, EMULSION INTRAVENOUS CONTINUOUS PRN
Status: DISCONTINUED | OUTPATIENT
Start: 2022-07-14 | End: 2022-07-14

## 2022-07-14 RX ORDER — LIDOCAINE HYDROCHLORIDE 20 MG/ML
INJECTION, SOLUTION INFILTRATION; PERINEURAL PRN
Status: DISCONTINUED | OUTPATIENT
Start: 2022-07-14 | End: 2022-07-14

## 2022-07-14 RX ADMIN — SODIUM CHLORIDE, SODIUM LACTATE, POTASSIUM CHLORIDE, CALCIUM CHLORIDE: 600; 310; 30; 20 INJECTION, SOLUTION INTRAVENOUS at 06:33

## 2022-07-14 RX ADMIN — PROPOFOL 50 MG: 10 INJECTION, EMULSION INTRAVENOUS at 07:33

## 2022-07-14 RX ADMIN — PROPOFOL 150 MCG/KG/MIN: 10 INJECTION, EMULSION INTRAVENOUS at 07:33

## 2022-07-14 RX ADMIN — LIDOCAINE HYDROCHLORIDE 50 MG: 20 INJECTION, SOLUTION INFILTRATION; PERINEURAL at 07:33

## 2022-07-14 NOTE — ANESTHESIA CARE TRANSFER NOTE
Patient: Willa Downey    Procedure: Procedure(s):  COLONOSCOPY       Diagnosis: Screen for colon cancer [Z12.11]  Diagnosis Additional Information: No value filed.    Anesthesia Type:   MAC     Note:    Oropharynx: oropharynx clear of all foreign objects and spontaneously breathing  Level of Consciousness: awake  Oxygen Supplementation: room air    Independent Airway: airway patency satisfactory and stable  Dentition: dentition unchanged  Vital Signs Stable: post-procedure vital signs reviewed and stable  Report to RN Given: handoff report given  Patient transferred to: Phase II    Handoff Report: Identifed the Patient, Identified the Reponsible Provider, Reviewed the pertinent medical history, Discussed the surgical course, Reviewed Intra-OP anesthesia mangement and issues during anesthesia, Set expectations for post-procedure period and Allowed opportunity for questions and acknowledgement of understanding      Vitals:  Vitals Value Taken Time   /60 07/14/22 0803   Temp     Pulse     Resp 16 07/14/22 0803   SpO2 98 % 07/14/22 0803       Electronically Signed By: TRACY Buckley CRNA  July 14, 2022  8:05 AM

## 2022-07-14 NOTE — DISCHARGE INSTRUCTIONS
Discharge Instructions after Colonoscopy  or Sigmoidoscopy    Today you had a ____ Colonoscopy ____ Sigmoidoscopy    Activity and Diet  You were given medicine for pain. You may be dizzy or sleepy.  For 24 hours:   Do not drive or use heavy equipment.   Do not make important decisions.   Do not drink any alcohol.  You may return to your normal diet and medicines.    Discomfort   Air was placed in your colon during the exam in order to see it. Walking helps to pass the air.   You may take Tylenol (acetaminophen) for pain unless your doctor has told you not to.  Do not take aspirin or ibuprofen (Advil, Motrin, or other anti-inflammatory  drugs) for _____ days.    Follow-up  ____ We took small tissue samples or polyps to study. Your doctor will call you with the results  within two weeks.    When to call:    Call right away if you have:   Unusual pain in belly or chest pain not relieved with passing air.   More than 1 to 2 Tablespoons of bleeding from your rectum.   Fever above 100.6  F (37.5  C).    If you have severe pain, bleeding, or shortness of breath, go to an emergency room.    If you have questions, call:  Monday to Friday, 8 a.m. to 4:30 p.m.  Central Scheduling Department: [phone]    After hours  [facility]: [phone] (Ask for the GI fellow on call)

## 2022-07-14 NOTE — ANESTHESIA PREPROCEDURE EVALUATION
Anesthesia Pre-Procedure Evaluation    Patient: Willa Downey   MRN: [id number] : 1960        Procedure : Procedure(s):  COLONOSCOPY          Past Medical History:   Diagnosis Date     Bilateral low back pain with right-sided sciatica, unspecified chronicity 2020     Neuroforaminal stenosis of lumbar spine 2020     Nuclear senile cataract of both eyes 2018     Osteoarthritis 10/15/2020     Psoriatic arthropathy (H) 10/19/2020      Past Surgical History:   Procedure Laterality Date     BIOPSY  2019    the right breast tissue(benign)     EYE SURGERY      3 surgeries for glaucoma treatment      Allergies   Allergen Reactions     Adhesive Tape Blisters, Itching and Swelling     Dust Mites      Latex Blisters, Itching, Other (See Comments) and Swelling     Skin sensitivity       Morphine Sulfate (Concentrate) Itching     Other reaction(s): Chills  heart palpatations     Seasonal Allergies      Codeine Palpitations     chills      Social History     Tobacco Use     Smoking status: Never Smoker     Smokeless tobacco: Never Used   Substance Use Topics     Alcohol use: Never      Wt Readings from Last 1 Encounters:   22 65.3 kg (144 lb)        Anesthesia Evaluation            ROS/MED HX  ENT/Pulmonary:  - neg pulmonary ROS     Neurologic:  - neg neurologic ROS     Cardiovascular:  - neg cardiovascular ROS     METS/Exercise Tolerance:     Hematologic:  - neg hematologic  ROS     Musculoskeletal:  - neg musculoskeletal ROS     GI/Hepatic:     (+) bowel prep,     Renal/Genitourinary:       Endo:  - neg endo ROS     Psychiatric/Substance Use:  - neg psychiatric ROS     Infectious Disease:  - neg infectious disease ROS     Malignancy:  - neg malignancy ROS     Other:               OUTSIDE LABS:  CBC:   Lab Results   Component Value Date    WBC 7.5 2022    WBC 8.4 2022    HGB 12.4 2022    HGB 13.4 2022    HCT 38.1 2022    HCT 40.4 2022      05/16/2022     02/01/2022     BMP:   Lab Results   Component Value Date     05/16/2022     02/01/2022    POTASSIUM 3.6 05/16/2022    POTASSIUM 3.9 02/01/2022    CHLORIDE 108 05/16/2022    CHLORIDE 107 02/01/2022    CO2 28 05/16/2022    CO2 24 02/01/2022    BUN 12 05/16/2022    BUN 16 02/01/2022    CR 0.67 05/16/2022    CR 0.70 02/01/2022    GLC 95 05/16/2022     (H) 02/01/2022     COAGS: No results found for: PTT, INR, FIBR  POC: No results found for: BGM, HCG, HCGS  HEPATIC:   Lab Results   Component Value Date    ALBUMIN 4.2 05/16/2022    PROTTOTAL 7.6 05/16/2022    ALT 21 05/16/2022    AST 15 05/16/2022    ALKPHOS 62 05/16/2022    BILITOTAL 0.6 05/16/2022     OTHER:   Lab Results   Component Value Date    A1C 5.7 (H) 05/16/2022    SEDRICK 10.1 05/16/2022    TSH 0.94 05/16/2022    CRP <2.9 02/01/2022    SED 4 02/01/2022       Anesthesia Plan    ASA Status:  1      Anesthesia Type: MAC.     - Reason for MAC: immobility needed              Consents    Anesthesia Plan(s) and associated risks, benefits, and realistic alternatives discussed. Questions answered and patient/representative(s) expressed understanding.     - Discussed: Risks, Benefits and Alternatives for BOTH SEDATION and the PROCEDURE were discussed     - Discussed with:  Patient      - Extended Intubation/Ventilatory Support Discussed: No.      - Patient is DNR/DNI Status: No    Use of blood products discussed: No .     Postoperative Care    Pain management: Oral pain medications.        Comments:                Tyler Kelly MD, MD

## 2022-07-14 NOTE — ANESTHESIA POSTPROCEDURE EVALUATION
Patient: Willa Downey    Procedure: Procedure(s):  COLONOSCOPY       Anesthesia Type:  MAC    Note:  Disposition: Outpatient   Postop Pain Control: Uneventful            Sign Out: Well controlled pain   PONV: No   Neuro/Psych: Uneventful            Sign Out: Acceptable/Baseline neuro status   Airway/Respiratory: Uneventful            Sign Out: Acceptable/Baseline resp. status   CV/Hemodynamics: Uneventful            Sign Out: Acceptable CV status; No obvious hypovolemia; No obvious fluid overload   Other NRE: NONE   DID A NON-ROUTINE EVENT OCCUR? No           Last vitals:  Vitals Value Taken Time   /60 07/14/22 0821   Temp 36.3  C (97.4  F) 07/14/22 0803   Pulse     Resp 16 07/14/22 0821   SpO2 100 % 07/14/22 0821       Electronically Signed By: Tyler Kelly MD, MD  July 14, 2022  1:59 PM

## 2022-07-14 NOTE — H&P
Willa SNYDER Shayan  [id number]  female  61 year old      Reason for procedure/surgery: Screening colonoscopy.    Patient Active Problem List   Diagnosis     Hyperlipidemia     Low-tension glaucoma of both eyes, severe stage     Myopia     Screening for malignant neoplasm of cervix     Psoriasis of nail     Psoriatic arthropathy (H)     Spinal stenosis of lumbar region, unspecified whether neurogenic claudication present     Immunosuppression (H)     Family history of malignant neoplasm of breast     History of snoring     Prediabetes       Past Surgical History:    Past Surgical History:   Procedure Laterality Date     BIOPSY  07/30/2019    the right breast tissue(benign)     EYE SURGERY      3 surgeries for glaucoma treatment       Past Medical History:   Past Medical History:   Diagnosis Date     Bilateral low back pain with right-sided sciatica, unspecified chronicity 11/11/2020     Neuroforaminal stenosis of lumbar spine 11/11/2020     Nuclear senile cataract of both eyes 08/16/2018     Osteoarthritis 10/15/2020     Psoriatic arthropathy (H) 10/19/2020       Social History:   Social History     Tobacco Use     Smoking status: Never Smoker     Smokeless tobacco: Never Used   Substance Use Topics     Alcohol use: Never       Family History:   Family History   Problem Relation Age of Onset     Hyperlipidemia Mother         My mother had high cholesterol.     Osteoporosis Mother      Hypertension Mother      Stomach Cancer Father         not sure where started     Cataracts Maternal Grandmother      Breast Cancer Maternal Aunt      Melanoma No family hx of      Skin Cancer No family hx of        Allergies:   Allergies   Allergen Reactions     Adhesive Tape Blisters, Itching and Swelling     Dust Mites      Latex Blisters, Itching, Other (See Comments) and Swelling     Skin sensitivity       Morphine Sulfate (Concentrate) Itching     Other reaction(s): Chills  heart palpatations     Seasonal Allergies      Codeine  Palpitations     chills       Active Medications:   Current Outpatient Medications   Medication Sig Dispense Refill     CALCIUM CARBONATE-VIT D-MIN PO Take 1 tablet by mouth       celecoxib (CELEBREX) 100 MG capsule Take 1 capsule (100 mg) by mouth daily as needed for pain 90 capsule 1     gabapentin (NEURONTIN) 300 MG capsule Take 1 capsule (300 mg) by mouth 3 times daily 90 capsule 2     loratadine 10 MG capsule Take 1 tablet by mouth       Multiple Vitamins-Minerals (MULTIVITAMIN ADULTS 50+) TABS daily        predniSONE (DELTASONE) 2.5 MG tablet Take 2.5 mg by mouth daily Prescribed by rheumatology       tofacitinib (XELJANZ XR) 11 MG 24 hr tablet Take 1 tablet (11 mg) by mouth daily Hold for signs of infection, then seek medical attention. Monitoring labs every 6 months 90 tablet 1     clobetasol (TEMOVATE) 0.05 % external ointment Apply thin layer twice daily as needed for psoriasis on the body. 60 g 11     clobetasol (TEMOVATE) 0.05 % external solution Apply topically daily To affected areas of scalp 50 mL 2       Systemic Review:   CONSTITUTIONAL: NEGATIVE for fever, chills, change in weight  ENT/MOUTH: NEGATIVE for ear, mouth and throat problems  RESP: NEGATIVE for significant cough or SOB  CV: NEGATIVE for chest pain, palpitations or peripheral edema    Physical Examination:   Vital Signs: /60   Pulse 84   Temp 97.4  F (36.3  C) (Temporal)   Resp 16   LMP  (LMP Unknown)   SpO2 98%   GENERAL: healthy, alert and no distress  NECK: no adenopathy, no asymmetry, masses, or scars  RESP: lungs clear to auscultation - no rales, rhonchi or wheezes  CV: regular rate and rhythm, normal S1 S2, no S3 or S4, no murmur, click or rub, no peripheral edema and peripheral pulses strong  ABDOMEN: soft, nontender, no hepatosplenomegaly, no masses and bowel sounds normal  MS: no gross musculoskeletal defects noted, no edema    Plan: Appropriate to proceed as scheduled.      Roderick Campos,  MD  7/14/2022    PCP:  Geovanna Rosales

## 2022-07-18 ENCOUNTER — TELEPHONE (OUTPATIENT)
Dept: DERMATOLOGY | Facility: CLINIC | Age: 62
End: 2022-07-18

## 2022-07-25 ENCOUNTER — TELEPHONE (OUTPATIENT)
Dept: RHEUMATOLOGY | Facility: CLINIC | Age: 62
End: 2022-07-25

## 2022-07-25 NOTE — TELEPHONE ENCOUNTER
Prior Authorization Approval    Authorization Effective Date: 7/25/2022  Authorization Expiration Date: 7/25/2023  Medication: PA Renewal Otezla  Approved Dose/Quantity: 55 tabs per 28 days  Reference #: Key: [id number]   Insurance Company: [email or web address] Phone [phone] Fax [phone]  Expected CoPay: $30     CoPay Card Available:      Foundation Assistance Needed:    Which Pharmacy is filling the prescription (Not needed for infusion/clinic administered): [facility] - [place], [place] - [address]  Pharmacy Notified: Yes  Patient Notified: Yes

## 2022-07-25 NOTE — TELEPHONE ENCOUNTER
PA Initiation    Medication: PA Pending [facility]  Insurance Company: [employer] - Phone [phone] Fax [phone]  Pharmacy Filling the Rx:    Filling Pharmacy Phone:    Filling Pharmacy Fax:    Start Date: 7/25/2022    Key: [id number]

## 2022-09-16 ENCOUNTER — VIRTUAL VISIT (OUTPATIENT)
Dept: RHEUMATOLOGY | Facility: CLINIC | Age: 62
End: 2022-09-16
Attending: INTERNAL MEDICINE
Payer: COMMERCIAL

## 2022-09-16 VITALS — WEIGHT: 132 LBS | BODY MASS INDEX: 22.53 KG/M2 | HEIGHT: 64 IN

## 2022-09-16 DIAGNOSIS — Z79.1 NSAID LONG-TERM USE: ICD-10-CM

## 2022-09-16 DIAGNOSIS — L40.50 PSORIATIC ARTHRITIS (H): Primary | ICD-10-CM

## 2022-09-16 DIAGNOSIS — Z79.899 HIGH RISK MEDICATION USE: ICD-10-CM

## 2022-09-16 DIAGNOSIS — L40.0 PLAQUE PSORIASIS: ICD-10-CM

## 2022-09-16 DIAGNOSIS — L40.9 PSORIASIS OF NAIL: ICD-10-CM

## 2022-09-16 DIAGNOSIS — Z79.52 LONG TERM (CURRENT) USE OF SYSTEMIC STEROIDS: ICD-10-CM

## 2022-09-16 PROCEDURE — 99214 OFFICE O/P EST MOD 30 MIN: CPT | Mod: 95 | Performed by: INTERNAL MEDICINE

## 2022-09-16 PROCEDURE — G0463 HOSPITAL OUTPT CLINIC VISIT: HCPCS | Mod: PN,RTG | Performed by: INTERNAL MEDICINE

## 2022-09-16 RX ORDER — PREDNISONE 1 MG/1
1 TABLET ORAL DAILY
Qty: 30 TABLET | Refills: 0 | Status: SHIPPED | OUTPATIENT
Start: 2022-09-16 | End: 2022-10-16

## 2022-09-16 RX ORDER — CELECOXIB 100 MG/1
100 CAPSULE ORAL 2 TIMES DAILY
Qty: 180 CAPSULE | Refills: 1 | Status: SHIPPED | OUTPATIENT
Start: 2022-09-16 | End: 2023-04-28

## 2022-09-16 ASSESSMENT — PAIN SCALES - GENERAL: PAINLEVEL: NO PAIN (0)

## 2022-09-16 NOTE — NURSING NOTE
Allergies and medications reviewed with patient. She is requesting refills on celebrex and prednisone as she is tapering down on that medication.    MARAL Newby

## 2022-09-16 NOTE — LETTER
9/16/2022       RE: Willa Downey  [address]  [facility] [id number]     Dear Colleague,    Thank you for referring your patient, Willa Downey, to the [facility] [place] at [facility]. Please see a copy of my visit note below.    Willa is a 62 year old who is being evaluated via a billable video visit.      How would you like to obtain your AVS? MyChart  If the video visit is dropped, the invitation should be resent by: Text to cell phone: [phone]  Will anyone else be joining your video visit? No        Video-Visit Details    Video Start Time: 11:04AM    Type of service:  Video Visit    Video End Time:11:36AM    Originating Location (pt. Location): Home    Distant Location (provider location):  [facility] [place]     Platform used for Video Visit: Tiburcio Arias MD  Rheumatology          Outpatient Rheumatology Follow-up    Name: Willa Downey    MRN [id number]   Today's date: 9/16/22           Reason for follow-up: psoriatic arthritis   Requesting physician: Kitty Mendez MD        Assessment & Plan:   #Psoriatic arthritis  #negative RF  #psoriasis, cutaneous and nail involvement  #DIP Left hand 3rd, 4th, 5th and right hand 4th digit inflammatory arthritis  #humira, enbrel, methotrexate failure  #Submandibular LAD- resolved  #High risk drug therapy: xeljanz  #Long term, current, systemic steroid use    62 year old female with hx of advanced glaucoma followed closely by ophthalmology presented to rheumatology on 2/5/21 with 5 month of progressive left hand 3rd, 4th, 5th digit and right hand 4th digit DIP inflammatory arthritis in the context of psoriasis, psoriatic changes of the nails. Taken together most consistent with psoriatic arthritis. Tried on 2 NSAIDs which failed to control symptoms, and had severe GERD symptoms. Tried methotrexate by prior rheumatologist  and disease not controlled. Humira started Mid February 2021 due to lack of efficacy of these other therapies and continued through May 14th, though had severe/ rapidly progressive disease after some improvement in the first 4 weeks involving DIP inflammatory arthritis and nail disease. We then ordered Enbrel, though starting was delayed due to development of impressive bilateral submandibular lymphadenopathy which resolved within about 1 week and thought likely secondary to viral infection. She started enbrel on 6/20/2021 which she had continued on until switching to cosentyx after approval on 8/31/21 due to lack of efficacy of enbrel and injection site reaction. Had been on cosentyx from 8/31/21 with initial improvement in inflammatory DIP arthritis, psoriatic lesions, stabilization of her nail disease though she then had progression of her cutaneous nail and joint inflammatory process which prompted restarting prednisone at 10 mg daily along with doubling her dose of Cosentyx to 300 mg each month.  She took her first dose of 300 mg on 11/23/2021.  She did try to decrease her prednisone shortly after that down to 7.5 though noticed return of her skin peeling/psoriatic skin lesions worsening and so went back up to 10 mg daily which has again improved these symptoms. She was again unable to taper from prednisone below 10mg daily without progression of both cutaneous and articular symptoms and so cosentyx was discontinued and xeljanz 11mg once daily started after insurance approval on 12/29/21. With each attempt to taper her steroids, had difficultly due to return of psoriasis so Willa was seen by Dermatology and started on otezla in addition to xeljanz. She returns today for follow-up.    Psoriatic Arthritis: skin/cutaneous disease continues to improve with the addition of otezla to xeljanz. This addition has allowed for ongoing taper of prednisone. Currently on 1mg daily- lowest dose she has been able to achieve.  Will continue on this current combination and continue to taper prednisone.   -Continue otezla BID  -Continue xeljanz 11mg once daily  -Continue on prednisone 1mg daily with plan to taper down as able over the next 2 weeks or so. Placed script for 30 days of 1mg tabs as this plan may need to be adjusted based on prior attempts.  -Xray bilateral feet to eval for etiology of foot pain with walking. Low suspicion for fracture as not constant pain. Potential erosive disease there. Higher suspicion for non inflammatory DJD.    High risk medication: Xeljanz and otezla  -No side effects by history/exam/available serologies to include CBC and CMP from 5/16/22 without evidence of toxicity  -Risks and benefits again discussed today of both therapies to include infection, BM suppression, Rash, HA, GI/hepatotoxicity, malignancy, DVT/PE, all cause mortality among others. Patient agreeable to continue  -Labs q6 months. Next due in Nov 2022.     Long term, current, systemic steroid use: current dose of 1mg daily. Taper above    Long term NSAID use: celebrex  -Labs q6 months for monitoring. Standing orders in place. Reviewed labs from march 2022 to include CBC and CMP without evidence of toxicity    Labs and follow-up in Jan 2023    Marc Arias MD  Rheumatology       Subjective:   September 16, 2022  Has started on otezla and without ongoing side effects with exception of new evening HA. She thinks it may be 5 or so days per week. Mild. During the day she does not notice anything. Noticing this after dinner/watching TV. Slight. Not to the point she needs to take anything. Has noticed that her psoriasis on her fingertips and scalp have improved. Still some sensitivity on her fingertips, but much better. Has been able to taper down to 1mg of prednisone. Has been on this dose for 6 days. Articular symptoms have been stable/at baseline. No obvious red/hot/swollen joints. No new areas of joint pain/involvement. No new side effects  "from xeljanz. Tolerating low dose prednisone without noticeable side effects. No interval infections. No fevers/chills/night sweats. Family member with COVID so has been isolating. Has continued to walk for exercise, as helps with overall symptoms, though has been experiencing pain in ball of foot/greater MTP. Low suspicion for gout, though will assess for other pathology.  14 point ROS collected and negative if not documented above.     Interval history 3/25/2022  Currently on 3mg of prednisone daily. Still with pink/ reddish spot on her scalp and left digits at tips of digits, though much improved. Has been tolerating her prednisone and xeljanz without issues. She did by accident take an increased dose of prednisone up to 10mg for 2 days when she was to reduce from 4 to 3mg due to having a different bottle of 2.5mg tabs (instead of the 1mg tabs she was taking). Her pain in the AM is 6/10. Takes her celebrex in the AM with pain resolution. No GI side effects from her celebrex. No fevers/chills/night sweats. She has noticed slight increase in the lesions of her digits since decreasing her prednisone down to 3mg for 5 days. Reports feeling well and that this is the best she has felt with DMARD therapy and being able to reduce her dose. No interval infections. ROS otherwise negative.     Interval history 1/28/22  Was already on prednisone 10mg and then xeljanz was added on Jan 3rd. Had complete resolution of psoriasis on scalp/inner ear. Hands also completely clear at that time. She then decreased to 7.5mg on 1/17 and for the first week was doing \"okay\", then this week noticed some pits on fingertips, some skin peeling. Yesterday and today has noticed some more skin peeling. Some more scalp irriation 3 days ago as well. She feels that since starting xeljanz this is the best improvement that she has found. Continues to take celebrex daily due to pain in joints without it. With celebrex, pain is resolved. If she does " "lots of activity (cleaning etc) will have some pain. No GI upset as she had with mobic. Not taking PPI. She has noticed some more fatigue in the AM and also more so in the afternoon. Uses clobetasol ointment and cream. Also topical for scalp. Uses them all at night. Using 0.05% clobetasol. Has continued to experience rare intermittent tender lymph nodes. Had last booster on August 21st. No interval infections. No fevers, chills, weight changes. Remains active. No noticeable side effects since starting xeljanz. ROS otherwise negative.     12/2/2021 interval history  Took her first dose of Cosentyx 200 mg daily on 11/23/2021.  Around that time tried to decrease her prednisone from 10 mg daily down to 7.5 mg daily though written to discuss she had return of joint pain, joint swelling and stiffness particularly of her DIPs, worsening of her nail disease and psoriatic lesions on the tips of her fingers.  Fatigue also more significant.  As a result she went back up to 10 mg daily and this improved some.  She is not been taking any NSAIDs since her last appointment despite her joint pain as the meloxicam previously prescribed it was giving her reflux/gastritis symptoms.  No injection site reaction from her Cosentyx.  No fevers or chills.  No weight changes.  Tolerating prednisone without issues.  Review of systems otherwise negative.    9/30/21 Interval history  Has been taking cosentyx without issues. Next dose on Friday and then switching to monthly. Has found that her skin is some improved over the fingertips. Though still some painful/burning sensation at the sites of prior lesions. This is improving. When she started prednisone, did have dramatic improvement. Though with taper improvement has been slower though has continued. Currently on 2.5mg daily. No side effects from low dose. Energy still \"not great\" though she thinks it is slowly improving. Going for walks 5 days per week 30-40 minutes per day. Reports that the " pain is OK when doing activities, but has been avoiding activities that are painful like weeding/carrying heavy items with grocery shopping. Has continued with mobic 3-4x week. 15mg tabs each time. No GI side effects at this frequency/dose. Still some stiff at DIPs. Though improved from prior. Did have lesion mid upper back and front groin which have improved since cosentyx/ prednisone. No fevers/chills. No interval infections. ROS otherwise negative.     August 12, 2021  Interval history  Had ?ear infection early July. Given ear drops by PCP. Had developed more psoriasis in new locations (palm of hand/scalp (10-15%)/lower stomach/low back/ and groin/gluteal cleft) where previously was on fingertips/nails only. Saw dermatology who prescribed topical, the topical has been somewhat helpful though has not resolves symptoms. She thinks that she has had 10-15% improvement with the topical. Spoke to pharmacist, who counseled her than can take up to 4 months for enbrel to take full effect. Gives herself injection every Sunday. After 5th injection, has itching/burning at the site. Takes Claritin already. Iced. Was told by pharmacist to try hydrocortisone, which she did. Decreased from 4-5 days of symptoms down to 3 days. From 5th injection onwards has had this reaction. Total of 8 injections so far. Joints much less of an issue compared to cutaneous involvement. If she has any joint pain, takes mobic. Roughly 1x per week. Around once per 7 -10days has extreme fatigue. Yesterday was significant. Slept until noon. Usually gets up at 630. Usually trouble sleeping, so this was unusual for her. No return of submandibular LAD. No fever, chills. No cough/sore throat. Some stiffness in the DIPs, morning predominant. Improves throughout the day. Some increase in pain this week. ROS otherwise negative.      6/17/21  Has advanced glaucoma/ dry eyes. Chronic. No new head/neck symptoms. Dry mouth during submandibular gland swelling, dry  mouth now resolved. Reports the swelling has mproved now by 50+%. No more pain, so no longer using ibuprofen. No fever/chills/cough or other signs/symptoms of infection. With ongoing progression of nail/distal digit predominant psoriasis and DIP arthritis. Severe pain. Unable to tolerate NSAIDs given GI upset despite PPI. ROS otherwise negative       5/14/21  Patient initially was going to wait to contact, though has been having progression of symptoms of nails/joint pain/ skin. Last injection was on Tuesday. Had a flare in the days following her injection. First 2 doses, she did notice some improvement. Though after that time, with each dose, has had less and less of a positive change in nails/joints/skin. Has instead been getting worse and worse response. Has stiffness and pain in her DIPs that has been progressive. Has redness as well. Also with radiation of the pain proximally which is new. Also some days with severe fatigue. No fevers/chills. No weight changes. No interval infections.     Past Medical History  Past Medical History:   Diagnosis Date     Bilateral low back pain with right-sided sciatica, unspecified chronicity 11/11/2020     Neuroforaminal stenosis of lumbar spine 11/11/2020     Nuclear senile cataract of both eyes 08/16/2018     Osteoarthritis 10/15/2020     Psoriatic arthropathy (H) 10/19/2020     Past Surgical History  Past Surgical History:   Procedure Laterality Date     BIOPSY  07/30/2019    the right breast tissue(benign)     COLONOSCOPY N/A 7/14/2022    Procedure: COLONOSCOPY;  Surgeon: Roderick Campos MD;  Location: [facility] OR     EYE SURGERY      3 surgeries for glaucoma treatment     Medications  Current Outpatient Medications   Medication     apremilast (OTEZLA) 30 MG tablet     CALCIUM CARBONATE-VIT D-MIN PO     celecoxib (CELEBREX) 100 MG capsule     clobetasol (TEMOVATE) 0.05 % external ointment     clobetasol (TEMOVATE) 0.05 % external solution     gabapentin  "(NEURONTIN) 300 MG capsule     loratadine 10 MG capsule     Multiple Vitamins-Minerals (MULTIVITAMIN ADULTS 50+) TABS     predniSONE (DELTASONE) 2.5 MG tablet     tofacitinib (XELJANZ XR) 11 MG 24 hr tablet     Apremilast (OTEZLA) 10 & 20 & 30 MG TBPK     No current facility-administered medications for this visit.     Allergies   Allergies   Allergen Reactions     Adhesive Tape Blisters, Itching and Swelling     Dust Mites      Latex Blisters, Itching, Other (See Comments) and Swelling     Skin sensitivity       Morphine Sulfate (Concentrate) Itching     Other reaction(s): Chills  heart palpatations     Seasonal Allergies      Codeine Palpitations     chills     Family History:   No family history of autoimmune diseases.     Social History  , with children. 2 kids and they are healthy. No ETOH, smoking, drug use.      Objective:    Physical exam:  Ht 1.626 m (5' 4\")   Wt 59.9 kg (132 lb)   LMP  (LMP Unknown)   BMI 22.66 kg/m    GEN: Sitting up unassisted. NAD  HEENT: no facial rash, sclera clear, no inflammatory nose/ external ear changes  CV: No upper extremity edema  Pulm: Breathing comfortably, no audible wheezing, no use accessory muscles, speaking in full sentences  Abdomen: Not distended  Skin: psoriatic nail disease with some improvement, though still obvious by video exam. No skin changes of the associated digital pulp/surrounding cuticles.   MSK: full active ROM of bilateral shoulders, elbows, wrists, MCPs, PIPs. DIPs of Digits 3-5 on the left hand and digit 3 on the right hand without erythema. Has psoriatic changes of fingernails of digits 3-5 on the left hand and digit 3 on the right hand- improving.     Labs  WBC   Date Value Ref Range Status   06/11/2021 9.4 4.0 - 11.0 10e9/L Final     WBC Count   Date Value Ref Range Status   05/16/2022 7.5 4.0 - 11.0 10e3/uL Final     Hemoglobin   Date Value Ref Range Status   05/16/2022 12.4 11.7 - 15.7 g/dL Final   06/11/2021 12.9 11.7 - 15.7 g/dL Final "     Platelet Count   Date Value Ref Range Status   05/16/2022 401 150 - 450 10e3/uL Final   06/11/2021 369 150 - 450 10e9/L Final     Creatinine   Date Value Ref Range Status   05/16/2022 0.67 0.52 - 1.04 mg/dL Final   06/11/2021 0.63 0.52 - 1.04 mg/dL Final     Lab Results   Component Value Date    ALKPHOS 62 05/16/2022    ALKPHOS 76 06/11/2021     AST   Date Value Ref Range Status   05/16/2022 15 0 - 45 U/L Final   06/11/2021 14 0 - 45 U/L Final     Lab Results   Component Value Date    ALT 21 05/16/2022    ALT 19 06/11/2021     Sed Rate   Date Value Ref Range Status   06/16/2021 15 0 - 30 mm/h Final     Erythrocyte Sedimentation Rate   Date Value Ref Range Status   02/01/2022 4 0 - 30 mm/hr Final     CRP Inflammation   Date Value Ref Range Status   02/01/2022 <2.9 0.0 - 8.0 mg/L Final   06/16/2021 5.2 0.0 - 8.0 mg/L Final     UA RESULTS:  No results for input(s): COLOR, APPEARANCE, URINEGLC, URINEBILI, URINEKETONE, SG, UBLD, URINEPH, PROTEIN, UROBILINOGEN, NITRITE, LEUKEST, RBCU, WBCU in the last [id number] hours.   Rheumatoid Factor   Date Value Ref Range Status   02/05/2021 <7 <12 IU/mL Final     6/16/21  SSA, SSB negative  Mumps IgG and IgM positive  Creatinine 0.63  LFTs normal  CBC WNL    2/5/21  HIV negative  Hep C AB negative  Hep B s AG negative  Hep B c AB reactive  Quant gold negative  Quant gold negative  RF negative  CRP <2.9  ESR 10  Cr 0.62  WBC 7.1  HGB 13.1    HLA b27 negative  Hep B virus DNA negative    2/2/21  ESR 9  CRP <2.9  WBC 6.1  HGB 12.8      11/11/20  A1C 5.3  Hep C/ HIV negative    Imaging:  MR left hand  Impression:  1a. Correlating to the marker at the third digit interphalangeal  joint, there is a focal erosion along the volar aspect of the distal  phalangeal base with intense bone marrow edema and abnormal T1 marrow  signal. Findings are concerning for osteomyelitis and infection should  be ruled out. Also on the differential is sequelae of long-standing  active  inflammatory arthropathy (including psoriatic arthritis given  patient's clinical history) where marginal erosions and marrow signal  abnormality are expected though the degree of T1 marrow replacement is  out of proportion to typical findings.   b. Subtle bone marrow edema in the middle phalanx without T1 marrow  signal abnormality, this is favored to represent reactive edema.  c. Joint effusion at the third digit interphalangeal joint,  potentially infectious or inflammatory. Consider aspiration for  definitive diagnosis.  d. The partially visualized fifth digit shows edema within the distal  phalanx and head of the middle phalanx with regional soft tissue  edema. Given predominant findings in the third digit, similar changes  in the fifth digit could support a polyarticular inflammatory  arthropathy. Correlate clinically.  2. Tenosynovitis of the third digit flexor and extensor tendons. There  is also tendinosis with high-grade (near full-thickness) tearing of  the extensor tendon/extensor saul at the distal phalangeal base with  slight proximal retraction of the residual fibers.  3. Diffusely thickened, edematous ulnar and radial collateral  ligaments, likely combination of sprain and reactive edema.  4. Extensive soft tissue edema centered about the interphalangeal  joint.

## 2022-09-16 NOTE — PROGRESS NOTES
Willa is a 62 year old who is being evaluated via a billable video visit.      How would you like to obtain your AVS? MyChart  If the video visit is dropped, the invitation should be resent by: Text to cell phone: [phone]  Will anyone else be joining your video visit? No        Video-Visit Details    Video Start Time: 11:04AM    Type of service:  Video Visit    Video End Time:11:36AM    Originating Location (pt. Location): Home    Distant Location (provider location):  [facility] [place]     Platform used for Video Visit: Tiburcio Arias MD  Rheumatology          Outpatient Rheumatology Follow-up    Name: Willa Downey    MRN [id number]   Today's date: 9/16/22           Reason for follow-up: psoriatic arthritis   Requesting physician: Kitty Mendez MD        Assessment & Plan:   #Psoriatic arthritis  #negative RF  #psoriasis, cutaneous and nail involvement  #DIP Left hand 3rd, 4th, 5th and right hand 4th digit inflammatory arthritis  #humira, enbrel, methotrexate failure  #Submandibular LAD- resolved  #High risk drug therapy: xeljanz  #Long term, current, systemic steroid use    62 year old female with hx of advanced glaucoma followed closely by ophthalmology presented to rheumatology on 2/5/21 with 5 month of progressive left hand 3rd, 4th, 5th digit and right hand 4th digit DIP inflammatory arthritis in the context of psoriasis, psoriatic changes of the nails. Taken together most consistent with psoriatic arthritis. Tried on 2 NSAIDs which failed to control symptoms, and had severe GERD symptoms. Tried methotrexate by prior rheumatologist and disease not controlled. Humira started Mid February 2021 due to lack of efficacy of these other therapies and continued through May 14th, though had severe/ rapidly progressive disease after some improvement in the first 4 weeks involving DIP inflammatory arthritis and nail disease. We then ordered Enbrel, though  starting was delayed due to development of impressive bilateral submandibular lymphadenopathy which resolved within about 1 week and thought likely secondary to viral infection. She started enbrel on 6/20/2021 which she had continued on until switching to cosentyx after approval on 8/31/21 due to lack of efficacy of enbrel and injection site reaction. Had been on cosentyx from 8/31/21 with initial improvement in inflammatory DIP arthritis, psoriatic lesions, stabilization of her nail disease though she then had progression of her cutaneous nail and joint inflammatory process which prompted restarting prednisone at 10 mg daily along with doubling her dose of Cosentyx to 300 mg each month.  She took her first dose of 300 mg on 11/23/2021.  She did try to decrease her prednisone shortly after that down to 7.5 though noticed return of her skin peeling/psoriatic skin lesions worsening and so went back up to 10 mg daily which has again improved these symptoms. She was again unable to taper from prednisone below 10mg daily without progression of both cutaneous and articular symptoms and so cosentyx was discontinued and xeljanz 11mg once daily started after insurance approval on 12/29/21. With each attempt to taper her steroids, had difficultly due to return of psoriasis so Willa was seen by Dermatology and started on otezla in addition to xeljanz. She returns today for follow-up.    Psoriatic Arthritis: skin/cutaneous disease continues to improve with the addition of otezla to xeljanz. This addition has allowed for ongoing taper of prednisone. Currently on 1mg daily- lowest dose she has been able to achieve. Will continue on this current combination and continue to taper prednisone.   -Continue otezla BID  -Continue xeljanz 11mg once daily  -Continue on prednisone 1mg daily with plan to taper down as able over the next 2 weeks or so. Placed script for 30 days of 1mg tabs as this plan may need to be adjusted based on prior  attempts.  -Xray bilateral feet to eval for etiology of foot pain with walking. Low suspicion for fracture as not constant pain. Potential erosive disease there. Higher suspicion for non inflammatory DJD.    High risk medication: Xeljanz and otezla  -No side effects by history/exam/available serologies to include CBC and CMP from 5/16/22 without evidence of toxicity  -Risks and benefits again discussed today of both therapies to include infection, BM suppression, Rash, HA, GI/hepatotoxicity, malignancy, DVT/PE, all cause mortality among others. Patient agreeable to continue  -Labs q6 months. Next due in Nov 2022.     Long term, current, systemic steroid use: current dose of 1mg daily. Taper above    Long term NSAID use: celebrex  -Labs q6 months for monitoring. Standing orders in place. Reviewed labs from march 2022 to include CBC and CMP without evidence of toxicity    Labs and follow-up in Jan 2023    Marc Arias MD  Rheumatology       Subjective:   September 16, 2022  Has started on otezla and without ongoing side effects with exception of new evening HA. She thinks it may be 5 or so days per week. Mild. During the day she does not notice anything. Noticing this after dinner/watching TV. Slight. Not to the point she needs to take anything. Has noticed that her psoriasis on her fingertips and scalp have improved. Still some sensitivity on her fingertips, but much better. Has been able to taper down to 1mg of prednisone. Has been on this dose for 6 days. Articular symptoms have been stable/at baseline. No obvious red/hot/swollen joints. No new areas of joint pain/involvement. No new side effects from xeljanz. Tolerating low dose prednisone without noticeable side effects. No interval infections. No fevers/chills/night sweats. Family member with COVID so has been isolating. Has continued to walk for exercise, as helps with overall symptoms, though has been experiencing pain in ball of foot/greater MTP. Low  "suspicion for gout, though will assess for other pathology.  14 point ROS collected and negative if not documented above.     Interval history 3/25/2022  Currently on 3mg of prednisone daily. Still with pink/ reddish spot on her scalp and left digits at tips of digits, though much improved. Has been tolerating her prednisone and xeljanz without issues. She did by accident take an increased dose of prednisone up to 10mg for 2 days when she was to reduce from 4 to 3mg due to having a different bottle of 2.5mg tabs (instead of the 1mg tabs she was taking). Her pain in the AM is 6/10. Takes her celebrex in the AM with pain resolution. No GI side effects from her celebrex. No fevers/chills/night sweats. She has noticed slight increase in the lesions of her digits since decreasing her prednisone down to 3mg for 5 days. Reports feeling well and that this is the best she has felt with DMARD therapy and being able to reduce her dose. No interval infections. ROS otherwise negative.     Interval history 1/28/22  Was already on prednisone 10mg and then xeljanz was added on Jan 3rd. Had complete resolution of psoriasis on scalp/inner ear. Hands also completely clear at that time. She then decreased to 7.5mg on 1/17 and for the first week was doing \"okay\", then this week noticed some pits on fingertips, some skin peeling. Yesterday and today has noticed some more skin peeling. Some more scalp irriation 3 days ago as well. She feels that since starting xeljanz this is the best improvement that she has found. Continues to take celebrex daily due to pain in joints without it. With celebrex, pain is resolved. If she does lots of activity (cleaning etc) will have some pain. No GI upset as she had with mobic. Not taking PPI. She has noticed some more fatigue in the AM and also more so in the afternoon. Uses clobetasol ointment and cream. Also topical for scalp. Uses them all at night. Using 0.05% clobetasol. Has continued to experience " "rare intermittent tender lymph nodes. Had last booster on August 21st. No interval infections. No fevers, chills, weight changes. Remains active. No noticeable side effects since starting xeljanz. ROS otherwise negative.     12/2/2021 interval history  Took her first dose of Cosentyx 200 mg daily on 11/23/2021.  Around that time tried to decrease her prednisone from 10 mg daily down to 7.5 mg daily though written to discuss she had return of joint pain, joint swelling and stiffness particularly of her DIPs, worsening of her nail disease and psoriatic lesions on the tips of her fingers.  Fatigue also more significant.  As a result she went back up to 10 mg daily and this improved some.  She is not been taking any NSAIDs since her last appointment despite her joint pain as the meloxicam previously prescribed it was giving her reflux/gastritis symptoms.  No injection site reaction from her Cosentyx.  No fevers or chills.  No weight changes.  Tolerating prednisone without issues.  Review of systems otherwise negative.    9/30/21 Interval history  Has been taking cosentyx without issues. Next dose on Friday and then switching to monthly. Has found that her skin is some improved over the fingertips. Though still some painful/burning sensation at the sites of prior lesions. This is improving. When she started prednisone, did have dramatic improvement. Though with taper improvement has been slower though has continued. Currently on 2.5mg daily. No side effects from low dose. Energy still \"not great\" though she thinks it is slowly improving. Going for walks 5 days per week 30-40 minutes per day. Reports that the pain is OK when doing activities, but has been avoiding activities that are painful like weeding/carrying heavy items with grocery shopping. Has continued with mobic 3-4x week. 15mg tabs each time. No GI side effects at this frequency/dose. Still some stiff at DIPs. Though improved from prior. Did have lesion mid " upper back and front groin which have improved since cosentyx/ prednisone. No fevers/chills. No interval infections. ROS otherwise negative.     August 12, 2021  Interval history  Had ?ear infection early July. Given ear drops by PCP. Had developed more psoriasis in new locations (palm of hand/scalp (10-15%)/lower stomach/low back/ and groin/gluteal cleft) where previously was on fingertips/nails only. Saw dermatology who prescribed topical, the topical has been somewhat helpful though has not resolves symptoms. She thinks that she has had 10-15% improvement with the topical. Spoke to pharmacist, who counseled her than can take up to 4 months for enbrel to take full effect. Gives herself injection every Sunday. After 5th injection, has itching/burning at the site. Takes Claritin already. Iced. Was told by pharmacist to try hydrocortisone, which she did. Decreased from 4-5 days of symptoms down to 3 days. From 5th injection onwards has had this reaction. Total of 8 injections so far. Joints much less of an issue compared to cutaneous involvement. If she has any joint pain, takes mobic. Roughly 1x per week. Around once per 7 -10days has extreme fatigue. Yesterday was significant. Slept until noon. Usually gets up at 630. Usually trouble sleeping, so this was unusual for her. No return of submandibular LAD. No fever, chills. No cough/sore throat. Some stiffness in the DIPs, morning predominant. Improves throughout the day. Some increase in pain this week. ROS otherwise negative.      6/17/21  Has advanced glaucoma/ dry eyes. Chronic. No new head/neck symptoms. Dry mouth during submandibular gland swelling, dry mouth now resolved. Reports the swelling has mproved now by 50+%. No more pain, so no longer using ibuprofen. No fever/chills/cough or other signs/symptoms of infection. With ongoing progression of nail/distal digit predominant psoriasis and DIP arthritis. Severe pain. Unable to tolerate NSAIDs given GI upset  despite PPI. ROS otherwise negative       5/14/21  Patient initially was going to wait to contact, though has been having progression of symptoms of nails/joint pain/ skin. Last injection was on Tuesday. Had a flare in the days following her injection. First 2 doses, she did notice some improvement. Though after that time, with each dose, has had less and less of a positive change in nails/joints/skin. Has instead been getting worse and worse response. Has stiffness and pain in her DIPs that has been progressive. Has redness as well. Also with radiation of the pain proximally which is new. Also some days with severe fatigue. No fevers/chills. No weight changes. No interval infections.     Past Medical History  Past Medical History:   Diagnosis Date     Bilateral low back pain with right-sided sciatica, unspecified chronicity 11/11/2020     Neuroforaminal stenosis of lumbar spine 11/11/2020     Nuclear senile cataract of both eyes 08/16/2018     Osteoarthritis 10/15/2020     Psoriatic arthropathy (H) 10/19/2020     Past Surgical History  Past Surgical History:   Procedure Laterality Date     BIOPSY  07/30/2019    the right breast tissue(benign)     COLONOSCOPY N/A 7/14/2022    Procedure: COLONOSCOPY;  Surgeon: Roderick Campos MD;  [place] [facility] [place]     EYE SURGERY      3 surgeries for glaucoma treatment     Medications  Current Outpatient Medications   Medication     apremilast (OTEZLA) 30 MG tablet     CALCIUM CARBONATE-VIT D-MIN PO     celecoxib (CELEBREX) 100 MG capsule     clobetasol (TEMOVATE) 0.05 % external ointment     clobetasol (TEMOVATE) 0.05 % external solution     gabapentin (NEURONTIN) 300 MG capsule     loratadine 10 MG capsule     Multiple Vitamins-Minerals (MULTIVITAMIN ADULTS 50+) TABS     predniSONE (DELTASONE) 2.5 MG tablet     tofacitinib (XELJANZ XR) 11 MG 24 hr tablet     Apremilast (OTEZLA) 10 & 20 & 30 MG TBPK     No current facility-administered medications for this visit.  "    Allergies   Allergies   Allergen Reactions     Adhesive Tape Blisters, Itching and Swelling     Dust Mites      Latex Blisters, Itching, Other (See Comments) and Swelling     Skin sensitivity       Morphine Sulfate (Concentrate) Itching     Other reaction(s): Chills  heart palpatations     Seasonal Allergies      Codeine Palpitations     chills     Family History:   No family history of autoimmune diseases.     Social History  , with children. 2 kids and they are healthy. No ETOH, smoking, drug use.      Objective:    Physical exam:  Ht 1.626 m (5' 4\")   Wt 59.9 kg (132 lb)   LMP  (LMP Unknown)   BMI 22.66 kg/m    GEN: Sitting up unassisted. NAD  HEENT: no facial rash, sclera clear, no inflammatory nose/ external ear changes  CV: No upper extremity edema  Pulm: Breathing comfortably, no audible wheezing, no use accessory muscles, speaking in full sentences  Abdomen: Not distended  Skin: psoriatic nail disease with some improvement, though still obvious by video exam. No skin changes of the associated digital pulp/surrounding cuticles.   MSK: full active ROM of bilateral shoulders, elbows, wrists, MCPs, PIPs. DIPs of Digits 3-5 on the left hand and digit 3 on the right hand without erythema. Has psoriatic changes of fingernails of digits 3-5 on the left hand and digit 3 on the right hand- improving.     Labs  WBC   Date Value Ref Range Status   06/11/2021 9.4 4.0 - 11.0 10e9/L Final     WBC Count   Date Value Ref Range Status   05/16/2022 7.5 4.0 - 11.0 10e3/uL Final     Hemoglobin   Date Value Ref Range Status   05/16/2022 12.4 11.7 - 15.7 g/dL Final   06/11/2021 12.9 11.7 - 15.7 g/dL Final     Platelet Count   Date Value Ref Range Status   05/16/2022 401 150 - 450 10e3/uL Final   06/11/2021 369 150 - 450 10e9/L Final     Creatinine   Date Value Ref Range Status   05/16/2022 0.67 0.52 - 1.04 mg/dL Final   06/11/2021 0.63 0.52 - 1.04 mg/dL Final     Lab Results   Component Value Date    ALKPHOS 62 " 05/16/2022    ALKPHOS 76 06/11/2021     AST   Date Value Ref Range Status   05/16/2022 15 0 - 45 U/L Final   06/11/2021 14 0 - 45 U/L Final     Lab Results   Component Value Date    ALT 21 05/16/2022    ALT 19 06/11/2021     Sed Rate   Date Value Ref Range Status   06/16/2021 15 0 - 30 mm/h Final     Erythrocyte Sedimentation Rate   Date Value Ref Range Status   02/01/2022 4 0 - 30 mm/hr Final     CRP Inflammation   Date Value Ref Range Status   02/01/2022 <2.9 0.0 - 8.0 mg/L Final   06/16/2021 5.2 0.0 - 8.0 mg/L Final     UA RESULTS:  No results for input(s): COLOR, APPEARANCE, URINEGLC, URINEBILI, URINEKETONE, SG, UBLD, URINEPH, PROTEIN, UROBILINOGEN, NITRITE, LEUKEST, RBCU, WBCU in the last 12124 hours.   Rheumatoid Factor   Date Value Ref Range Status   02/05/2021 <7 <12 IU/mL Final     6/16/21  SSA, SSB negative  Mumps IgG and IgM positive  Creatinine 0.63  LFTs normal  CBC WNL    2/5/21  HIV negative  Hep C AB negative  Hep B s AG negative  Hep B c AB reactive  Quant gold negative  Quant gold negative  RF negative  CRP <2.9  ESR 10  Cr 0.62  WBC 7.1  HGB 13.1    HLA b27 negative  Hep B virus DNA negative    2/2/21  ESR 9  CRP <2.9  WBC 6.1  HGB 12.8      11/11/20  A1C 5.3  Hep C/ HIV negative    Imaging:  MR left hand  Impression:  1a. Correlating to the marker at the third digit interphalangeal  joint, there is a focal erosion along the volar aspect of the distal  phalangeal base with intense bone marrow edema and abnormal T1 marrow  signal. Findings are concerning for osteomyelitis and infection should  be ruled out. Also on the differential is sequelae of long-standing  active inflammatory arthropathy (including psoriatic arthritis given  patient's clinical history) where marginal erosions and marrow signal  abnormality are expected though the degree of T1 marrow replacement is  out of proportion to typical findings.   b. Subtle bone marrow edema in the middle phalanx without T1 marrow  signal  abnormality, this is favored to represent reactive edema.  c. Joint effusion at the third digit interphalangeal joint,  potentially infectious or inflammatory. Consider aspiration for  definitive diagnosis.  d. The partially visualized fifth digit shows edema within the distal  phalanx and head of the middle phalanx with regional soft tissue  edema. Given predominant findings in the third digit, similar changes  in the fifth digit could support a polyarticular inflammatory  arthropathy. Correlate clinically.  2. Tenosynovitis of the third digit flexor and extensor tendons. There  is also tendinosis with high-grade (near full-thickness) tearing of  the extensor tendon/extensor saul at the distal phalangeal base with  slight proximal retraction of the residual fibers.  3. Diffusely thickened, edematous ulnar and radial collateral  ligaments, likely combination of sprain and reactive edema.  4. Extensive soft tissue edema centered about the interphalangeal  joint.

## 2022-09-27 DIAGNOSIS — L40.50 PSORIATIC ARTHRITIS (H): ICD-10-CM

## 2022-09-27 NOTE — TELEPHONE ENCOUNTER
Pharmacy needs clarification  Is the patient to be taking both Otezla AND Xeljanz or did the Otezla replace XelJanz?  - Patient just requested a refill of Xeljanz and she does have a refill on file but pharmacist wants clarification first before dispensing.  - they last filled the Xeljanz in June  - they last filled the Otezla in July     tofacitinib (XELJANZ XR) 11 MG 24 hr tablet  Last Written Prescription Date:  6/21/2022   Last Fill Quantity: 90,   # refills: 1    apremilast (OTEZLA) 30 MG tablet      Last Written Prescription Date:  7/20/2022  Last Fill Quantity: 60,   # refills: 5     Routing to both Dr Arias and Rheumatology triage.

## 2022-09-28 RX ORDER — TOFACITINIB 11 MG/1
11 TABLET, FILM COATED, EXTENDED RELEASE ORAL DAILY
Qty: 90 TABLET | Refills: 3 | Status: SHIPPED | OUTPATIENT
Start: 2022-09-28 | End: 2023-10-02

## 2022-09-28 NOTE — TELEPHONE ENCOUNTER
I called [facility] and spoke with pharmacist Murali.  Discussed that I had gotten a response back from Dr Arias with confirmation that patient is taking both the Otezla and Xeljanz at the same time.     *see message added to note that was sent earlier today from Dr Arias.     Marc Arias MD  You 6 hours ago (6:08 AM)     LM    She is taking both at the same time    Routing comment

## 2022-10-03 DIAGNOSIS — M54.41 ACUTE RIGHT-SIDED LOW BACK PAIN WITH RIGHT-SIDED SCIATICA: ICD-10-CM

## 2022-10-03 RX ORDER — GABAPENTIN 300 MG/1
300 CAPSULE ORAL 3 TIMES DAILY
Qty: 90 CAPSULE | Refills: 2 | Status: SHIPPED | OUTPATIENT
Start: 2022-10-03 | End: 2023-02-02

## 2022-10-10 NOTE — RESULT ENCOUNTER NOTE
Tram Ms. Downey,  Your results came back and Mumps IGM test is in the borderline range . Recommend rechecking in 14 days to confirm if this was a recent infection and cause of current gland swelling. If you have any further concerns please do not hesitate to contact us by message, phone or making an appointment.  Have a good day   Dr Bobby ROSE Negative

## 2022-10-29 ENCOUNTER — HEALTH MAINTENANCE LETTER (OUTPATIENT)
Age: 62
End: 2022-10-29

## 2022-12-12 ENCOUNTER — TELEPHONE (OUTPATIENT)
Dept: RHEUMATOLOGY | Facility: CLINIC | Age: 62
End: 2022-12-12

## 2022-12-12 NOTE — TELEPHONE ENCOUNTER
PA Initiation    Medication: Xeljanz renewal  Insurance Company: [employer] - Phone [phone] Fax [phone]  Pharmacy Filling the Rx: [facility] - [place], [place] - [facility] [address]  Filling Pharmacy Phone:    Filling Pharmacy Fax:    Start Date: 12/12/2022    [id number]

## 2022-12-17 ENCOUNTER — MYC MEDICAL ADVICE (OUTPATIENT)
Dept: RHEUMATOLOGY | Facility: CLINIC | Age: 62
End: 2022-12-17

## 2022-12-19 NOTE — TELEPHONE ENCOUNTER
Spoke to patient to update, Dr. Arias is out of the clinic but checking his messages as he is able, offer to send her question/concern to the on call provider but patient would rather wait for Dr. Arias and our [facility] pharmacist to collaborate for plan on how she should proceed.    Judith Bush RN  Rheumatology Clinic

## 2022-12-19 NOTE — TELEPHONE ENCOUNTER
PRIOR AUTHORIZATION DENIED    Medication: Xeljanz renewal    Denial Date: 12/14/2022    Denial Rational: Cannot use concurrently with Otezla    Appeal Information:

## 2022-12-19 NOTE — TELEPHONE ENCOUNTER
Medication Appeal Initiation    We have initiated an appeal for the requested medication:  Medication: Xeljanz renewal  Appeal Start Date:  12/19/2022  Insurance Company: [employer] - Phone [phone] Fax [phone]  Comments:  Sent to plan

## 2023-01-18 DIAGNOSIS — L40.50 PSORIATIC ARTHRITIS (H): ICD-10-CM

## 2023-01-19 NOTE — TELEPHONE ENCOUNTER
Filled out paperwork for [employer] bridge program & sent to Md, waiting for pt reply on if I can send to her

## 2023-01-19 NOTE — TELEPHONE ENCOUNTER
Willa said she has medication until March. I told her we can wait to see her insurance's response and if we don't have an approval by middle of February, we can submit that documentation so she can continue medication.

## 2023-01-19 NOTE — TELEPHONE ENCOUNTER
apremilast (OTEZLA) 30 MG tablet     Last Written Prescription Date:  07-  Last Fill Quantity: 60,   # refills: 5  Last Office Visit : 09-  Future Office visit:  2-

## 2023-01-30 NOTE — TELEPHONE ENCOUNTER
Gary didn't accept appeal, having pt sign consent to appeal/release medical information, sent via email

## 2023-01-31 ENCOUNTER — MYC MEDICAL ADVICE (OUTPATIENT)
Dept: FAMILY MEDICINE | Facility: CLINIC | Age: 63
End: 2023-01-31
Payer: COMMERCIAL

## 2023-01-31 DIAGNOSIS — N64.4 BREAST PAIN, LEFT: Primary | ICD-10-CM

## 2023-02-01 NOTE — TELEPHONE ENCOUNTER
Dr. Rosales- pt requesting 3D mammogram due to new left breast discomfort. Order pended.    Per chart review, bilateral breast US order still active as well.     Please review and advise.    Thank you,  Florinda Miguel RN BSN  [facility]

## 2023-02-02 ENCOUNTER — TELEPHONE (OUTPATIENT)
Dept: FAMILY MEDICINE | Facility: CLINIC | Age: 63
End: 2023-02-02
Payer: COMMERCIAL

## 2023-02-02 DIAGNOSIS — M54.41 ACUTE RIGHT-SIDED LOW BACK PAIN WITH RIGHT-SIDED SCIATICA: ICD-10-CM

## 2023-02-02 DIAGNOSIS — N64.4 BREAST PAIN, LEFT: Primary | ICD-10-CM

## 2023-02-02 RX ORDER — GABAPENTIN 300 MG/1
300 CAPSULE ORAL 3 TIMES DAILY
Qty: 90 CAPSULE | Refills: 2 | Status: SHIPPED | OUTPATIENT
Start: 2023-02-02 | End: 2023-06-28

## 2023-02-02 NOTE — TELEPHONE ENCOUNTER
[facility] calling for a repeat order.  Order was accidentally deleted when trying to schedule.    Diagnostic mammo bilat re-pended  Also requesting US left breast.    JERSON Ashby RN  [facility]

## 2023-02-03 RX ORDER — GABAPENTIN 300 MG/1
300 CAPSULE ORAL 3 TIMES DAILY
Qty: 90 CAPSULE | Refills: 2 | OUTPATIENT
Start: 2023-02-03

## 2023-02-08 ENCOUNTER — ANCILLARY PROCEDURE (OUTPATIENT)
Dept: MAMMOGRAPHY | Facility: CLINIC | Age: 63
End: 2023-02-08
Attending: STUDENT IN AN ORGANIZED HEALTH CARE EDUCATION/TRAINING PROGRAM
Payer: COMMERCIAL

## 2023-02-08 DIAGNOSIS — N64.4 BREAST PAIN, LEFT: ICD-10-CM

## 2023-02-08 PROCEDURE — 77066 DX MAMMO INCL CAD BI: CPT | Performed by: STUDENT IN AN ORGANIZED HEALTH CARE EDUCATION/TRAINING PROGRAM

## 2023-02-08 PROCEDURE — G0279 TOMOSYNTHESIS, MAMMO: HCPCS | Performed by: STUDENT IN AN ORGANIZED HEALTH CARE EDUCATION/TRAINING PROGRAM

## 2023-02-08 PROCEDURE — 76642 ULTRASOUND BREAST LIMITED: CPT | Mod: LT | Performed by: STUDENT IN AN ORGANIZED HEALTH CARE EDUCATION/TRAINING PROGRAM

## 2023-02-10 ENCOUNTER — VIRTUAL VISIT (OUTPATIENT)
Dept: RHEUMATOLOGY | Facility: CLINIC | Age: 63
End: 2023-02-10
Attending: INTERNAL MEDICINE
Payer: COMMERCIAL

## 2023-02-10 DIAGNOSIS — L40.50 PSORIATIC ARTHRITIS (H): Primary | ICD-10-CM

## 2023-02-10 DIAGNOSIS — Z79.899 HIGH RISK MEDICATION USE: ICD-10-CM

## 2023-02-10 PROCEDURE — G0463 HOSPITAL OUTPT CLINIC VISIT: HCPCS | Mod: PN,GT | Performed by: INTERNAL MEDICINE

## 2023-02-10 PROCEDURE — 99214 OFFICE O/P EST MOD 30 MIN: CPT | Mod: VID | Performed by: INTERNAL MEDICINE

## 2023-02-10 NOTE — PROGRESS NOTES
Video-Visit Details    Type of service:  Video Visit    Video visit start time 8:19:36 am.  Video visit end time 8:45:05 am.  Originating Location (pt. Location): home        Distant Location (provider location):  off site    Mode of Communication:  Video Conference via [facility]      Outpatient Rheumatology Follow-up    Name: Willa Downey    MRN [id number]   Today's date: 2/10/2023           Reason for follow-up: psoriatic arthritis   Requesting physician: Kitty Mendez MD        Assessment & Plan:   #Psoriatic arthritis  #negative RF  #psoriasis, cutaneous and nail involvement  #DIP Left hand 3rd, 4th, 5th and right hand 4th digit inflammatory arthritis  #humira, enbrel, methotrexate failure  #Submandibular LAD- resolved  #High risk drug therapy: xeljanz and Otezla    62 year old female with hx of advanced glaucoma followed closely by ophthalmology presented to rheumatology on 2/5/21 with 5 month of progressive left hand 3rd, 4th, 5th digit and right hand 4th digit DIP inflammatory arthritis in the context of psoriasis, psoriatic changes of the nails. Taken together most consistent with psoriatic arthritis. Tried on 2 NSAIDs which failed to control symptoms, and had severe GERD symptoms. Tried methotrexate by prior rheumatologist and disease not controlled. Humira started Mid February 2021 due to lack of efficacy of these other therapies and continued through May 14th, though had severe/ rapidly progressive disease after some improvement in the first 4 weeks involving DIP inflammatory arthritis and nail disease. We then ordered Enbrel, though starting was delayed due to development of impressive bilateral submandibular lymphadenopathy which resolved within about 1 week and thought likely secondary to viral infection. She started enbrel on 6/20/2021 which she had continued on until switching to cosentyx after approval on 8/31/21 due to lack of efficacy of enbrel and injection site reaction. Had been  on cosentyx from 8/31/21 with initial improvement in inflammatory DIP arthritis, psoriatic lesions, stabilization of her nail disease though she then had progression of her cutaneous nail and joint inflammatory process which prompted restarting prednisone at 10 mg daily along with doubling her dose of Cosentyx to 300 mg each month.  She took her first dose of 300 mg on 11/23/2021.  She did try to decrease her prednisone shortly after that down to 7.5 though noticed return of her skin peeling/psoriatic skin lesions worsening and so went back up to 10 mg daily which has again improved these symptoms. She was again unable to taper from prednisone below 10mg daily without progression of both cutaneous and articular symptoms and so cosentyx was discontinued and xeljanz 11mg once daily started after insurance approval on 12/29/21. With each attempt to taper her steroids, had difficultly due to return of psoriasis so Willa was seen by Dermatology and started on otezla in addition to xeljanz. She returns today for follow-up.    Psoriatic Arthritis: Ms Downey continues on Xeljanz and Otezla which has provided for the first time excellent disease control which is in remission without active cutaneous or articular inflammatory disease.  No side effects from either drug in isolation or in combination.  Disease remission is supported by her normal ESR and CRP obtained on 2/16/2023. We will plan to continue with this combination given her excellent tolerability and the steroid sparing effects of these drugs.  -Continue otezla BID  -Continue xeljanz 11mg once daily  - Follow-up end of April    High risk medication: Xeljanz and otezla  -No side effects by history/exam/available serologies to include CBC and CMP from 2/16/2023 without evidence of toxicity  -Risks and benefits again discussed today of both therapies to include infection, BM suppression, Rash, HA, GI/hepatotoxicity, malignancy, DVT/PE, all cause mortality among  others. Patient agreeable to continue  -Labs q6 months. Next due in August 2023.     Long term NSAID use: celebrex  -Labs q6 months for monitoring. Standing orders in place. Reviewed labs from 2/16/2023 to include CBC and CMP without evidence of toxicity    Marc Arias MD  Rheumatology       Subjective:   2/10/2023  - Continues on Otezla and Xeljanz without noticeable side effects.  - This combination has allowed for complete resolution of her cutaneous psoriasis  - She denies red hot or swollen joints.  Still some intermittent pain and discomfort in her DIPs though significantly improved.  - Also some pain discomfort in her CMC's secondary to known noninflammatory osteoarthritis  - Her fingernails are improving and look healthy on her hands with minimal digital pitting fissures and breaking  - No fevers chills night sweats  - No unintentional weight loss    September 16, 2022  Has started on otezla and without ongoing side effects with exception of new evening HA. She thinks it may be 5 or so days per week. Mild. During the day she does not notice anything. Noticing this after dinner/watching TV. Slight. Not to the point she needs to take anything. Has noticed that her psoriasis on her fingertips and scalp have improved. Still some sensitivity on her fingertips, but much better. Has been able to taper down to 1mg of prednisone. Has been on this dose for 6 days. Articular symptoms have been stable/at baseline. No obvious red/hot/swollen joints. No new areas of joint pain/involvement. No new side effects from xeljanz. Tolerating low dose prednisone without noticeable side effects. No interval infections. No fevers/chills/night sweats. Family member with COVID so has been isolating. Has continued to walk for exercise, as helps with overall symptoms, though has been experiencing pain in ball of foot/greater MTP. Low suspicion for gout, though will assess for other pathology.  14 point ROS collected and negative if  "not documented above.     Interval history 3/25/2022  Currently on 3mg of prednisone daily. Still with pink/ reddish spot on her scalp and left digits at tips of digits, though much improved. Has been tolerating her prednisone and xeljanz without issues. She did by accident take an increased dose of prednisone up to 10mg for 2 days when she was to reduce from 4 to 3mg due to having a different bottle of 2.5mg tabs (instead of the 1mg tabs she was taking). Her pain in the AM is 6/10. Takes her celebrex in the AM with pain resolution. No GI side effects from her celebrex. No fevers/chills/night sweats. She has noticed slight increase in the lesions of her digits since decreasing her prednisone down to 3mg for 5 days. Reports feeling well and that this is the best she has felt with DMARD therapy and being able to reduce her dose. No interval infections. ROS otherwise negative.     Interval history 1/28/22  Was already on prednisone 10mg and then xeljanz was added on Jan 3rd. Had complete resolution of psoriasis on scalp/inner ear. Hands also completely clear at that time. She then decreased to 7.5mg on 1/17 and for the first week was doing \"okay\", then this week noticed some pits on fingertips, some skin peeling. Yesterday and today has noticed some more skin peeling. Some more scalp irriation 3 days ago as well. She feels that since starting xeljanz this is the best improvement that she has found. Continues to take celebrex daily due to pain in joints without it. With celebrex, pain is resolved. If she does lots of activity (cleaning etc) will have some pain. No GI upset as she had with mobic. Not taking PPI. She has noticed some more fatigue in the AM and also more so in the afternoon. Uses clobetasol ointment and cream. Also topical for scalp. Uses them all at night. Using 0.05% clobetasol. Has continued to experience rare intermittent tender lymph nodes. Had last booster on August 21st. No interval infections. No " "fevers, chills, weight changes. Remains active. No noticeable side effects since starting xeljanz. ROS otherwise negative.     12/2/2021 interval history  Took her first dose of Cosentyx 200 mg daily on 11/23/2021.  Around that time tried to decrease her prednisone from 10 mg daily down to 7.5 mg daily though written to discuss she had return of joint pain, joint swelling and stiffness particularly of her DIPs, worsening of her nail disease and psoriatic lesions on the tips of her fingers.  Fatigue also more significant.  As a result she went back up to 10 mg daily and this improved some.  She is not been taking any NSAIDs since her last appointment despite her joint pain as the meloxicam previously prescribed it was giving her reflux/gastritis symptoms.  No injection site reaction from her Cosentyx.  No fevers or chills.  No weight changes.  Tolerating prednisone without issues.  Review of systems otherwise negative.    9/30/21 Interval history  Has been taking cosentyx without issues. Next dose on Friday and then switching to monthly. Has found that her skin is some improved over the fingertips. Though still some painful/burning sensation at the sites of prior lesions. This is improving. When she started prednisone, did have dramatic improvement. Though with taper improvement has been slower though has continued. Currently on 2.5mg daily. No side effects from low dose. Energy still \"not great\" though she thinks it is slowly improving. Going for walks 5 days per week 30-40 minutes per day. Reports that the pain is OK when doing activities, but has been avoiding activities that are painful like weeding/carrying heavy items with grocery shopping. Has continued with mobic 3-4x week. 15mg tabs each time. No GI side effects at this frequency/dose. Still some stiff at DIPs. Though improved from prior. Did have lesion mid upper back and front groin which have improved since cosentyx/ prednisone. No fevers/chills. No " interval infections. ROS otherwise negative.     August 12, 2021  Interval history  Had ?ear infection early July. Given ear drops by PCP. Had developed more psoriasis in new locations (palm of hand/scalp (10-15%)/lower stomach/low back/ and groin/gluteal cleft) where previously was on fingertips/nails only. Saw dermatology who prescribed topical, the topical has been somewhat helpful though has not resolves symptoms. She thinks that she has had 10-15% improvement with the topical. Spoke to pharmacist, who counseled her than can take up to 4 months for enbrel to take full effect. Gives herself injection every Sunday. After 5th injection, has itching/burning at the site. Takes Claritin already. Iced. Was told by pharmacist to try hydrocortisone, which she did. Decreased from 4-5 days of symptoms down to 3 days. From 5th injection onwards has had this reaction. Total of 8 injections so far. Joints much less of an issue compared to cutaneous involvement. If she has any joint pain, takes mobic. Roughly 1x per week. Around once per 7 -10days has extreme fatigue. Yesterday was significant. Slept until noon. Usually gets up at 630. Usually trouble sleeping, so this was unusual for her. No return of submandibular LAD. No fever, chills. No cough/sore throat. Some stiffness in the DIPs, morning predominant. Improves throughout the day. Some increase in pain this week. ROS otherwise negative.      6/17/21  Has advanced glaucoma/ dry eyes. Chronic. No new head/neck symptoms. Dry mouth during submandibular gland swelling, dry mouth now resolved. Reports the swelling has mproved now by 50+%. No more pain, so no longer using ibuprofen. No fever/chills/cough or other signs/symptoms of infection. With ongoing progression of nail/distal digit predominant psoriasis and DIP arthritis. Severe pain. Unable to tolerate NSAIDs given GI upset despite PPI. ROS otherwise negative       5/14/21  Patient initially was going to wait to contact,  though has been having progression of symptoms of nails/joint pain/ skin. Last injection was on Tuesday. Had a flare in the days following her injection. First 2 doses, she did notice some improvement. Though after that time, with each dose, has had less and less of a positive change in nails/joints/skin. Has instead been getting worse and worse response. Has stiffness and pain in her DIPs that has been progressive. Has redness as well. Also with radiation of the pain proximally which is new. Also some days with severe fatigue. No fevers/chills. No weight changes. No interval infections.     Past Medical History  Past Medical History:   Diagnosis Date     Bilateral low back pain with right-sided sciatica, unspecified chronicity 11/11/2020     Neuroforaminal stenosis of lumbar spine 11/11/2020     Nuclear senile cataract of both eyes 08/16/2018     Osteoarthritis 10/15/2020     Psoriatic arthropathy (H) 10/19/2020     Past Surgical History  Past Surgical History:   Procedure Laterality Date     BIOPSY  07/30/2019    the right breast tissue(benign)     COLONOSCOPY N/A 7/14/2022    Procedure: COLONOSCOPY;  Surgeon: Roderick Campos MD;  Location: [facility] [place] OR     EYE SURGERY      3 surgeries for glaucoma treatment     Medications  Current Outpatient Medications   Medication     Apremilast (OTEZLA) 10 & 20 & 30 MG TBPK     apremilast (OTEZLA) 30 MG tablet     CALCIUM CARBONATE-VIT D-MIN PO     celecoxib (CELEBREX) 100 MG capsule     celecoxib (CELEBREX) 100 MG capsule     clobetasol (TEMOVATE) 0.05 % external ointment     clobetasol (TEMOVATE) 0.05 % external solution     gabapentin (NEURONTIN) 300 MG capsule     loratadine 10 MG capsule     Multiple Vitamins-Minerals (MULTIVITAMIN ADULTS 50+) TABS     predniSONE (DELTASONE) 2.5 MG tablet     tofacitinib (XELJANZ XR) 11 MG 24 hr tablet     No current facility-administered medications for this visit.     Allergies   Allergies   Allergen Reactions     Adhesive  Tape Blisters, Itching and Swelling     Dust Mites      Latex Blisters, Itching, Other (See Comments) and Swelling     Skin sensitivity       Morphine Sulfate (Concentrate) Itching     Other reaction(s): Chills  heart palpatations     Seasonal Allergies      Codeine Palpitations     chills     Family History:   No family history of autoimmune diseases.     Social History  , with children. 2 kids and they are healthy. No ETOH, smoking, drug use.      Objective:    Physical exam:  LMP  (LMP Unknown)   Sitting up unassisted no acute distress pleasant as always  No facial rash sclera clear  Able to make a full fist bilaterally.  No DIP PIP or MCP erythema or edema.  No loss of MCP valleys.  She denies any tenderness to self palpation over her MCPs PIPs or DIPs.  No psoriatic lesions identified by video exam today which is significant Carlos improved from her prior exams.  No digital tip peeling.  No fingernail pitting cracking or fissuring today.    WBC   Date Value Ref Range Status   06/11/2021 9.4 4.0 - 11.0 10e9/L Final     WBC Count   Date Value Ref Range Status   02/16/2023 6.4 4.0 - 11.0 10e3/uL Final     Hemoglobin   Date Value Ref Range Status   02/16/2023 12.4 11.7 - 15.7 g/dL Final   06/11/2021 12.9 11.7 - 15.7 g/dL Final     Platelet Count   Date Value Ref Range Status   02/16/2023 362 150 - 450 10e3/uL Final   06/11/2021 369 150 - 450 10e9/L Final     Creatinine   Date Value Ref Range Status   02/16/2023 0.61 0.51 - 0.95 mg/dL Final   06/11/2021 0.63 0.52 - 1.04 mg/dL Final     Lab Results   Component Value Date    ALKPHOS 64 02/16/2023    ALKPHOS 76 06/11/2021     AST   Date Value Ref Range Status   02/16/2023 31 10 - 35 U/L Final   06/11/2021 14 0 - 45 U/L Final     Lab Results   Component Value Date    ALT 16 02/16/2023    ALT 19 06/11/2021     Sed Rate   Date Value Ref Range Status   06/16/2021 15 0 - 30 mm/h Final     Erythrocyte Sedimentation Rate   Date Value Ref Range Status   02/16/2023 8 0 - 30  mm/hr Final     CRP Inflammation   Date Value Ref Range Status   02/01/2022 <2.9 0.0 - 8.0 mg/L Final   06/16/2021 5.2 0.0 - 8.0 mg/L Final     UA RESULTS:  No results for input(s): COLOR, APPEARANCE, URINEGLC, URINEBILI, URINEKETONE, SG, UBLD, URINEPH, PROTEIN, UROBILINOGEN, NITRITE, LEUKEST, RBCU, WBCU in the last [id number] hours.   Rheumatoid Factor   Date Value Ref Range Status   02/05/2021 <7 <12 IU/mL Final     6/16/21  SSA, SSB negative  Mumps IgG and IgM positive  Creatinine 0.63  LFTs normal  CBC WNL    2/5/21  HIV negative  Hep C AB negative  Hep B s AG negative  Hep B c AB reactive  Quant gold negative  Quant gold negative  RF negative  CRP <2.9  ESR 10  Cr 0.62  WBC 7.1  HGB 13.1    HLA b27 negative  Hep B virus DNA negative    2/2/21  ESR 9  CRP <2.9  WBC 6.1  HGB 12.8      11/11/20  A1C 5.3  Hep C/ HIV negative    Imaging:  MR left hand  Impression:  1a. Correlating to the marker at the third digit interphalangeal  joint, there is a focal erosion along the volar aspect of the distal  phalangeal base with intense bone marrow edema and abnormal T1 marrow  signal. Findings are concerning for osteomyelitis and infection should  be ruled out. Also on the differential is sequelae of long-standing  active inflammatory arthropathy (including psoriatic arthritis given  patient's clinical history) where marginal erosions and marrow signal  abnormality are expected though the degree of T1 marrow replacement is  out of proportion to typical findings.   b. Subtle bone marrow edema in the middle phalanx without T1 marrow  signal abnormality, this is favored to represent reactive edema.  c. Joint effusion at the third digit interphalangeal joint,  potentially infectious or inflammatory. Consider aspiration for  definitive diagnosis.  d. The partially visualized fifth digit shows edema within the distal  phalanx and head of the middle phalanx with regional soft tissue  edema. Given predominant findings in  the third digit, similar changes  in the fifth digit could support a polyarticular inflammatory  arthropathy. Correlate clinically.  2. Tenosynovitis of the third digit flexor and extensor tendons. There  is also tendinosis with high-grade (near full-thickness) tearing of  the extensor tendon/extensor saul at the distal phalangeal base with  slight proximal retraction of the residual fibers.  3. Diffusely thickened, edematous ulnar and radial collateral  ligaments, likely combination of sprain and reactive edema.  4. Extensive soft tissue edema centered about the interphalangeal  joint.

## 2023-02-10 NOTE — PATIENT INSTRUCTIONS
1) continue otezla  2) continue xeljanz  3) labs in the next week or so  4) follow-up end of April    Let me know by Monday if you have not heard anything about the xeljanz appeal.

## 2023-02-10 NOTE — LETTER
2/10/2023       RE: Willa Downey  [address]  [facility] [address]     Dear Colleague,    Thank you for referring your patient, Willa Downey, to the [facility] RHEUMATOLOGY CLINIC [place] at [facility]. Please see a copy of my visit note below.    Video-Visit Details    Type of service:  Video Visit    Video visit start time 8:19:36 am.  Video visit end time 8:45:05 am.  Originating Location (pt. Location): home        Distant Location (provider location):  off site    Mode of Communication:  Video Conference via [facility]      Outpatient Rheumatology Follow-up    Name: Willa Downey    MRN [id number]   Today's date: 2/10/2023           Reason for follow-up: psoriatic arthritis   Requesting physician: Kitty Mendez MD        Assessment & Plan:   #Psoriatic arthritis  #negative RF  #psoriasis, cutaneous and nail involvement  #DIP Left hand 3rd, 4th, 5th and right hand 4th digit inflammatory arthritis  #humira, enbrel, methotrexate failure  #Submandibular LAD- resolved  #High risk drug therapy: xeljanz and Otezla    62 year old female with hx of advanced glaucoma followed closely by ophthalmology presented to rheumatology on 2/5/21 with 5 month of progressive left hand 3rd, 4th, 5th digit and right hand 4th digit DIP inflammatory arthritis in the context of psoriasis, psoriatic changes of the nails. Taken together most consistent with psoriatic arthritis. Tried on 2 NSAIDs which failed to control symptoms, and had severe GERD symptoms. Tried methotrexate by prior rheumatologist and disease not controlled. Humira started Mid February 2021 due to lack of efficacy of these other therapies and continued through May 14th, though had severe/ rapidly progressive disease after some improvement in the first 4 weeks involving DIP inflammatory arthritis and nail disease. We then ordered Enbrel, though starting was delayed due to  development of impressive bilateral submandibular lymphadenopathy which resolved within about 1 week and thought likely secondary to viral infection. She started enbrel on 6/20/2021 which she had continued on until switching to cosentyx after approval on 8/31/21 due to lack of efficacy of enbrel and injection site reaction. Had been on cosentyx from 8/31/21 with initial improvement in inflammatory DIP arthritis, psoriatic lesions, stabilization of her nail disease though she then had progression of her cutaneous nail and joint inflammatory process which prompted restarting prednisone at 10 mg daily along with doubling her dose of Cosentyx to 300 mg each month.  She took her first dose of 300 mg on 11/23/2021.  She did try to decrease her prednisone shortly after that down to 7.5 though noticed return of her skin peeling/psoriatic skin lesions worsening and so went back up to 10 mg daily which has again improved these symptoms. She was again unable to taper from prednisone below 10mg daily without progression of both cutaneous and articular symptoms and so cosentyx was discontinued and xeljanz 11mg once daily started after insurance approval on 12/29/21. With each attempt to taper her steroids, had difficultly due to return of psoriasis so Willa was seen by Dermatology and started on otezla in addition to xeljanz. She returns today for follow-up.    Psoriatic Arthritis: Ms Downey continues on Xeljanz and Otezla which has provided for the first time excellent disease control which is in remission without active cutaneous or articular inflammatory disease.  No side effects from either drug in isolation or in combination.  Disease remission is supported by her normal ESR and CRP obtained on 2/16/2023. We will plan to continue with this combination given her excellent tolerability and the steroid sparing effects of these drugs.  -Continue otezla BID  -Continue xeljanz 11mg once daily  - Follow-up end of April    High  risk medication: Xeljanz and otezla  -No side effects by history/exam/available serologies to include CBC and CMP from 2/16/2023 without evidence of toxicity  -Risks and benefits again discussed today of both therapies to include infection, BM suppression, Rash, HA, GI/hepatotoxicity, malignancy, DVT/PE, all cause mortality among others. Patient agreeable to continue  -Labs q6 months. Next due in August 2023.     Long term NSAID use: celebrex  -Labs q6 months for monitoring. Standing orders in place. Reviewed labs from 2/16/2023 to include CBC and CMP without evidence of toxicity    Marc Arias MD  Rheumatology       Subjective:   2/10/2023  - Continues on Otezla and Xeljanz without noticeable side effects.  - This combination has allowed for complete resolution of her cutaneous psoriasis  - She denies red hot or swollen joints.  Still some intermittent pain and discomfort in her DIPs though significantly improved.  - Also some pain discomfort in her CMC's secondary to known noninflammatory osteoarthritis  - Her fingernails are improving and look healthy on her hands with minimal digital pitting fissures and breaking  - No fevers chills night sweats  - No unintentional weight loss    September 16, 2022  Has started on otezla and without ongoing side effects with exception of new evening HA. She thinks it may be 5 or so days per week. Mild. During the day she does not notice anything. Noticing this after dinner/watching TV. Slight. Not to the point she needs to take anything. Has noticed that her psoriasis on her fingertips and scalp have improved. Still some sensitivity on her fingertips, but much better. Has been able to taper down to 1mg of prednisone. Has been on this dose for 6 days. Articular symptoms have been stable/at baseline. No obvious red/hot/swollen joints. No new areas of joint pain/involvement. No new side effects from xeljanz. Tolerating low dose prednisone without noticeable side effects. No  "interval infections. No fevers/chills/night sweats. Family member with COVID so has been isolating. Has continued to walk for exercise, as helps with overall symptoms, though has been experiencing pain in ball of foot/greater MTP. Low suspicion for gout, though will assess for other pathology.  14 point ROS collected and negative if not documented above.     Interval history 3/25/2022  Currently on 3mg of prednisone daily. Still with pink/ reddish spot on her scalp and left digits at tips of digits, though much improved. Has been tolerating her prednisone and xeljanz without issues. She did by accident take an increased dose of prednisone up to 10mg for 2 days when she was to reduce from 4 to 3mg due to having a different bottle of 2.5mg tabs (instead of the 1mg tabs she was taking). Her pain in the AM is 6/10. Takes her celebrex in the AM with pain resolution. No GI side effects from her celebrex. No fevers/chills/night sweats. She has noticed slight increase in the lesions of her digits since decreasing her prednisone down to 3mg for 5 days. Reports feeling well and that this is the best she has felt with DMARD therapy and being able to reduce her dose. No interval infections. ROS otherwise negative.     Interval history 1/28/22  Was already on prednisone 10mg and then xeljanz was added on Jan 3rd. Had complete resolution of psoriasis on scalp/inner ear. Hands also completely clear at that time. She then decreased to 7.5mg on 1/17 and for the first week was doing \"okay\", then this week noticed some pits on fingertips, some skin peeling. Yesterday and today has noticed some more skin peeling. Some more scalp irriation 3 days ago as well. She feels that since starting xeljanz this is the best improvement that she has found. Continues to take celebrex daily due to pain in joints without it. With celebrex, pain is resolved. If she does lots of activity (cleaning etc) will have some pain. No GI upset as she had with " "mobic. Not taking PPI. She has noticed some more fatigue in the AM and also more so in the afternoon. Uses clobetasol ointment and cream. Also topical for scalp. Uses them all at night. Using 0.05% clobetasol. Has continued to experience rare intermittent tender lymph nodes. Had last booster on August 21st. No interval infections. No fevers, chills, weight changes. Remains active. No noticeable side effects since starting xeljanz. ROS otherwise negative.     12/2/2021 interval history  Took her first dose of Cosentyx 200 mg daily on 11/23/2021.  Around that time tried to decrease her prednisone from 10 mg daily down to 7.5 mg daily though written to discuss she had return of joint pain, joint swelling and stiffness particularly of her DIPs, worsening of her nail disease and psoriatic lesions on the tips of her fingers.  Fatigue also more significant.  As a result she went back up to 10 mg daily and this improved some.  She is not been taking any NSAIDs since her last appointment despite her joint pain as the meloxicam previously prescribed it was giving her reflux/gastritis symptoms.  No injection site reaction from her Cosentyx.  No fevers or chills.  No weight changes.  Tolerating prednisone without issues.  Review of systems otherwise negative.    9/30/21 Interval history  Has been taking cosentyx without issues. Next dose on Friday and then switching to monthly. Has found that her skin is some improved over the fingertips. Though still some painful/burning sensation at the sites of prior lesions. This is improving. When she started prednisone, did have dramatic improvement. Though with taper improvement has been slower though has continued. Currently on 2.5mg daily. No side effects from low dose. Energy still \"not great\" though she thinks it is slowly improving. Going for walks 5 days per week 30-40 minutes per day. Reports that the pain is OK when doing activities, but has been avoiding activities that are " painful like weeding/carrying heavy items with grocery shopping. Has continued with mobic 3-4x week. 15mg tabs each time. No GI side effects at this frequency/dose. Still some stiff at DIPs. Though improved from prior. Did have lesion mid upper back and front groin which have improved since cosentyx/ prednisone. No fevers/chills. No interval infections. ROS otherwise negative.     August 12, 2021  Interval history  Had ?ear infection early July. Given ear drops by PCP. Had developed more psoriasis in new locations (palm of hand/scalp (10-15%)/lower stomach/low back/ and groin/gluteal cleft) where previously was on fingertips/nails only. Saw dermatology who prescribed topical, the topical has been somewhat helpful though has not resolves symptoms. She thinks that she has had 10-15% improvement with the topical. Spoke to pharmacist, who counseled her than can take up to 4 months for enbrel to take full effect. Gives herself injection every Sunday. After 5th injection, has itching/burning at the site. Takes Claritin already. Iced. Was told by pharmacist to try hydrocortisone, which she did. Decreased from 4-5 days of symptoms down to 3 days. From 5th injection onwards has had this reaction. Total of 8 injections so far. Joints much less of an issue compared to cutaneous involvement. If she has any joint pain, takes mobic. Roughly 1x per week. Around once per 7 -10days has extreme fatigue. Yesterday was significant. Slept until noon. Usually gets up at 630. Usually trouble sleeping, so this was unusual for her. No return of submandibular LAD. No fever, chills. No cough/sore throat. Some stiffness in the DIPs, morning predominant. Improves throughout the day. Some increase in pain this week. ROS otherwise negative.      6/17/21  Has advanced glaucoma/ dry eyes. Chronic. No new head/neck symptoms. Dry mouth during submandibular gland swelling, dry mouth now resolved. Reports the swelling has mproved now by 50+%. No more  pain, so no longer using ibuprofen. No fever/chills/cough or other signs/symptoms of infection. With ongoing progression of nail/distal digit predominant psoriasis and DIP arthritis. Severe pain. Unable to tolerate NSAIDs given GI upset despite PPI. ROS otherwise negative       5/14/21  Patient initially was going to wait to contact, though has been having progression of symptoms of nails/joint pain/ skin. Last injection was on Tuesday. Had a flare in the days following her injection. First 2 doses, she did notice some improvement. Though after that time, with each dose, has had less and less of a positive change in nails/joints/skin. Has instead been getting worse and worse response. Has stiffness and pain in her DIPs that has been progressive. Has redness as well. Also with radiation of the pain proximally which is new. Also some days with severe fatigue. No fevers/chills. No weight changes. No interval infections.     Past Medical History  Past Medical History:   Diagnosis Date     Bilateral low back pain with right-sided sciatica, unspecified chronicity 11/11/2020     Neuroforaminal stenosis of lumbar spine 11/11/2020     Nuclear senile cataract of both eyes 08/16/2018     Osteoarthritis 10/15/2020     Psoriatic arthropathy (H) 10/19/2020     Past Surgical History  Past Surgical History:   Procedure Laterality Date     BIOPSY  07/30/2019    the right breast tissue(benign)     COLONOSCOPY N/A 7/14/2022    Procedure: COLONOSCOPY;  Surgeon: Roderick Campos MD;  Location: [facility] OR     EYE SURGERY      3 surgeries for glaucoma treatment     Medications  Current Outpatient Medications   Medication     Apremilast (OTEZLA) 10 & 20 & 30 MG TBPK     apremilast (OTEZLA) 30 MG tablet     CALCIUM CARBONATE-VIT D-MIN PO     celecoxib (CELEBREX) 100 MG capsule     celecoxib (CELEBREX) 100 MG capsule     clobetasol (TEMOVATE) 0.05 % external ointment     clobetasol (TEMOVATE) 0.05 % external solution     gabapentin  (NEURONTIN) 300 MG capsule     loratadine 10 MG capsule     Multiple Vitamins-Minerals (MULTIVITAMIN ADULTS 50+) TABS     predniSONE (DELTASONE) 2.5 MG tablet     tofacitinib (XELJANZ XR) 11 MG 24 hr tablet     No current facility-administered medications for this visit.     Allergies   Allergies   Allergen Reactions     Adhesive Tape Blisters, Itching and Swelling     Dust Mites      Latex Blisters, Itching, Other (See Comments) and Swelling     Skin sensitivity       Morphine Sulfate (Concentrate) Itching     Other reaction(s): Chills  heart palpatations     Seasonal Allergies      Codeine Palpitations     chills     Family History:   No family history of autoimmune diseases.     Social History  , with children. 2 kids and they are healthy. No ETOH, smoking, drug use.      Objective:    Physical exam:  LMP  (LMP Unknown)   Sitting up unassisted no acute distress pleasant as always  No facial rash sclera clear  Able to make a full fist bilaterally.  No DIP PIP or MCP erythema or edema.  No loss of MCP valleys.  She denies any tenderness to self palpation over her MCPs PIPs or DIPs.  No psoriatic lesions identified by video exam today which is significant Carlos improved from her prior exams.  No digital tip peeling.  No fingernail pitting cracking or fissuring today.    WBC   Date Value Ref Range Status   06/11/2021 9.4 4.0 - 11.0 10e9/L Final     WBC Count   Date Value Ref Range Status   02/16/2023 6.4 4.0 - 11.0 10e3/uL Final     Hemoglobin   Date Value Ref Range Status   02/16/2023 12.4 11.7 - 15.7 g/dL Final   06/11/2021 12.9 11.7 - 15.7 g/dL Final     Platelet Count   Date Value Ref Range Status   02/16/2023 362 150 - 450 10e3/uL Final   06/11/2021 369 150 - 450 10e9/L Final     Creatinine   Date Value Ref Range Status   02/16/2023 0.61 0.51 - 0.95 mg/dL Final   06/11/2021 0.63 0.52 - 1.04 mg/dL Final     Lab Results   Component Value Date    ALKPHOS 64 02/16/2023    ALKPHOS 76 06/11/2021     AST   Date  Value Ref Range Status   02/16/2023 31 10 - 35 U/L Final   06/11/2021 14 0 - 45 U/L Final     Lab Results   Component Value Date    ALT 16 02/16/2023    ALT 19 06/11/2021     Sed Rate   Date Value Ref Range Status   06/16/2021 15 0 - 30 mm/h Final     Erythrocyte Sedimentation Rate   Date Value Ref Range Status   02/16/2023 8 0 - 30 mm/hr Final     CRP Inflammation   Date Value Ref Range Status   02/01/2022 <2.9 0.0 - 8.0 mg/L Final   06/16/2021 5.2 0.0 - 8.0 mg/L Final     UA RESULTS:  No results for input(s): COLOR, APPEARANCE, URINEGLC, URINEBILI, URINEKETONE, SG, UBLD, URINEPH, PROTEIN, UROBILINOGEN, NITRITE, LEUKEST, RBCU, WBCU in the last [id number] hours.   Rheumatoid Factor   Date Value Ref Range Status   02/05/2021 <7 <12 IU/mL Final     6/16/21  SSA, SSB negative  Mumps IgG and IgM positive  Creatinine 0.63  LFTs normal  CBC WNL    2/5/21  HIV negative  Hep C AB negative  Hep B s AG negative  Hep B c AB reactive  Quant gold negative  Quant gold negative  RF negative  CRP <2.9  ESR 10  Cr 0.62  WBC 7.1  HGB 13.1    HLA b27 negative  Hep B virus DNA negative    2/2/21  ESR 9  CRP <2.9  WBC 6.1  HGB 12.8      11/11/20  A1C 5.3  Hep C/ HIV negative    Imaging:  MR left hand  Impression:  1a. Correlating to the marker at the third digit interphalangeal  joint, there is a focal erosion along the volar aspect of the distal  phalangeal base with intense bone marrow edema and abnormal T1 marrow  signal. Findings are concerning for osteomyelitis and infection should  be ruled out. Also on the differential is sequelae of long-standing  active inflammatory arthropathy (including psoriatic arthritis given  patient's clinical history) where marginal erosions and marrow signal  abnormality are expected though the degree of T1 marrow replacement is  out of proportion to typical findings.   b. Subtle bone marrow edema in the middle phalanx without T1 marrow  signal abnormality, this is favored to represent  reactive edema.  c. Joint effusion at the third digit interphalangeal joint,  potentially infectious or inflammatory. Consider aspiration for  definitive diagnosis.  d. The partially visualized fifth digit shows edema within the distal  phalanx and head of the middle phalanx with regional soft tissue  edema. Given predominant findings in the third digit, similar changes  in the fifth digit could support a polyarticular inflammatory  arthropathy. Correlate clinically.  2. Tenosynovitis of the third digit flexor and extensor tendons. There  is also tendinosis with high-grade (near full-thickness) tearing of  the extensor tendon/extensor saul at the distal phalangeal base with  slight proximal retraction of the residual fibers.  3. Diffusely thickened, edematous ulnar and radial collateral  ligaments, likely combination of sprain and reactive edema.  4. Extensive soft tissue edema centered about the interphalangeal  joint.

## 2023-02-10 NOTE — NURSING NOTE
Patient denies any changes since echeck-in regarding medication and allergies and states all information entered during echeck-in remains accurate.    Is the patient currently in the state of [place]? YES    Visit mode:VIDEO    If the visit is dropped, the patient can be reconnected by: VIDEO VISIT: Text to cell phone: [phone]    Will anyone else be joining the visit? NO      How would you like to obtain your AVS? MyChart    Are changes needed to the allergy or medication list? NO    Comments or concerns regarding today's visit: NONE

## 2023-02-16 ENCOUNTER — LAB (OUTPATIENT)
Dept: LAB | Facility: CLINIC | Age: 63
End: 2023-02-16
Payer: COMMERCIAL

## 2023-02-16 DIAGNOSIS — Z79.899 HIGH RISK MEDICATION USE: ICD-10-CM

## 2023-02-16 DIAGNOSIS — L40.50 PSORIATIC ARTHRITIS (H): ICD-10-CM

## 2023-02-16 LAB
ALBUMIN SERPL BCG-MCNC: 4.7 G/DL (ref 3.5–5.2)
ALP SERPL-CCNC: 64 U/L (ref 35–104)
ALT SERPL W P-5'-P-CCNC: 16 U/L (ref 10–35)
ANION GAP SERPL CALCULATED.3IONS-SCNC: 13 MMOL/L (ref 7–15)
AST SERPL W P-5'-P-CCNC: 31 U/L (ref 10–35)
BASOPHILS # BLD AUTO: 0.1 10E3/UL (ref 0–0.2)
BASOPHILS NFR BLD AUTO: 1 %
BILIRUB SERPL-MCNC: 0.4 MG/DL
BUN SERPL-MCNC: 13.3 MG/DL (ref 8–23)
CALCIUM SERPL-MCNC: 10.3 MG/DL (ref 8.8–10.2)
CHLORIDE SERPL-SCNC: 104 MMOL/L (ref 98–107)
CREAT SERPL-MCNC: 0.61 MG/DL (ref 0.51–0.95)
CRP SERPL-MCNC: <3 MG/L
DEPRECATED HCO3 PLAS-SCNC: 24 MMOL/L (ref 22–29)
EOSINOPHIL # BLD AUTO: 0.2 10E3/UL (ref 0–0.7)
EOSINOPHIL NFR BLD AUTO: 3 %
ERYTHROCYTE [DISTWIDTH] IN BLOOD BY AUTOMATED COUNT: 13.1 % (ref 10–15)
ERYTHROCYTE [SEDIMENTATION RATE] IN BLOOD BY WESTERGREN METHOD: 8 MM/HR (ref 0–30)
GFR SERPL CREATININE-BSD FRML MDRD: >90 ML/MIN/1.73M2
GLUCOSE SERPL-MCNC: 92 MG/DL (ref 70–99)
HCT VFR BLD AUTO: 37.2 % (ref 35–47)
HGB BLD-MCNC: 12.4 G/DL (ref 11.7–15.7)
IMM GRANULOCYTES # BLD: 0 10E3/UL
IMM GRANULOCYTES NFR BLD: 1 %
LYMPHOCYTES # BLD AUTO: 2.2 10E3/UL (ref 0.8–5.3)
LYMPHOCYTES NFR BLD AUTO: 35 %
MCH RBC QN AUTO: 29.5 PG (ref 26.5–33)
MCHC RBC AUTO-ENTMCNC: 33.3 G/DL (ref 31.5–36.5)
MCV RBC AUTO: 89 FL (ref 78–100)
MONOCYTES # BLD AUTO: 0.6 10E3/UL (ref 0–1.3)
MONOCYTES NFR BLD AUTO: 9 %
NEUTROPHILS # BLD AUTO: 3.4 10E3/UL (ref 1.6–8.3)
NEUTROPHILS NFR BLD AUTO: 53 %
PLATELET # BLD AUTO: 362 10E3/UL (ref 150–450)
POTASSIUM SERPL-SCNC: 4.1 MMOL/L (ref 3.4–5.3)
PROT SERPL-MCNC: 7.5 G/DL (ref 6.4–8.3)
RBC # BLD AUTO: 4.2 10E6/UL (ref 3.8–5.2)
SODIUM SERPL-SCNC: 141 MMOL/L (ref 136–145)
WBC # BLD AUTO: 6.4 10E3/UL (ref 4–11)

## 2023-02-16 PROCEDURE — 86140 C-REACTIVE PROTEIN: CPT

## 2023-02-16 PROCEDURE — 80053 COMPREHEN METABOLIC PANEL: CPT

## 2023-02-16 PROCEDURE — 85652 RBC SED RATE AUTOMATED: CPT

## 2023-02-16 PROCEDURE — 36415 COLL VENOUS BLD VENIPUNCTURE: CPT

## 2023-02-16 PROCEDURE — 85025 COMPLETE CBC W/AUTO DIFF WBC: CPT

## 2023-03-15 NOTE — TELEPHONE ENCOUNTER
The only issue with the plan is once she runs out of the bridge program she cannot sign up for free drug. Xeljanz free drug no longer approves patients enrolled in commercial insurance for any reason.    I will try to submit the external appeal as well.

## 2023-04-10 ENCOUNTER — TELEPHONE (OUTPATIENT)
Dept: RHEUMATOLOGY | Facility: CLINIC | Age: 63
End: 2023-04-10
Payer: COMMERCIAL

## 2023-04-10 NOTE — TELEPHONE ENCOUNTER
Prior Authorization Specialty Medication Request    Medication/Dose: tofacitinib (XELJANZ XR) 11 MG 24 hr tablet  ICD code (if different than what is on RX):    Previously Tried and Failed:      Important Lab Values:   Rationale:     Insurance Name:   Insurance ID:   Insurance Phone Number:     Pharmacy Information (if different than what is on RX)  Name:    Phone:

## 2023-04-11 NOTE — TELEPHONE ENCOUNTER
The bridge program is not available in [place], we also cannot sign up for free drug because they no accept any commercially insured patients.     I have submitted the external appeal (second level appeal) & will wait for the response. That is the last option.

## 2023-04-14 ENCOUNTER — MYC MEDICAL ADVICE (OUTPATIENT)
Dept: RHEUMATOLOGY | Facility: CLINIC | Age: 63
End: 2023-04-14
Payer: COMMERCIAL

## 2023-04-14 DIAGNOSIS — L40.50 PSORIATIC ARTHRITIS (H): Primary | ICD-10-CM

## 2023-04-14 NOTE — TELEPHONE ENCOUNTER
Patient updated an appeal has been submitted for the tofacitinib (Xeljanz) per support specialty team.    Patient is requesting alternative options during the interim.  MTM referral placed and routing to provider for update.    Judith Bush RN  Rheumatology Clinic

## 2023-04-18 ENCOUNTER — VIRTUAL VISIT (OUTPATIENT)
Dept: PHARMACY | Facility: CLINIC | Age: 63
End: 2023-04-18
Attending: INTERNAL MEDICINE
Payer: COMMERCIAL

## 2023-04-18 DIAGNOSIS — L40.9 PSORIASIS: ICD-10-CM

## 2023-04-18 DIAGNOSIS — M79.2 NERVE PAIN: ICD-10-CM

## 2023-04-18 DIAGNOSIS — L40.50 PSORIATIC ARTHRITIS (H): Primary | ICD-10-CM

## 2023-04-18 DIAGNOSIS — J30.2 SEASONAL ALLERGIC RHINITIS, UNSPECIFIED TRIGGER: ICD-10-CM

## 2023-04-18 DIAGNOSIS — Z78.9 TAKES DIETARY SUPPLEMENTS: ICD-10-CM

## 2023-04-18 PROCEDURE — 99607 MTMS BY PHARM ADDL 15 MIN: CPT

## 2023-04-18 PROCEDURE — 99605 MTMS BY PHARM NP 15 MIN: CPT

## 2023-04-18 RX ORDER — PREDNISONE 2.5 MG/1
TABLET ORAL
Qty: 70 TABLET | Refills: 2 | Status: SHIPPED | OUTPATIENT
Start: 2023-04-18 | End: 2023-11-22

## 2023-04-18 NOTE — TELEPHONE ENCOUNTER
December 18, 2018      Cate Galeas MD  [address]  [facility] [address]  [facility] [address]           [facility]  [address]  [facility] [address]  Phone: [phone]          Patient: Bri Santiago   MR Number: [id number]   YOB: 1960   Date of Visit: 12/18/2018       Dear Dr. Cate Galeas:    Thank you for referring Bri Santiago to me for evaluation. Attached you will find relevant portions of my assessment and plan of care.    If you have questions, please do not hesitate to call me. I look forward to following Bri Santiago along with you.    Sincerely,    Geeta Hall MD    Enclosure  CC:  No Recipients    If you would like to receive this communication electronically, please contact [email or web address] or [phone] to request more information on [facility] access.    For providers and/or their staff who would like to refer a patient to [facility], please contact us through our one-stop-shop provider referral line, [facility], at [phone].    If you feel you have received this communication in error or would no longer like to receive these types of communications, please e-mail [email or web address]          MEDICATION APPEAL APPROVED    Medication: Xeljanz renewal denied, Appeal approved  Authorization Effective Date: 4/17/2023  Authorization Expiration Date: 4/17/2024  Approved Dose/Quantity: 30 tabs per 30 days  Reference #: [id number]   Insurance Company: [employer] - Phone [phone] Fax [phone]  Expected CoPay:       CoPay Card Available:      Foundation Assistance Needed:    Which Pharmacy is filling the prescription (Not needed for infusion/clinic administered): [facility] - [place], [place] - [address]        Faxed approval to pharmacy.

## 2023-04-18 NOTE — PROGRESS NOTES
Medication Therapy Management (MTM) Encounter    ASSESSMENT:                            Medication Adherence/Access: See below for considerations    Psoriatic Arthritis/Psoriasis: Otezla monotherapy not adequately controlling symptoms. Would benefit from starting prednisone burst to minimize flare symptoms while waiting for Xeljanz external appeal decision. Recommend 10 mg daily x 7 days, 7.5 mg daily x 7 days, 5 mg daily x 7 days, then 2.5 mg daily until appeal decision is known. Reviewed alternate medication options if Xeljanz is denied including Rinvoq, Tremfya, or Skyrizi. Discussed Rinvoq would be most similar to Xeljanz due to both being TERESA inhibitors, and may have a patient assistance program she would qualify for if insurance also denies this option. If Xeljanz external appeal is denied, would benefit from pursuing coverage for Rinvoq 15 mg daily.    Nerve Pain: Stable.    Allergic Rhinitis: Stable.    Supplements: Stable    PLAN:                            1. Start prednisone 10 mg daily x 7 days, 7.5 mg daily x 7 days, 5 mg daily x 7 days, then 2.5 mg daily until our office contacts you.    2. We are waiting on the external appeal for Xeljanz to be completed. If this is denied, we can consider Rinvoq.    -- Xeljanz external appeal approved 4/18/23 after MTM visit. Patient will continue Otezla + Xeljanz therapy. Plan to continue prednisone taper as prescribed and discontinue after taking 2.5 mg daily x 7 days to help manage current flare.    Follow-up: Return in about 3 months (around 7/18/2023) for MTM Pharmacist Visit.    SUBJECTIVE/OBJECTIVE:                          Willa Downey is a 62 year old female called for an initial visit. She was referred to me from Marc Arias MD.      Reason for visit: Xeljanz continues to be denied by insurance and psoriasis symptoms are returning    Allergies/ADRs: Reviewed in chart  Past Medical History: Reviewed in chart  Tobacco: She reports that she has never  smoked. She has never used smokeless tobacco.  Alcohol: not currently using    Medication Adherence/Access: Medication barriers: obtaining medication from insurance. Patient has been trying to get Xeljanz covered through insurance for several months. Has had initial PA and appeal denied, cannot apply for patient assistance due to type of insurance, and bridge program not offered in [place]. Currently has a pending external appeal but unclear if this will be approved or not.      Psoriatic Arthritis/Psoriasis: Currently taking Otezla 30 mg twice daily, Celebrex 100 mg twice daily, clobetasol solution daily to affected areas. Has been unable to get Xeljanz covered - see above. Reports she is currently having a flare up of her psoriasis on her fingers, inside of ankles, palms, and back of her right hand. Notes this started appearing on 4/10. Her scalp is also very itchy - unsure if this is psoriasis as well. Using clobetasol on this which is helping some but it is still bothersome. Reports her hands are becoming progressively more stiff and sore since running out of Xeljanz on 4/3. Has been using the Celebrex but this is only taking the edge off the pain. Combination of Xeljanz and Otezla has been the only treatment combination to work for her symptoms. Did try Xeljanz alone and continually had flares until Otezla was added last July. After combination was started she rarely has flares - maybe one minor flare every 3-6 months.    Liver Function Studies - Recent Labs   Lab Test 02/16/23  1051   PROTTOTAL 7.5   ALBUMIN 4.7   BILITOTAL 0.4   ALKPHOS 64   AST 31   ALT 16     CBC RESULTS: Recent Labs   Lab Test 02/16/23  1051   WBC 6.4   RBC 4.20   HGB 12.4   HCT 37.2   MCV 89   MCH 29.5   MCHC 33.3   RDW 13.1        Nerve Pain: Currently taking gabapentin 300 mg twice daily. Reports this seems to be helping with her pain well - is able to go on walks and complete daily activities without bothersome pain. Notes she does  feel more drowsy if she takes it three times daily as prescribed. Otherwise no side effects.    Creatinine   Date Value Ref Range Status   02/16/2023 0.61 0.51 - 0.95 mg/dL Final   06/11/2021 0.63 0.52 - 1.04 mg/dL Final     Allergic Rhinitis: Current medications include loratadine 10 mg once daily as needed. Patient feels that current therapy is effective. No side effects or concerns noted.    Supplements: Currently taking multivitamin daily, Citracal petites 400 mg daily. No reported issues at this time.    Today's Vitals: LMP  (LMP Unknown)   ----------------    I spent 50 minutes with this patient today. All changes were made via collaborative practice agreement with Marc Arias. A copy of the visit note was provided to the patient's provider(s).    A summary of these recommendations was sent via [employer].    Иван KhanD  Medication Therapy Management Pharmacist  [facility]  Phone: [phone]    Telemedicine Visit Details  Type of service:  Telephone visit  Start Time: 9:30 AM  End Time: 10:20 AM     Medication Therapy Recommendations  Psoriatic arthritis (H)    Current Medication: apremilast (OTEZLA) 30 MG tablet   Rationale: Synergistic therapy - Needs additional medication therapy - Indication   Recommendation: Start Medication - predniSONE 5 MG tablet - 10 mg daily x 7 days, 7.5 mg daily x 7 days, 5 mg daily x 7 days, then 2.5 mg daily x 7 days   Status: Accepted per CPA          Current Medication: tofacitinib (XELJANZ XR) 11 MG 24 hr tablet   Rationale: Cannot afford medication product - Cost - Adherence   Recommendation: Change Medication - Rinvoq 15 MG Tb24 - Daily   Status: No Longer Relevant

## 2023-04-19 ENCOUNTER — TELEPHONE (OUTPATIENT)
Dept: RHEUMATOLOGY | Facility: CLINIC | Age: 63
End: 2023-04-19

## 2023-04-28 ENCOUNTER — VIRTUAL VISIT (OUTPATIENT)
Dept: RHEUMATOLOGY | Facility: CLINIC | Age: 63
End: 2023-04-28
Attending: INTERNAL MEDICINE
Payer: COMMERCIAL

## 2023-04-28 DIAGNOSIS — Z79.899 HIGH RISK MEDICATION USE: ICD-10-CM

## 2023-04-28 DIAGNOSIS — L40.50 PSORIATIC ARTHRITIS (H): Primary | ICD-10-CM

## 2023-04-28 PROCEDURE — 99214 OFFICE O/P EST MOD 30 MIN: CPT | Mod: VID | Performed by: INTERNAL MEDICINE

## 2023-04-28 RX ORDER — CELECOXIB 100 MG/1
100 CAPSULE ORAL 2 TIMES DAILY
Qty: 180 CAPSULE | Refills: 1 | Status: SHIPPED | OUTPATIENT
Start: 2023-04-28 | End: 2023-10-25

## 2023-04-28 NOTE — LETTER
4/28/2023       RE: Willa Downey  [address]  [facility] [id number]     Dear Colleague,    Thank you for referring your patient, Willa Downey, to the [facility]HE[facility] [place] at [facility]. Please see a copy of my visit note below.    Virtual Visit Details  Type of service:  Video Visit   Originating Location (pt. Location):  Home    Distant Location (provider location):  Off site  Platform used for Video Visit: Kellen  Joined the call at 4/28/2023, 11:10:03 am.  Left the call at 4/28/2023, 11:39:56 am.  You were on the call for 29 minutes 52 seconds .  Marc Arias MD  Rheumatology      Outpatient Rheumatology Follow-up    Name: Willa Downey    MRN [id number]   Today's date: 4/28/2023  Date of last visit 2/10/2023           Reason for follow-up: psoriatic arthritis   Requesting physician: Kitty Mendez MD        Assessment & Plan:   #Psoriatic arthritis  #negative RF  #psoriasis, cutaneous and nail involvement  #DIP Left hand 3rd, 4th, 5th and right hand 4th digit inflammatory arthritis  #humira, enbrel, methotrexate failure  #Submandibular LAD- resolved  #High risk drug therapy: xeljanz and Otezla    62 year old female with hx of advanced glaucoma followed closely by ophthalmology presented to rheumatology on 2/5/21 with 5 month of progressive left hand 3rd, 4th, 5th digit and right hand 4th digit DIP inflammatory arthritis in the context of psoriasis, psoriatic changes of the nails. Taken together most consistent with psoriatic arthritis. Tried on 2 NSAIDs which failed to control symptoms, and had severe GERD symptoms. Tried methotrexate by prior rheumatologist and disease not controlled. Humira started Mid February 2021 due to lack of efficacy of these other therapies and continued through May 14th, though had severe/ rapidly progressive disease after some improvement in the first 4 weeks involving DIP  inflammatory arthritis and nail disease. We then ordered Enbrel, though starting was delayed due to development of impressive bilateral submandibular lymphadenopathy which resolved within about 1 week and thought likely secondary to viral infection. She started enbrel on 6/20/2021 which she had continued on until switching to cosentyx after approval on 8/31/21 due to lack of efficacy of enbrel and injection site reaction. Had been on cosentyx from 8/31/21 with initial improvement in inflammatory DIP arthritis, psoriatic lesions, stabilization of her nail disease though she then had progression of her cutaneous nail and joint inflammatory process which prompted restarting prednisone at 10 mg daily along with doubling her dose of Cosentyx to 300 mg each month.  She took her first dose of 300 mg on 11/23/2021.  She did try to decrease her prednisone shortly after that down to 7.5 though noticed return of her skin peeling/psoriatic skin lesions worsening and so went back up to 10 mg daily which has again improved these symptoms. She was again unable to taper from prednisone below 10mg daily without progression of both cutaneous and articular symptoms and so cosentyx was discontinued and xeljanz 11mg once daily started after insurance approval on 12/29/21. With each attempt to taper her steroids, had difficultly due to return of psoriasis so Willa was seen by Dermatology and started on otezla in addition to xeljanz. She returns today for follow-up.    Psoriatic Arthritis: Ms Downey continues on Xeljanz and Otezla.  She had been off of Xeljanz since April 3 due to insurance issues and immediately noticed worsening/active symptoms requiring restarting systemic prednisone which was extremely difficult to taper in the past due to rebound phenomenon which is well described with cutaneous psoriasis.  Her Xeljanz was just approved on Tuesday and she started taking it.  She was started on a prednisone taper on 4/18/2023 and  just today started noticing improvement.  Her prednisone taper started at 10 mg and she is decreasing by 2.5 mg every 7 days.  She is now back on Xeljanz and Otezla and we are both hopeful that this combination again will allow her to taper completely off of systemic steroids.  Hopeful that the rebound phenomenon will be less given this combination which was previously very helpful in inducing and maintaining remission of her inflammatory arthritis/tenosynovitis symptoms along with her cutaneous manifestations of her psoriatic arthritis.  -Continue otezla BID  -Continue xeljanz 11mg once daily  -Conitnue to prednisone taper by 2.5 every 7 days. If flares with taper, will go back up to dose that was working and again 3 days later will try again to down a gain. If unable will let me know so that we can slow the taper so that instead of going down to 2.5 mg every 7 days we may need to decrease by 1 mg.  We will need to provide additional 1 mg tablets so that she can do this.    High risk medication: Xeljanz and otezla  -No side effects by history/exam/available serologies to include CBC and CMP from 2/16/2023 without evidence of toxicity  -Risks and benefits again discussed today of both therapies to include infection, BM suppression, Rash, HA, GI/hepatotoxicity, malignancy, DVT/PE, all cause mortality among others. Patient agreeable to continue  -Labs q6 months. Next due in August 2023.     Long term NSAID use: celebrex  -Labs q6 months for monitoring. Standing orders in place. Reviewed labs from 2/16/2023 to include CBC and CMP without evidence of toxicity    Follow-up with me in August.  Labs due again at that time as well.    Marc Arias MD  Rheumatology   Subjective:   April 28, 2023   -last dose of xeljanz was on April 3rd  -One week later, all psoriasis symptoms returned. On fingertips/worsening joints/palms/ankles/scalp/back of palms.   -Continued on otezla  -Started on 10mg of prednisone on 4/18, took for  7 days, now on 7.5mg on day 4.   -xeljanz approved. Had delivered on Tuesday.  -starting today, after taking prednisone since 4/18, prednisone is helping. First day of improvement.   -still fatigue/lack of energy.   -No fevers chills night sweats.  No unintentional weight loss.  - No interval infections  - 14 point review of systems collected and otherwise negative    2/10/2023  - Continues on Otezla and Xeljanz without noticeable side effects.  - This combination has allowed for complete resolution of her cutaneous psoriasis  - She denies red hot or swollen joints.  Still some intermittent pain and discomfort in her DIPs though significantly improved.  - Also some pain discomfort in her CMC's secondary to known noninflammatory osteoarthritis  - Her fingernails are improving and look healthy on her hands with minimal digital pitting fissures and breaking  - No fevers chills night sweats  - No unintentional weight loss    September 16, 2022  Has started on otezla and without ongoing side effects with exception of new evening HA. She thinks it may be 5 or so days per week. Mild. During the day she does not notice anything. Noticing this after dinner/watching TV. Slight. Not to the point she needs to take anything. Has noticed that her psoriasis on her fingertips and scalp have improved. Still some sensitivity on her fingertips, but much better. Has been able to taper down to 1mg of prednisone. Has been on this dose for 6 days. Articular symptoms have been stable/at baseline. No obvious red/hot/swollen joints. No new areas of joint pain/involvement. No new side effects from xeljanz. Tolerating low dose prednisone without noticeable side effects. No interval infections. No fevers/chills/night sweats. Family member with COVID so has been isolating. Has continued to walk for exercise, as helps with overall symptoms, though has been experiencing pain in ball of foot/greater MTP. Low suspicion for gout, though will assess  "for other pathology.  14 point ROS collected and negative if not documented above.     Interval history 3/25/2022  Currently on 3mg of prednisone daily. Still with pink/ reddish spot on her scalp and left digits at tips of digits, though much improved. Has been tolerating her prednisone and xeljanz without issues. She did by accident take an increased dose of prednisone up to 10mg for 2 days when she was to reduce from 4 to 3mg due to having a different bottle of 2.5mg tabs (instead of the 1mg tabs she was taking). Her pain in the AM is 6/10. Takes her celebrex in the AM with pain resolution. No GI side effects from her celebrex. No fevers/chills/night sweats. She has noticed slight increase in the lesions of her digits since decreasing her prednisone down to 3mg for 5 days. Reports feeling well and that this is the best she has felt with DMARD therapy and being able to reduce her dose. No interval infections. ROS otherwise negative.     Interval history 1/28/22  Was already on prednisone 10mg and then xeljanz was added on Jan 3rd. Had complete resolution of psoriasis on scalp/inner ear. Hands also completely clear at that time. She then decreased to 7.5mg on 1/17 and for the first week was doing \"okay\", then this week noticed some pits on fingertips, some skin peeling. Yesterday and today has noticed some more skin peeling. Some more scalp irriation 3 days ago as well. She feels that since starting xeljanz this is the best improvement that she has found. Continues to take celebrex daily due to pain in joints without it. With celebrex, pain is resolved. If she does lots of activity (cleaning etc) will have some pain. No GI upset as she had with mobic. Not taking PPI. She has noticed some more fatigue in the AM and also more so in the afternoon. Uses clobetasol ointment and cream. Also topical for scalp. Uses them all at night. Using 0.05% clobetasol. Has continued to experience rare intermittent tender lymph nodes. " "Had last booster on August 21st. No interval infections. No fevers, chills, weight changes. Remains active. No noticeable side effects since starting xeljanz. ROS otherwise negative.     12/2/2021 interval history  Took her first dose of Cosentyx 200 mg daily on 11/23/2021.  Around that time tried to decrease her prednisone from 10 mg daily down to 7.5 mg daily though written to discuss she had return of joint pain, joint swelling and stiffness particularly of her DIPs, worsening of her nail disease and psoriatic lesions on the tips of her fingers.  Fatigue also more significant.  As a result she went back up to 10 mg daily and this improved some.  She is not been taking any NSAIDs since her last appointment despite her joint pain as the meloxicam previously prescribed it was giving her reflux/gastritis symptoms.  No injection site reaction from her Cosentyx.  No fevers or chills.  No weight changes.  Tolerating prednisone without issues.  Review of systems otherwise negative.    9/30/21 Interval history  Has been taking cosentyx without issues. Next dose on Friday and then switching to monthly. Has found that her skin is some improved over the fingertips. Though still some painful/burning sensation at the sites of prior lesions. This is improving. When she started prednisone, did have dramatic improvement. Though with taper improvement has been slower though has continued. Currently on 2.5mg daily. No side effects from low dose. Energy still \"not great\" though she thinks it is slowly improving. Going for walks 5 days per week 30-40 minutes per day. Reports that the pain is OK when doing activities, but has been avoiding activities that are painful like weeding/carrying heavy items with grocery shopping. Has continued with mobic 3-4x week. 15mg tabs each time. No GI side effects at this frequency/dose. Still some stiff at DIPs. Though improved from prior. Did have lesion mid upper back and front groin which have " improved since cosentyx/ prednisone. No fevers/chills. No interval infections. ROS otherwise negative.     August 12, 2021  Interval history  Had ?ear infection early July. Given ear drops by PCP. Had developed more psoriasis in new locations (palm of hand/scalp (10-15%)/lower stomach/low back/ and groin/gluteal cleft) where previously was on fingertips/nails only. Saw dermatology who prescribed topical, the topical has been somewhat helpful though has not resolves symptoms. She thinks that she has had 10-15% improvement with the topical. Spoke to pharmacist, who counseled her than can take up to 4 months for enbrel to take full effect. Gives herself injection every Sunday. After 5th injection, has itching/burning at the site. Takes Claritin already. Iced. Was told by pharmacist to try hydrocortisone, which she did. Decreased from 4-5 days of symptoms down to 3 days. From 5th injection onwards has had this reaction. Total of 8 injections so far. Joints much less of an issue compared to cutaneous involvement. If she has any joint pain, takes mobic. Roughly 1x per week. Around once per 7 -10days has extreme fatigue. Yesterday was significant. Slept until noon. Usually gets up at 630. Usually trouble sleeping, so this was unusual for her. No return of submandibular LAD. No fever, chills. No cough/sore throat. Some stiffness in the DIPs, morning predominant. Improves throughout the day. Some increase in pain this week. ROS otherwise negative.      6/17/21  Has advanced glaucoma/ dry eyes. Chronic. No new head/neck symptoms. Dry mouth during submandibular gland swelling, dry mouth now resolved. Reports the swelling has mproved now by 50+%. No more pain, so no longer using ibuprofen. No fever/chills/cough or other signs/symptoms of infection. With ongoing progression of nail/distal digit predominant psoriasis and DIP arthritis. Severe pain. Unable to tolerate NSAIDs given GI upset despite PPI. ROS otherwise negative        5/14/21  Patient initially was going to wait to contact, though has been having progression of symptoms of nails/joint pain/ skin. Last injection was on Tuesday. Had a flare in the days following her injection. First 2 doses, she did notice some improvement. Though after that time, with each dose, has had less and less of a positive change in nails/joints/skin. Has instead been getting worse and worse response. Has stiffness and pain in her DIPs that has been progressive. Has redness as well. Also with radiation of the pain proximally which is new. Also some days with severe fatigue. No fevers/chills. No weight changes. No interval infections.     Past Medical History  Past Medical History:   Diagnosis Date    Bilateral low back pain with right-sided sciatica, unspecified chronicity 11/11/2020    Neuroforaminal stenosis of lumbar spine 11/11/2020    Nuclear senile cataract of both eyes 08/16/2018    Osteoarthritis 10/15/2020    Psoriatic arthropathy (H) 10/19/2020     Past Surgical History  Past Surgical History:   Procedure Laterality Date    BIOPSY  07/30/2019    the right breast tissue(benign)    COLONOSCOPY N/A 7/14/2022    Procedure: COLONOSCOPY;  Surgeon: Roderick Campos MD;  Location: [facility] OR    EYE SURGERY      3 surgeries for glaucoma treatment     Medications  Current Outpatient Medications   Medication    apremilast (OTEZLA) 30 MG tablet    CALCIUM CARBONATE-VIT D-MIN PO    celecoxib (CELEBREX) 100 MG capsule    clobetasol (TEMOVATE) 0.05 % external solution    gabapentin (NEURONTIN) 300 MG capsule    loratadine 10 MG capsule    Multiple Vitamins-Minerals (MULTIVITAMIN ADULTS 50+) TABS    predniSONE (DELTASONE) 2.5 MG tablet    tofacitinib (XELJANZ XR) 11 MG 24 hr tablet    Apremilast (OTEZLA) 10 & 20 & 30 MG TBPK    celecoxib (CELEBREX) 100 MG capsule    clobetasol (TEMOVATE) 0.05 % external ointment     No current facility-administered medications for this visit.     Allergies    Allergies   Allergen Reactions    Adhesive Tape Blisters, Itching and Swelling    Dust Mites     Latex Blisters, Itching, Other (See Comments) and Swelling     Skin sensitivity      Morphine Sulfate (Concentrate) Itching     Other reaction(s): Chills  heart palpatations    Seasonal Allergies     Codeine Palpitations     chills     Family History:   No family history of autoimmune diseases.     Social History  , with children. 2 kids and they are healthy. No ETOH, smoking, drug use.      Objective:    Physical exam:  LMP  (LMP Unknown)   Redness overlying multiple DIP joints.  Also cracking and psoriatic lesions overlying the digital fingertips and DIPs.  Also on her scalp and back of the neck.  Her ankles are swollen.  Back of the hands involved as well.  Breathing comfortably no use of accessory muscles no cough.  No audible wheeze.  Sclera appear clear no facial rash.    WBC   Date Value Ref Range Status   06/11/2021 9.4 4.0 - 11.0 10e9/L Final     WBC Count   Date Value Ref Range Status   02/16/2023 6.4 4.0 - 11.0 10e3/uL Final     Hemoglobin   Date Value Ref Range Status   02/16/2023 12.4 11.7 - 15.7 g/dL Final   06/11/2021 12.9 11.7 - 15.7 g/dL Final     Platelet Count   Date Value Ref Range Status   02/16/2023 362 150 - 450 10e3/uL Final   06/11/2021 369 150 - 450 10e9/L Final     Creatinine   Date Value Ref Range Status   02/16/2023 0.61 0.51 - 0.95 mg/dL Final   06/11/2021 0.63 0.52 - 1.04 mg/dL Final     Lab Results   Component Value Date    ALKPHOS 64 02/16/2023    ALKPHOS 76 06/11/2021     AST   Date Value Ref Range Status   02/16/2023 31 10 - 35 U/L Final   06/11/2021 14 0 - 45 U/L Final     Lab Results   Component Value Date    ALT 16 02/16/2023    ALT 19 06/11/2021     Sed Rate   Date Value Ref Range Status   06/16/2021 15 0 - 30 mm/h Final     Erythrocyte Sedimentation Rate   Date Value Ref Range Status   02/16/2023 8 0 - 30 mm/hr Final     CRP Inflammation   Date Value Ref Range Status    02/01/2022 <2.9 0.0 - 8.0 mg/L Final   06/16/2021 5.2 0.0 - 8.0 mg/L Final     UA RESULTS:  No results for input(s): COLOR, APPEARANCE, URINEGLC, URINEBILI, URINEKETONE, SG, UBLD, URINEPH, PROTEIN, UROBILINOGEN, NITRITE, LEUKEST, RBCU, WBCU in the last [id number] hours.   Rheumatoid Factor   Date Value Ref Range Status   02/05/2021 <7 <12 IU/mL Final     6/16/21  SSA, SSB negative  Mumps IgG and IgM positive  Creatinine 0.63  LFTs normal  CBC WNL    2/5/21  HIV negative  Hep C AB negative  Hep B s AG negative  Hep B c AB reactive  Quant gold negative  Quant gold negative  RF negative  CRP <2.9  ESR 10  Cr 0.62  WBC 7.1  HGB 13.1    HLA b27 negative  Hep B virus DNA negative    2/2/21  ESR 9  CRP <2.9  WBC 6.1  HGB 12.8      11/11/20  A1C 5.3  Hep C/ HIV negative    Imaging:  MR left hand  Impression:  1a. Correlating to the marker at the third digit interphalangeal  joint, there is a focal erosion along the volar aspect of the distal  phalangeal base with intense bone marrow edema and abnormal T1 marrow  signal. Findings are concerning for osteomyelitis and infection should  be ruled out. Also on the differential is sequelae of long-standing  active inflammatory arthropathy (including psoriatic arthritis given  patient's clinical history) where marginal erosions and marrow signal  abnormality are expected though the degree of T1 marrow replacement is  out of proportion to typical findings.   b. Subtle bone marrow edema in the middle phalanx without T1 marrow  signal abnormality, this is favored to represent reactive edema.  c. Joint effusion at the third digit interphalangeal joint,  potentially infectious or inflammatory. Consider aspiration for  definitive diagnosis.  d. The partially visualized fifth digit shows edema within the distal  phalanx and head of the middle phalanx with regional soft tissue  edema. Given predominant findings in the third digit, similar changes  in the fifth digit could  support a polyarticular inflammatory  arthropathy. Correlate clinically.  2. Tenosynovitis of the third digit flexor and extensor tendons. There  is also tendinosis with high-grade (near full-thickness) tearing of  the extensor tendon/extensor saul at the distal phalangeal base with  slight proximal retraction of the residual fibers.  3. Diffusely thickened, edematous ulnar and radial collateral  ligaments, likely combination of sprain and reactive edema.  4. Extensive soft tissue edema centered about the interphalangeal  joint.       Marc Arias MD

## 2023-04-28 NOTE — Clinical Note
Willa Thomas's otezla PA is due again in July. Should I re-order now to avoid lapse in coverage?   Thanks Marc

## 2023-04-28 NOTE — PROGRESS NOTES
Virtual Visit Details  Type of service:  Video Visit   Originating Location (pt. Location):  Home    Distant Location (provider location):  Off site  Platform used for Video Visit: Kellen  Joined the call at 4/28/2023, 11:10:03 am.  Left the call at 4/28/2023, 11:39:56 am.  You were on the call for 29 minutes 52 seconds .  Marc Arias MD  Rheumatology      Outpatient Rheumatology Follow-up    Name: Willa Downey    MRN [id number]   Today's date: 4/28/2023  Date of last visit 2/10/2023           Reason for follow-up: psoriatic arthritis   Requesting physician: Kitty Mendez MD        Assessment & Plan:   #Psoriatic arthritis  #negative RF  #psoriasis, cutaneous and nail involvement  #DIP Left hand 3rd, 4th, 5th and right hand 4th digit inflammatory arthritis  #humira, enbrel, methotrexate failure  #Submandibular LAD- resolved  #High risk drug therapy: xeljanz and Otezla    62 year old female with hx of advanced glaucoma followed closely by ophthalmology presented to rheumatology on 2/5/21 with 5 month of progressive left hand 3rd, 4th, 5th digit and right hand 4th digit DIP inflammatory arthritis in the context of psoriasis, psoriatic changes of the nails. Taken together most consistent with psoriatic arthritis. Tried on 2 NSAIDs which failed to control symptoms, and had severe GERD symptoms. Tried methotrexate by prior rheumatologist and disease not controlled. Humira started Mid February 2021 due to lack of efficacy of these other therapies and continued through May 14th, though had severe/ rapidly progressive disease after some improvement in the first 4 weeks involving DIP inflammatory arthritis and nail disease. We then ordered Enbrel, though starting was delayed due to development of impressive bilateral submandibular lymphadenopathy which resolved within about 1 week and thought likely secondary to viral infection. She started enbrel on 6/20/2021 which she had continued on until switching to  cosentyx after approval on 8/31/21 due to lack of efficacy of enbrel and injection site reaction. Had been on cosentyx from 8/31/21 with initial improvement in inflammatory DIP arthritis, psoriatic lesions, stabilization of her nail disease though she then had progression of her cutaneous nail and joint inflammatory process which prompted restarting prednisone at 10 mg daily along with doubling her dose of Cosentyx to 300 mg each month.  She took her first dose of 300 mg on 11/23/2021.  She did try to decrease her prednisone shortly after that down to 7.5 though noticed return of her skin peeling/psoriatic skin lesions worsening and so went back up to 10 mg daily which has again improved these symptoms. She was again unable to taper from prednisone below 10mg daily without progression of both cutaneous and articular symptoms and so cosentyx was discontinued and xeljanz 11mg once daily started after insurance approval on 12/29/21. With each attempt to taper her steroids, had difficultly due to return of psoriasis so Willa was seen by Dermatology and started on otezla in addition to xeljanz. She returns today for follow-up.    Psoriatic Arthritis: Ms Downey continues on Xeljanz and Otezla.  She had been off of Xeljanz since April 3 due to insurance issues and immediately noticed worsening/active symptoms requiring restarting systemic prednisone which was extremely difficult to taper in the past due to rebound phenomenon which is well described with cutaneous psoriasis.  Her Xeljanz was just approved on Tuesday and she started taking it.  She was started on a prednisone taper on 4/18/2023 and just today started noticing improvement.  Her prednisone taper started at 10 mg and she is decreasing by 2.5 mg every 7 days.  She is now back on Xeljanz and Otezla and we are both hopeful that this combination again will allow her to taper completely off of systemic steroids.  Hopeful that the rebound phenomenon will be less  given this combination which was previously very helpful in inducing and maintaining remission of her inflammatory arthritis/tenosynovitis symptoms along with her cutaneous manifestations of her psoriatic arthritis.  -Continue otezla BID  -Continue xeljanz 11mg once daily  -Conitnue to prednisone taper by 2.5 every 7 days. If flares with taper, will go back up to dose that was working and again 3 days later will try again to down a gain. If unable will let me know so that we can slow the taper so that instead of going down to 2.5 mg every 7 days we may need to decrease by 1 mg.  We will need to provide additional 1 mg tablets so that she can do this.    High risk medication: Xeljanz and otezla  -No side effects by history/exam/available serologies to include CBC and CMP from 2/16/2023 without evidence of toxicity  -Risks and benefits again discussed today of both therapies to include infection, BM suppression, Rash, HA, GI/hepatotoxicity, malignancy, DVT/PE, all cause mortality among others. Patient agreeable to continue  -Labs q6 months. Next due in August 2023.     Long term NSAID use: celebrex  -Labs q6 months for monitoring. Standing orders in place. Reviewed labs from 2/16/2023 to include CBC and CMP without evidence of toxicity    Follow-up with me in August.  Labs due again at that time as well.    Marc Arias MD  Rheumatology   Subjective:   April 28, 2023   -last dose of xeljanz was on April 3rd  -One week later, all psoriasis symptoms returned. On fingertips/worsening joints/palms/ankles/scalp/back of palms.   -Continued on otezla  -Started on 10mg of prednisone on 4/18, took for 7 days, now on 7.5mg on day 4.   -xeljanz approved. Had delivered on Tuesday.  -starting today, after taking prednisone since 4/18, prednisone is helping. First day of improvement.   -still fatigue/lack of energy.   -No fevers chills night sweats.  No unintentional weight loss.  - No interval infections  - 14 point review of  systems collected and otherwise negative    2/10/2023  - Continues on Otezla and Xeljanz without noticeable side effects.  - This combination has allowed for complete resolution of her cutaneous psoriasis  - She denies red hot or swollen joints.  Still some intermittent pain and discomfort in her DIPs though significantly improved.  - Also some pain discomfort in her CMC's secondary to known noninflammatory osteoarthritis  - Her fingernails are improving and look healthy on her hands with minimal digital pitting fissures and breaking  - No fevers chills night sweats  - No unintentional weight loss    September 16, 2022  Has started on otezla and without ongoing side effects with exception of new evening HA. She thinks it may be 5 or so days per week. Mild. During the day she does not notice anything. Noticing this after dinner/watching TV. Slight. Not to the point she needs to take anything. Has noticed that her psoriasis on her fingertips and scalp have improved. Still some sensitivity on her fingertips, but much better. Has been able to taper down to 1mg of prednisone. Has been on this dose for 6 days. Articular symptoms have been stable/at baseline. No obvious red/hot/swollen joints. No new areas of joint pain/involvement. No new side effects from xeljanz. Tolerating low dose prednisone without noticeable side effects. No interval infections. No fevers/chills/night sweats. Family member with COVID so has been isolating. Has continued to walk for exercise, as helps with overall symptoms, though has been experiencing pain in ball of foot/greater MTP. Low suspicion for gout, though will assess for other pathology.  14 point ROS collected and negative if not documented above.     Interval history 3/25/2022  Currently on 3mg of prednisone daily. Still with pink/ reddish spot on her scalp and left digits at tips of digits, though much improved. Has been tolerating her prednisone and xeljanz without issues. She did by  "accident take an increased dose of prednisone up to 10mg for 2 days when she was to reduce from 4 to 3mg due to having a different bottle of 2.5mg tabs (instead of the 1mg tabs she was taking). Her pain in the AM is 6/10. Takes her celebrex in the AM with pain resolution. No GI side effects from her celebrex. No fevers/chills/night sweats. She has noticed slight increase in the lesions of her digits since decreasing her prednisone down to 3mg for 5 days. Reports feeling well and that this is the best she has felt with DMARD therapy and being able to reduce her dose. No interval infections. ROS otherwise negative.     Interval history 1/28/22  Was already on prednisone 10mg and then xeljanz was added on Jan 3rd. Had complete resolution of psoriasis on scalp/inner ear. Hands also completely clear at that time. She then decreased to 7.5mg on 1/17 and for the first week was doing \"okay\", then this week noticed some pits on fingertips, some skin peeling. Yesterday and today has noticed some more skin peeling. Some more scalp irriation 3 days ago as well. She feels that since starting xeljanz this is the best improvement that she has found. Continues to take celebrex daily due to pain in joints without it. With celebrex, pain is resolved. If she does lots of activity (cleaning etc) will have some pain. No GI upset as she had with mobic. Not taking PPI. She has noticed some more fatigue in the AM and also more so in the afternoon. Uses clobetasol ointment and cream. Also topical for scalp. Uses them all at night. Using 0.05% clobetasol. Has continued to experience rare intermittent tender lymph nodes. Had last booster on August 21st. No interval infections. No fevers, chills, weight changes. Remains active. No noticeable side effects since starting xeljanz. ROS otherwise negative.     12/2/2021 interval history  Took her first dose of Cosentyx 200 mg daily on 11/23/2021.  Around that time tried to decrease her prednisone " "from 10 mg daily down to 7.5 mg daily though written to discuss she had return of joint pain, joint swelling and stiffness particularly of her DIPs, worsening of her nail disease and psoriatic lesions on the tips of her fingers.  Fatigue also more significant.  As a result she went back up to 10 mg daily and this improved some.  She is not been taking any NSAIDs since her last appointment despite her joint pain as the meloxicam previously prescribed it was giving her reflux/gastritis symptoms.  No injection site reaction from her Cosentyx.  No fevers or chills.  No weight changes.  Tolerating prednisone without issues.  Review of systems otherwise negative.    9/30/21 Interval history  Has been taking cosentyx without issues. Next dose on Friday and then switching to monthly. Has found that her skin is some improved over the fingertips. Though still some painful/burning sensation at the sites of prior lesions. This is improving. When she started prednisone, did have dramatic improvement. Though with taper improvement has been slower though has continued. Currently on 2.5mg daily. No side effects from low dose. Energy still \"not great\" though she thinks it is slowly improving. Going for walks 5 days per week 30-40 minutes per day. Reports that the pain is OK when doing activities, but has been avoiding activities that are painful like weeding/carrying heavy items with grocery shopping. Has continued with mobic 3-4x week. 15mg tabs each time. No GI side effects at this frequency/dose. Still some stiff at DIPs. Though improved from prior. Did have lesion mid upper back and front groin which have improved since cosentyx/ prednisone. No fevers/chills. No interval infections. ROS otherwise negative.     August 12, 2021  Interval history  Had ?ear infection early July. Given ear drops by PCP. Had developed more psoriasis in new locations (palm of hand/scalp (10-15%)/lower stomach/low back/ and groin/gluteal cleft) where " previously was on fingertips/nails only. Saw dermatology who prescribed topical, the topical has been somewhat helpful though has not resolves symptoms. She thinks that she has had 10-15% improvement with the topical. Spoke to pharmacist, who counseled her than can take up to 4 months for enbrel to take full effect. Gives herself injection every Sunday. After 5th injection, has itching/burning at the site. Takes Claritin already. Iced. Was told by pharmacist to try hydrocortisone, which she did. Decreased from 4-5 days of symptoms down to 3 days. From 5th injection onwards has had this reaction. Total of 8 injections so far. Joints much less of an issue compared to cutaneous involvement. If she has any joint pain, takes mobic. Roughly 1x per week. Around once per 7 -10days has extreme fatigue. Yesterday was significant. Slept until noon. Usually gets up at 630. Usually trouble sleeping, so this was unusual for her. No return of submandibular LAD. No fever, chills. No cough/sore throat. Some stiffness in the DIPs, morning predominant. Improves throughout the day. Some increase in pain this week. ROS otherwise negative.      6/17/21  Has advanced glaucoma/ dry eyes. Chronic. No new head/neck symptoms. Dry mouth during submandibular gland swelling, dry mouth now resolved. Reports the swelling has mproved now by 50+%. No more pain, so no longer using ibuprofen. No fever/chills/cough or other signs/symptoms of infection. With ongoing progression of nail/distal digit predominant psoriasis and DIP arthritis. Severe pain. Unable to tolerate NSAIDs given GI upset despite PPI. ROS otherwise negative       5/14/21  Patient initially was going to wait to contact, though has been having progression of symptoms of nails/joint pain/ skin. Last injection was on Tuesday. Had a flare in the days following her injection. First 2 doses, she did notice some improvement. Though after that time, with each dose, has had less and less of a  positive change in nails/joints/skin. Has instead been getting worse and worse response. Has stiffness and pain in her DIPs that has been progressive. Has redness as well. Also with radiation of the pain proximally which is new. Also some days with severe fatigue. No fevers/chills. No weight changes. No interval infections.     Past Medical History  Past Medical History:   Diagnosis Date     Bilateral low back pain with right-sided sciatica, unspecified chronicity 11/11/2020     Neuroforaminal stenosis of lumbar spine 11/11/2020     Nuclear senile cataract of both eyes 08/16/2018     Osteoarthritis 10/15/2020     Psoriatic arthropathy (H) 10/19/2020     Past Surgical History  Past Surgical History:   Procedure Laterality Date     BIOPSY  07/30/2019    the right breast tissue(benign)     COLONOSCOPY N/A 7/14/2022    Procedure: COLONOSCOPY;  Surgeon: Roderick Campos MD;  Location: [facility] OR     EYE SURGERY      3 surgeries for glaucoma treatment     Medications  Current Outpatient Medications   Medication     apremilast (OTEZLA) 30 MG tablet     CALCIUM CARBONATE-VIT D-MIN PO     celecoxib (CELEBREX) 100 MG capsule     clobetasol (TEMOVATE) 0.05 % external solution     gabapentin (NEURONTIN) 300 MG capsule     loratadine 10 MG capsule     Multiple Vitamins-Minerals (MULTIVITAMIN ADULTS 50+) TABS     predniSONE (DELTASONE) 2.5 MG tablet     tofacitinib (XELJANZ XR) 11 MG 24 hr tablet     Apremilast (OTEZLA) 10 & 20 & 30 MG TBPK     celecoxib (CELEBREX) 100 MG capsule     clobetasol (TEMOVATE) 0.05 % external ointment     No current facility-administered medications for this visit.     Allergies   Allergies   Allergen Reactions     Adhesive Tape Blisters, Itching and Swelling     Dust Mites      Latex Blisters, Itching, Other (See Comments) and Swelling     Skin sensitivity       Morphine Sulfate (Concentrate) Itching     Other reaction(s): Chills  heart palpatations     Seasonal Allergies      Codeine  Palpitations     chills     Family History:   No family history of autoimmune diseases.     Social History  , with children. 2 kids and they are healthy. No ETOH, smoking, drug use.      Objective:    Physical exam:  LMP  (LMP Unknown)   Redness overlying multiple DIP joints.  Also cracking and psoriatic lesions overlying the digital fingertips and DIPs.  Also on her scalp and back of the neck.  Her ankles are swollen.  Back of the hands involved as well.  Breathing comfortably no use of accessory muscles no cough.  No audible wheeze.  Sclera appear clear no facial rash.    WBC   Date Value Ref Range Status   06/11/2021 9.4 4.0 - 11.0 10e9/L Final     WBC Count   Date Value Ref Range Status   02/16/2023 6.4 4.0 - 11.0 10e3/uL Final     Hemoglobin   Date Value Ref Range Status   02/16/2023 12.4 11.7 - 15.7 g/dL Final   06/11/2021 12.9 11.7 - 15.7 g/dL Final     Platelet Count   Date Value Ref Range Status   02/16/2023 362 150 - 450 10e3/uL Final   06/11/2021 369 150 - 450 10e9/L Final     Creatinine   Date Value Ref Range Status   02/16/2023 0.61 0.51 - 0.95 mg/dL Final   06/11/2021 0.63 0.52 - 1.04 mg/dL Final     Lab Results   Component Value Date    ALKPHOS 64 02/16/2023    ALKPHOS 76 06/11/2021     AST   Date Value Ref Range Status   02/16/2023 31 10 - 35 U/L Final   06/11/2021 14 0 - 45 U/L Final     Lab Results   Component Value Date    ALT 16 02/16/2023    ALT 19 06/11/2021     Sed Rate   Date Value Ref Range Status   06/16/2021 15 0 - 30 mm/h Final     Erythrocyte Sedimentation Rate   Date Value Ref Range Status   02/16/2023 8 0 - 30 mm/hr Final     CRP Inflammation   Date Value Ref Range Status   02/01/2022 <2.9 0.0 - 8.0 mg/L Final   06/16/2021 5.2 0.0 - 8.0 mg/L Final     UA RESULTS:  No results for input(s): COLOR, APPEARANCE, URINEGLC, URINEBILI, URINEKETONE, SG, UBLD, URINEPH, PROTEIN, UROBILINOGEN, NITRITE, LEUKEST, RBCU, WBCU in the last [id number] hours.   Rheumatoid Factor   Date Value Ref Range  Status   02/05/2021 <7 <12 IU/mL Final     6/16/21  SSA, SSB negative  Mumps IgG and IgM positive  Creatinine 0.63  LFTs normal  CBC WNL    2/5/21  HIV negative  Hep C AB negative  Hep B s AG negative  Hep B c AB reactive  Quant gold negative  Quant gold negative  RF negative  CRP <2.9  ESR 10  Cr 0.62  WBC 7.1  HGB 13.1    HLA b27 negative  Hep B virus DNA negative    2/2/21  ESR 9  CRP <2.9  WBC 6.1  HGB 12.8      11/11/20  A1C 5.3  Hep C/ HIV negative    Imaging:  MR left hand  Impression:  1a. Correlating to the marker at the third digit interphalangeal  joint, there is a focal erosion along the volar aspect of the distal  phalangeal base with intense bone marrow edema and abnormal T1 marrow  signal. Findings are concerning for osteomyelitis and infection should  be ruled out. Also on the differential is sequelae of long-standing  active inflammatory arthropathy (including psoriatic arthritis given  patient's clinical history) where marginal erosions and marrow signal  abnormality are expected though the degree of T1 marrow replacement is  out of proportion to typical findings.   b. Subtle bone marrow edema in the middle phalanx without T1 marrow  signal abnormality, this is favored to represent reactive edema.  c. Joint effusion at the third digit interphalangeal joint,  potentially infectious or inflammatory. Consider aspiration for  definitive diagnosis.  d. The partially visualized fifth digit shows edema within the distal  phalanx and head of the middle phalanx with regional soft tissue  edema. Given predominant findings in the third digit, similar changes  in the fifth digit could support a polyarticular inflammatory  arthropathy. Correlate clinically.  2. Tenosynovitis of the third digit flexor and extensor tendons. There  is also tendinosis with high-grade (near full-thickness) tearing of  the extensor tendon/extensor saul at the distal phalangeal base with  slight proximal retraction of  the residual fibers.  3. Diffusely thickened, edematous ulnar and radial collateral  ligaments, likely combination of sprain and reactive edema.  4. Extensive soft tissue edema centered about the interphalangeal  joint.

## 2023-04-28 NOTE — NURSING NOTE
Is the patient currently in the state of [place]? YES    Visit mode:VIDEO    If the visit is dropped, the patient can be reconnected by: VIDEO VISIT: Text to cell phone: [phone]    Will anyone else be joining the visit? NO      How would you like to obtain your AVS? MyChart    Are changes needed to the allergy or medication list? YES: Pt has multiple medications that need to be removed     Pt asked to have double medications removed from her medication list      Reason for visit: Video Visit (2 mo f/u)    Bridgette Griffin on 4/28/2023 at 10:48 AM

## 2023-04-30 NOTE — PATIENT INSTRUCTIONS
"Recommendations from today's MTM visit:                                                       1. Start prednisone 10 mg daily x 7 days, 7.5 mg daily x 7 days, 5 mg daily x 7 days, then 2.5 mg daily until our office contacts you.    2. We are waiting on the external appeal for Xeljanz to be completed. If this is denied, we can consider Rinvoq.    Follow-up: Return in about 3 months (around 7/18/2023) for MTM Pharmacist Visit.    It was great speaking with you today.  I value your experience and would be very thankful for your time in providing feedback in our clinic survey. In the next few days, you may receive an email or text message from [employer] with a link to a survey related to your  clinical pharmacist.\"     To schedule another MTM appointment, please call the clinic directly or you may call the MTM scheduling line at [phone] or toll-free at [phone].     My Clinical Pharmacist's contact information:                                                      Please feel free to contact me with any questions or concerns you have.      Genesis Medina, PharmD  Medication Therapy Management Pharmacist  [facility]  Phone: [phone]     "

## 2023-05-03 ENCOUNTER — PHARMACY VISIT (OUTPATIENT)
Dept: ADMINISTRATIVE | Facility: CLINIC | Age: 63
End: 2023-05-03
Payer: COMMERCIAL

## 2023-05-03 NOTE — PROGRESS NOTES
Psoriatic Arthritis Clinical Follow Up Assessment   Completed on  21:08:25 [facility], by Annelise May       Patient  Patient Name: FLOR CONTRERAS  :   EHR ID: [id number]       Activity Date      Activity Medications    OTEZLA + XELJANZ XR        Care Details    RAPID-3    Dress yourself, including tying shoelaces and doing buttons?   ? Without any difficulty         Get in and out of bed?   ? Without any difficulty         Lift a full cup or glass to your mouth?   ? With some difficulty         Walk outdoors on flat ground?   ? Without any difficulty         Wash and dry your entire body?   ? Without any difficulty         Bend down to  clothing from the floor?   ? Without any difficulty         Turn regular faucets on and off?   ? Without any difficulty         Get in and out of a car, bus, train or airplane?   ? Without any difficulty         Walk two miles or three kilometers, if you wish?   ? Without any difficulty         Participate in recreational activities and sports as you would like, if you wish?   ? With much difficulty         How much pain have you had because of your condition over the past week? Please indicate how severe your pain has been, on the scale from 0-10 scale (with 0 being no pain and 10 being pain as bad as it could be)   ? 7.0          Considering all the ways in which illness and health conditions may affect you at this time, please indicate how you are doing on the scale of 0-10 (with 0 being very well and 10 being very poorly)   ? 8.0          RAPID-3 score   ? 15.999       What are the patient's goals for this therapy?   ? Reduce pain and lessen PsO Symptoms      Is patient meeting treatment goals?   ? Progressing toward goal      Please enter patient's PDC. PDC= adherence metric. Goal >0.8 (80% adherence)    ? 0.99      Do you have both psoriasis and psoriatic arthritis?   ? Yes    Where are the areas of psoriasis on the patient's body? (Select  all that apply).   ? Scalp   ? Nails   ? Hands    If one hand represents about 1% of your body, approximately what percent of your body is affected by psoriasis? Enter 0 to 100   ? 5    Based on the patient's quality of life, please rate the patient's current disease severity [QA Metric]   ? Moderate: some effect on quality of life, moderate body surface area affected    Over the past week, How itchy, sore, painful or stinging has your skin been?   ? A lot     Over the past week, how much has your skin interfered with your ability to do activities of daily living (shopping, cleaning, working, studying, sports)?   ? A lot          Please select CURRENT side effect(s) for monitoring:   ? None         Summary Notes     Spoke with Willa for assessment. Current Regimen: Otezla 30mg BID + Xeljanz XR 11mg daily + prednisone   Denies side effects, injection site reactions, missed doses, medication/allergy changes. PDC = 0.99 -- just opened new bottle of Otezla and wants refill reminder in 2 weeks' time.     PsA: Started back on Xeljanz on 4/28 after 4 months without it due to extended PA approval process (was denied twice and sent for external review before approval was given). She was really suffering without the Xeljanz particularly with her PsO - fingertips/hands were peeling and painful and she also had rashes on her ankle and back of hand where it never flared previously. She's thankfully feeling much better today back on dual therapy along with a prednisone taper. Asked me if prednisone was something that makes her feel more energized and could negatively impact sleep - discussed at length about how taking in the mornings is best because yes it can, but that she's also at a lower dose at this time. Been struggling with insomnia, both falling and staying asleep, so recommended she continue her 2 mile walks as regular exercise and then could consider trying low dose melatonin (1 or 3mg) at bedtime for a few days. She has  her annual check up with her PCP this month and we talked about her discussing this with them as well.     Follow up: 3 months       Annelise May, PharmD  Therapy Management Pharmacist  [facility]  [facility]

## 2023-05-23 ASSESSMENT — ENCOUNTER SYMPTOMS
DYSURIA: 0
PALPITATIONS: 0
FREQUENCY: 0
HEADACHES: 0
JOINT SWELLING: 0
SHORTNESS OF BREATH: 0
PARESTHESIAS: 0
MYALGIAS: 0
CONSTIPATION: 0
COUGH: 0
WEAKNESS: 0
CHILLS: 0
ARTHRALGIAS: 0
NAUSEA: 0
NERVOUS/ANXIOUS: 0
ABDOMINAL PAIN: 0
HEMATURIA: 0
FEVER: 0
HEARTBURN: 0
SORE THROAT: 0
HEMATOCHEZIA: 0
EYE PAIN: 0
DIARRHEA: 0
DIZZINESS: 0
BREAST MASS: 0

## 2023-05-26 ENCOUNTER — OFFICE VISIT (OUTPATIENT)
Dept: FAMILY MEDICINE | Facility: CLINIC | Age: 63
End: 2023-05-26
Payer: COMMERCIAL

## 2023-05-26 VITALS
RESPIRATION RATE: 16 BRPM | HEIGHT: 64 IN | OXYGEN SATURATION: 99 % | HEART RATE: 73 BPM | SYSTOLIC BLOOD PRESSURE: 121 MMHG | WEIGHT: 133.4 LBS | TEMPERATURE: 97.2 F | BODY MASS INDEX: 22.77 KG/M2 | DIASTOLIC BLOOD PRESSURE: 84 MMHG

## 2023-05-26 DIAGNOSIS — Z80.3 FAMILY HISTORY OF MALIGNANT NEOPLASM OF BREAST: ICD-10-CM

## 2023-05-26 DIAGNOSIS — Z13.820 SCREENING FOR OSTEOPOROSIS: ICD-10-CM

## 2023-05-26 DIAGNOSIS — E78.5 HYPERLIPIDEMIA, UNSPECIFIED HYPERLIPIDEMIA TYPE: ICD-10-CM

## 2023-05-26 DIAGNOSIS — K57.30 DIVERTICULOSIS OF LARGE INTESTINE WITHOUT HEMORRHAGE: ICD-10-CM

## 2023-05-26 DIAGNOSIS — L40.9 PSORIASIS OF NAIL: ICD-10-CM

## 2023-05-26 DIAGNOSIS — Z87.898 HISTORY OF SNORING: ICD-10-CM

## 2023-05-26 DIAGNOSIS — Z82.62 FAMILY HISTORY OF OSTEOPOROSIS: ICD-10-CM

## 2023-05-26 DIAGNOSIS — E83.52 SERUM CALCIUM ELEVATED: ICD-10-CM

## 2023-05-26 DIAGNOSIS — D84.9 IMMUNOSUPPRESSION (H): ICD-10-CM

## 2023-05-26 DIAGNOSIS — R73.03 PREDIABETES: ICD-10-CM

## 2023-05-26 DIAGNOSIS — Z23 NEED FOR COVID-19 VACCINE: ICD-10-CM

## 2023-05-26 DIAGNOSIS — G47.9 SLEEP DISORDER: ICD-10-CM

## 2023-05-26 DIAGNOSIS — L40.50 PSORIATIC ARTHROPATHY (H): ICD-10-CM

## 2023-05-26 DIAGNOSIS — H52.10 MYOPIA, UNSPECIFIED LATERALITY: ICD-10-CM

## 2023-05-26 DIAGNOSIS — Z00.00 HEALTH CARE MAINTENANCE: ICD-10-CM

## 2023-05-26 DIAGNOSIS — M48.061 SPINAL STENOSIS OF LUMBAR REGION, UNSPECIFIED WHETHER NEUROGENIC CLAUDICATION PRESENT: ICD-10-CM

## 2023-05-26 DIAGNOSIS — Z00.00 ROUTINE HISTORY AND PHYSICAL EXAMINATION OF ADULT: Primary | ICD-10-CM

## 2023-05-26 DIAGNOSIS — Z71.89 ADVANCED DIRECTIVES, COUNSELING/DISCUSSION: ICD-10-CM

## 2023-05-26 DIAGNOSIS — H40.1233 LOW-TENSION GLAUCOMA OF BOTH EYES, SEVERE STAGE: ICD-10-CM

## 2023-05-26 DIAGNOSIS — D22.9 MULTIPLE PIGMENTED NEVI: ICD-10-CM

## 2023-05-26 LAB
ALBUMIN SERPL BCG-MCNC: 5.1 G/DL (ref 3.5–5.2)
ALP SERPL-CCNC: 62 U/L (ref 35–104)
ALT SERPL W P-5'-P-CCNC: 13 U/L (ref 10–35)
ANION GAP SERPL CALCULATED.3IONS-SCNC: 12 MMOL/L (ref 7–15)
AST SERPL W P-5'-P-CCNC: 26 U/L (ref 10–35)
BASOPHILS # BLD AUTO: 0 10E3/UL (ref 0–0.2)
BASOPHILS NFR BLD AUTO: 1 %
BILIRUB SERPL-MCNC: 0.4 MG/DL
BUN SERPL-MCNC: 11.4 MG/DL (ref 8–23)
CALCIUM SERPL-MCNC: 10 MG/DL (ref 8.8–10.2)
CHLORIDE SERPL-SCNC: 105 MMOL/L (ref 98–107)
CHOLEST SERPL-MCNC: 252 MG/DL
CREAT SERPL-MCNC: 0.67 MG/DL (ref 0.51–0.95)
DEPRECATED HCO3 PLAS-SCNC: 23 MMOL/L (ref 22–29)
EOSINOPHIL # BLD AUTO: 0.1 10E3/UL (ref 0–0.7)
EOSINOPHIL NFR BLD AUTO: 2 %
ERYTHROCYTE [DISTWIDTH] IN BLOOD BY AUTOMATED COUNT: 13.2 % (ref 10–15)
GFR SERPL CREATININE-BSD FRML MDRD: >90 ML/MIN/1.73M2
GLUCOSE SERPL-MCNC: 94 MG/DL (ref 70–99)
HBA1C MFR BLD: 5.7 % (ref 0–5.6)
HCT VFR BLD AUTO: 39.4 % (ref 35–47)
HDLC SERPL-MCNC: 76 MG/DL
HGB BLD-MCNC: 12.8 G/DL (ref 11.7–15.7)
IMM GRANULOCYTES # BLD: 0 10E3/UL
IMM GRANULOCYTES NFR BLD: 0 %
LDLC SERPL CALC-MCNC: 147 MG/DL
LYMPHOCYTES # BLD AUTO: 2.5 10E3/UL (ref 0.8–5.3)
LYMPHOCYTES NFR BLD AUTO: 50 %
MCH RBC QN AUTO: 29.7 PG (ref 26.5–33)
MCHC RBC AUTO-ENTMCNC: 32.5 G/DL (ref 31.5–36.5)
MCV RBC AUTO: 91 FL (ref 78–100)
MONOCYTES # BLD AUTO: 0.3 10E3/UL (ref 0–1.3)
MONOCYTES NFR BLD AUTO: 7 %
NEUTROPHILS # BLD AUTO: 2.1 10E3/UL (ref 1.6–8.3)
NEUTROPHILS NFR BLD AUTO: 40 %
NONHDLC SERPL-MCNC: 176 MG/DL
NRBC # BLD AUTO: 0 10E3/UL
NRBC BLD AUTO-RTO: 0 /100
PLATELET # BLD AUTO: 383 10E3/UL (ref 150–450)
POTASSIUM SERPL-SCNC: 4.1 MMOL/L (ref 3.4–5.3)
PROT SERPL-MCNC: 7.3 G/DL (ref 6.4–8.3)
PTH-INTACT SERPL-MCNC: 35 PG/ML (ref 15–65)
RBC # BLD AUTO: 4.31 10E6/UL (ref 3.8–5.2)
SODIUM SERPL-SCNC: 140 MMOL/L (ref 136–145)
TRIGL SERPL-MCNC: 144 MG/DL
TSH SERPL DL<=0.005 MIU/L-ACNC: 0.63 UIU/ML (ref 0.3–4.2)
WBC # BLD AUTO: 5.1 10E3/UL (ref 4–11)

## 2023-05-26 PROCEDURE — 83036 HEMOGLOBIN GLYCOSYLATED A1C: CPT | Performed by: FAMILY MEDICINE

## 2023-05-26 PROCEDURE — 82306 VITAMIN D 25 HYDROXY: CPT | Performed by: FAMILY MEDICINE

## 2023-05-26 PROCEDURE — 36415 COLL VENOUS BLD VENIPUNCTURE: CPT | Performed by: FAMILY MEDICINE

## 2023-05-26 PROCEDURE — 83970 ASSAY OF PARATHORMONE: CPT | Performed by: FAMILY MEDICINE

## 2023-05-26 PROCEDURE — 99396 PREV VISIT EST AGE 40-64: CPT | Mod: 25 | Performed by: FAMILY MEDICINE

## 2023-05-26 PROCEDURE — 0124A COVID-19 BIVALENT 12+ (PFIZER): CPT | Performed by: FAMILY MEDICINE

## 2023-05-26 PROCEDURE — 99214 OFFICE O/P EST MOD 30 MIN: CPT | Mod: 25 | Performed by: FAMILY MEDICINE

## 2023-05-26 PROCEDURE — 91312 COVID-19 BIVALENT 12+ (PFIZER): CPT | Performed by: FAMILY MEDICINE

## 2023-05-26 PROCEDURE — 80061 LIPID PANEL: CPT | Performed by: FAMILY MEDICINE

## 2023-05-26 PROCEDURE — 80050 GENERAL HEALTH PANEL: CPT | Performed by: FAMILY MEDICINE

## 2023-05-26 RX ORDER — LANOLIN ALCOHOL/MO/W.PET/CERES
3 CREAM (GRAM) TOPICAL
COMMUNITY
End: 2024-07-11

## 2023-05-26 ASSESSMENT — ENCOUNTER SYMPTOMS
DYSURIA: 0
HEARTBURN: 0
HEMATOCHEZIA: 0
CONSTIPATION: 0
NAUSEA: 0
EYE PAIN: 0
FREQUENCY: 0
JOINT SWELLING: 0
DIARRHEA: 0
SORE THROAT: 0
PALPITATIONS: 0
CHILLS: 0
COUGH: 0
HEADACHES: 0
FEVER: 0
ARTHRALGIAS: 0
NERVOUS/ANXIOUS: 0
ABDOMINAL PAIN: 0
WEAKNESS: 0
SHORTNESS OF BREATH: 0
BREAST MASS: 0
HEMATURIA: 0
DIZZINESS: 0
PARESTHESIAS: 0
MYALGIAS: 0

## 2023-05-26 ASSESSMENT — PAIN SCALES - GENERAL: PAINLEVEL: NO PAIN (1)

## 2023-05-26 NOTE — PROGRESS NOTES
SUBJECTIVE:   CC: Willa is an 62 year old who presents for preventive health visit.       5/26/2023     2:36 PM   Additional Questions   Roomed by Kristine Carlson   Accompanied by Self   Patient has been advised of split billing requirements and indicates understanding: Yes  Healthy Habits:    Getting at least 3 servings of Calcium per day:  Yes    Bi-annual eye exam:  Yes    Dental care twice a year:  Yes    Sleep apnea or symptoms of sleep apnea:  Daytime drowsiness    Diet:  Regular (no restrictions)    Frequency of exercise:  4-5 days/week    Duration of exercise:  45-60 minutes    Taking medications regularly:  Yes    Medication side effects:  None    PHQ-2 Total Score:    Additional concerns today:  No  Imm/Inj  Pertinent negatives include no abdominal pain, arthralgias, chest pain, chills, congestion, coughing, fever, headaches, joint swelling, myalgias, nausea, rash, sore throat or weakness.     CURRENTLY  Here for preventive health and additional concerns   No breast issues  Fh of breast cancer in an aunt , declines a  referral, Plate full with different specialists already  Had a dx mammogram for axillary pain in feb was normal no symptoms. Does self check.   Pap due 2025. No vaginal symptoms  HM reviewed  No ACP on file  Colonoscopy due 2032  Discussed dexa  Vaccines utd  Will do COVID booster today as immunosuppressed    Hyperlipidemia.   Will do labs today.    Prediabetes, notes been working on weight loss    Hx of snoring. Has day time drowsiness after meals sitting down triggers a nap of 15 min then able to get up & going, no falling asleep at wheel. Gets 6 hrs of sleep at night or less may be contributing. Feels gets up refreshed, no am headaches. Is on gabapentin,Declines referral to sleep    Sometimes wakes up,has trouble falling back to sleep. Discussed with the specialty pharmacist & advised to use melatonin. Waits to take if not able to sleep couple days in a row. Its helps her sleep well  but then in am is  bit groggy & doesnt like it. Is a stay at home mom. Youngest is in high school.    History of osteoarthritis with spinal stenosis lumbar spine under care of sports doctor on gabapentin for pain relief taking 300 mg twice a day.Gabapentin now bid, not sure how would feel off it. Also on Celebrex bid with meals no issues, cbc and CMP in jan normal. Not seen ortho Dr vázquez in a while. As soon as weeding a bit feels it in back  In am when wakes up& not had gabapentin yet will feel mild sciatica pain.     Psoriatic arthropathy currently well controlled on Xeljanz, Otelza & Celebrex under care of rheum.  previously has tried Humira, Enbrel, Cosentyx,  Had a flare up 1 month ago when awaiting Xeljanz to be approved to continue but now back on it and doing well. No longer on prednisone since may 13 th. Reports no side effects.    Psoriasis of nail and skin well controlled on clobetasol ointment to body and lidex  solution to scalp and genital area as needed under care of her dermatologist. No new moles.     Elevated calcium at last lab with rheum. Will recheck today. Is on calcium and vitamin D. Is at risk of osteoporosis given hx of chronic prednisone use, high risk meds, post menopausal & family hx.      Asymptomatic diverticulosis noted on colonoscopy last year.   Myopia and glaucoma.under care of eye. Stable.     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,  with two children youngest is 14, in [place] since 1989, parents diseased, older sister also in the , Hx of OA back and thumb on gabapentin tid for low back pain, hx of lumbar spinal stenosis, hx of psoriatic arthropathy on Xeljanz & Otelza, Celebrex & off a slow prednisone taper ( previously on Humira, Enbrel &cosit), under care of rheumatology and psoriasis of nails/  Skin on temovate ointment and lidex solution as needed under care of dermatology, chronic immunosuppression, fh of osteoporosis, HLD, prediabetes, hx of snoring, sleep  difficulty, hx of migraines, gum disease, glaucoma S/p laser in 2009 & surgery 2012 & 2016, on eye drops,myopia,  managed by eye, multiple pigmented nevi, diverticulosis,fh of breast cancer,  seasonal allergies, dust mites, on loratadine prn, allergic to codeine, morphine, latex & adhesive tape, hx of prior C section in 2001 secondary to a fibroid blocking the birth canal, went into [facility] after the surgery and uterine artery embolization done and adopted daughter in 2006, prior cataract surgery 2015 & 2018, prior right breast biopsy ( benign) in 2019,      Seen for chest pain in 2012 and EKG and CXR and exam was normal. Seen by cardiology and stress echo test done was normal. Under care of eye. Seen by dentist , for URI in 2014, seen for a physical in 2014. In 2016 seen for ETD after a flight. Seen by audiology ( normal test) ,  & ENT, in 2017 seen for acute back pain and multiple small hand joint pain DIP & PIP advised PT and NSAID'S.      In 5/2018 noted nail changes of right ring finger and right thumb. Felt nail was  from nail bed. Nail clippings sent for fungal culture. Seen 5/9/2018 and gyn was normal and pap done.      Seen 7/2018 by rheumatology at [facility] for a one year history of intermittent bilateral first CMC joint tenderness consistent with osteoarthritis, who presented with right fourth fingernail changes beginning March 2018 with onycholysis and dystrophic changes, fungal culture negative, mild onycholysis in the right thumbnail, and acute swelling, redness and tenderness of the right fourth DIP joint since May. This had been associated with some swelling and tenderness in her finger pad with tender blister-type sores. Dermatology gave her triamcinolone which caused burning of the skin. Her primary physician gave her naproxen which caused daytime lethargy but gave her some partial relief. Exam revealed nail changes and right fourth DIP joint swelling, most consistent with psoriatic  arthritis. Laboratories to include CBC with differential, basic metabolic panel, liver panel, ESR, CRP, TSH, RF, CCP and hepatitis C antibody (on two occasions she has positive hepatitis B core antibody and surface antibody suggestive of prior infection which she cleared). Was given desonide for perioral dermatitis. testing was done and X-ray of hands showed soft tissue swelling at the ring finger DIP joints. No bony changes. X-ray of feet were normal. RF and CCP negative. Normal ESR and CRP. She was started on methotrexate four tablets weekly, tapered up to 6 tablets weekly. &  received prednisone 30 mg, 20 mg, 10 mg, 5 mg each ×5 days, then off.  Seen oct 2018 noted improved and methotrexate increased to manage symptoms and felt had OA of left knee and referred to derm for fungal scrapings of toenail concerns.      Seen by dermatology at [facility] for nail changes right fourth fingernail suspected to be psoriasis & had tac injection to the nail bed, , Pincer nail deformity B/l 5th toes & epidermal inclusion cyst on neck.      Seen by rheum at [facility] on 2/222019 & noted improved and continued on  eight tablets of methotrexate weekly.   Seen by derm at [facility] 3/13/19 for psoriatic nail disease and continue don clobetasol ointment bid  prn and TAC injection done of nail bed.   Seen by derm 6/2019 and declined soriatane or Humira or kenalog injections and was to continue clobetasol ointment twice a day.  Had PVD left eye 8/2019.  Seen by rheum 9/13/19 & stable & continued on methotrexate, refilled  weekly. & advised thumb splints & OTC meds for B/l OA 1st CMC & meds refilled in 12/2019.  methotrexate      Next seen at [facility] after insurance changes  Seen by Chad Peck 7/9/20 for acute back pain , referred to ortho and given flexeril. Seen by ortho 7/13/20 & given steroids and advised PT & to use flexeril sparingly. Seen by PT 7/15, then by back doctor and xray was unremarkable. On 8/16/20 due  to persistent lumbar radiculopathy MRI done showed lumbar stenosis. Had an epidural 9/8/20 & noted pain improved and continued on gabapentin and home care exercise program & advised to increase activity and follow up as needed.      Seen first time by this writer on 10/15/20 for swelling of left middle finger pulp and onycholysis of the nail with prior hx of psoriatic arthropathy / psoriatic arthritis & psoriasis of right fourth finger nail treated in 2018 with Kenalog  injections by derm, clobetasol bid & on methotrexate by rheum, no longer on methotrexate since 12/2019 with new onset left middle finger nail changes, separation from nail bed with DIP to pulp swelling pain & tenderness similar to when dx with psoriasis & psoriatic arthritis previously.   [facility]um but since insurance changed & didn't have a PCP did see rheum out of [facility]ists in [place]  & given two courses of abx with no improvement & reported rheum concerned about dactylitis & advised to get an mri & see the hand surgeon. Had not done the imaging yet & needed help on what to do next.   Had no sign of sepsis & looked well. Records reviewed after visit in more detail. Was advised & given referral to see a hand surgeon & to hang on to mri order until seen by the  hand surgeon to make further recommendations on that. Advised to Go to the ER if had a fever     Derm notes received and reviewed noting psoriasis. No notes received from rheum. [facility]  but [facility] were not able to see her. Seen by hand specialist at [facility] on 1/22, by then nail had fallen off and pain had subsided and was told had simple OA and opted not to see another rheumatologist.      Seen 11/11/20 for a physical. Breast exam was normal. Mammogram due 5/2021 as dx imaging normal in 5/2020. Pelvic/ pap due 2021. Discussed working on advance directives. HLD on no meds. Likely familial. Had not had much activity due to  sciatica and spinal stenosis but was going to work on her exercise. Had OA CMC B/l thumb & Spinal stenosis/ spondylolisthesis L 4-5 with neuro foraminal stenosis and sciatica on right, not much helped with epidural. Did PT, seen by Dr Zhou on gabapentin 300 mg tid taking bid which helped. Had started using a stationary bike. Hoped to defer surgery for as long as possible. Had no side effects from gabapentin to date. Was to continue care with ortho.   Reviewed prior hx of right finger documented psoriatic arthropathy treated in 2018 with methotrexate, but not had in over 1 yr. Hx of psoriatic nail changes seen by derm in past and recently for it. Recently had new onset left middle finger DIP to pulp swelling with loosening and subsequent loss of a dystrophic looking nail. Was ruled out for fungal infection and treated for paronychia with two different antibiotics, used clobetasol on nail folds and seen by a different rheumatologist who told her it was not psoriatic arthritis and advised seeing a hand surgeon & doing an mri as was concerned about possibility of dactylitis. Seen by [facility] hand surgeon on 10/22/20 who also reviewed xray's and said joints were not involved and did not look like psoriatic arthritis , symptoms improved after nail fell off mid October and advised  MRI was not needed and to monitor. At the physical felt was doing better and opted to wait to see a new derm and rheum for a third opinion. Was to follow up with dermatology and ortho back as needed & to get us records from her recent visit with [facility]. Prior records had been a bit confusing as some stated she had psoriatic arthritis and usually it is not a diagnosis that goes away. But recently outside ortho and outside rheum said it was not psoriatic arthritis & felt was just localized skin/ nail psoriasis. Was to continue care with her eye doc for her glaucoma & return in  1 yr for a preventive physical or sooner in an office visit or virtual visit  for any new or persistent concerns.  Labs came back showing negative HIV, Hep C, normal HBA1c, normal cbc, CMP, TSH & negative FIT with elevated TG & LDL & ASCVD risk of 3.5 %  & dietary & lifestyle changes discussed & to recheck in 6 months.      Seen by ortho 1/15/21 for pain and swelling left third finger & CMC OA left thumb and MRI ordered. MRI 3rd digit showed bony erosions and differential was polyarticular inflammatory arthropathy versus osteomyelitis. Labs showed normal esr, CRP & cbc & referred to hand surgeon. Seen by the hand surgeon on 2/3/21, felt didn't have osteomyelitis and referred to rheumatology.      Seen by rheumatology on 2/5/21 & noted 5 month of progressive left hand 3rd, 4th, 5th digit and right hand 4th digit DIP inflammatory arthritis in the context of psoriasis of the nails of these same fingers. Had had evaluation by PCP, derm, ortho, rheumatology with this presentation without obvious etiology found. Possibility of infection by exam and MRI raised, though normal inflammatory markers, lack of improvement with antibiotics, and now chronic small joint symmetric polyarthritis was felt somewhat atypical for infection. Her presentation appeared to be consistent with the diagnosis given to the patient in 2018 by Dr. Owens of  rheumatology, for which she was prescribed methotrexate though self discontinued when she changed insurance and was lost to follow-up in early 2020. It was felt her presentation was most likely secondary to psoriatic arthritis, though discussed with the patient that without aspiration or Bx of an affected joint/bone that they could not rule out infection.   Started on mobic. On 2/18 rheum decreased mobic to 7.5 mg & given omeprazole to use when used mobic. Started on Humira and referred to dermatology. Seen by derm on 4/1 and given clobetasol to use for skin around psoriatic nails & referred to get a epidermal cyst on her neck excised. On 4/2/21 seen by rheum and  noted continued active Psoriatic arthritis disease and continued on mobic 7.5 mg, omeprazole 20 & Humira. On 5/11/21 noted active disease having failed NSAID, methotrexate and Humira and to start Enbrel.      On 6/9/21 message noted that she reported that 2 days prior she woke up and was washing her face and noticed what sounded like bilateral submandibular lymphadenopathy.  Not tender or painful that day so continued and nothing done about it.  Then the day before  she did have some development of pain and noticed that they were tender to palpation.  She denied any other symptoms at the time other than her ongoing psoriatic arthritis.  Denied any fever, chills, cough, shortness of breath, oral infection, weight loss.  No viral symptoms.  She felt otherwise well.  It was discussed with her that her symptoms were most likely the sequelae of reactive lymphadenopathy and  that they would either evolve into typical viral URI symptoms or continue to regress and resolve without intervention.  Given the bilateral nature, tenderness, and overnight development there was less suspicion for malignancy like lymphoma.  Rheum counseled her that she could use NSAID's/tylenol for symptoms.      Seen 6/11/21 for submandibular gland enlargement . Labs done showed normal B12, CMP, TSH, ferritin, & cbc. LDL was elevated. U/S showed non specific enlarged submandibular glands. Testing showed mumps Ig M positive with normal CRP and esr. [facility] informed and advised to get a buccal swab but due to difficulty getting labs opted to skip further resting and symptoms resolved on their own     Seen 7/2/21 by Dr Ho for ear itching , had mild cerumen and given ciprodex. Seen by derm 7/13/21 for a psoriasis flare off th Humira and was to start enberal and given clobetasol for nail & gluteal cleft and lidex solution for scalp and genital area. & to use T gel and  Dove soap.   Seen by Rheum 8/17/21 & continue don Enbrel and started da prednisone  taper. On 8/16/21 seen by sports med and continued on gabapentin for spinal stenosis. On 8/25/21 seen by derm &had a wide excision of a skin lesion. Seen by rheum on 9/30/21 & Enbrel had been switched to cosent & continued on a slow Pred taper.on 11/15/21 had a flare when prednisone decreased and went back on 7.5 mg of prednisone & coscint increased to 300. Seen 12/2/21 virtually by rheum & continued on coscint 300 & prednisone decreased from 10 to 7.5 on 12/24. Started on Celebrex, PPI given and to switched to Xeljanz. On 1/25/22 seen by rheum virtually and continued on Xeljanz, Celebrex and prednisone taper. On 2/1/22 CRP, Hep B, CMP, Cbc, HIV , esr and protein was normal.      Seen 5/16/22 for preventive health. Mammogram due but due to left anterior armpit wall pain 1 month and right armpit wall pain 2 weeks recommended diagnostic mammogram and ultrasound bilaterally to assess symptoms.  Previously had had a biopsy on the right in 2019 that had been normal and diagnostic imaging for a lump on the left in 2020 that had been normal. Had hx of a aunt with breast cancer, opted out of a  referral. Pelvic / PAP. HPV obtained   History of hyperlipidemia.    For history of drowsiness/ odalis post lunch & dinner, & hx of snoring. Post lunch drowsiness improved with exercise.  Also on gabapentin which may be contributing to drowsiness.  Recommended eating healthy, some weight loss, low carbohydrate intake, to consider referral to a sleep specialist. After the visit hemoglobin A1c did come back elevated in the prediabetes range.   History of osteoarthritis with spinal stenosis lumbar spine under care of sports doctor on gabapentin for pain relief taking 300 mg twice a day instead of the 3 times a day as prescribed, with good relief.  Psoriatic arthropathy well controlled on Xeljanz, previously had tried Humira, Enbrel, Cosentyx, also on Celebrex daily and a slow Pred taper down to 2.5 mg daily.  Celebrex likely  helping back pain.  Under care with her rheumatologist managing these meds.  Psoriasis of nail and skin well controlled on clobetasol ointment to body and lidex  solution to scalp and genital area as needed. Under care of her dermatologist.  To continue calcium and vitamin D supplementation as at risk of osteoporosis on prednisone use and being postmenopausal. & to continue care with eye doctor for myopia and glaucoma.  Health care maintenance reviewed. Colon cancer screening due and referred for colonoscopy. To consider working on health care directives. Tdap given. Return in 1 year for a preventive physical and sooner in an office visit for any new concerns.     ldl was elevated. ASCVD risk 3%, normal CMP, TSH, cbc, HBA1c in prediabetic level. Seen 6/16/22  Neg for DVT, had a lipoma. Seen virtually 6/24/22 seen by rheum & continued on Xeljanz, 2.5 prednisone & Celebrex. Seen by derm 7/6/22 & started Otelza & continued on clobetasol  Prn. Colonoscopy done 7/14 showed diverticulosis & advised to recheck in 10 yrs. Seen by rheum 9/14 & doing well on otelza, Xeljanz, & Pred taper. Seen by rheum 2/10/23 noted disease remission with no symptoms & normal esr & CRP. Dx mammogram & u/S in feb showed no concerns. Seen by pharmacist & Xeljanz re approved. Seen by rheum 4/28 & doing well after minor flare off Xeljanz.    [place]  shows getting gabapentin 300 mg # 90 monthly last on 5/15/23 by Dr Zhou.    Social History     Tobacco Use     Smoking status: Never     Smokeless tobacco: Never   Vaping Use     Vaping status: Never Used   Substance Use Topics     Alcohol use: Never           5/23/2023    12:47 PM   Alcohol Use   Prescreen: >3 drinks/day or >7 drinks/week? No     Reviewed orders with patient.  Reviewed health maintenance and updated orders accordingly - Yes  Lab work is in process  Labs reviewed in EPIC  BP Readings from Last 3 Encounters:   05/26/23 121/84   07/14/22 101/60   05/16/22 130/88    Wt Readings from Last  3 Encounters:   05/26/23 60.5 kg (133 lb 6.4 oz)   09/16/22 59.9 kg (132 lb)   05/16/22 65.3 kg (144 lb)         Patient Active Problem List   Diagnosis     Hyperlipidemia     Low-tension glaucoma of both eyes, severe stage     Myopia     Screening for malignant neoplasm of cervix     Psoriasis of nail     Psoriatic arthropathy (H)     Spinal stenosis of lumbar region, unspecified whether neurogenic claudication present     Immunosuppression (H)     Family history of malignant neoplasm of breast     History of snoring     Prediabetes     Diverticulosis of large intestine     Sleep disorder     Multiple pigmented nevi     Family history of osteoporosis     Past Surgical History:   Procedure Laterality Date     BIOPSY  07/30/2019    the right breast tissue(benign)     COLONOSCOPY N/A 7/14/2022    Procedure: COLONOSCOPY;  Surgeon: Roedrick Campos MD;  Location: [facility] OR     EYE SURGERY      3 surgeries for glaucoma treatment       Social History     Tobacco Use     Smoking status: Never     Smokeless tobacco: Never   Vaping Use     Vaping status: Never Used   Substance Use Topics     Alcohol use: Never     Family History   Problem Relation Age of Onset     Hyperlipidemia Mother         My mother had high cholesterol.     Osteoporosis Mother      Hypertension Mother      Stomach Cancer Father         not sure where started     Cataracts Maternal Grandmother      Breast Cancer Maternal Aunt      Melanoma No family hx of      Skin Cancer No family hx of          Current Outpatient Medications   Medication Sig Dispense Refill     apremilast (OTEZLA) 30 MG tablet Take 1 tablet (30 mg) by mouth 2 times daily Hold for signs of infection, and seek medical attention. 180 tablet 2     CALCIUM CARBONATE-VIT D-MIN PO Take 1 tablet by mouth       celecoxib (CELEBREX) 100 MG capsule Take 1 capsule (100 mg) by mouth 2 times daily 180 capsule 1     clobetasol (TEMOVATE) 0.05 % external solution Apply topically daily To  affected areas of scalp 50 mL 2     gabapentin (NEURONTIN) 300 MG capsule Take 1 capsule (300 mg) by mouth 3 times daily 90 capsule 2     loratadine 10 MG capsule Take 1 tablet by mouth       melatonin 3 MG tablet Take 3 mg by mouth nightly as needed for sleep       Multiple Vitamins-Minerals (MULTIVITAMIN ADULTS 50+) TABS daily        predniSONE (DELTASONE) 2.5 MG tablet Take 10 mg daily x 7 days, then 7.5 mg daily x 7 days, then 5 mg daily x 7 days, then 2.5 mg daily until instructed by rheumatologist to stop 70 tablet 2     tofacitinib (XELJANZ XR) 11 MG 24 hr tablet Take 1 tablet (11 mg) by mouth daily Hold for signs of infection, then seek medical attention. Monitoring labs every 6 months 90 tablet 3     Allergies   Allergen Reactions     Adhesive Tape Blisters, Itching and Swelling     Dust Mites      Latex Blisters, Itching, Other (See Comments) and Swelling     Skin sensitivity       Morphine Sulfate (Concentrate) Itching     Other reaction(s): Chills  heart palpatations     Seasonal Allergies      Codeine Palpitations     chills     Recent Labs   Lab Test 05/26/23  1616 02/16/23  1051 05/16/22  1033 02/01/22  0918 06/11/21  1644 02/05/21  1559 11/11/20  1023   A1C 5.7*  --  5.7*  --   --   --  5.3   *  --  147*  --  153*  --  Cannot estimate LDL when triglyceride exceeds 400 mg/dL  166*   HDL 76  --  76  --  56  --  45*   TRIG 144  --  186*  --  208*  --  452*   ALT 13 16 21   < > 19 21 23   CR 0.67 0.61 0.67   < > 0.63 0.62 0.56   GFRESTIMATED >90 >90 >90   < > >90 >90 >90   GFRESTBLACK  --   --   --   --  >90 >90 >90   POTASSIUM 4.1 4.1 3.6   < > 4.3 3.7 3.6   TSH 0.63  --  0.94  --  1.48  --  1.30    < > = values in this interval not displayed.        Breast Cancer Screening:    FHS-7:       5/20/2022    12:54 PM 2/8/2023     2:46 PM   Breast CA Risk Assessment (FHS-7)   Did any of your first-degree relatives have breast or ovarian cancer? No No   Did any of your relatives have bilateral breast  cancer? Unknown No   Did any man in your family have breast cancer? No No   Did any woman in your family have breast and ovarian cancer? No No   Did any woman in your family have breast cancer before age 50 y? No No   Do you have 2 or more relatives with breast and/or ovarian cancer? No No   Do you have 2 or more relatives with breast and/or bowel cancer? No No     Mammogram Screening: Recommended mammography every 1-2 years with patient discussion and risk factor consideration  Pertinent mammograms are reviewed under the imaging tab.    History of abnormal Pap smear: NO - age 30-65 PAP every 5 years with negative HPV co-testing recommended  Last 3 Pap and HPV Results:       Latest Ref Rng & Units 5/16/2022    10:15 AM   PAP / HPV   PAP  Negative for Intraepithelial Lesion or Malignancy (NILM)     HPV 16 DNA Negative Negative     HPV 18 DNA Negative Negative     Other HR HPV Negative Negative           Latest Ref Rng & Units 5/16/2022    10:15 AM   PAP / HPV   PAP  Negative for Intraepithelial Lesion or Malignancy (NILM)     HPV 16 DNA Negative Negative     HPV 18 DNA Negative Negative     Other HR HPV Negative Negative       Reviewed and updated as needed this visit by clinical staff   Tobacco  Allergies  Meds  Problems             Reviewed and updated as needed this visit by Provider    Allergies  Meds  Problems            Past Medical History:   Diagnosis Date     Bilateral low back pain with right-sided sciatica, unspecified chronicity 11/11/2020     Neuroforaminal stenosis of lumbar spine 11/11/2020     Nuclear senile cataract of both eyes 08/16/2018     Osteoarthritis 10/15/2020     Psoriatic arthropathy (H) 10/19/2020     Serum calcium elevated 5/31/2023      Past Surgical History:   Procedure Laterality Date     BIOPSY  07/30/2019    the right breast tissue(benign)     COLONOSCOPY N/A 7/14/2022    Procedure: COLONOSCOPY;  Surgeon: Roderick Campos MD;  Location: [facility] OR     EYE SURGERY    "   3 surgeries for glaucoma treatment     OB History   No obstetric history on file.       Review of Systems   Constitutional: Negative for chills and fever.   HENT: Negative for congestion, ear pain, hearing loss and sore throat.    Eyes: Negative for pain and visual disturbance.   Respiratory: Negative for cough and shortness of breath.    Cardiovascular: Negative for chest pain, palpitations and peripheral edema.   Gastrointestinal: Negative for abdominal pain, constipation, diarrhea, heartburn, hematochezia and nausea.   Breasts:  Negative for tenderness, breast mass and discharge.   Genitourinary: Negative for dysuria, frequency, genital sores, hematuria, pelvic pain, urgency, vaginal bleeding and vaginal discharge.   Musculoskeletal: Negative for arthralgias, joint swelling and myalgias.   Skin: Negative for rash.   Neurological: Negative for dizziness, weakness, headaches and paresthesias.   Psychiatric/Behavioral: Negative for mood changes. The patient is not nervous/anxious.         OBJECTIVE:   /84 (BP Location: Right arm, Patient Position: Sitting, Cuff Size: Adult Regular)   Pulse 73   Temp 97.2  F (36.2  C) (Tympanic)   Resp 16   Ht 1.619 m (5' 3.75\")   Wt 60.5 kg (133 lb 6.4 oz)   LMP  (LMP Unknown)   SpO2 99%   BMI 23.08 kg/m    Physical Exam  GENERAL: healthy, alert and no distress  EYES: Eyes grossly normal to inspection, PERRL and conjunctivae and sclerae normal, glasses  HENT: ear canals and TM's normal, nose and mouth without ulcers or lesions  NECK: no adenopathy, no asymmetry, masses, or scars and thyroid normal to palpation  RESP: lungs clear to auscultation - no rales, rhonchi or wheezes  BREAST: normal without masses, tenderness or nipple discharge and no palpable axillary masses or adenopathy  CV: regular rate and rhythm, normal S1 S2, no S3 or S4, no murmur, click or rub, no peripheral edema and peripheral pulses strong  ABDOMEN: soft, non tender, no hepatosplenomegaly, no " masses and bowel sounds normal  MS: no gross musculoskeletal defects noted, no edema  SKIN: no suspicious lesions or rashes, multiple pigmented nevi, nails look better   NEURO: Normal strength and tone, mentation intact and speech normal  PSYCH: mentation appears normal, affect normal/bright  LYMPH: no cervical, supraclavicular, axillary, or inguinal adenopathy    Diagnostic Test Results:  Labs reviewed in Epic  No results found for this or any previous visit (from the past 24 hour(s)).  Results for orders placed or performed in visit on 05/26/23   Comprehensive metabolic panel (BMP + Alb, Alk Phos, ALT, AST, Total. Bili, TP)     Status: Normal   Result Value Ref Range    Sodium 140 136 - 145 mmol/L    Potassium 4.1 3.4 - 5.3 mmol/L    Chloride 105 98 - 107 mmol/L    Carbon Dioxide (CO2) 23 22 - 29 mmol/L    Anion Gap 12 7 - 15 mmol/L    Urea Nitrogen 11.4 8.0 - 23.0 mg/dL    Creatinine 0.67 0.51 - 0.95 mg/dL    Calcium 10.0 8.8 - 10.2 mg/dL    Glucose 94 70 - 99 mg/dL    Alkaline Phosphatase 62 35 - 104 U/L    AST 26 10 - 35 U/L    ALT 13 10 - 35 U/L    Protein Total 7.3 6.4 - 8.3 g/dL    Albumin 5.1 3.5 - 5.2 g/dL    Bilirubin Total 0.4 <=1.2 mg/dL    GFR Estimate >90 >60 mL/min/1.73m2   Lipid Profile (Chol, Trig, HDL, LDL calc)     Status: Abnormal   Result Value Ref Range    Cholesterol 252 (H) <200 mg/dL    Triglycerides 144 <150 mg/dL    Direct Measure HDL 76 >=50 mg/dL    LDL Cholesterol Calculated 147 (H) <=100 mg/dL    Non HDL Cholesterol 176 (H) <130 mg/dL    Narrative    Cholesterol  Desirable:  <200 mg/dL    Triglycerides  Normal:  Less than 150 mg/dL  Borderline High:  150-199 mg/dL  High:  200-499 mg/dL  Very High:  Greater than or equal to 500 mg/dL    Direct Measure HDL  Female:  Greater than or equal to 50 mg/dL   Male:  Greater than or equal to 40 mg/dL    LDL Cholesterol  Desirable:  <100mg/dL  Above Desirable:  100-129 mg/dL   Borderline High:  130-159 mg/dL   High:  160-189 mg/dL   Very High:   >= 190 mg/dL    Non HDL Cholesterol  Desirable:  130 mg/dL  Above Desirable:  130-159 mg/dL  Borderline High:  160-189 mg/dL  High:  190-219 mg/dL  Very High:  Greater than or equal to 220 mg/dL   TSH with free T4 reflex     Status: Normal   Result Value Ref Range    TSH 0.63 0.30 - 4.20 uIU/mL   Hemoglobin A1c     Status: Abnormal   Result Value Ref Range    Hemoglobin A1C 5.7 (H) 0.0 - 5.6 %   Vitamin D Deficiency     Status: Normal   Result Value Ref Range    Vitamin D, Total (25-Hydroxy) 43 20 - 75 ug/L    Narrative    Season, race, dietary intake, and treatment affect the concentration of 25-hydroxy-Vitamin D. Values may decrease during winter months and increase during summer months. Values 20-29 ug/L may indicate Vitamin D insufficiency and values <20 ug/L may indicate Vitamin D deficiency.    Vitamin D determination is routinely performed by an immunoassay specific for 25 hydroxyvitamin D3.  If an individual is on vitamin D2(ergocalciferol) supplementation, please specify 25 OH vitamin D2 and D3 level determination by LCMSMS test VITD23.     Parathyroid Hormone Intact     Status: Normal   Result Value Ref Range    Parathyroid Hormone Intact 35 15 - 65 pg/mL    Narrative    This result was obtained with the Roche Elecsys PTH STAT assay.   This reference range differs from PTH assays used in other [facility] laboratories.   CBC with platelets and differential     Status: None   Result Value Ref Range    WBC Count 5.1 4.0 - 11.0 10e3/uL    RBC Count 4.31 3.80 - 5.20 10e6/uL    Hemoglobin 12.8 11.7 - 15.7 g/dL    Hematocrit 39.4 35.0 - 47.0 %    MCV 91 78 - 100 fL    MCH 29.7 26.5 - 33.0 pg    MCHC 32.5 31.5 - 36.5 g/dL    RDW 13.2 10.0 - 15.0 %    Platelet Count 383 150 - 450 10e3/uL    % Neutrophils 40 %    % Lymphocytes 50 %    % Monocytes 7 %    % Eosinophils 2 %    % Basophils 1 %    % Immature Granulocytes 0 %    NRBCs per 100 WBC 0 <1 /100    Absolute Neutrophils 2.1 1.6 - 8.3 10e3/uL    Absolute  Lymphocytes 2.5 0.8 - 5.3 10e3/uL    Absolute Monocytes 0.3 0.0 - 1.3 10e3/uL    Absolute Eosinophils 0.1 0.0 - 0.7 10e3/uL    Absolute Basophils 0.0 0.0 - 0.2 10e3/uL    Absolute Immature Granulocytes 0.0 <=0.4 10e3/uL    Absolute NRBCs 0.0 10e3/uL   CBC with platelets and differential     Status: None    Narrative    The following orders were created for panel order CBC with platelets and differential.  Procedure                               Abnormality         Status                     ---------                               -----------         ------                     CBC with platelets and d...[[id number]]                      Final result                 Please view results for these tests on the individual orders.       ASSESSMENT/PLAN:       ICD-10-CM    1. Routine history and physical examination of adult  Z00.00       2. Hyperlipidemia, unspecified hyperlipidemia type  E78.5 Lipid Profile (Chol, Trig, HDL, LDL calc)     TSH with free T4 reflex     Lipid Profile (Chol, Trig, HDL, LDL calc)     TSH with free T4 reflex      3. Prediabetes  R73.03 Comprehensive metabolic panel (BMP + Alb, Alk Phos, ALT, AST, Total. Bili, TP)     TSH with free T4 reflex     Hemoglobin A1c     Comprehensive metabolic panel (BMP + Alb, Alk Phos, ALT, AST, Total. Bili, TP)     TSH with free T4 reflex     Hemoglobin A1c      4. History of snoring  Z87.898 CBC with platelets and differential     TSH with free T4 reflex     CBC with platelets and differential     TSH with free T4 reflex      5. Sleep disorder  G47.9       6. Spinal stenosis of lumbar region, unspecified whether neurogenic claudication present  M48.061       7. Psoriatic arthropathy (H)  L40.50 DX Hip/Pelvis/Spine      8. Immunosuppression (H)  D84.9 DX Hip/Pelvis/Spine      9. Psoriasis of nail  L40.9       10. Multiple pigmented nevi  D22.9       11. Serum calcium elevated  E83.52 Comprehensive metabolic panel (BMP + Alb, Alk Phos, ALT, AST, Total. Bili, TP)      Vitamin D Deficiency     Parathyroid Hormone Intact     DX Hip/Pelvis/Spine     Comprehensive metabolic panel (BMP + Alb, Alk Phos, ALT, AST, Total. Bili, TP)     Vitamin D Deficiency     Parathyroid Hormone Intact      12. Screening for osteoporosis  Z13.820 DX Hip/Pelvis/Spine      13. Family history of osteoporosis  Z82.62 DX Hip/Pelvis/Spine      14. Diverticulosis of large intestine without hemorrhage  K57.30 CBC with platelets and differential     CBC with platelets and differential      15. Low-tension glaucoma of both eyes, severe stage  H40.1233       16. Myopia, unspecified laterality  H52.10       17. Family history of malignant neoplasm of breast  Z80.3       18. Health care maintenance  Z00.00 REVIEW OF HEALTH MAINTENANCE PROTOCOL ORDERS      19. Advanced directives, counseling/discussion  Z71.89       20. Need for COVID-19 vaccine  Z23 COVID-19 BIVALENT 12+ (PFIZER)        Here for preventive health and additional concerns   No breast issues. Exam normal prior axillar pain resolved. Dx Mammo & u/S in feb was normal. Continue with annual screening. Fh of breast cancer in an aunt , declines a  referral as busy with other specialists.   Pap due 2025. No vaginal symptoms  HM reviewed  Encouraged working on health care directives  Colonoscopy due 2032  Discussed dexa  Vaccines utd  Will do COVID booster today as immunosuppressed    Hyperlipidemia.  Will do labs today. Later labs showed LDL(bad) cholesterol level is elevated which can increase heart disease risk.  A diet high in fat and simple carbohydrates, genetics and being overweight can contribute to this. The 10-year ASCVD risk score (Cornelius CHOUDHARY, et al., 2019) is: 2.8% ( low), Advised exercising 150 minutes of aerobic exercise per week (30 minutes for 5 days per week or 50 minutes for 3 days per week are options) and eating a low saturated fat/low carbohydrate diet are helpful to improve this. In  3 to 12 months, recheck a fasting cholesterol  panel.  Liver and gallbladder tests are normal (ALT,AST, Alk phos, bilirubin), kidney function is normal (Cr, GFR), sodium is normal, potassium is normal, calcium is normal, glucose is normal. TSH (thyroid stimulating hormone) level is normal which indicates normal thyroid function.    Prediabetes, notes been working on weight loss. Labs came back at 5.7 similar to prior. Continue to work smaller carb portions & recheck in 6 to 12 months.     Hx of snoring. Has day time drowsiness after meals sitting down triggers a nap of 15 min then able to get up & go, no falling asleep at wheel. Gets 6 hrs of sleep at night or less may be contributing. Feels gets up refreshed, no am headaches. Is on gabapentin. Declines referral to sleep. Sleep on side. Eat less carb's at lunch.     Sometimes wakes up, has trouble falling back to sleep. Discussed with the specialty pharmacist & advised to use melatonin. Waits to take if not able to sleep couple days in a row. While it helps her  sleep well it makes her groggy in am. Sleep hygiene discussed briefly.     History of osteoarthritis with spinal stenosis lumbar spine seen by sports doctor in the past & has continued on gabapentin for pain relief taking 300 mg twice a day. Not ready to try coming off it.     Psoriatic arthropathy currently well controlled in remission clinically & on labs on Xeljanz, Otelza & Celebrex under care of rheum.  Previously has tried Humira, Enbrel, Cosentyx,  Had a flare up 1 month ago when awaiting Xeljanz to be approved to continue but now back on it and doing well. No longer on prednisone since may 13 th. Reports no side effects. Understands is on high risk meds, has had no symptoms of gastritis or gi bleed or effect on kidney function or BP or heart & continues on these meds. Cbc & CMP normal to date. Will recheck today formed monitoring. Later labs showed normal red blood cell (Hgb) levels, normal white blood cell count and normal platelet  levels.    Psoriasis of nail and skin well controlled on clobetasol ointment to body and lidex  solution to scalp and genital area as needed under care of her dermatologist. No new moles.     Elevated calcium at last lab with rheum. Will recheck today. Is on calcium and vitamin D. Is at risk of osteoporosis given hx of chronic prednisone use, high risk meds, post menopausal & family hx.  Dexa scan ordered. Later labs showed normal calcium, vitamin D and PTH & recommend getting 1000 to 2000 IU daily in diet or supplements    Asymptomatic diverticulosis noted on colonoscopy last year. Recommend a high fiber diet.     Continue care with eye doc for Myopia and glaucoma.    Return in 1 yr for a preventive physical & sooner for any new concerns.      Patient has been advised of split billing requirements and indicates understanding: Yes      COUNSELING:  Reviewed preventive health counseling, as reflected in patient instructions       Regular exercise       Healthy diet/nutrition       Vision screening       Immunizations    Vaccinated for: Covid-19         Alcohol Use       Osteoporosis prevention/bone health       Colorectal Cancer Screening       (Zahra)menopause management       The 10-year ASCVD risk score (Cornelius CHOUDHARY, et al., 2019) is: 2.8%    Values used to calculate the score:      Age: 62 years      Sex: Female      Is Non- : No      Diabetic: No      Tobacco smoker: No      Systolic Blood Pressure: 121 mmHg      Is BP treated: No      HDL Cholesterol: 76 mg/dL      Total Cholesterol: 252 mg/dL       Advance Care Planning    She reports that she has never smoked. She has never used smokeless tobacco.    Geovanna Rosales MD  [facility]

## 2023-05-26 NOTE — PATIENT INSTRUCTIONS
See notes for details discussed at visit     Preventive Health Recommendations  Female Ages 50 - 64    Yearly exam: See your health care provider every year in order to  Review health changes.   Discuss preventive care.    Review your medicines if your doctor has prescribed any.    Get a Pap test every three years (unless you have an abnormal result and your provider advises testing more often).  If you get Pap tests with HPV test, you only need to test every 5 years, unless you have an abnormal result.   You do not need a Pap test if your uterus was removed (hysterectomy) and you have not had cancer.  You should be tested each year for STDs (sexually transmitted diseases) if you're at risk.   Have a mammogram every 1 to 2 years.  Have a colonoscopy at age 50, or have a yearly FIT test (stool test). These exams screen for colon cancer.    Have a cholesterol test every 5 years, or more often if advised.  Have a diabetes test (fasting glucose) every three years. If you are at risk for diabetes, you should have this test more often.   If you are at risk for osteoporosis (brittle bone disease), think about having a bone density scan (DEXA).    Shots: Get a flu shot each year. Get a tetanus shot every 10 years.    Nutrition:   Eat at least 5 servings of fruits and vegetables each day.  Eat whole-grain bread, whole-wheat pasta and brown rice instead of white grains and rice.  Get adequate Calcium and Vitamin D.     Lifestyle  Exercise at least 150 minutes a week (30 minutes a day, 5 days a week). This will help you control your weight and prevent disease.  Limit alcohol to one drink per day.  No smoking.   Wear sunscreen to prevent skin cancer.   See your dentist every six months for an exam and cleaning.  See your eye doctor every 1 to 2 years.

## 2023-05-29 LAB — DEPRECATED CALCIDIOL+CALCIFEROL SERPL-MC: 43 UG/L (ref 20–75)

## 2023-05-31 PROBLEM — E83.52 SERUM CALCIUM ELEVATED: Status: RESOLVED | Noted: 2023-05-31 | Resolved: 2023-05-31

## 2023-05-31 PROBLEM — G47.9 SLEEP DISORDER: Status: ACTIVE | Noted: 2023-05-31

## 2023-05-31 PROBLEM — E83.52 SERUM CALCIUM ELEVATED: Status: ACTIVE | Noted: 2023-05-31

## 2023-05-31 PROBLEM — Z82.62 FAMILY HISTORY OF OSTEOPOROSIS: Status: ACTIVE | Noted: 2023-05-31

## 2023-05-31 PROBLEM — D22.9 MULTIPLE PIGMENTED NEVI: Status: ACTIVE | Noted: 2023-05-31

## 2023-06-12 ENCOUNTER — TELEPHONE (OUTPATIENT)
Dept: RHEUMATOLOGY | Facility: CLINIC | Age: 63
End: 2023-06-12
Payer: COMMERCIAL

## 2023-06-14 NOTE — TELEPHONE ENCOUNTER
Prior Authorization Approval    Medication: OTEZLA 30 MG PO TABS  Authorization Effective Date: 7/26/2023  Authorization Expiration Date: 7/26/2024  Approved Dose/Quantity: bid  Reference #: [id number]   Insurance Company: [employer] Phone [phone] Fax [phone]  Expected CoPay:       CoPay Card Available:      Financial Assistance Needed: no  Which Pharmacy is filling the prescription: [facility] - [place], [place] - [facility] [address]  Pharmacy Notified: Yes  Patient Notified: Yes

## 2023-06-25 DIAGNOSIS — M54.41 ACUTE RIGHT-SIDED LOW BACK PAIN WITH RIGHT-SIDED SCIATICA: ICD-10-CM

## 2023-06-28 RX ORDER — GABAPENTIN 300 MG/1
300 CAPSULE ORAL 3 TIMES DAILY
Qty: 90 CAPSULE | Refills: 2 | Status: SHIPPED | OUTPATIENT
Start: 2023-06-28 | End: 2023-11-13

## 2023-07-05 ENCOUNTER — OFFICE VISIT (OUTPATIENT)
Dept: DERMATOLOGY | Facility: CLINIC | Age: 63
End: 2023-07-05
Payer: COMMERCIAL

## 2023-07-05 DIAGNOSIS — L40.9 PSORIASIS: Primary | ICD-10-CM

## 2023-07-05 DIAGNOSIS — D22.9 MULTIPLE BENIGN NEVI: ICD-10-CM

## 2023-07-05 DIAGNOSIS — L81.4 LENTIGINES: ICD-10-CM

## 2023-07-05 PROCEDURE — 99214 OFFICE O/P EST MOD 30 MIN: CPT | Performed by: DERMATOLOGY

## 2023-07-05 RX ORDER — TAZAROTENE 1 MG/G
CREAM TOPICAL
Qty: 30 G | Refills: 3 | Status: SHIPPED | OUTPATIENT
Start: 2023-07-05

## 2023-07-05 RX ORDER — TACROLIMUS 1 MG/G
OINTMENT TOPICAL 2 TIMES DAILY
Qty: 30 G | Refills: 3 | Status: SHIPPED | OUTPATIENT
Start: 2023-07-05 | End: 2024-01-01

## 2023-07-05 ASSESSMENT — PAIN SCALES - GENERAL: PAINLEVEL: NO PAIN (0)

## 2023-07-05 NOTE — LETTER
7/5/2023       RE: Willa Downey  [address]  [facility] [address]     Dear Colleague,    Thank you for referring your patient, Willa Downey, to the [facility] [place] [facility]. Please see a copy of my visit note below.    [facility] Dermatology Note  Encounter Date: Jul 5, 2023  Office Visit     Dermatology Problem List:  1. Psoriasis with psoriatic arthritis: mainly on the hands, fingertips, nails   - current tx: xeljanz per rheumatology (12/2021 to present, did not tolerate hiatus 04/2023), prednisone 2.5 mg daily per rheumatology, clobetasol 0.05% ointment/cream for hands and trunk, clobetasol solution, t-gel and t-sal shampoo  - past tx: methotrexate (not effective), humira per rheumatology (started 2/2021, stoppped 5/2021, not effective), enbrel (6/2021 to 8/2021, not effective), cosentyx (8/2021-12/2021, unable to taper off prednisone while on cosentyx), lidex solution  - future: consider alternative biologic   2. Cyst of skin, posterior neck midline, s/p excision 8/25/21    ____________________________________________    Assessment & Plan:   # Psoriasis in the setting of psoriatic arthritis: chronic, active.  - Pt overall doing better on tofacitinib + Otezla but still pretty symptomatic hand involvement and had flare when had to be off otezla a few months ago which she is still recovering from  - As she is already on two biologic meds and has failed multiple as in prob list, will try to get home hand/foot nbUVB (she is not able to come to clinic 3x weekly)  - Also trial tazarotene cream at bedtime and Protopic ointment BID prn given failure of topical sterids  - Continue xeljanz, otezla, prednisone per rheumatology  - Continue clobetasol or lidex scalp solution daily prn; also has T gel/sal acid containing shampoo (dermarest)  - Continue T-gel and T-sal shampoos.  *rheum note reviewed     #  "Lesion to montior, R forearm  - phot today recheck 3 months  - ddx blue nevus v SK v lentigo    # Multiple benign nevi.   # Lentigenes  - Monitor for ABCDEs of melanoma   - Continue sun protection - recommend SPF 30 or higher with frequent application   - Return sooner if noticing changing or symptomatic lesions       Procedures Performed:   None     Follow-up: 3 months, prn for new or changing lesions    Staff:    Delilah Isidro MD    Department of Dermatology  [facility]: Phone: [phone], Fax: [phone]  7/5/2023    ____________________________________________    CC: Psoriasis (Psoriartic arthritis follow-up: \"Scalp feels better these days\" per patient/Mainly affects her hands/fingers)    HPI:  Ms. Willa Downey is a(n) 62 year old female who presents today as a return patient for above.    In April, had to be off xeljanz for 1 month.  During that time, rheum prescribed prednisone.    Immediately started having bad psoriasis when off xeljanz - scalp, hands, skin breaking out.  Now back on xeljanz and still on otezla.  Still some on hands.    Last year, happened every 2 months or and relatively minor.  Not as bad as it used to be but still annoying.    Prior to April, was at about 90%.    Not using any topicals - clobetasol doesn't seem to do anything, hands get sticky.  Using cetaphil right now.  Uses T gel in scalp, dermarest (has sal acid 3%)  When it gets red and irritated, uses lidex/clobetasol    New spot on right arm - 2-3 months, staying same size   Color and shape odd    Patient is otherwise feeling well, without additional skin concerns.    Labs Reviewed:  N/A    Physical Exam:  Vitals: LMP  (LMP Unknown)   SKIN: Full body skin exam excluding the genitals was performed including face, scalp, neck, ears, chest, back, bilateral arms, hands, bilateral legs, feet, and buttocks.   - scattered pustules and " deep-seated vesicles on fingertips   - right forearm 4 mm bluish gray macule, dermoscopy nonspecific no clear network  - Multiple regular brown pigmented macules and papules are identified on the trunk and extremities.   - Scattered brown macules on sun exposed areas.  - No other lesions of concern on areas examined.     Medications:  Current Outpatient Medications   Medication    apremilast (OTEZLA) 30 MG tablet    CALCIUM CARBONATE-VIT D-MIN PO    celecoxib (CELEBREX) 100 MG capsule    clobetasol (TEMOVATE) 0.05 % external solution    gabapentin (NEURONTIN) 300 MG capsule    loratadine 10 MG capsule    melatonin 3 MG tablet    Multiple Vitamins-Minerals (MULTIVITAMIN ADULTS 50+) TABS    tofacitinib (XELJANZ XR) 11 MG 24 hr tablet    predniSONE (DELTASONE) 2.5 MG tablet     No current facility-administered medications for this visit.      Past Medical History:   Patient Active Problem List   Diagnosis    Hyperlipidemia    Low-tension glaucoma of both eyes, severe stage    Myopia    Screening for malignant neoplasm of cervix    Psoriasis of nail    Psoriatic arthropathy (H)    Spinal stenosis of lumbar region, unspecified whether neurogenic claudication present    Immunosuppression (H)    Family history of malignant neoplasm of breast    History of snoring    Prediabetes    Diverticulosis of large intestine    Sleep disorder    Multiple pigmented nevi    Family history of osteoporosis     Past Medical History:   Diagnosis Date    Bilateral low back pain with right-sided sciatica, unspecified chronicity 11/11/2020    Neuroforaminal stenosis of lumbar spine 11/11/2020    Nuclear senile cataract of both eyes 08/16/2018    Osteoarthritis 10/15/2020    Psoriatic arthropathy (H) 10/19/2020    Serum calcium elevated 5/31/2023       CC No referring provider defined for this encounter. on close of this encounter.

## 2023-07-05 NOTE — PROGRESS NOTES
[facility] Dermatology Note  Encounter Date: Jul 5, 2023  Office Visit     Dermatology Problem List:  1. Psoriasis with psoriatic arthritis: mainly on the hands, fingertips, nails   - current tx: xeljanz per rheumatology (12/2021 to present, did not tolerate hiatus 04/2023), prednisone 2.5 mg daily per rheumatology, clobetasol 0.05% ointment/cream for hands and trunk, clobetasol solution, t-gel and t-sal shampoo  - past tx: methotrexate (not effective), humira per rheumatology (started 2/2021, stoppped 5/2021, not effective), enbrel (6/2021 to 8/2021, not effective), cosentyx (8/2021-12/2021, unable to taper off prednisone while on cosentyx), lidex solution  - future: consider alternative biologic   2. Cyst of skin, posterior neck midline, s/p excision 8/25/21    ____________________________________________    Assessment & Plan:   # Psoriasis in the setting of psoriatic arthritis: chronic, active.  - Pt overall doing better on tofacitinib + Otezla but still pretty symptomatic hand involvement and had flare when had to be off otezla a few months ago which she is still recovering from  - As she is already on two biologic meds and has failed multiple as in prob list, will try to get home hand/foot nbUVB (she is not able to come to clinic 3x weekly)  - Also trial tazarotene cream at bedtime and Protopic ointment BID prn given failure of topical sterids  - Continue xeljanz, otezla, prednisone per rheumatology  - Continue clobetasol or lidex scalp solution daily prn; also has T gel/sal acid containing shampoo (dermarest)  - Continue T-gel and T-sal shampoos.  *rheum note reviewed     # Lesion to montior, R forearm  - phot today recheck 3 months  - ddx blue nevus v SK v lentigo    # Multiple benign nevi.   # Lentigenes  - Monitor for ABCDEs of melanoma   - Continue sun protection - recommend SPF 30 or higher with frequent application   - Return sooner if noticing changing or symptomatic  "lesions       Procedures Performed:   None     Follow-up: 3 months, prn for new or changing lesions    Staff:    Delilah Isidro MD    Department of Dermatology  [facility]: Phone: [phone], Fax: [phone]  7/5/2023    ____________________________________________    CC: Psoriasis (Psoriartic arthritis follow-up: \"Scalp feels better these days\" per patient/Mainly affects her hands/fingers)    HPI:  Ms. Willa Downey is a(n) 62 year old female who presents today as a return patient for above.    In April, had to be off xeljanz for 1 month.  During that time, rheum prescribed prednisone.    Immediately started having bad psoriasis when off xeljanz - scalp, hands, skin breaking out.  Now back on xeljanz and still on otezla.  Still some on hands.    Last year, happened every 2 months or and relatively minor.  Not as bad as it used to be but still annoying.    Prior to April, was at about 90%.    Not using any topicals - clobetasol doesn't seem to do anything, hands get sticky.  Using cetaphil right now.  Uses T gel in scalp, dermarest (has sal acid 3%)  When it gets red and irritated, uses lidex/clobetasol    New spot on right arm - 2-3 months, staying same size   Color and shape odd    Patient is otherwise feeling well, without additional skin concerns.    Labs Reviewed:  N/A    Physical Exam:  Vitals: LMP  (LMP Unknown)   SKIN: Full body skin exam excluding the genitals was performed including face, scalp, neck, ears, chest, back, bilateral arms, hands, bilateral legs, feet, and buttocks.   - scattered pustules and deep-seated vesicles on fingertips   - right forearm 4 mm bluish gray macule, dermoscopy nonspecific no clear network  - Multiple regular brown pigmented macules and papules are identified on the trunk and extremities.   - Scattered brown macules on sun exposed areas.  - No other lesions of concern on " areas examined.     Medications:  Current Outpatient Medications   Medication     apremilast (OTEZLA) 30 MG tablet     CALCIUM CARBONATE-VIT D-MIN PO     celecoxib (CELEBREX) 100 MG capsule     clobetasol (TEMOVATE) 0.05 % external solution     gabapentin (NEURONTIN) 300 MG capsule     loratadine 10 MG capsule     melatonin 3 MG tablet     Multiple Vitamins-Minerals (MULTIVITAMIN ADULTS 50+) TABS     tofacitinib (XELJANZ XR) 11 MG 24 hr tablet     predniSONE (DELTASONE) 2.5 MG tablet     No current facility-administered medications for this visit.      Past Medical History:   Patient Active Problem List   Diagnosis     Hyperlipidemia     Low-tension glaucoma of both eyes, severe stage     Myopia     Screening for malignant neoplasm of cervix     Psoriasis of nail     Psoriatic arthropathy (H)     Spinal stenosis of lumbar region, unspecified whether neurogenic claudication present     Immunosuppression (H)     Family history of malignant neoplasm of breast     History of snoring     Prediabetes     Diverticulosis of large intestine     Sleep disorder     Multiple pigmented nevi     Family history of osteoporosis     Past Medical History:   Diagnosis Date     Bilateral low back pain with right-sided sciatica, unspecified chronicity 11/11/2020     Neuroforaminal stenosis of lumbar spine 11/11/2020     Nuclear senile cataract of both eyes 08/16/2018     Osteoarthritis 10/15/2020     Psoriatic arthropathy (H) 10/19/2020     Serum calcium elevated 5/31/2023       CC No referring provider defined for this encounter. on close of this encounter.

## 2023-07-05 NOTE — PATIENT INSTRUCTIONS
"Patient Instructions for Hand/Foot Narrow Band Ultraviolet B (NBUVB) Therapy    I will have pinkness or redness and my skin will be tan. I may have persistent redness or itching. Risks of the procedure include burn, pain, scar, skin discoloration, itching, eye damage, worsening of skin condition, skin aging and skin cancer. Multiple treatments may be required.     Treatments usually begin three times weekly with a minimum of 48 hours between treatments.     If your skin lesions are very scaly, you will be asked to apply mineral oil to the involved skin before treatment. This increases the penetration of the light.    If the wrists and/or arches of the feet are not involved with the skin disease, a sunscreen and/or zinc oxide will be applied to those areas during treatment.     All patients will wear ultraviolet light-blocking eye protection in the strange. Close your eyes behind the goggles.     If you have orders to cover additional parts of your body during the treatment, always use this same item every time to prevent burning.      You should not sunbathe or go to tanning booths during the time you are receiving light therapy. When you are outdoors you should apply a sunscreen to all exposed areas.     If you get sunburned you will not be able to receive light treatments.    Light therapy has a drying effect on the skin, so it is important to apply a moisturizing cream or ointment frequently.    You may want to try and schedule a light appointment on the same day as your follow-up clinic visits.    Report any redness or burning that lasts longer than 24 hours, any increase in itching, or changes in skin condition. You should feel like your skin has a \"sun kissed\" feeling.    Inform your phototherapy nurse or your Dermatology doctor, before having any more light treatments, if (a) you start taking any new medication or (b) you develop any new health problems.     Please do not bring children to your phototherapy " appointments. If children must come with you, please bring a responsible adult caregiver to supervise them.    13. IF YOU ARE HAVING TREATMENT AT THE [facility], you must check with your insurance company to make sure phototherapy is covered before you can start treatment.    Procedure codes to tell your insurance company are:   UVB- 16237 if you are using petroleum or sunscreen during the treatment  [id number] if you are not going petroleum or sunscreen.   [id number]  Who should I call with questions?  [facility]: [phone]   [facility]: [phone]  For urgent needs outside of business hours call the [facility] at [phone] and ask for the dermatology resident on call

## 2023-07-05 NOTE — NURSING NOTE
"Dermatology Rooming Note    Willa Downey's goals for this visit include:   Chief Complaint   Patient presents with     Psoriasis     Psoriartic arthritis follow-up: \"Scalp feels better these days\" per patient  Mainly affects her hands/fingers     Claritza Ocampo LPN    "

## 2023-07-11 ENCOUNTER — TELEPHONE (OUTPATIENT)
Dept: DERMATOLOGY | Facility: CLINIC | Age: 63
End: 2023-07-11
Payer: COMMERCIAL

## 2023-07-11 NOTE — TELEPHONE ENCOUNTER
Faxed [facility] home phototherapy order form, insurance information, clinic notes and letter of medical necessity to [facility] [employer] at [phone]    Form sent to scanning.     Joanne Bhat RN

## 2023-07-11 NOTE — TELEPHONE ENCOUNTER
[facility] Call Center    Phone Message    May a detailed message be left on voicemail: yes     Reason for Call: Other: Pt calling to schedule 3 month follow up with Sanna. Soonest avail is Dec. Please call [phone]. Thanks!      Action Taken: Message routed to:  [facility] DERM    Travel Screening: Not Applicable

## 2023-07-13 ENCOUNTER — TELEPHONE (OUTPATIENT)
Dept: DERMATOLOGY | Facility: CLINIC | Age: 63
End: 2023-07-13
Payer: COMMERCIAL

## 2023-07-13 NOTE — TELEPHONE ENCOUNTER
Via phone pt got schedule for the following:    Appointment type: Return  Provider: Dr. Isidro   Return date: 1/3/2024  Specialty phone number: [phone]

## 2023-07-27 ENCOUNTER — TELEPHONE (OUTPATIENT)
Dept: DERMATOLOGY | Facility: CLINIC | Age: 63
End: 2023-07-27
Payer: COMMERCIAL

## 2023-07-27 NOTE — TELEPHONE ENCOUNTER
Central Prior Authorization Team   Phone: [phone]    PA Initiation    Medication: TACROLIMUS 0.1 % EX OINT  Insurance Company: [employer] - Phone [phone] Fax [phone]  Pharmacy Filling the Rx: [facility] [place], [place] - [address]  Filling Pharmacy Phone: [phone]  Filling Pharmacy Fax:    Start Date: 7/27/2023

## 2023-07-27 NOTE — TELEPHONE ENCOUNTER
Insurance plan prefers [employer] Tazorac, but still requires a P/A      Central Prior Authorization Team   Phone: [phone]    PA Initiation    Medication: TAZORAC 0.1 % EX CREA  Insurance Company: [employer] - Phone [phone] Fax [phone]  Pharmacy Filling the Rx: [facility] [address]  Filling Pharmacy Phone: [phone]  Filling Pharmacy Fax:    Start Date: 7/27/2023

## 2023-07-28 NOTE — TELEPHONE ENCOUNTER
PRIOR AUTHORIZATION DENIED    Medication: TACROLIMUS 0.1 % EX OINT  Insurance Company: [employer] - Phone [phone] Fax [phone]  Denial Date: 7/27/2023  Denial Rational:           Appeal Information:

## 2023-07-28 NOTE — TELEPHONE ENCOUNTER
Writer Aneudy messaged patient in regards to her two medications prior authorizations.    Encouraged patient to look at [employer] for options.    Yumiko REDMOND RN

## 2023-07-28 NOTE — TELEPHONE ENCOUNTER
Prior Authorization Approval    Medication: TAZORAC 0.1 % EX CREA  Authorization Effective Date: 7/27/2023  Authorization Expiration Date: 7/27/2024  Approved Dose/Quantity:  30 gm per 30 days  Reference #: [id number]   Insurance Company: [employer] - Phone [phone] Fax [phone]  Expected CoPay:       CoPay Card Available:      Financial Assistance Needed:   Which Pharmacy is filling the prescription: [facility] [address]  Pharmacy Notified: Yes  Patient Notified: No

## 2023-08-02 ENCOUNTER — VIRTUAL VISIT (OUTPATIENT)
Dept: RHEUMATOLOGY | Facility: CLINIC | Age: 63
End: 2023-08-02
Attending: INTERNAL MEDICINE
Payer: COMMERCIAL

## 2023-08-02 VITALS — WEIGHT: 131 LBS | HEIGHT: 64 IN | BODY MASS INDEX: 22.36 KG/M2

## 2023-08-02 DIAGNOSIS — L40.0 PLAQUE PSORIASIS: ICD-10-CM

## 2023-08-02 DIAGNOSIS — L40.9 PSORIASIS OF NAIL: ICD-10-CM

## 2023-08-02 DIAGNOSIS — L40.50 PSORIATIC ARTHRITIS (H): Primary | ICD-10-CM

## 2023-08-02 DIAGNOSIS — Z79.1 NSAID LONG-TERM USE: ICD-10-CM

## 2023-08-02 DIAGNOSIS — Z79.899 HIGH RISK MEDICATION USE: ICD-10-CM

## 2023-08-02 PROCEDURE — 99215 OFFICE O/P EST HI 40 MIN: CPT | Mod: VID | Performed by: INTERNAL MEDICINE

## 2023-08-02 ASSESSMENT — PAIN SCALES - GENERAL: PAINLEVEL: MODERATE PAIN (4)

## 2023-08-02 NOTE — PROGRESS NOTES
Virtual Visit Details    Type of service:  Video Visit   Joined the call at 8/2/2023, 1:23:20 pm.  Left the call at 8/2/2023, 1:58:40 pm.  You were on the call for 35 minutes 19 seconds .  Originating Location (pt. Location): Home    Distant Location (provider location):  On-site  Platform used for Video Visit: [employer]Well

## 2023-08-02 NOTE — NURSING NOTE
Is the patient currently in the state of [place]? YES    Visit mode:VIDEO    If the visit is dropped, the patient can be reconnected by: VIDEO VISIT: Text to cell phone: [phone]    Will anyone else be joining the visit? NO    How would you like to obtain your AVS? MyChart    Are changes needed to the allergy or medication list? NO    Reason for visit: RECHECK    Pt completed echeck-in an states medications and allergies are correct.     Pierre Mccabe, VF

## 2023-08-02 NOTE — PROGRESS NOTES
Outpatient Rheumatology Follow-up    Name: Willa Downey    MRN [id number]   Today's date: 8/2/23         Reason for follow-up: psoriatic arthritis   Requesting physician: Kitty Mendez MD        Assessment & Plan:   #Psoriatic arthritis  #negative RF  #psoriasis, cutaneous and nail involvement  #DIP Left hand 3rd, 4th, 5th and right hand 4th digit inflammatory arthritis  #humira, enbrel, methotrexate failure  #Submandibular LAD- resolved  #High risk drug therapy: xeljanz and Otezla    62 year old female with hx of advanced glaucoma followed closely by ophthalmology presented to rheumatology on 2/5/21 with 5 month of progressive left hand 3rd, 4th, 5th digit and right hand 4th digit DIP inflammatory arthritis in the context of psoriasis, psoriatic changes of the nails. Taken together most consistent with psoriatic arthritis. Tried on 2 NSAIDs which failed to control symptoms, and had severe GERD symptoms. Tried methotrexate by prior rheumatologist and disease not controlled. Humira started Mid February 2021 due to lack of efficacy of these other therapies and continued through May 14th, though had severe/ rapidly progressive disease after some improvement in the first 4 weeks involving DIP inflammatory arthritis and nail disease. We then ordered Enbrel, though starting was delayed due to development of impressive bilateral submandibular lymphadenopathy which resolved within about 1 week and thought likely secondary to viral infection. She started enbrel on 6/20/2021 which she had continued on until switching to cosentyx after approval on 8/31/21 due to lack of efficacy of enbrel and injection site reaction. Had been on cosentyx from 8/31/21 with initial improvement in inflammatory DIP arthritis, psoriatic lesions, stabilization of her nail disease though she then had progression of her cutaneous nail and joint inflammatory process which prompted restarting prednisone at 10 mg daily along with doubling  her dose of Cosentyx to 300 mg each month.  She took her first dose of 300 mg on 11/23/2021.  She did try to decrease her prednisone shortly after that down to 7.5 though noticed return of her skin peeling/psoriatic skin lesions worsening and so went back up to 10 mg daily which has again improved these symptoms. She was again unable to taper from prednisone below 10mg daily without progression of both cutaneous and articular symptoms and so cosentyx was discontinued and xeljanz 11mg once daily started after insurance approval on 12/29/21. With each attempt to taper her steroids, had difficultly due to return of psoriasis so Willa was seen by Dermatology and started on otezla in addition to xeljanz. She returns today for follow-up.    Psoriatic Arthritis: Ms Downey continues on Xeljanz and Otezla.  She had been off of Xeljanz since April 3 due to insurance issues and immediately noticed worsening/active symptoms requiring restarting systemic prednisone which was extremely difficult to taper in the past due to rebound phenomenon which is well described with cutaneous psoriasis.  Her Xeljanz was just approved at the time of her last visit and she restarted it.  She was started on a prednisone taper on 4/18/2023 and at the time of her last appointment started noticing improvement.  Her prednisone taper started at 10 mg and she was decreasing by 2.5 mg every 7 days.  She is now back on Xeljanz and Otezla and we are both hopeful that this combination again. This has allowed her to slowly completely taper off of steroids successfully since her last visit with me. She is currently only on otezla BID and xeljanz 11mg once daily. Her inflammatory synovitis/tenosynovitis and cutaneous disease is currently quiet  -Continue otezla BID  -Continue xeljanz 11mg once daily  -Has completely tapered off of prednisone. Remain off of prednisone at this time given quiet/improving disease    High risk medication: Xeljanz and  otezla  -No side effects by history/exam/available serologies to include CBC and CMP from 5/26/23 without evidence of toxicity  -Risks and benefits again discussed today of both therapies to include infection, BM suppression, Rash, HA, GI/hepatotoxicity, malignancy, DVT/PE, all cause mortality among others. Patient agreeable to continue  -Labs q6 months. Next due in Nov 2023.     Long term NSAID use: celebrex  -Labs q6 months for monitoring. Standing orders in place. Reviewed labs from 5/26/23 to include CBC and CMP without evidence of toxicity    Follow-up with me in [facility] with labs due at that time.     Marc Arias MD  Rheumatology    I spent a total of 40 minutes on the date of service on chart review, patient encounter, , documentation.      Subjective:   August 2, 2023  -was off of xeljanz for about full month in April. Was taking prednisone during that time. Was having fingertip peeling. When one gets better another gets worse. More joint pain on her hands. Also part of her toes.   -This month, her skin and joint pains are much improved. Her skin is currently completely cleared.   -Still will have joint pains  -has completely tapered off of prednisone  -has continued on xeljanz and otezla. Tolerating well without noticeable side effects  -every 1-2 months, will have skin peeling on her hands, otherwise she feels well. Joint pain is not associated with erythema/edema/warmth. Described as shooting pain. In her CMCs/bialteral thumb IP joints. Has stiffness in her DIPS/PIPs.   -Does have stiffness in the AM that is localized to bilateral DIPs. Lasts for 1-2 hours.   -no interval infections. No fevers/chills/night sweats.   -stiffness does not limit any specific activities that she wants to do  -she does avoid certain activities due to the pain in her hands  -The pain that she has after increased activity is at the end of the day  -has been going for walks in the AM 2.5 miles 5x/week.  Afterwards/towards the end, her right foot will have   14 point ROS collected and negative if not documented above    April 28, 2023   -last dose of xeljanz was on April 3rd  -One week later, all psoriasis symptoms returned. On fingertips/worsening joints/palms/ankles/scalp/back of palms.   -Continued on otezla  -Started on 10mg of prednisone on 4/18, took for 7 days, now on 7.5mg on day 4.   -xeljanz approved. Had delivered on Tuesday.  -starting today, after taking prednisone since 4/18, prednisone is helping. First day of improvement.   -still fatigue/lack of energy.   -No fevers chills night sweats.  No unintentional weight loss.  - No interval infections  - 14 point review of systems collected and otherwise negative    2/10/2023  - Continues on Otezla and Xeljanz without noticeable side effects.  - This combination has allowed for complete resolution of her cutaneous psoriasis  - She denies red hot or swollen joints.  Still some intermittent pain and discomfort in her DIPs though significantly improved.  - Also some pain discomfort in her CMC's secondary to known noninflammatory osteoarthritis  - Her fingernails are improving and look healthy on her hands with minimal digital pitting fissures and breaking  - No fevers chills night sweats  - No unintentional weight loss    September 16, 2022  Has started on otezla and without ongoing side effects with exception of new evening HA. She thinks it may be 5 or so days per week. Mild. During the day she does not notice anything. Noticing this after dinner/watching TV. Slight. Not to the point she needs to take anything. Has noticed that her psoriasis on her fingertips and scalp have improved. Still some sensitivity on her fingertips, but much better. Has been able to taper down to 1mg of prednisone. Has been on this dose for 6 days. Articular symptoms have been stable/at baseline. No obvious red/hot/swollen joints. No new areas of joint pain/involvement. No new side  "effects from xeljanz. Tolerating low dose prednisone without noticeable side effects. No interval infections. No fevers/chills/night sweats. Family member with COVID so has been isolating. Has continued to walk for exercise, as helps with overall symptoms, though has been experiencing pain in ball of foot/greater MTP. Low suspicion for gout, though will assess for other pathology.  14 point ROS collected and negative if not documented above.     Interval history 3/25/2022  Currently on 3mg of prednisone daily. Still with pink/ reddish spot on her scalp and left digits at tips of digits, though much improved. Has been tolerating her prednisone and xeljanz without issues. She did by accident take an increased dose of prednisone up to 10mg for 2 days when she was to reduce from 4 to 3mg due to having a different bottle of 2.5mg tabs (instead of the 1mg tabs she was taking). Her pain in the AM is 6/10. Takes her celebrex in the AM with pain resolution. No GI side effects from her celebrex. No fevers/chills/night sweats. She has noticed slight increase in the lesions of her digits since decreasing her prednisone down to 3mg for 5 days. Reports feeling well and that this is the best she has felt with DMARD therapy and being able to reduce her dose. No interval infections. ROS otherwise negative.     Interval history 1/28/22  Was already on prednisone 10mg and then xeljanz was added on Jan 3rd. Had complete resolution of psoriasis on scalp/inner ear. Hands also completely clear at that time. She then decreased to 7.5mg on 1/17 and for the first week was doing \"okay\", then this week noticed some pits on fingertips, some skin peeling. Yesterday and today has noticed some more skin peeling. Some more scalp irriation 3 days ago as well. She feels that since starting xeljanz this is the best improvement that she has found. Continues to take celebrex daily due to pain in joints without it. With celebrex, pain is resolved. If she " "does lots of activity (cleaning etc) will have some pain. No GI upset as she had with mobic. Not taking PPI. She has noticed some more fatigue in the AM and also more so in the afternoon. Uses clobetasol ointment and cream. Also topical for scalp. Uses them all at night. Using 0.05% clobetasol. Has continued to experience rare intermittent tender lymph nodes. Had last booster on August 21st. No interval infections. No fevers, chills, weight changes. Remains active. No noticeable side effects since starting xeljanz. ROS otherwise negative.     12/2/2021 interval history  Took her first dose of Cosentyx 200 mg daily on 11/23/2021.  Around that time tried to decrease her prednisone from 10 mg daily down to 7.5 mg daily though written to discuss she had return of joint pain, joint swelling and stiffness particularly of her DIPs, worsening of her nail disease and psoriatic lesions on the tips of her fingers.  Fatigue also more significant.  As a result she went back up to 10 mg daily and this improved some.  She is not been taking any NSAIDs since her last appointment despite her joint pain as the meloxicam previously prescribed it was giving her reflux/gastritis symptoms.  No injection site reaction from her Cosentyx.  No fevers or chills.  No weight changes.  Tolerating prednisone without issues.  Review of systems otherwise negative.    9/30/21 Interval history  Has been taking cosentyx without issues. Next dose on Friday and then switching to monthly. Has found that her skin is some improved over the fingertips. Though still some painful/burning sensation at the sites of prior lesions. This is improving. When she started prednisone, did have dramatic improvement. Though with taper improvement has been slower though has continued. Currently on 2.5mg daily. No side effects from low dose. Energy still \"not great\" though she thinks it is slowly improving. Going for walks 5 days per week 30-40 minutes per day. Reports " that the pain is OK when doing activities, but has been avoiding activities that are painful like weeding/carrying heavy items with grocery shopping. Has continued with mobic 3-4x week. 15mg tabs each time. No GI side effects at this frequency/dose. Still some stiff at DIPs. Though improved from prior. Did have lesion mid upper back and front groin which have improved since cosentyx/ prednisone. No fevers/chills. No interval infections. ROS otherwise negative.     August 12, 2021  Interval history  Had ?ear infection early July. Given ear drops by PCP. Had developed more psoriasis in new locations (palm of hand/scalp (10-15%)/lower stomach/low back/ and groin/gluteal cleft) where previously was on fingertips/nails only. Saw dermatology who prescribed topical, the topical has been somewhat helpful though has not resolves symptoms. She thinks that she has had 10-15% improvement with the topical. Spoke to pharmacist, who counseled her than can take up to 4 months for enbrel to take full effect. Gives herself injection every Sunday. After 5th injection, has itching/burning at the site. Takes Claritin already. Iced. Was told by pharmacist to try hydrocortisone, which she did. Decreased from 4-5 days of symptoms down to 3 days. From 5th injection onwards has had this reaction. Total of 8 injections so far. Joints much less of an issue compared to cutaneous involvement. If she has any joint pain, takes mobic. Roughly 1x per week. Around once per 7 -10days has extreme fatigue. Yesterday was significant. Slept until noon. Usually gets up at 630. Usually trouble sleeping, so this was unusual for her. No return of submandibular LAD. No fever, chills. No cough/sore throat. Some stiffness in the DIPs, morning predominant. Improves throughout the day. Some increase in pain this week. ROS otherwise negative.      6/17/21  Has advanced glaucoma/ dry eyes. Chronic. No new head/neck symptoms. Dry mouth during submandibular gland  swelling, dry mouth now resolved. Reports the swelling has mproved now by 50+%. No more pain, so no longer using ibuprofen. No fever/chills/cough or other signs/symptoms of infection. With ongoing progression of nail/distal digit predominant psoriasis and DIP arthritis. Severe pain. Unable to tolerate NSAIDs given GI upset despite PPI. ROS otherwise negative       5/14/21  Patient initially was going to wait to contact, though has been having progression of symptoms of nails/joint pain/ skin. Last injection was on Tuesday. Had a flare in the days following her injection. First 2 doses, she did notice some improvement. Though after that time, with each dose, has had less and less of a positive change in nails/joints/skin. Has instead been getting worse and worse response. Has stiffness and pain in her DIPs that has been progressive. Has redness as well. Also with radiation of the pain proximally which is new. Also some days with severe fatigue. No fevers/chills. No weight changes. No interval infections.     Past Medical History  Past Medical History:   Diagnosis Date    Bilateral low back pain with right-sided sciatica, unspecified chronicity 11/11/2020    Neuroforaminal stenosis of lumbar spine 11/11/2020    Nuclear senile cataract of both eyes 08/16/2018    Osteoarthritis 10/15/2020    Psoriatic arthropathy (H) 10/19/2020    Serum calcium elevated 5/31/2023     Past Surgical History  Past Surgical History:   Procedure Laterality Date    BIOPSY  07/30/2019    the right breast tissue(benign)    COLONOSCOPY N/A 7/14/2022    Procedure: COLONOSCOPY;  Surgeon: Roderick Campos MD;  [place]: [facility] [place]    EYE SURGERY      3 surgeries for glaucoma treatment     Medications  Current Outpatient Medications   Medication    apremilast (OTEZLA) 30 MG tablet    CALCIUM CARBONATE-VIT D-MIN PO    celecoxib (CELEBREX) 100 MG capsule    clobetasol (TEMOVATE) 0.05 % external solution    gabapentin (NEURONTIN) 300 MG  capsule    loratadine 10 MG capsule    melatonin 3 MG tablet    Multiple Vitamins-Minerals (MULTIVITAMIN ADULTS 50+) TABS    predniSONE (DELTASONE) 2.5 MG tablet    tacrolimus (PROTOPIC) 0.1 % external ointment    tazarotene (TAZORAC) 0.1 % external cream    tofacitinib (XELJANZ XR) 11 MG 24 hr tablet     No current facility-administered medications for this visit.     Allergies   Allergies   Allergen Reactions    Adhesive Tape Blisters, Itching and Swelling    Dust Mites     Latex Blisters, Itching, Other (See Comments) and Swelling     Skin sensitivity      Morphine Sulfate (Concentrate) Itching     Other reaction(s): Chills  heart palpatations    Seasonal Allergies     Codeine Palpitations     chills     Family History:   No family history of autoimmune diseases.     Social History  , with children. 2 kids and they are healthy. No ETOH, smoking, drug use.      Objective:    Physical exam:  LMP  (LMP Unknown)   Sitting up unassisted NAD. Pleasant and interactive as always  No facial rash, sclera clear  Breathing comfortably without use of accessory muscles no cough no audible wheeze  -Bilateral shoulders elbows wrist MCPs PIPs DIPs.  Able to make a full fist bilaterally.  No evidence of dactylitis.  No MCP valleys loss.  Lower extremities not evaluated.    WBC   Date Value Ref Range Status   06/11/2021 9.4 4.0 - 11.0 10e9/L Final     WBC Count   Date Value Ref Range Status   05/26/2023 5.1 4.0 - 11.0 10e3/uL Final     Hemoglobin   Date Value Ref Range Status   05/26/2023 12.8 11.7 - 15.7 g/dL Final   06/11/2021 12.9 11.7 - 15.7 g/dL Final     Platelet Count   Date Value Ref Range Status   05/26/2023 383 150 - 450 10e3/uL Final   06/11/2021 369 150 - 450 10e9/L Final     Creatinine   Date Value Ref Range Status   05/26/2023 0.67 0.51 - 0.95 mg/dL Final   06/11/2021 0.63 0.52 - 1.04 mg/dL Final     Lab Results   Component Value Date    ALKPHOS 64 02/16/2023    ALKPHOS 76 06/11/2021     AST   Date Value Ref  Range Status   05/26/2023 26 10 - 35 U/L Final   06/11/2021 14 0 - 45 U/L Final     Lab Results   Component Value Date    ALT 16 02/16/2023    ALT 19 06/11/2021     Sed Rate   Date Value Ref Range Status   06/16/2021 15 0 - 30 mm/h Final     Erythrocyte Sedimentation Rate   Date Value Ref Range Status   02/16/2023 8 0 - 30 mm/hr Final     CRP Inflammation   Date Value Ref Range Status   02/01/2022 <2.9 0.0 - 8.0 mg/L Final   06/16/2021 5.2 0.0 - 8.0 mg/L Final     UA RESULTS:  No results for input(s): COLOR, APPEARANCE, URINEGLC, URINEBILI, URINEKETONE, SG, UBLD, URINEPH, PROTEIN, UROBILINOGEN, NITRITE, LEUKEST, RBCU, WBCU in the last [id number] hours.   Rheumatoid Factor   Date Value Ref Range Status   02/05/2021 <7 <12 IU/mL Final     6/16/21  SSA, SSB negative  Mumps IgG and IgM positive  Creatinine 0.63  LFTs normal  CBC WNL    2/5/21  HIV negative  Hep C AB negative  Hep B s AG negative  Hep B c AB reactive  Quant gold negative  Quant gold negative  RF negative  CRP <2.9  ESR 10  Cr 0.62  WBC 7.1  HGB 13.1    HLA b27 negative  Hep B virus DNA negative    2/2/21  ESR 9  CRP <2.9  WBC 6.1  HGB 12.8      11/11/20  A1C 5.3  Hep C/ HIV negative    Imaging:  MR left hand  Impression:  1a. Correlating to the marker at the third digit interphalangeal  joint, there is a focal erosion along the volar aspect of the distal  phalangeal base with intense bone marrow edema and abnormal T1 marrow  signal. Findings are concerning for osteomyelitis and infection should  be ruled out. Also on the differential is sequelae of long-standing  active inflammatory arthropathy (including psoriatic arthritis given  patient's clinical history) where marginal erosions and marrow signal  abnormality are expected though the degree of T1 marrow replacement is  out of proportion to typical findings.   b. Subtle bone marrow edema in the middle phalanx without T1 marrow  signal abnormality, this is favored to represent reactive  edema.  c. Joint effusion at the third digit interphalangeal joint,  potentially infectious or inflammatory. Consider aspiration for  definitive diagnosis.  d. The partially visualized fifth digit shows edema within the distal  phalanx and head of the middle phalanx with regional soft tissue  edema. Given predominant findings in the third digit, similar changes  in the fifth digit could support a polyarticular inflammatory  arthropathy. Correlate clinically.  2. Tenosynovitis of the third digit flexor and extensor tendons. There  is also tendinosis with high-grade (near full-thickness) tearing of  the extensor tendon/extensor saul at the distal phalangeal base with  slight proximal retraction of the residual fibers.  3. Diffusely thickened, edematous ulnar and radial collateral  ligaments, likely combination of sprain and reactive edema.  4. Extensive soft tissue edema centered about the interphalangeal  joint.

## 2023-08-02 NOTE — LETTER
8/2/2023       RE: Willa Downey  [address]  [facility] [address]     Dear Colleague,    Thank you for referring your patient, Willa Downey, to the [facility] [place] at [facility]. Please see a copy of my visit note below.      Outpatient Rheumatology Follow-up    Name: Willa Downey    MRN [id number]   Today's date: 8/2/23         Reason for follow-up: psoriatic arthritis   Requesting physician: Kitty Mendez MD        Assessment & Plan:   #Psoriatic arthritis  #negative RF  #psoriasis, cutaneous and nail involvement  #DIP Left hand 3rd, 4th, 5th and right hand 4th digit inflammatory arthritis  #humira, enbrel, methotrexate failure  #Submandibular LAD- resolved  #High risk drug therapy: xeljanz and Otezla    62 year old female with hx of advanced glaucoma followed closely by ophthalmology presented to rheumatology on 2/5/21 with 5 month of progressive left hand 3rd, 4th, 5th digit and right hand 4th digit DIP inflammatory arthritis in the context of psoriasis, psoriatic changes of the nails. Taken together most consistent with psoriatic arthritis. Tried on 2 NSAIDs which failed to control symptoms, and had severe GERD symptoms. Tried methotrexate by prior rheumatologist and disease not controlled. Humira started Mid February 2021 due to lack of efficacy of these other therapies and continued through May 14th, though had severe/ rapidly progressive disease after some improvement in the first 4 weeks involving DIP inflammatory arthritis and nail disease. We then ordered Enbrel, though starting was delayed due to development of impressive bilateral submandibular lymphadenopathy which resolved within about 1 week and thought likely secondary to viral infection. She started enbrel on 6/20/2021 which she had continued on until switching to cosentyx after approval on 8/31/21 due to lack of efficacy of enbrel and  injection site reaction. Had been on cosentyx from 8/31/21 with initial improvement in inflammatory DIP arthritis, psoriatic lesions, stabilization of her nail disease though she then had progression of her cutaneous nail and joint inflammatory process which prompted restarting prednisone at 10 mg daily along with doubling her dose of Cosentyx to 300 mg each month.  She took her first dose of 300 mg on 11/23/2021.  She did try to decrease her prednisone shortly after that down to 7.5 though noticed return of her skin peeling/psoriatic skin lesions worsening and so went back up to 10 mg daily which has again improved these symptoms. She was again unable to taper from prednisone below 10mg daily without progression of both cutaneous and articular symptoms and so cosentyx was discontinued and xeljanz 11mg once daily started after insurance approval on 12/29/21. With each attempt to taper her steroids, had difficultly due to return of psoriasis so Willa was seen by Dermatology and started on otezla in addition to xeljanz. She returns today for follow-up.    Psoriatic Arthritis: Ms Downey continues on Xeljanz and Otezla.  She had been off of Xeljanz since April 3 due to insurance issues and immediately noticed worsening/active symptoms requiring restarting systemic prednisone which was extremely difficult to taper in the past due to rebound phenomenon which is well described with cutaneous psoriasis.  Her Xeljanz was just approved at the time of her last visit and she restarted it.  She was started on a prednisone taper on 4/18/2023 and at the time of her last appointment started noticing improvement.  Her prednisone taper started at 10 mg and she was decreasing by 2.5 mg every 7 days.  She is now back on Xeljanz and Otezla and we are both hopeful that this combination again. This has allowed her to slowly completely taper off of steroids successfully since her last visit with me. She is currently only on otezla BID  and xeljanz 11mg once daily. Her inflammatory synovitis/tenosynovitis and cutaneous disease is currently quiet  -Continue otezla BID  -Continue xeljanz 11mg once daily  -Has completely tapered off of prednisone. Remain off of prednisone at this time given quiet/improving disease    High risk medication: Xeljanz and otezla  -No side effects by history/exam/available serologies to include CBC and CMP from 5/26/23 without evidence of toxicity  -Risks and benefits again discussed today of both therapies to include infection, BM suppression, Rash, HA, GI/hepatotoxicity, malignancy, DVT/PE, all cause mortality among others. Patient agreeable to continue  -Labs q6 months. Next due in Nov 2023.     Long term NSAID use: celebrex  -Labs q6 months for monitoring. Standing orders in place. Reviewed labs from 5/26/23 to include CBC and CMP without evidence of toxicity    Follow-up with me in Novemeber with labs due at that time.     Marc Arias MD  Rheumatology    I spent a total of 40 minutes on the date of service on chart review, patient encounter, , documentation.      Subjective:   August 2, 2023  -was off of xeljanz for about full month in April. Was taking prednisone during that time. Was having fingertip peeling. When one gets better another gets worse. More joint pain on her hands. Also part of her toes.   -This month, her skin and joint pains are much improved. Her skin is currently completely cleared.   -Still will have joint pains  -has completely tapered off of prednisone  -has continued on xeljanz and otezla. Tolerating well without noticeable side effects  -every 1-2 months, will have skin peeling on her hands, otherwise she feels well. Joint pain is not associated with erythema/edema/warmth. Described as shooting pain. In her CMCs/bialteral thumb IP joints. Has stiffness in her DIPS/PIPs.   -Does have stiffness in the AM that is localized to bilateral DIPs. Lasts for 1-2 hours.   -no interval  infections. No fevers/chills/night sweats.   -stiffness does not limit any specific activities that she wants to do  -she does avoid certain activities due to the pain in her hands  -The pain that she has after increased activity is at the end of the day  -has been going for walks in the AM 2.5 miles 5x/week. Afterwards/towards the end, her right foot will have   14 point ROS collected and negative if not documented above    April 28, 2023   -last dose of xeljanz was on April 3rd  -One week later, all psoriasis symptoms returned. On fingertips/worsening joints/palms/ankles/scalp/back of palms.   -Continued on otezla  -Started on 10mg of prednisone on 4/18, took for 7 days, now on 7.5mg on day 4.   -xeljanz approved. Had delivered on Tuesday.  -starting today, after taking prednisone since 4/18, prednisone is helping. First day of improvement.   -still fatigue/lack of energy.   -No fevers chills night sweats.  No unintentional weight loss.  - No interval infections  - 14 point review of systems collected and otherwise negative    2/10/2023  - Continues on Otezla and Xeljanz without noticeable side effects.  - This combination has allowed for complete resolution of her cutaneous psoriasis  - She denies red hot or swollen joints.  Still some intermittent pain and discomfort in her DIPs though significantly improved.  - Also some pain discomfort in her CMC's secondary to known noninflammatory osteoarthritis  - Her fingernails are improving and look healthy on her hands with minimal digital pitting fissures and breaking  - No fevers chills night sweats  - No unintentional weight loss    September 16, 2022  Has started on otezla and without ongoing side effects with exception of new evening HA. She thinks it may be 5 or so days per week. Mild. During the day she does not notice anything. Noticing this after dinner/watching TV. Slight. Not to the point she needs to take anything. Has noticed that her psoriasis on her  "fingertips and scalp have improved. Still some sensitivity on her fingertips, but much better. Has been able to taper down to 1mg of prednisone. Has been on this dose for 6 days. Articular symptoms have been stable/at baseline. No obvious red/hot/swollen joints. No new areas of joint pain/involvement. No new side effects from xeljanz. Tolerating low dose prednisone without noticeable side effects. No interval infections. No fevers/chills/night sweats. Family member with COVID so has been isolating. Has continued to walk for exercise, as helps with overall symptoms, though has been experiencing pain in ball of foot/greater MTP. Low suspicion for gout, though will assess for other pathology.  14 point ROS collected and negative if not documented above.     Interval history 3/25/2022  Currently on 3mg of prednisone daily. Still with pink/ reddish spot on her scalp and left digits at tips of digits, though much improved. Has been tolerating her prednisone and xeljanz without issues. She did by accident take an increased dose of prednisone up to 10mg for 2 days when she was to reduce from 4 to 3mg due to having a different bottle of 2.5mg tabs (instead of the 1mg tabs she was taking). Her pain in the AM is 6/10. Takes her celebrex in the AM with pain resolution. No GI side effects from her celebrex. No fevers/chills/night sweats. She has noticed slight increase in the lesions of her digits since decreasing her prednisone down to 3mg for 5 days. Reports feeling well and that this is the best she has felt with DMARD therapy and being able to reduce her dose. No interval infections. ROS otherwise negative.     Interval history 1/28/22  Was already on prednisone 10mg and then xeljanz was added on Jan 3rd. Had complete resolution of psoriasis on scalp/inner ear. Hands also completely clear at that time. She then decreased to 7.5mg on 1/17 and for the first week was doing \"okay\", then this week noticed some pits on fingertips, " some skin peeling. Yesterday and today has noticed some more skin peeling. Some more scalp irriation 3 days ago as well. She feels that since starting xeljanz this is the best improvement that she has found. Continues to take celebrex daily due to pain in joints without it. With celebrex, pain is resolved. If she does lots of activity (cleaning etc) will have some pain. No GI upset as she had with mobic. Not taking PPI. She has noticed some more fatigue in the AM and also more so in the afternoon. Uses clobetasol ointment and cream. Also topical for scalp. Uses them all at night. Using 0.05% clobetasol. Has continued to experience rare intermittent tender lymph nodes. Had last booster on August 21st. No interval infections. No fevers, chills, weight changes. Remains active. No noticeable side effects since starting xeljanz. ROS otherwise negative.     12/2/2021 interval history  Took her first dose of Cosentyx 200 mg daily on 11/23/2021.  Around that time tried to decrease her prednisone from 10 mg daily down to 7.5 mg daily though written to discuss she had return of joint pain, joint swelling and stiffness particularly of her DIPs, worsening of her nail disease and psoriatic lesions on the tips of her fingers.  Fatigue also more significant.  As a result she went back up to 10 mg daily and this improved some.  She is not been taking any NSAIDs since her last appointment despite her joint pain as the meloxicam previously prescribed it was giving her reflux/gastritis symptoms.  No injection site reaction from her Cosentyx.  No fevers or chills.  No weight changes.  Tolerating prednisone without issues.  Review of systems otherwise negative.    9/30/21 Interval history  Has been taking cosentyx without issues. Next dose on Friday and then switching to monthly. Has found that her skin is some improved over the fingertips. Though still some painful/burning sensation at the sites of prior lesions. This is improving.  "When she started prednisone, did have dramatic improvement. Though with taper improvement has been slower though has continued. Currently on 2.5mg daily. No side effects from low dose. Energy still \"not great\" though she thinks it is slowly improving. Going for walks 5 days per week 30-40 minutes per day. Reports that the pain is OK when doing activities, but has been avoiding activities that are painful like weeding/carrying heavy items with grocery shopping. Has continued with mobic 3-4x week. 15mg tabs each time. No GI side effects at this frequency/dose. Still some stiff at DIPs. Though improved from prior. Did have lesion mid upper back and front groin which have improved since cosentyx/ prednisone. No fevers/chills. No interval infections. ROS otherwise negative.     August 12, 2021  Interval history  Had ?ear infection early July. Given ear drops by PCP. Had developed more psoriasis in new locations (palm of hand/scalp (10-15%)/lower stomach/low back/ and groin/gluteal cleft) where previously was on fingertips/nails only. Saw dermatology who prescribed topical, the topical has been somewhat helpful though has not resolves symptoms. She thinks that she has had 10-15% improvement with the topical. Spoke to pharmacist, who counseled her than can take up to 4 months for enbrel to take full effect. Gives herself injection every Sunday. After 5th injection, has itching/burning at the site. Takes Claritin already. Iced. Was told by pharmacist to try hydrocortisone, which she did. Decreased from 4-5 days of symptoms down to 3 days. From 5th injection onwards has had this reaction. Total of 8 injections so far. Joints much less of an issue compared to cutaneous involvement. If she has any joint pain, takes mobic. Roughly 1x per week. Around once per 7 -10days has extreme fatigue. Yesterday was significant. Slept until noon. Usually gets up at 630. Usually trouble sleeping, so this was unusual for her. No return of " submandibular LAD. No fever, chills. No cough/sore throat. Some stiffness in the DIPs, morning predominant. Improves throughout the day. Some increase in pain this week. ROS otherwise negative.      6/17/21  Has advanced glaucoma/ dry eyes. Chronic. No new head/neck symptoms. Dry mouth during submandibular gland swelling, dry mouth now resolved. Reports the swelling has mproved now by 50+%. No more pain, so no longer using ibuprofen. No fever/chills/cough or other signs/symptoms of infection. With ongoing progression of nail/distal digit predominant psoriasis and DIP arthritis. Severe pain. Unable to tolerate NSAIDs given GI upset despite PPI. ROS otherwise negative       5/14/21  Patient initially was going to wait to contact, though has been having progression of symptoms of nails/joint pain/ skin. Last injection was on Tuesday. Had a flare in the days following her injection. First 2 doses, she did notice some improvement. Though after that time, with each dose, has had less and less of a positive change in nails/joints/skin. Has instead been getting worse and worse response. Has stiffness and pain in her DIPs that has been progressive. Has redness as well. Also with radiation of the pain proximally which is new. Also some days with severe fatigue. No fevers/chills. No weight changes. No interval infections.     Past Medical History  Past Medical History:   Diagnosis Date    Bilateral low back pain with right-sided sciatica, unspecified chronicity 11/11/2020    Neuroforaminal stenosis of lumbar spine 11/11/2020    Nuclear senile cataract of both eyes 08/16/2018    Osteoarthritis 10/15/2020    Psoriatic arthropathy (H) 10/19/2020    Serum calcium elevated 5/31/2023     Past Surgical History  Past Surgical History:   Procedure Laterality Date    BIOPSY  07/30/2019    the right breast tissue(benign)    COLONOSCOPY N/A 7/14/2022    Procedure: COLONOSCOPY;  Surgeon: Roderick Campos MD;  Location: [facility] [place] [facility] OR     EYE SURGERY      3 surgeries for glaucoma treatment     Medications  Current Outpatient Medications   Medication    apremilast (OTEZLA) 30 MG tablet    CALCIUM CARBONATE-VIT D-MIN PO    celecoxib (CELEBREX) 100 MG capsule    clobetasol (TEMOVATE) 0.05 % external solution    gabapentin (NEURONTIN) 300 MG capsule    loratadine 10 MG capsule    melatonin 3 MG tablet    Multiple Vitamins-Minerals (MULTIVITAMIN ADULTS 50+) TABS    predniSONE (DELTASONE) 2.5 MG tablet    tacrolimus (PROTOPIC) 0.1 % external ointment    tazarotene (TAZORAC) 0.1 % external cream    tofacitinib (XELJANZ XR) 11 MG 24 hr tablet     No current facility-administered medications for this visit.     Allergies   Allergies   Allergen Reactions    Adhesive Tape Blisters, Itching and Swelling    Dust Mites     Latex Blisters, Itching, Other (See Comments) and Swelling     Skin sensitivity      Morphine Sulfate (Concentrate) Itching     Other reaction(s): Chills  heart palpatations    Seasonal Allergies     Codeine Palpitations     chills     Family History:   No family history of autoimmune diseases.     Social History  , with children. 2 kids and they are healthy. No ETOH, smoking, drug use.      Objective:    Physical exam:  LMP  (LMP Unknown)   Sitting up unassisted NAD. Pleasant and interactive as always  No facial rash, sclera clear  Breathing comfortably without use of accessory muscles no cough no audible wheeze  -Bilateral shoulders elbows wrist MCPs PIPs DIPs.  Able to make a full fist bilaterally.  No evidence of dactylitis.  No MCP valleys loss.  Lower extremities not evaluated.    WBC   Date Value Ref Range Status   06/11/2021 9.4 4.0 - 11.0 10e9/L Final     WBC Count   Date Value Ref Range Status   05/26/2023 5.1 4.0 - 11.0 10e3/uL Final     Hemoglobin   Date Value Ref Range Status   05/26/2023 12.8 11.7 - 15.7 g/dL Final   06/11/2021 12.9 11.7 - 15.7 g/dL Final     Platelet Count   Date Value Ref Range Status   05/26/2023 383  150 - 450 10e3/uL Final   06/11/2021 369 150 - 450 10e9/L Final     Creatinine   Date Value Ref Range Status   05/26/2023 0.67 0.51 - 0.95 mg/dL Final   06/11/2021 0.63 0.52 - 1.04 mg/dL Final     Lab Results   Component Value Date    ALKPHOS 64 02/16/2023    ALKPHOS 76 06/11/2021     AST   Date Value Ref Range Status   05/26/2023 26 10 - 35 U/L Final   06/11/2021 14 0 - 45 U/L Final     Lab Results   Component Value Date    ALT 16 02/16/2023    ALT 19 06/11/2021     Sed Rate   Date Value Ref Range Status   06/16/2021 15 0 - 30 mm/h Final     Erythrocyte Sedimentation Rate   Date Value Ref Range Status   02/16/2023 8 0 - 30 mm/hr Final     CRP Inflammation   Date Value Ref Range Status   02/01/2022 <2.9 0.0 - 8.0 mg/L Final   06/16/2021 5.2 0.0 - 8.0 mg/L Final     UA RESULTS:  No results for input(s): COLOR, APPEARANCE, URINEGLC, URINEBILI, URINEKETONE, SG, UBLD, URINEPH, PROTEIN, UROBILINOGEN, NITRITE, LEUKEST, RBCU, WBCU in the last 55550 hours.   Rheumatoid Factor   Date Value Ref Range Status   02/05/2021 <7 <12 IU/mL Final     6/16/21  SSA, SSB negative  Mumps IgG and IgM positive  Creatinine 0.63  LFTs normal  CBC WNL    2/5/21  HIV negative  Hep C AB negative  Hep B s AG negative  Hep B c AB reactive  Quant gold negative  Quant gold negative  RF negative  CRP <2.9  ESR 10  Cr 0.62  WBC 7.1  HGB 13.1    HLA b27 negative  Hep B virus DNA negative    2/2/21  ESR 9  CRP <2.9  WBC 6.1  HGB 12.8      11/11/20  A1C 5.3  Hep C/ HIV negative    Imaging:  MR left hand  Impression:  1a. Correlating to the marker at the third digit interphalangeal  joint, there is a focal erosion along the volar aspect of the distal  phalangeal base with intense bone marrow edema and abnormal T1 marrow  signal. Findings are concerning for osteomyelitis and infection should  be ruled out. Also on the differential is sequelae of long-standing  active inflammatory arthropathy (including psoriatic arthritis given  patient's  clinical history) where marginal erosions and marrow signal  abnormality are expected though the degree of T1 marrow replacement is  out of proportion to typical findings.   b. Subtle bone marrow edema in the middle phalanx without T1 marrow  signal abnormality, this is favored to represent reactive edema.  c. Joint effusion at the third digit interphalangeal joint,  potentially infectious or inflammatory. Consider aspiration for  definitive diagnosis.  d. The partially visualized fifth digit shows edema within the distal  phalanx and head of the middle phalanx with regional soft tissue  edema. Given predominant findings in the third digit, similar changes  in the fifth digit could support a polyarticular inflammatory  arthropathy. Correlate clinically.  2. Tenosynovitis of the third digit flexor and extensor tendons. There  is also tendinosis with high-grade (near full-thickness) tearing of  the extensor tendon/extensor saul at the distal phalangeal base with  slight proximal retraction of the residual fibers.  3. Diffusely thickened, edematous ulnar and radial collateral  ligaments, likely combination of sprain and reactive edema.  4. Extensive soft tissue edema centered about the interphalangeal  joint.       Virtual Visit Details    Type of service:  Video Visit   Joined the call at 8/2/2023, 1:23:20 pm.  Left the call at 8/2/2023, 1:58:40 pm.  You were on the call for 35 minutes 19 seconds .  Originating Location (pt. Location): Home    Distant Location (provider location):  On-site  Platform used for Video Visit: Tiburcio Arias MD

## 2023-08-30 ENCOUNTER — PHARMACY VISIT (OUTPATIENT)
Dept: ADMINISTRATIVE | Facility: CLINIC | Age: 63
End: 2023-08-30
Payer: COMMERCIAL

## 2023-08-30 NOTE — PROGRESS NOTES
Psoriatic Arthritis Clinical Follow Up Assessment    Spoke with Willa for assessment. Current Regimen: Otezla 30mg BID + Xeljanz XR 11mg daily   Denies side effects, missed doses, medication/allergy changes. PDC = 1.00     PsA/PsO: Willa says she's in a much better place right now than when we last spoke in May. She's completely clear of PsO and not bothered by any specific joint pains at this time. She mentions how she's very happy to be back on track after the blip without Xeljanz in April 2023. No questions/concerns regarding either Otezla or Xeljanz at this time.     Follow up: 3 months        Care Details    What are the patient's goals for this therapy?   ? Reduce pain and lessen PsO Symptoms      Is patient meeting treatment goals?   ? Yes      Select medication prescribed:   ? XELJANZ XR (tofacitinib citrate)      Please enter patient's PDC. PDC= adherence metric. Goal >0.8 (80% adherence)   ? 1      Based on the patient's report or refill record over the last 6-12 months, does the patient appear to be taking less than 80% of their medication?   ? No      Do you have both psoriasis and psoriatic arthritis?   ? Yes    Where are the areas of psoriasis on the patient's body?   ? No areas affected    If one hand represents about 1% of your body, approximately what percent of your body is affected by psoriasis? Enter 0 to 100   ? 0    Based on the patient's quality of life, please rate the patient's current disease severity   ? Pt has no current PsO symptoms    Over the past week, How itchy, sore, painful or stinging has your skin been?   ? Not at all     Over the past week, how much has your skin interfered with your ability to do activities of daily living (shopping, cleaning, working, studying, sports)?   ? Not at all        Please select CURRENT side effect(s) for monitoring:   ? None         Annelise May, PharmD  Therapy Management Pharmacist  [facility]  [facility]

## 2023-09-29 DIAGNOSIS — L40.50 PSORIATIC ARTHRITIS (H): ICD-10-CM

## 2023-10-02 RX ORDER — TOFACITINIB 11 MG/1
TABLET, FILM COATED, EXTENDED RELEASE ORAL
Qty: 90 TABLET | Refills: 3 | Status: SHIPPED | OUTPATIENT
Start: 2023-10-02 | End: 2023-11-10

## 2023-10-02 NOTE — TELEPHONE ENCOUNTER
tofacitinib (XELJANZ XR) 11 MG 24 hr tablet   90 tablet 3 9/28/2022 9/28/2023     Last Office Visit : 8-2-2023  Future Office visit:  11-          CBC RESULTS:   Recent Labs   Lab Test 05/26/23  1616   WBC 5.1   RBC 4.31   HGB 12.8   HCT 39.4   MCV 91   MCH 29.7   MCHC 32.5   RDW 13.2          Creatinine   Date Value Ref Range Status   05/26/2023 0.67 0.51 - 0.95 mg/dL Final   06/11/2021 0.63 0.52 - 1.04 mg/dL Final   ]    Liver Function Studies -   Recent Labs   Lab Test 05/26/23  1616   PROTTOTAL 7.3   ALBUMIN 5.1   BILITOTAL 0.4   ALKPHOS 62   AST 26   ALT 13

## 2023-10-11 ENCOUNTER — TELEPHONE (OUTPATIENT)
Dept: RHEUMATOLOGY | Facility: CLINIC | Age: 63
End: 2023-10-11
Payer: COMMERCIAL

## 2023-10-11 NOTE — TELEPHONE ENCOUNTER
Patient Contacted for the patient to call back and schedule the following:    Appointment type: Return follow up  Provider: Dr. Arias  Return date: March 7th, 2024  Specialty phone number: [phone]  Additional appointment(s) needed: NA  Additonal Notes: Reschedule from November 16th appt

## 2023-10-12 ENCOUNTER — MYC MEDICAL ADVICE (OUTPATIENT)
Dept: RHEUMATOLOGY | Facility: CLINIC | Age: 63
End: 2023-10-12
Payer: COMMERCIAL

## 2023-10-12 DIAGNOSIS — L40.50 PSORIATIC ARTHRITIS (H): ICD-10-CM

## 2023-10-12 NOTE — TELEPHONE ENCOUNTER
Last Written Prescription Date:  1/19/2023  Last Fill Quantity: 180,  # refills: 2   Last office visit: Visit date not found ; last virtual visit: 8/2/2023 with prescribing provider:  Juana   Future Office Visit: 3/7/24      Requested Prescriptions   Pending Prescriptions Disp Refills    apremilast (OTEZLA) 30 MG tablet 180 tablet 2     Sig: Take 1 tablet (30 mg) by mouth 2 times daily Hold for signs of infection, and seek medical attention.       There is no refill protocol information for this order        Munira Phillip RN

## 2023-10-16 NOTE — TELEPHONE ENCOUNTER
MEDICATION APPEAL DENIED    Medication: Xeljanz renewal denied    Denial Date: 2/23/2023    Denial Rational: Cannot use in combination with Otezla    Second Level Appeal Information:         Pt awoke on Friday with a stiff neck. Pt taking tylenol and using heat with no relief.

## 2023-10-19 ENCOUNTER — TELEPHONE (OUTPATIENT)
Dept: RHEUMATOLOGY | Facility: CLINIC | Age: 63
End: 2023-10-19
Payer: COMMERCIAL

## 2023-10-19 NOTE — TELEPHONE ENCOUNTER
Patient Contacted for the patient to call back and schedule the following:    Appointment type: Return visit  Provider: Dr. Arias  Return date: January 3rd, 2023  Specialty phone number: [phone]  Additional appointment(s) needed: NA  Additonal Notes: Reschedule from December 1st as Dr. Arias will be out of office that day. DAVID slot use approved.

## 2023-10-23 DIAGNOSIS — L40.50 PSORIATIC ARTHRITIS (H): ICD-10-CM

## 2023-10-25 RX ORDER — CELECOXIB 100 MG/1
100 CAPSULE ORAL 2 TIMES DAILY
Qty: 180 CAPSULE | Refills: 2 | Status: SHIPPED | OUTPATIENT
Start: 2023-10-25 | End: 2024-01-01

## 2023-11-10 ENCOUNTER — VIRTUAL VISIT (OUTPATIENT)
Dept: RHEUMATOLOGY | Facility: CLINIC | Age: 63
End: 2023-11-10
Attending: INTERNAL MEDICINE
Payer: COMMERCIAL

## 2023-11-10 DIAGNOSIS — L40.0 PLAQUE PSORIASIS: ICD-10-CM

## 2023-11-10 DIAGNOSIS — L40.50 PSORIATIC ARTHRITIS (H): Primary | ICD-10-CM

## 2023-11-10 RX ORDER — TOFACITINIB 11 MG/1
TABLET, FILM COATED, EXTENDED RELEASE ORAL
Qty: 90 TABLET | Refills: 3 | Status: SHIPPED | OUTPATIENT
Start: 2023-11-10 | End: 2024-07-02

## 2023-11-10 NOTE — Clinical Note
11/10/2023       RE: Willa Downey  [address]  [facility] [address]     Dear Colleague,    Thank you for referring your patient, Willa Downey, to the [facility] [place] at [facility]. Please see a copy of my visit note below.    Medication Therapy Management (MTM) Encounter    ASSESSMENT:                            Medication Adherence/Access: See below for considerations    Psoriatic Arthritis/Psoriasis:       PLAN:                            ***    Follow-up: {[email or web address]}    SUBJECTIVE/OBJECTIVE:                          Willa Downey is a 63 year old female called for a follow-up visit from 4/18/23.       Reason for visit: Xeljanz and Otezla follow up    Allergies/ADRs: Reviewed in chart  Past Medical History: Reviewed in chart  Tobacco: She reports that she has never smoked. She has never used smokeless tobacco.  Alcohol: not currently using    Medication Adherence/Access: Medication barriers: obtaining medication from insurance. Protopic 0.1% cream denied by insurance - using tazarotene cream instead but would like to try Protopic if it is possible to get it approved.    Psoriatic Arthritis/Psoriasis:   Otezla 30 mg twice daily  Xeljanz XR 11 mg daily  Celebrex 100 mg twice daily  Tazarotene 0.1% twice daily as needed   Clobetasol solution daily as needed    Overall doing well but has been noticing the start of some psoriatic arthritis and psoriasis symptoms. Fingertips have been peeling last 1-2 months no matter which cream is used. Her left thumbnail is breaking/cracking which is new for her. Also noticing some joint pain across fingers with current dose of Celebrex.     Liver Function Studies -   Recent Labs   Lab Test 05/26/23  1616   PROTTOTAL 7.3   ALBUMIN 5.1   BILITOTAL 0.4   ALKPHOS 62   AST 26   ALT 13     CBC RESULTS:   Recent Labs   Lab Test 05/26/23  1616   WBC 5.1   RBC 4.31   HGB 12.8    HCT 39.4   MCV 91   MCH 29.7   MCHC 32.5   RDW 13.2        Today's Vitals: LMP  (LMP Unknown)   ----------------    I spent 30 minutes with this patient today. All changes were made via collaborative practice agreement with Marc Arias MD. A copy of the visit note was provided to the patient's provider(s).    A summary of these recommendations was sent via [employer].    Genesis Medina, PharmD  Medication Therapy Management Pharmacist  [facility]  Phone: [phone]    Telemedicine Visit Details  Type of service:  Telephone visit  Start Time: 10:00 AM  End Time: 10:30 AM     Medication Therapy Recommendations  No medication therapy recommendations to display       Medication Therapy Management (MTM) Encounter    ASSESSMENT:                            Medication Adherence/Access: See below for considerations    Psoriatic Arthritis/Psoriasis: Patient is tolerating current therapy well however some breakthrough symptoms are still occurring. Could benefit from trying Protopic cream for continued skin concerns. Will attempt to resubmit Protopic PA and appeal if necessary. Could consider increase of Celebrex to 300 mg daily. Will plan to trial this new dose for 2 weeks to determine efficacy of higher dose.    PLAN:                            1. I will send in a new prior authorization for the Protopic cream. Since you have tried clobetasol recently there is a chance we could get it approved.    2. Try increasing your Celebrex to 200 mg in the morning and 100 mg in the evening.     Follow-up: Return in 5 weeks (on 12/15/2023) for MTM Pharmacist Visit.    SUBJECTIVE/OBJECTIVE:                          Willa Downey is a 63 year old female called for a follow-up visit from 4/18/23.       Reason for visit: Xeljanz and Otezla follow up    Allergies/ADRs: Reviewed in chart  Past Medical History: Reviewed in chart  Tobacco: She reports that she has never smoked. She has never used  smokeless tobacco.  Alcohol: not currently using    Medication Adherence/Access: Medication barriers: obtaining medication from insurance. Protopic 0.1% cream denied by insurance - using tazarotene cream instead but would like to try Protopic if it is possible to get it approved.    Psoriatic Arthritis/Psoriasis:   Otezla 30 mg twice daily  Xeljanz XR 11 mg daily  Celebrex 100 mg twice daily  Tazarotene 0.1% twice daily as needed   Clobetasol solution daily as needed    Overall doing well but has been noticing the start of some psoriatic arthritis and psoriasis symptoms. Fingertips have been peeling last 1-2 months no matter which cream is used. Has tried both clobetasol and tazarotene. Her left thumbnail is breaking/cracking which is new for her. Also noticing some joint pain across fingers with current dose of Celebrex.     Liver Function Studies -   Recent Labs   Lab Test 05/26/23  1616   PROTTOTAL 7.3   ALBUMIN 5.1   BILITOTAL 0.4   ALKPHOS 62   AST 26   ALT 13     CBC RESULTS:   Recent Labs   Lab Test 05/26/23  1616   WBC 5.1   RBC 4.31   HGB 12.8   HCT 39.4   MCV 91   MCH 29.7   MCHC 32.5   RDW 13.2        Today's Vitals: LMP  (LMP Unknown)   ----------------    I spent 30 minutes with this patient today. All changes were made via collaborative practice agreement with Marc Arias MD. A copy of the visit note was provided to the patient's provider(s).    A summary of these recommendations was sent via [employer]    Иван KhanD  Medication Therapy Management Pharmacist  [facility]  Phone: [phone]    Telemedicine Visit Details  Type of service:  Telephone visit  Start Time: 10:00 AM  End Time: 10:30 AM     Medication Therapy Recommendations  No medication therapy recommendations to display         Again, thank you for allowing me to participate in the care of your patient.      Sincerely,    BIBI HUNT [facility]

## 2023-11-11 NOTE — TELEPHONE ENCOUNTER
Screening Questions  BlueKIND OF PREP RedLOCATION [review exclusion criteria] GreenSEDATION TYPE  1. Are you active on mychart? y    2. Ordering/Referring Provider: Geovanna Rosales MD    3. What insurance is in the chart? med[employer]     4. Do you have a legal guardian or medical Power of ?  Are you able to give consent for your medical care? n   (Sedation review/consideration needed)    3.   BMI 24.72 [BMI OVER 40-EXTENDED PREP]  If greater than 40 review exclusion criteria [PAC APPT IF @ [facility]]      4. Have you had a positive covid test in the last 90 days? n     5.  Respiratory Screening :  [If yes to any of the following HOSPITAL setting only]     Do you use daily home oxygen? n  Do you have mod to severe Obstructive Sleep Apnea? n [OKAY @ [facility] SH PH RI]   Do you have Pulmonary Hypertension? n   Do you have UNCONTROLLED asthma? n      6.   Have you had a heart or lung transplant? n      7.   Are you currently on dialysis? n [ If yes, G-PREP & HOSPITAL setting only]     8.   Do you have chronic kidney disease? n[ If yes, G-PREP ]    9.   Have you had a stroke or Transient ischemic attack (TIA - aka  mini stroke ) within 6 months?  n(If yes, please review exclusion criteria)    10.   In the past 6 months, have you had any heart related issues including cardiomyopathy or heart attack? n          If yes, did it require cardiac stenting or other implantable device? n      11.   Do you have any implantable devices in your body (pacemaker, defib, LVAD)? n (If yes, please review exclusion criteria)    12.   Do you take nitroglycerin? n           If yes, how often? n  (if yes, HOSPITAL setting ONLY)    13.   Are you currently taking any blood thinners? n           [IF YES, INFORM PATIENT TO FOLLOW UP W/ ORDERING PROVIDER FOR BRIDGING INSTRUCTIONS]     14.   Do you have a diagnosis of diabetes? n [ If yes, G-PREP ]    15.   [FEMALES] Are you currently pregnant? n    If yes, how many weeks? n    16.   Are you  taking any prescription pain medications on a routine schedule?  y [ If yes, EXTENDED PREP.] [If yes, MAC]    17.   Do you have any chemical dependencies such as alcohol, street drugs, or methadone?  n [If yes, MAC]    18.   Do you have any history of post-traumatic stress syndrome, severe anxiety or history of psychosis?  n [If yes, MAC]    19.   Do you transfer independently?  y    20.  On a regular basis do you go 3-5 days between bowel movements? n [ If yes, EXTENDED PREP.]    21.   Preferred LOCAL Pharmacy for Pre Prescription        [facility] [address]    Scheduling Details      Caller :  Willa   (Please ask for phone number if not scheduled by patient)    Type of Procedure Scheduled: Lower Endoscopy [Colonoscopy]  Which Colonoscopy Prep was Sent?: mprep    Surgeon: leslie  Date of Procedure: 07/14  [place] [facility]    Sedation Type: mac    Conscious Sedation- Needs  for 6 hours after the procedure  MAC/General-Needs  for 24 hours after procedure    Pre-op Required at [facility] [place], [place] and OR for MAC sedation: n  (advise patient they will need a pre-op prior to procedure -)      Informed patient they will need an adult  y  Cannot take any type of public or medical transportation alone    Pre-Procedure Covid test to be completed at [facility] or Externally:     Confirmed Nurse will call to complete assessment y    Additional comments:         86 y/o Male with h/o HTN, DM, hyperlipidemia was admitted on 11/10 for increased confusion x one day. In ED the patient was unable to state why he was sent to the hospital. According to the notes, he was experiencing 1 day of increasing fatigue, confusion, disorientation, headache and unsteady gait at home. He was evaluated by his PCP who sent him to the ED to be evaluated and admitted. No reported fevers, chills, shakes, cough, shortness of breath, nausea, vomiting, dysuria, abdominal pain, chest pain, dizziness, diarrhea. In the ED, he had a CT of the head which was negative and on the tele monitor he had 2 cardiac pauses, one of 1.4 seconds and a second of 2 seconds. He received vancomycin IV x 1 and cefepime IV x 1.    1. Encephalopathy.  84 y/o Male with h/o HTN, DM, hyperlipidemia was admitted on 11/10 for increased confusion x one day. In ED the patient was unable to state why he was sent to the hospital. According to the notes, he was experiencing 1 day of increasing fatigue, confusion, disorientation, headache and unsteady gait at home. He was evaluated by his PCP who sent him to the ED to be evaluated and admitted. No reported fevers, chills, shakes, cough, shortness of breath, nausea, vomiting, dysuria, abdominal pain, chest pain, dizziness, diarrhea. In the ED, he had a CT of the head which was negative and on the tele monitor he had 2 cardiac pauses, one of 1.4 seconds and a second of 2 seconds. He received vancomycin IV x 1 and cefepime IV x 1.    1. Febrile syndrome. Encephalopathy.   -fever ?cause  -cultures reviewed  -obtain Lyme AB, babesia PCR, ehrlichia PCR, WNV, RMSF PCR  -obtain EBV, CMV, ESR, CRP, LDH     86 y/o Male with h/o HTN, DM, hyperlipidemia was admitted on 11/10 for increased confusion x one day. In ED the patient was unable to state why he was sent to the hospital. According to the notes, he was experiencing 1 day of increasing fatigue, confusion, disorientation, headache and unsteady gait at home. He was evaluated by his PCP who sent him to the ED to be evaluated and admitted. No reported fevers, chills, shakes, cough, shortness of breath, nausea, vomiting, dysuria, abdominal pain, chest pain, dizziness, diarrhea. In the ED, he had a CT of the head which was negative and on the tele monitor he had 2 cardiac pauses, one of 1.4 seconds and a second of 2 seconds. He received vancomycin IV x 1 and cefepime IV x 1.    1. Febrile syndrome. Possible viral syndrome. Metabolic Encephalopathy.   -fever ?cause  -cultures reviewed  -obtain Lyme AB, babesia PCR, ehrlichia PCR, WNV, RMSF PCR  -obtain EBV, CMV, ESR, CRP, LDH  -no meningeal signs, no HA, neck is supple  -would observe off abx at this time  -f/u cultures  -old chart reviewed to assess prior cultures  -monitor temps  -f/u CBC  -supportive care  2. Other issues:   -care per medicine    d/w Dr. Ortiz

## 2023-11-13 DIAGNOSIS — M54.41 ACUTE RIGHT-SIDED LOW BACK PAIN WITH RIGHT-SIDED SCIATICA: ICD-10-CM

## 2023-11-13 RX ORDER — GABAPENTIN 300 MG/1
300 CAPSULE ORAL 3 TIMES DAILY
Qty: 90 CAPSULE | Refills: 3 | Status: SHIPPED | OUTPATIENT
Start: 2023-11-13 | End: 2024-02-09

## 2023-11-13 NOTE — TELEPHONE ENCOUNTER
gabapentin (NEURONTIN) 300 MG capsule   90 capsule 2 6/28/2023 8/16/2021  [facility] [place]    Dimitri Zhou, DO  Sports Medicine    Routed because: not on protocol. Gap in med rf. Lv 8/21

## 2023-11-21 ENCOUNTER — TELEPHONE (OUTPATIENT)
Dept: DERMATOLOGY | Facility: CLINIC | Age: 63
End: 2023-11-21
Payer: COMMERCIAL

## 2023-11-21 DIAGNOSIS — L40.50 PSORIATIC ARTHRITIS (H): Primary | ICD-10-CM

## 2023-11-21 NOTE — TELEPHONE ENCOUNTER
PA Initiation    Medication: TACROLIMUS 0.1 % EX OINT  Insurance Company: [employer] - Phone [phone] Fax [phone]  Pharmacy Filling the Rx: [facility] [address]  Filling Pharmacy Phone: [phone]  Filling Pharmacy Fax: [phone]  Start Date: 11/21/2023

## 2023-11-21 NOTE — PROGRESS NOTES
Medication Therapy Management (MTM) Encounter    ASSESSMENT:                            Medication Adherence/Access: See below for considerations    Psoriatic Arthritis/Psoriasis: Patient is tolerating current therapy well however some breakthrough symptoms are still occurring. Could benefit from trying Protopic cream for continued skin concerns. Will attempt to resubmit Protopic PA and appeal if necessary. Could consider increase of Celebrex to 300 mg daily. Will plan to trial this new dose for 2 weeks to determine efficacy of higher dose.    PLAN:                            1. I will send in a new prior authorization for the Protopic cream. Since you have tried clobetasol recently there is a chance we could get it approved.    2. Try increasing your Celebrex to 200 mg in the morning and 100 mg in the evening.     Follow-up: Return in 5 weeks (on 12/15/2023) for MTM Pharmacist Visit.    SUBJECTIVE/OBJECTIVE:                          Willa Downey is a 63 year old female called for a follow-up visit from 4/18/23.       Reason for visit: Xeljanz and Otezla follow up    Allergies/ADRs: Reviewed in chart  Past Medical History: Reviewed in chart  Tobacco: She reports that she has never smoked. She has never used smokeless tobacco.  Alcohol: not currently using    Medication Adherence/Access: Medication barriers: obtaining medication from insurance. Protopic 0.1% cream denied by insurance - using tazarotene cream instead but would like to try Protopic if it is possible to get it approved.    Psoriatic Arthritis/Psoriasis:   Otezla 30 mg twice daily  Xeljanz XR 11 mg daily  Celebrex 100 mg twice daily  Tazarotene 0.1% twice daily as needed   Clobetasol solution daily as needed    Overall doing well but has been noticing the start of some psoriatic arthritis and psoriasis symptoms. Fingertips have been peeling last 1-2 months no matter which cream is used. Has tried both clobetasol and tazarotene. Her left thumbnail is  breaking/cracking which is new for her. Also noticing some joint pain across fingers with current dose of Celebrex.     Liver Function Studies -   Recent Labs   Lab Test 05/26/23  1616   PROTTOTAL 7.3   ALBUMIN 5.1   BILITOTAL 0.4   ALKPHOS 62   AST 26   ALT 13     CBC RESULTS:   Recent Labs   Lab Test 05/26/23  1616   WBC 5.1   RBC 4.31   HGB 12.8   HCT 39.4   MCV 91   MCH 29.7   MCHC 32.5   RDW 13.2        Today's Vitals: LMP  (LMP Unknown)   ----------------    I spent 30 minutes with this patient today. All changes were made via collaborative practice agreement with Marc Arias MD. A copy of the visit note was provided to the patient's provider(s).    A summary of these recommendations was sent via [employer].    Иван KhanD  Medication Therapy Management Pharmacist  [facility]  Phone: [phone]    Telemedicine Visit Details  Type of service:  Telephone visit  Start Time: 10:00 AM  End Time: 10:30 AM     Medication Therapy Recommendations  Plaque psoriasis    Current Medication: tacrolimus (PROTOPIC) 0.1 % external ointment   Rationale: Cannot afford medication product - Cost - Adherence   Recommendation: Referral to Service  - New PA started   Status: Resolved Med Access Issue         Psoriatic arthritis (H)    Current Medication: celecoxib (CELEBREX) 100 MG capsule   Rationale: Dose too low - Dosage too low - Effectiveness   Recommendation: Increase Dose - 200 mg every morning and 100 mg every evening   Status: Accepted per CPA

## 2023-11-22 NOTE — PATIENT INSTRUCTIONS
"Recommendations from today's MTM visit:                                                       1. I will send in a new prior authorization for the Protopic cream. Since you have tried clobetasol recently there is a chance we could get it approved.    2. Try increasing your Celebrex to 200 mg in the morning and 100 mg in the evening.     Follow-up: Return in 5 weeks (on 12/15/2023) for MTM Pharmacist Visit.    It was great speaking with you today.  I value your experience and would be very thankful for your time in providing feedback in our clinic survey. In the next few days, you may receive an email or text message from [facility] [employer] with a link to a survey related to your  clinical pharmacist.\"     To schedule another MTM appointment, please call the clinic directly or you may call the MTM scheduling line at [phone] or toll-free at [phone].     My Clinical Pharmacist's contact information:                                                      Please feel free to contact me with any questions or concerns you have.      Genesis Medina, PharmD  Medication Therapy Management Pharmacist  [facility]  Phone: [phone]     "

## 2023-11-22 NOTE — TELEPHONE ENCOUNTER
PRIOR AUTHORIZATION DENIED    Medication: TACROLIMUS 0.1 % EX OINT  Insurance Company: [employer] - Phone [phone] Fax [phone]  Denial Date: 11/22/2023  Denial Rational:     Appeal Information:     Patient Notified: No, care team must notify on denial.

## 2023-11-27 ENCOUNTER — LAB (OUTPATIENT)
Dept: LAB | Facility: CLINIC | Age: 63
End: 2023-11-27
Payer: COMMERCIAL

## 2023-11-27 DIAGNOSIS — L40.50 PSORIATIC ARTHRITIS (H): ICD-10-CM

## 2023-11-27 DIAGNOSIS — Z79.899 HIGH RISK MEDICATION USE: ICD-10-CM

## 2023-11-27 LAB
ALBUMIN SERPL BCG-MCNC: 4.6 G/DL (ref 3.5–5.2)
ALT SERPL W P-5'-P-CCNC: 20 U/L (ref 0–50)
AST SERPL W P-5'-P-CCNC: 30 U/L (ref 0–45)
BASOPHILS # BLD AUTO: 0.1 10E3/UL (ref 0–0.2)
BASOPHILS NFR BLD AUTO: 1 %
CREAT SERPL-MCNC: 0.61 MG/DL (ref 0.51–0.95)
CRP SERPL-MCNC: <3 MG/L
EGFRCR SERPLBLD CKD-EPI 2021: >90 ML/MIN/1.73M2
EOSINOPHIL # BLD AUTO: 0.2 10E3/UL (ref 0–0.7)
EOSINOPHIL NFR BLD AUTO: 3 %
ERYTHROCYTE [DISTWIDTH] IN BLOOD BY AUTOMATED COUNT: 13.3 % (ref 10–15)
ERYTHROCYTE [SEDIMENTATION RATE] IN BLOOD BY WESTERGREN METHOD: 10 MM/HR (ref 0–30)
HCT VFR BLD AUTO: 37.2 % (ref 35–47)
HGB BLD-MCNC: 12.3 G/DL (ref 11.7–15.7)
IMM GRANULOCYTES # BLD: 0 10E3/UL
IMM GRANULOCYTES NFR BLD: 0 %
LYMPHOCYTES # BLD AUTO: 2.4 10E3/UL (ref 0.8–5.3)
LYMPHOCYTES NFR BLD AUTO: 37 %
MCH RBC QN AUTO: 29.6 PG (ref 26.5–33)
MCHC RBC AUTO-ENTMCNC: 33.1 G/DL (ref 31.5–36.5)
MCV RBC AUTO: 89 FL (ref 78–100)
MONOCYTES # BLD AUTO: 0.4 10E3/UL (ref 0–1.3)
MONOCYTES NFR BLD AUTO: 7 %
NEUTROPHILS # BLD AUTO: 3.3 10E3/UL (ref 1.6–8.3)
NEUTROPHILS NFR BLD AUTO: 52 %
PLATELET # BLD AUTO: 388 10E3/UL (ref 150–450)
RBC # BLD AUTO: 4.16 10E6/UL (ref 3.8–5.2)
WBC # BLD AUTO: 6.4 10E3/UL (ref 4–11)

## 2023-11-27 PROCEDURE — 82040 ASSAY OF SERUM ALBUMIN: CPT

## 2023-11-27 PROCEDURE — 82565 ASSAY OF CREATININE: CPT

## 2023-11-27 PROCEDURE — 84460 ALANINE AMINO (ALT) (SGPT): CPT

## 2023-11-27 PROCEDURE — 85652 RBC SED RATE AUTOMATED: CPT

## 2023-11-27 PROCEDURE — 85025 COMPLETE CBC W/AUTO DIFF WBC: CPT

## 2023-11-27 PROCEDURE — 86140 C-REACTIVE PROTEIN: CPT

## 2023-11-27 PROCEDURE — 84450 TRANSFERASE (AST) (SGOT): CPT

## 2023-11-27 PROCEDURE — 36415 COLL VENOUS BLD VENIPUNCTURE: CPT

## 2023-12-06 ENCOUNTER — TELEPHONE (OUTPATIENT)
Dept: DERMATOLOGY | Facility: CLINIC | Age: 63
End: 2023-12-06

## 2023-12-06 ENCOUNTER — OFFICE VISIT (OUTPATIENT)
Dept: DERMATOLOGY | Facility: CLINIC | Age: 63
End: 2023-12-06
Payer: COMMERCIAL

## 2023-12-06 DIAGNOSIS — L40.9 PSORIASIS: Primary | ICD-10-CM

## 2023-12-06 DIAGNOSIS — L72.3 INFLAMED EPIDERMOID CYST OF SKIN: ICD-10-CM

## 2023-12-06 PROCEDURE — 11900 INJECT SKIN LESIONS </W 7: CPT | Performed by: DERMATOLOGY

## 2023-12-06 PROCEDURE — 99213 OFFICE O/P EST LOW 20 MIN: CPT | Mod: 25 | Performed by: DERMATOLOGY

## 2023-12-06 RX ORDER — LIDOCAINE 40 MG/G
CREAM TOPICAL ONCE
Status: COMPLETED | OUTPATIENT
Start: 2023-12-06 | End: 2023-12-06

## 2023-12-06 RX ORDER — TACROLIMUS 1 MG/G
OINTMENT TOPICAL 2 TIMES DAILY
Qty: 60 G | Refills: 3 | Status: SHIPPED | OUTPATIENT
Start: 2023-12-06

## 2023-12-06 RX ORDER — DOXYCYCLINE 100 MG/1
100 CAPSULE ORAL 2 TIMES DAILY
Qty: 28 CAPSULE | Refills: 0 | Status: SHIPPED | OUTPATIENT
Start: 2023-12-06 | End: 2023-12-06

## 2023-12-06 RX ORDER — DOXYCYCLINE 100 MG/1
100 CAPSULE ORAL 2 TIMES DAILY
Qty: 28 CAPSULE | Refills: 0 | Status: SHIPPED | OUTPATIENT
Start: 2023-12-06 | End: 2024-07-02

## 2023-12-06 RX ADMIN — LIDOCAINE: 40 CREAM TOPICAL at 08:03

## 2023-12-06 ASSESSMENT — PAIN SCALES - GENERAL: PAINLEVEL: NO PAIN (0)

## 2023-12-06 NOTE — PROGRESS NOTES
[facility] Dermatology Note  Encounter Date: Dec 6, 2023  Office Visit     Dermatology Problem List:  1. Psoriasis with psoriatic arthritis: mainly on the hands, fingertips, nails   - current tx: xeljanz per rheumatology (12/2021 to present, did not tolerate hiatus 04/2023) and otezla, clobetasol 0.05% ointment/cream for hands and trunk, clobetasol solution, t-gel and t-sal shampoo  - past tx: methotrexate (not effective), humira per rheumatology (started 2/2021, stoppped 5/2021, not effective), enbrel (6/2021 to 8/2021, not effective), cosentyx (8/2021-12/2021), prednisone, lidex solution  - future: consider alternative biologic   2. Cyst of skin, posterior neck midline, s/p excision 8/25/21  3. Inflamed cyst, left inferior labia, s/p ILK 12/06/23  - Current tx: Doxycyline 100 mg BID.    ____________________________________________    Assessment & Plan:    # Inflamed cyst, left inferior labia  - IL-K injections performed today (see procedure note(s) below).  - Start doxycycline 100 mg BID.  - Discussed definitive treatment would be surgical removal. Patient would like to hold of for now.    # Psoriasis in the setting of psoriatic arthritis: chronic, active, improved. Given groin involvement will submit protopic again. Overall doing better on tofacitinib + Otezla, was able to wean off prednisone with rheum.  - Start protopic BID prn  - Also also tazarotene cream at bedtime   - Continue xeljanz, otezla per rheumatology  - Continue clobetasol or lidex scalp solution daily prn; also has T gel/sal acid containing shampoo (dermarest)  - Continue T-gel and T-sal shampoos.  - Has hand/foot nbUVB at home if needed  *rheum note reviewed    Procedures Performed:   - Intra-lesional triamcinolone procedure note. After verbal consent and review of risk of pain and skin thinning/atrophy, positioning and cleansing with isopropyl alcohol, 1 total mL of triamcinolone 20 mg/mL was injected into 1 lesion(s) on the  left inferior labia. The patient tolerated the procedure well and left the dermatology clinic in good condition.    Follow-up: 1 month(s) in-person as scheduled, or earlier for new or changing lesions    Staff and Scribe:     Scribe Disclosure:   I, DELL ELLIS, am serving as a scribe; to document services personally performed by Delilah Isidro MD -based on data collection and the provider's statements to me.    Provider Disclosure:   The documentation recorded by the scribe accurately reflects the services I personally performed and the decisions made by me.    Delilah Isidro MD    Department of Dermatology  [facility]: Phone: [phone], Fax: [phone]  12/9/2023     ____________________________________________    CC: Psoriasis (Follow up psoriasis, swollen spot in left groin area. Psoriasis is doing well. )    HPI:  Ms. Willa Downey is a(n) 63 year old female who presents today as a return patient for psoriasis.    The patient reports she has a swollen spot in the left groin area. Began about 1 week ago. She mentions she is uncomfortable. Has previously had psoriasis on the groin.    Psoriasis is doing well.    Patient is otherwise feeling well, without additional skin concerns.    Labs Reviewed:  N/A    Physical Exam:  Vitals: LMP  (LMP Unknown)   SKIN: Focused examination of the left groin was performed.  - Left inferior labia, large erythematous subcutaneous papulonodule  - minimal scaling on fingers  - No other lesions of concern on areas examined.     Medications:  Current Outpatient Medications   Medication    apremilast (OTEZLA) 30 MG tablet    CALCIUM CARBONATE-VIT D-MIN PO    celecoxib (CELEBREX) 100 MG capsule    clobetasol (TEMOVATE) 0.05 % external solution    gabapentin (NEURONTIN) 300 MG capsule    loratadine 10 MG capsule    melatonin 3 MG tablet    Multiple Vitamins-Minerals  (MULTIVITAMIN ADULTS 50+) TABS    tacrolimus (PROTOPIC) 0.1 % external ointment    tazarotene (TAZORAC) 0.1 % external cream    tofacitinib (XELJANZ XR) 11 MG 24 hr tablet     No current facility-administered medications for this visit.      Past Medical History:   Patient Active Problem List   Diagnosis    Hyperlipidemia    Low-tension glaucoma of both eyes, severe stage    Myopia    Screening for malignant neoplasm of cervix    Psoriasis of nail    Psoriatic arthropathy (H)    Spinal stenosis of lumbar region, unspecified whether neurogenic claudication present    Immunosuppression (H24)    Family history of malignant neoplasm of breast    History of snoring    Prediabetes    Diverticulosis of large intestine    Sleep disorder    Multiple pigmented nevi    Family history of osteoporosis     Past Medical History:   Diagnosis Date    Bilateral low back pain with right-sided sciatica, unspecified chronicity 11/11/2020    Neuroforaminal stenosis of lumbar spine 11/11/2020    Nuclear senile cataract of both eyes 08/16/2018    Osteoarthritis 10/15/2020    Psoriatic arthropathy (H) 10/19/2020    Serum calcium elevated 5/31/2023        CC No referring provider defined for this encounter. on close of this encounter.

## 2023-12-06 NOTE — LETTER
12/6/2023       RE: Willa Downey  [address]  [place] [address]     Dear Colleague,    Thank you for referring your patient, Willa Downey, to the [facility]ERMATOLOG[facility] [place] at [facility]. Please see a copy of my visit note below.    [facility] Dermatology Note  Encounter Date: Dec 6, 2023  Office Visit     Dermatology Problem List:  1. Psoriasis with psoriatic arthritis: mainly on the hands, fingertips, nails   - current tx: xeljanz per rheumatology (12/2021 to present, did not tolerate hiatus 04/2023) and otezla, clobetasol 0.05% ointment/cream for hands and trunk, clobetasol solution, t-gel and t-sal shampoo  - past tx: methotrexate (not effective), humira per rheumatology (started 2/2021, stoppped 5/2021, not effective), enbrel (6/2021 to 8/2021, not effective), cosentyx (8/2021-12/2021), prednisone, lidex solution  - future: consider alternative biologic   2. Cyst of skin, posterior neck midline, s/p excision 8/25/21  3. Inflamed cyst, left inferior labia, s/p ILK 12/06/23  - Current tx: Doxycyline 100 mg BID.    ____________________________________________    Assessment & Plan:    # Inflamed cyst, left inferior labia  - IL-K injections performed today (see procedure note(s) below).  - Start doxycycline 100 mg BID.  - Discussed definitive treatment would be surgical removal. Patient would like to hold of for now.    # Psoriasis in the setting of psoriatic arthritis: chronic, active, improved. Given groin involvement will submit protopic again. Overall doing better on tofacitinib + Otezla, was able to wean off prednisone with rheum.  - Start protopic BID prn  - Also also tazarotene cream at bedtime   - Continue xeljanz, otezla per rheumatology  - Continue clobetasol or lidex scalp solution daily prn; also has T gel/sal acid containing shampoo (dermarest)  - Continue T-gel and T-sal shampoos.  - Has  hand/foot nbUVB at home if needed  *rheum note reviewed    Procedures Performed:   - Intra-lesional triamcinolone procedure note. After verbal consent and review of risk of pain and skin thinning/atrophy, positioning and cleansing with isopropyl alcohol, 1 total mL of triamcinolone 20 mg/mL was injected into 1 lesion(s) on the left inferior labia. The patient tolerated the procedure well and left the dermatology clinic in good condition.    Follow-up: 1 month(s) in-person as scheduled, or earlier for new or changing lesions    Staff and Scribe:     Scribe Disclosure:   I, DELL ELLIS, am serving as a scribe; to document services personally performed by Delilah Isidro MD -based on data collection and the provider's statements to me.    Provider Disclosure:   The documentation recorded by the scribe accurately reflects the services I personally performed and the decisions made by me.    Delilah Isidro MD    Department of Dermatology  [facility] [place]: Phone: [phone], Fax: [phone]  12/9/2023     ____________________________________________    CC: Psoriasis (Follow up psoriasis, swollen spot in left groin area. Psoriasis is doing well. )    HPI:  Ms. Willa Downey is a(n) 63 year old female who presents today as a return patient for psoriasis.    The patient reports she has a swollen spot in the left groin area. Began about 1 week ago. She mentions she is uncomfortable. Has previously had psoriasis on the groin.    Psoriasis is doing well.    Patient is otherwise feeling well, without additional skin concerns.    Labs Reviewed:  N/A    Physical Exam:  Vitals: LMP  (LMP Unknown)   SKIN: Focused examination of the left groin was performed.  - Left inferior labia, large erythematous subcutaneous papulonodule  - minimal scaling on fingers  - No other lesions of concern on areas examined.      Medications:  Current Outpatient Medications   Medication    apremilast (OTEZLA) 30 MG tablet    CALCIUM CARBONATE-VIT D-MIN PO    celecoxib (CELEBREX) 100 MG capsule    clobetasol (TEMOVATE) 0.05 % external solution    gabapentin (NEURONTIN) 300 MG capsule    loratadine 10 MG capsule    melatonin 3 MG tablet    Multiple Vitamins-Minerals (MULTIVITAMIN ADULTS 50+) TABS    tacrolimus (PROTOPIC) 0.1 % external ointment    tazarotene (TAZORAC) 0.1 % external cream    tofacitinib (XELJANZ XR) 11 MG 24 hr tablet     No current facility-administered medications for this visit.      Past Medical History:   Patient Active Problem List   Diagnosis    Hyperlipidemia    Low-tension glaucoma of both eyes, severe stage    Myopia    Screening for malignant neoplasm of cervix    Psoriasis of nail    Psoriatic arthropathy (H)    Spinal stenosis of lumbar region, unspecified whether neurogenic claudication present    Immunosuppression (H24)    Family history of malignant neoplasm of breast    History of snoring    Prediabetes    Diverticulosis of large intestine    Sleep disorder    Multiple pigmented nevi    Family history of osteoporosis     Past Medical History:   Diagnosis Date    Bilateral low back pain with right-sided sciatica, unspecified chronicity 11/11/2020    Neuroforaminal stenosis of lumbar spine 11/11/2020    Nuclear senile cataract of both eyes 08/16/2018    Osteoarthritis 10/15/2020    Psoriatic arthropathy (H) 10/19/2020    Serum calcium elevated 5/31/2023      CC No referring provider defined for this encounter. on close of this encounter.

## 2023-12-06 NOTE — NURSING NOTE
Dermatology Rooming Note    Willa Downey's goals for this visit include:   Chief Complaint   Patient presents with    Psoriasis     Follow up psoriasis, swollen spot in left groin area. Psoriasis is doing well.      Joanne Bhat RN

## 2023-12-06 NOTE — TELEPHONE ENCOUNTER
[facility] Prior Authorization Team Request    Medication:Tacrolimus 0.1% OINT  Dosing: Apply topically twice daily to hands and groin as needed  Qty: 60  Day Supply: 30  NDC (required for Medicaid members): [address]-90      Insurance   BIN: [id number]  PCN: [id number]  Grp: [id number]  ID: [id number]    CoverMyMeds Key (if applicable):     Additional documentation:       Filling Pharmacy: [facility]  Phone Number: [phone]  Contact:    Pharmacy NPI (required for Medicaid members):  [id number]

## 2023-12-06 NOTE — NURSING NOTE
Drug Administration Record    Prior to injection, verified patient identity using patient's name and date of birth.  Due to injection administration, patient instructed to remain in clinic for 15 minutes  afterwards, and to report any adverse reaction to me immediately.    Drug Name: triamcinolone acetonide(kenalog)  Dose: 1mL of triamcinolone 20mg/mL, 20mg dose  Route administered: ID  NDC #: 7229062005  Amount of waste(mL):0.5ml  Reason for waste: Multi dose vial    LOT #: [id number]  SITE: see providers note  : [place]  EXPIRATION DATE: 06/2025

## 2023-12-07 ENCOUNTER — TELEPHONE (OUTPATIENT)
Dept: RHEUMATOLOGY | Facility: CLINIC | Age: 63
End: 2023-12-07
Payer: COMMERCIAL

## 2023-12-09 RX ORDER — TRIAMCINOLONE ACETONIDE 40 MG/ML
0.5 INJECTION, SUSPENSION INTRA-ARTICULAR; INTRAMUSCULAR ONCE
Status: DISCONTINUED | OUTPATIENT
Start: 2023-12-09 | End: 2024-07-17

## 2023-12-09 NOTE — TELEPHONE ENCOUNTER
Central Prior Authorization Team   Phone: [phone]    PA Initiation    Medication: TACROLIMUS 0.1 % EX OINT  Insurance Company: [employer] - Phone [phone] Fax [phone]  Pharmacy Filling the Rx: [facility] [place], [place] - [address] [place] [address] [id number]  Filling Pharmacy Phone: [phone]  Filling Pharmacy Fax:    Start Date: 12/9/2023

## 2023-12-11 NOTE — TELEPHONE ENCOUNTER
Prior Authorization Approval    Authorization Effective Date: 12/9/2023  Authorization Expiration Date: 12/9/2024  Medication: Tacrolimus 0.1% OINT-APPROVED  Reference #:     Insurance Company: [employer] - Phone [phone] Fax [phone]  Which Pharmacy is filling the prescription (Not needed for infusion/clinic administered): [facility] [address] 4-762  Pharmacy Notified: Yes  Patient Notified: Instructed pharmacy to notify patient when script is ready to /ship.

## 2023-12-15 ENCOUNTER — VIRTUAL VISIT (OUTPATIENT)
Dept: RHEUMATOLOGY | Facility: CLINIC | Age: 63
End: 2023-12-15
Attending: INTERNAL MEDICINE
Payer: COMMERCIAL

## 2023-12-15 DIAGNOSIS — L40.50 PSORIATIC ARTHRITIS (H): Primary | ICD-10-CM

## 2023-12-15 DIAGNOSIS — L40.0 PLAQUE PSORIASIS: ICD-10-CM

## 2023-12-15 NOTE — Clinical Note
12/15/2023       RE: Willa Downey  [address]  [place] [address]     Dear Colleague,    Thank you for referring your patient, Willa Downey, to the [facility]HEUM[facility]OLOGY [facility]IN[facility] [place] at . Please see a copy of my visit note below.    Medication Therapy Management (MTM) Encounter    ASSESSMENT:                            Medication Adherence/Access: See below for considerations    Psoriatic Arthritis/Psoriasis: Provided education on Protopic today including dosing, general administration, side effects (both common/serious), precautions, monitoring and time to efficacy. Reviewed difference in mechanism from tazarotene and clobetasol. Will provide updated prescription for Celebrex based on improved efficacy on higher dose. Reviewed current medications and discussed recommendation to continue all medication as prescribed due to increased symptoms earlier this year while off Xeljanz.    PLAN:                            1. Try using Protopic ointment on hands to see if this improves skin symptoms. This may be helpful since it works differently than tazarotene and clobetasol.    2. I will send an updated prescription for Celebrex 200 mg every morning and 100 mg every evening. You can pick this up once you run out of your current prescription.    3. Continue all medications as prescribed.    Follow-up: {[id number]}    SUBJECTIVE/OBJECTIVE:                          Willa Downey is a 63 year old female called for a follow-up visit from 11/10/23.       Reason for visit: Celebrex and Protopic follow up    Allergies/ADRs: Reviewed in chart  Past Medical History: Reviewed in chart  Tobacco: She reports that she has never smoked. She has never used smokeless tobacco.  Alcohol: not currently using    Medication Adherence/Access: Notes she feels like she is on so many medications for her psoriatic arthritis and wonders if she  could eliminate some of them.    Psoriatic Arthritis/Psoriasis:   Otezla 30 mg twice daily  Xeljanz XR 11 mg daily  Celebrex 200 mg every morning and 100 mg every evening  Protopic 0.1% ointment twice daily as needed   Tazarotene 0.1% twice daily as needed   Clobetasol solution daily as needed     Feels the increased dose of celebrex is working well to minimize joint pain - still has some pain in the mid to late afternoon but finds it tolerable. Notes she would like to continue this dose. Dermatology was able to get Protopic approved - just started this. Would like to review how this ointment works and how it is different from her other creams/ointments. Still having splitting/thinning skin on her fingers.     Liver Function Studies -   Recent Labs   Lab Test 11/27/23 0922 05/26/23  1616   PROTTOTAL  --  7.3   ALBUMIN 4.6 5.1   BILITOTAL  --  0.4   ALKPHOS  --  62   AST 30 26   ALT 20 13     CBC RESULTS:   Recent Labs   Lab Test 11/27/23 0922   WBC 6.4   RBC 4.16   HGB 12.3   HCT 37.2   MCV 89   MCH 29.6   MCHC 33.1   RDW 13.3        Today's Vitals: LMP  (LMP Unknown)   ----------------    I spent 19 minutes with this patient today. All changes were made via collaborative practice agreement with Marc Arias. A copy of the visit note was provided to the patient's provider(s).    A summary of these recommendations was sent via [employer].    Иван KhanD  Medication Therapy Management Pharmacist  [facility]  Phone: [phone]    Telemedicine Visit Details  Type of service:  Telephone visit  Start Time: 9:30 AM  End Time: 9:49 AM     Medication Therapy Recommendations  No medication therapy recommendations to display         Again, thank you for allowing me to participate in the care of your patient.      Sincerely,    BIBI HUNT SAHRA

## 2024-01-01 RX ORDER — CELECOXIB 100 MG/1
CAPSULE ORAL
Qty: 270 CAPSULE | Refills: 1 | Status: SHIPPED | OUTPATIENT
Start: 2024-01-01 | End: 2024-04-05

## 2024-01-01 NOTE — PROGRESS NOTES
Medication Therapy Management (MTM) Encounter    ASSESSMENT:                            Medication Adherence/Access: See below for considerations    Psoriatic Arthritis/Psoriasis: Provided education on Protopic today including dosing, general administration, side effects (both common/serious), precautions, monitoring and time to efficacy. Reviewed difference in mechanism from tazarotene and clobetasol. Will provide updated prescription for Celebrex based on improved efficacy on higher dose. Reviewed current medications and discussed recommendation to continue all medication as prescribed due to increased symptoms earlier this year while off Xeljanz.    PLAN:                            1. Try using Protopic ointment on hands to see if this improves skin symptoms. This may be helpful since it works differently than tazarotene and clobetasol.    2. I will send an updated prescription for Celebrex 200 mg every morning and 100 mg every evening. You can pick this up once you run out of your current prescription.    3. Continue all medications as prescribed.    Follow-up: Return in about 6 months (around 6/15/2024) for MTM Pharmacist Visit.    SUBJECTIVE/OBJECTIVE:                          Wlila Downey is a 63 year old female called for a follow-up visit from 11/10/23.       Reason for visit: Celebrex and Protopic follow up    Allergies/ADRs: Reviewed in chart  Past Medical History: Reviewed in chart  Tobacco: She reports that she has never smoked. She has never used smokeless tobacco.  Alcohol: not currently using    Medication Adherence/Access: Notes she feels like she is on so many medications for her psoriatic arthritis and wonders if she could eliminate some of them.    Psoriatic Arthritis/Psoriasis:   Otezla 30 mg twice daily  Xeljanz XR 11 mg daily  Celebrex 200 mg every morning and 100 mg every evening  Protopic 0.1% ointment twice daily as needed   Tazarotene 0.1% twice daily as needed   Clobetasol solution  daily as needed     Feels the increased dose of celebrex is working well to minimize joint pain - still has some pain in the mid to late afternoon but finds it tolerable. Notes she would like to continue this dose. Dermatology was able to get Protopic approved - just started this. Would like to review how this ointment works and how it is different from her other creams/ointments. Still having splitting/thinning skin on her fingers.     Liver Function Studies -   Recent Labs   Lab Test 11/27/23 0922 05/26/23  1616   PROTTOTAL  --  7.3   ALBUMIN 4.6 5.1   BILITOTAL  --  0.4   ALKPHOS  --  62   AST 30 26   ALT 20 13     CBC RESULTS:   Recent Labs   Lab Test 11/27/23 0922   WBC 6.4   RBC 4.16   HGB 12.3   HCT 37.2   MCV 89   MCH 29.6   MCHC 33.1   RDW 13.3        Today's Vitals: LMP  (LMP Unknown)   ----------------    I spent 19 minutes with this patient today. All changes were made via collaborative practice agreement with Marc Arias. A copy of the visit note was provided to the patient's provider(s).    A summary of these recommendations was sent via [employer].    Genesis Medina, PharmD  Medication Therapy Management Pharmacist  [facility]  Phone: [phone]    Telemedicine Visit Details  Type of service:  Telephone visit  Start Time: 9:30 AM  End Time: 9:49 AM     Medication Therapy Recommendations  No medication therapy recommendations to display

## 2024-01-01 NOTE — PATIENT INSTRUCTIONS
"Recommendations from today's MTM visit:                                                       1. Try using Protopic ointment on hands to see if this improves skin symptoms. This may be helpful since it works differently than tazarotene and clobetasol.    2. I will send an updated prescription for Celebrex 200 mg every morning and 100 mg every evening. You can pick this up once you run out of your current prescription.    3. Continue all medications as prescribed.    Follow-up: Return in about 6 months (around 6/15/2024) for MTM Pharmacist Visit.    It was great speaking with you today.  I value your experience and would be very thankful for your time in providing feedback in our clinic survey. In the next few days, you may receive an email or text message from [employer] with a link to a survey related to your  clinical pharmacist.\"     To schedule another MTM appointment, please call the clinic directly or you may call the MTM scheduling line at [phone] or toll-free at [phone].     My Clinical Pharmacist's contact information:                                                      Please feel free to contact me with any questions or concerns you have.      Genesis Medina, PharmD  Medication Therapy Management Pharmacist  [facility]  Phone: [phone]     "

## 2024-01-03 ENCOUNTER — OFFICE VISIT (OUTPATIENT)
Dept: DERMATOLOGY | Facility: CLINIC | Age: 64
End: 2024-01-03
Payer: COMMERCIAL

## 2024-01-03 ENCOUNTER — OFFICE VISIT (OUTPATIENT)
Dept: RHEUMATOLOGY | Facility: CLINIC | Age: 64
End: 2024-01-03
Attending: INTERNAL MEDICINE
Payer: COMMERCIAL

## 2024-01-03 VITALS
HEART RATE: 75 BPM | OXYGEN SATURATION: 99 % | BODY MASS INDEX: 23.56 KG/M2 | WEIGHT: 138 LBS | DIASTOLIC BLOOD PRESSURE: 86 MMHG | HEIGHT: 64 IN | SYSTOLIC BLOOD PRESSURE: 129 MMHG

## 2024-01-03 DIAGNOSIS — Z79.899 HIGH RISK MEDICATION USE: ICD-10-CM

## 2024-01-03 DIAGNOSIS — L72.3 INFLAMED EPIDERMOID CYST OF SKIN: ICD-10-CM

## 2024-01-03 DIAGNOSIS — L40.50 PSORIATIC ARTHRITIS (H): Primary | ICD-10-CM

## 2024-01-03 DIAGNOSIS — L40.9 PSORIASIS: Primary | ICD-10-CM

## 2024-01-03 DIAGNOSIS — L82.1 SEBORRHEIC KERATOSIS: ICD-10-CM

## 2024-01-03 DIAGNOSIS — L40.0 PLAQUE PSORIASIS: ICD-10-CM

## 2024-01-03 DIAGNOSIS — Z79.1 NSAID LONG-TERM USE: ICD-10-CM

## 2024-01-03 PROCEDURE — 99214 OFFICE O/P EST MOD 30 MIN: CPT | Performed by: INTERNAL MEDICINE

## 2024-01-03 PROCEDURE — 99214 OFFICE O/P EST MOD 30 MIN: CPT | Performed by: DERMATOLOGY

## 2024-01-03 PROCEDURE — 99213 OFFICE O/P EST LOW 20 MIN: CPT | Performed by: INTERNAL MEDICINE

## 2024-01-03 ASSESSMENT — PAIN SCALES - GENERAL
PAINLEVEL: NO PAIN (0)
PAINLEVEL: NO PAIN (0)

## 2024-01-03 NOTE — NURSING NOTE
"Chief Complaint   Patient presents with    RECHECK     Follow-up      Vital signs:      BP: 129/86 Pulse: 75     SpO2: 99 %     Height: 162.6 cm (5' 4\") (pt reported) Weight: 62.6 kg (138 lb)  Estimated body mass index is 23.69 kg/m  as calculated from the following:    Height as of this encounter: 1.626 m (5' 4\").    Weight as of this encounter: 62.6 kg (138 lb).      Yvette Collier CMA   1/3/2024 9:38 AM    "

## 2024-01-03 NOTE — LETTER
1/3/2024       RE: Willa Downey  [address]  [facility] [address]     Dear Colleague,    Thank you for referring your patient, Willa Downey, to the [facility] [place] at [facility]. Please see a copy of my visit note below.      Outpatient Rheumatology Follow-up    Name: Willa Downey    MRN [id number]   Today's date: January 3, 2024   Date of last visit: 8/2/23         Reason for follow-up: psoriatic arthritis   Requesting physician: Kitty Mendez MD        Assessment & Plan:   #Psoriatic arthritis  #negative RF  #psoriasis, cutaneous and nail involvement  #DIP Left hand 3rd, 4th, 5th and right hand 4th digit inflammatory arthritis- resolved  #humira, enbrel, methotrexate failure  #Submandibular LAD- resolved  #High risk drug therapy: xeljanz and Otezla    63 year old female with hx of advanced glaucoma followed closely by ophthalmology presented to rheumatology on 2/5/21 with 5 month of progressive left hand 3rd, 4th, 5th digit and right hand 4th digit DIP inflammatory arthritis in the context of psoriasis, psoriatic changes of the nails. Taken together most consistent with psoriatic arthritis. Tried on 2 NSAIDs which failed to control symptoms, and had severe GERD symptoms. Tried methotrexate by prior rheumatologist and disease not controlled. Humira started Mid February 2021 due to lack of efficacy of these other therapies and continued through May 14th, though had severe/ rapidly progressive disease after some improvement in the first 4 weeks involving DIP inflammatory arthritis and nail disease. We then ordered Enbrel, though starting was delayed due to development of impressive bilateral submandibular lymphadenopathy which resolved within about 1 week and thought likely secondary to viral infection. She started enbrel on 6/20/2021 which she had continued on until switching to cosentyx after approval on  8/31/21 due to lack of efficacy of enbrel and injection site reaction. Had been on cosentyx from 8/31/21 with initial improvement in inflammatory DIP arthritis, psoriatic lesions, stabilization of her nail disease though she then had progression of her cutaneous nail and joint inflammatory process which prompted restarting prednisone at 10 mg daily along with doubling her dose of Cosentyx to 300 mg each month.  She took her first dose of 300 mg on 11/23/2021.  She did try to decrease her prednisone shortly after that down to 7.5 though noticed return of her skin peeling/psoriatic skin lesions worsening and so went back up to 10 mg daily which has again improved these symptoms. She was again unable to taper from prednisone below 10mg daily without progression of both cutaneous and articular symptoms and so cosentyx was discontinued and xeljanz 11mg once daily started after insurance approval on 12/29/21. With each attempt to taper her steroids, had difficultly due to return of psoriasis so Willa was seen by Dermatology and started on otezla in addition to xeljanz. She returns today for follow-up.    Psoriatic Arthritis: Ms Downey continues on Xeljanz 11mg once daily and Otezla 30mg BID along with celebrex 200mg qAM and 100mg qPM. Her disease is currently in remission at that time on this regimen. It is important that we continue on this biologic therapy regimen as any prior interruptions in her therapy have resulted in severe flares of her disease which then necessitate prolonged steroid tapers, specifically for her cutaneous disease which has been very challenging to treat. She is tolerating these therapies without any noticeable side effects.   -Continue otezla 30mg BID  -Continue xeljanz 11mg once daily  -continue celebrex 200mg qAM and 100mg qPM  -Has a follow-up appointment already scheduled with Dr Ira Guzman in April 2024.     High risk medication: Xeljanz and otezla  -Most recent labs reviewed  from 11/27/23 to include ESR, CRP, Creatinine, CBC with differential, AST, ALT, albumin. All were within normal range. No evidence of acute drug toxicity. Will continue with routine screening drug toxicity monitoring every 6 months while on her current therapy. Standing orders have been updated.   -Risks and benefits again discussed both therapies to include infection, BM suppression, Rash, HA, GI/hepatotoxicity, malignancy, DVT/PE, all cause mortality among others. Patient agreeable to continue  -Labs q6 months. Next due May 2024.     Long term NSAID use: celebrex  -Labs q6 months for monitoring. Standing orders in place. Reviewed labs from 11/27/23 to include CBC, creatinine without evidence of toxicity    Marc Arias MD  Rheumatology     Subjective:   January 3, 2024  -met with [facility] pharmacist tobin layne PharmD on 12/15/23 and updated medication list at this time includes:  Otezla 30 mg twice daily  Xeljanz XR 11 mg daily  Celebrex 200 mg every morning and 100 mg every evening  Protopic 0.1% ointment twice daily as needed   Tazarotene 0.1% twice daily as needed   Clobetasol solution daily as needed    Following a consultation with pharmacist Tobin, the patient has adjusted her Celebrex dosage to 200 mg in the morning and 100 mg at night. This modification has been beneficial in controlling her pain, and she has not experienced any acute pain episodes since the change.    The patient experiences joint stiffness and occasional finger joint pain. She has expressed curiosity about other potential areas that may be affected by psoriatic arthritis. We discussed this today and discussed that give her quiet disease at this time that it would be unlikely that new areas are to develop, though if they were to develop they would be akin to the initial presenting symptoms of red/warm/swollen joints.     The patient has been managing sciatica with gabapentin, which has proven to be effective.     She reports an  absence of fevers, chills, or night sweats, and has not had any infections.    Although the patient notes a decrease in  strength, she does not perceive it to be deteriorating. Previous attempts at hand therapy did not yield beneficial results.    The patient confirms she has no mouth or nose ulcers and her weight has remained stable. She has sufficient refills for her current medications, which include Xeljanz, Otezla, and Celebrex, and does not require any additional refills at this time.    She denies any red/hot/swollen joints. No symptoms consistent with dactylitis. No inflammatory back pain symptoms. No inflammatory eye disease symptoms. Has a history of glaucoma however. No GI symptoms from her otezla or celebrex, which she is mindful of.     Her skin has been quiet. She saw dermatology this AM prior to her appointment with me. She continues to have nail pitting on the finger nails of her left hand, though right hand nails remain unaffected.     She has not required the use of systemic steroids since her last visit with me. Overall she is happy with her disease control at this time.     14 point ROS collected and negative if not documented above.     August 2, 2023  -was off of xeljanz for about full month in April. Was taking prednisone during that time. Was having fingertip peeling. When one gets better another gets worse. More joint pain on her hands. Also part of her toes.   -This month, her skin and joint pains are much improved. Her skin is currently completely cleared.   -Still will have joint pains  -has completely tapered off of prednisone  -has continued on xeljanz and otezla. Tolerating well without noticeable side effects  -every 1-2 months, will have skin peeling on her hands, otherwise she feels well. Joint pain is not associated with erythema/edema/warmth. Described as shooting pain. In her CMCs/bialteral thumb IP joints. Has stiffness in her DIPS/PIPs.   -Does have stiffness in the AM that  is localized to bilateral DIPs. Lasts for 1-2 hours.   -no interval infections. No fevers/chills/night sweats.   -stiffness does not limit any specific activities that she wants to do  -she does avoid certain activities due to the pain in her hands  -The pain that she has after increased activity is at the end of the day  -has been going for walks in the AM 2.5 miles 5x/week. Afterwards/towards the end, her right foot will have   14 point ROS collected and negative if not documented above    April 28, 2023   -last dose of xeljanz was on April 3rd  -One week later, all psoriasis symptoms returned. On fingertips/worsening joints/palms/ankles/scalp/back of palms.   -Continued on otezla  -Started on 10mg of prednisone on 4/18, took for 7 days, now on 7.5mg on day 4.   -xeljanz approved. Had delivered on Tuesday.  -starting today, after taking prednisone since 4/18, prednisone is helping. First day of improvement.   -still fatigue/lack of energy.   -No fevers chills night sweats.  No unintentional weight loss.  - No interval infections  - 14 point review of systems collected and otherwise negative    2/10/2023  - Continues on Otezla and Xeljanz without noticeable side effects.  - This combination has allowed for complete resolution of her cutaneous psoriasis  - She denies red hot or swollen joints.  Still some intermittent pain and discomfort in her DIPs though significantly improved.  - Also some pain discomfort in her CMC's secondary to known noninflammatory osteoarthritis  - Her fingernails are improving and look healthy on her hands with minimal digital pitting fissures and breaking  - No fevers chills night sweats  - No unintentional weight loss    September 16, 2022  Has started on otezla and without ongoing side effects with exception of new evening HA. She thinks it may be 5 or so days per week. Mild. During the day she does not notice anything. Noticing this after dinner/watching TV. Slight. Not to the point she  needs to take anything. Has noticed that her psoriasis on her fingertips and scalp have improved. Still some sensitivity on her fingertips, but much better. Has been able to taper down to 1mg of prednisone. Has been on this dose for 6 days. Articular symptoms have been stable/at baseline. No obvious red/hot/swollen joints. No new areas of joint pain/involvement. No new side effects from xeljanz. Tolerating low dose prednisone without noticeable side effects. No interval infections. No fevers/chills/night sweats. Family member with COVID so has been isolating. Has continued to walk for exercise, as helps with overall symptoms, though has been experiencing pain in ball of foot/greater MTP. Low suspicion for gout, though will assess for other pathology.  14 point ROS collected and negative if not documented above.     Interval history 3/25/2022  Currently on 3mg of prednisone daily. Still with pink/ reddish spot on her scalp and left digits at tips of digits, though much improved. Has been tolerating her prednisone and xeljanz without issues. She did by accident take an increased dose of prednisone up to 10mg for 2 days when she was to reduce from 4 to 3mg due to having a different bottle of 2.5mg tabs (instead of the 1mg tabs she was taking). Her pain in the AM is 6/10. Takes her celebrex in the AM with pain resolution. No GI side effects from her celebrex. No fevers/chills/night sweats. She has noticed slight increase in the lesions of her digits since decreasing her prednisone down to 3mg for 5 days. Reports feeling well and that this is the best she has felt with DMARD therapy and being able to reduce her dose. No interval infections. ROS otherwise negative.     Interval history 1/28/22  Was already on prednisone 10mg and then xeljanz was added on Jan 3rd. Had complete resolution of psoriasis on scalp/inner ear. Hands also completely clear at that time. She then decreased to 7.5mg on 1/17 and for the first week was  "doing \"okay\", then this week noticed some pits on fingertips, some skin peeling. Yesterday and today has noticed some more skin peeling. Some more scalp irriation 3 days ago as well. She feels that since starting xeljanz this is the best improvement that she has found. Continues to take celebrex daily due to pain in joints without it. With celebrex, pain is resolved. If she does lots of activity (cleaning etc) will have some pain. No GI upset as she had with mobic. Not taking PPI. She has noticed some more fatigue in the AM and also more so in the afternoon. Uses clobetasol ointment and cream. Also topical for scalp. Uses them all at night. Using 0.05% clobetasol. Has continued to experience rare intermittent tender lymph nodes. Had last booster on August 21st. No interval infections. No fevers, chills, weight changes. Remains active. No noticeable side effects since starting xeljanz. ROS otherwise negative.     12/2/2021 interval history  Took her first dose of Cosentyx 200 mg daily on 11/23/2021.  Around that time tried to decrease her prednisone from 10 mg daily down to 7.5 mg daily though written to discuss she had return of joint pain, joint swelling and stiffness particularly of her DIPs, worsening of her nail disease and psoriatic lesions on the tips of her fingers.  Fatigue also more significant.  As a result she went back up to 10 mg daily and this improved some.  She is not been taking any NSAIDs since her last appointment despite her joint pain as the meloxicam previously prescribed it was giving her reflux/gastritis symptoms.  No injection site reaction from her Cosentyx.  No fevers or chills.  No weight changes.  Tolerating prednisone without issues.  Review of systems otherwise negative.    9/30/21 Interval history  Has been taking cosentyx without issues. Next dose on Friday and then switching to monthly. Has found that her skin is some improved over the fingertips. Though still some painful/burning " "sensation at the sites of prior lesions. This is improving. When she started prednisone, did have dramatic improvement. Though with taper improvement has been slower though has continued. Currently on 2.5mg daily. No side effects from low dose. Energy still \"not great\" though she thinks it is slowly improving. Going for walks 5 days per week 30-40 minutes per day. Reports that the pain is OK when doing activities, but has been avoiding activities that are painful like weeding/carrying heavy items with grocery shopping. Has continued with mobic 3-4x week. 15mg tabs each time. No GI side effects at this frequency/dose. Still some stiff at DIPs. Though improved from prior. Did have lesion mid upper back and front groin which have improved since cosentyx/ prednisone. No fevers/chills. No interval infections. ROS otherwise negative.     August 12, 2021  Interval history  Had ?ear infection early July. Given ear drops by PCP. Had developed more psoriasis in new locations (palm of hand/scalp (10-15%)/lower stomach/low back/ and groin/gluteal cleft) where previously was on fingertips/nails only. Saw dermatology who prescribed topical, the topical has been somewhat helpful though has not resolves symptoms. She thinks that she has had 10-15% improvement with the topical. Spoke to pharmacist, who counseled her than can take up to 4 months for enbrel to take full effect. Gives herself injection every Sunday. After 5th injection, has itching/burning at the site. Takes Claritin already. Iced. Was told by pharmacist to try hydrocortisone, which she did. Decreased from 4-5 days of symptoms down to 3 days. From 5th injection onwards has had this reaction. Total of 8 injections so far. Joints much less of an issue compared to cutaneous involvement. If she has any joint pain, takes mobic. Roughly 1x per week. Around once per 7 -10days has extreme fatigue. Yesterday was significant. Slept until noon. Usually gets up at 630. Usually " trouble sleeping, so this was unusual for her. No return of submandibular LAD. No fever, chills. No cough/sore throat. Some stiffness in the DIPs, morning predominant. Improves throughout the day. Some increase in pain this week. ROS otherwise negative.      6/17/21  Has advanced glaucoma/ dry eyes. Chronic. No new head/neck symptoms. Dry mouth during submandibular gland swelling, dry mouth now resolved. Reports the swelling has mproved now by 50+%. No more pain, so no longer using ibuprofen. No fever/chills/cough or other signs/symptoms of infection. With ongoing progression of nail/distal digit predominant psoriasis and DIP arthritis. Severe pain. Unable to tolerate NSAIDs given GI upset despite PPI. ROS otherwise negative       5/14/21  Patient initially was going to wait to contact, though has been having progression of symptoms of nails/joint pain/ skin. Last injection was on Tuesday. Had a flare in the days following her injection. First 2 doses, she did notice some improvement. Though after that time, with each dose, has had less and less of a positive change in nails/joints/skin. Has instead been getting worse and worse response. Has stiffness and pain in her DIPs that has been progressive. Has redness as well. Also with radiation of the pain proximally which is new. Also some days with severe fatigue. No fevers/chills. No weight changes. No interval infections.     Past Medical History  Past Medical History:   Diagnosis Date    Bilateral low back pain with right-sided sciatica, unspecified chronicity 11/11/2020    Neuroforaminal stenosis of lumbar spine 11/11/2020    Nuclear senile cataract of both eyes 08/16/2018    Osteoarthritis 10/15/2020    Psoriatic arthropathy (H) 10/19/2020    Serum calcium elevated 5/31/2023     Past Surgical History  Past Surgical History:   Procedure Laterality Date    BIOPSY  07/30/2019    the right breast tissue(benign)    COLONOSCOPY N/A 7/14/2022    Procedure: COLONOSCOPY;   "Surgeon: Roderick Campos MD;  Location: [facility] [place]    EYE SURGERY      3 surgeries for glaucoma treatment     Medications  Current Outpatient Medications   Medication    apremilast (OTEZLA) 30 MG tablet    CALCIUM CARBONATE-VIT D-MIN PO    celecoxib (CELEBREX) 100 MG capsule    clobetasol (TEMOVATE) 0.05 % external solution    doxycycline monohydrate (MONODOX) 100 MG capsule    gabapentin (NEURONTIN) 300 MG capsule    loratadine 10 MG capsule    melatonin 3 MG tablet    Multiple Vitamins-Minerals (MULTIVITAMIN ADULTS 50+) TABS    tacrolimus (PROTOPIC) 0.1 % external ointment    tazarotene (TAZORAC) 0.1 % external cream    tofacitinib (XELJANZ XR) 11 MG 24 hr tablet     Current Facility-Administered Medications   Medication    triamcinolone (KENALOG-40) injection 20 mg     Allergies   Allergies   Allergen Reactions    Adhesive Tape Blisters, Itching and Swelling    Dust Mites     Latex Blisters, Itching, Other (See Comments) and Swelling     Skin sensitivity      Morphine Sulfate (Concentrate) Itching     Other reaction(s): Chills  heart palpatations    Seasonal Allergies     Codeine Palpitations     chills     Family History:   No family history of autoimmune diseases.     Social History  , with children. 2 kids and they are healthy. No ETOH, smoking, drug use.      Objective:    Physical exam:  /86   Pulse 75   Ht 1.626 m (5' 4\")   Wt 62.6 kg (138 lb)   LMP  (LMP Unknown)   SpO2 99%   BMI 23.69 kg/m    Sitting up unassisted NAD, pleasant and interactive as always  No facial rash, wearing N95 mask  Sclera appear clear  Breathing comfortably on room air, no cough, no audible wheeze, no use of accessory muscles, speaking in full sentences without difficulty  Full active and passive ROM of bilateral shoulders, elbows, wrists, MCPs, PIPs, DIPs without synovitis of any joints. Able to make a full fist bilaterally.  strength bilaterally is some weaker than would be expected. No evidence " of dactylitis. Had TTP of the left hand 2nd digit DIP, though no appreciable synovitis of this joint. When I went back to examine this joint later in the encounter, it was no longer tender to palpation.   Has nail pitting on the fingernails of the left hand. Not on the right hand.   No acute psoriatic plaques appreciated today on skin exam    WBC   Date Value Ref Range Status   06/11/2021 9.4 4.0 - 11.0 10e9/L Final     WBC Count   Date Value Ref Range Status   11/27/2023 6.4 4.0 - 11.0 10e3/uL Final     Hemoglobin   Date Value Ref Range Status   11/27/2023 12.3 11.7 - 15.7 g/dL Final   06/11/2021 12.9 11.7 - 15.7 g/dL Final     Platelet Count   Date Value Ref Range Status   11/27/2023 388 150 - 450 10e3/uL Final   06/11/2021 369 150 - 450 10e9/L Final     Creatinine   Date Value Ref Range Status   11/27/2023 0.61 0.51 - 0.95 mg/dL Final   06/11/2021 0.63 0.52 - 1.04 mg/dL Final     Lab Results   Component Value Date    ALKPHOS 64 02/16/2023    ALKPHOS 76 06/11/2021     AST   Date Value Ref Range Status   11/27/2023 30 0 - 45 U/L Final     Comment:     Reference intervals for this test were updated on 6/12/2023 to more accurately reflect our healthy population. There may be differences in the flagging of prior results with similar values performed with this method. Interpretation of those prior results can be made in the context of the updated reference intervals.   06/11/2021 14 0 - 45 U/L Final     Lab Results   Component Value Date    ALT 16 02/16/2023    ALT 19 06/11/2021     Sed Rate   Date Value Ref Range Status   06/16/2021 15 0 - 30 mm/h Final     Erythrocyte Sedimentation Rate   Date Value Ref Range Status   11/27/2023 10 0 - 30 mm/hr Final     CRP Inflammation   Date Value Ref Range Status   02/01/2022 <2.9 0.0 - 8.0 mg/L Final   06/16/2021 5.2 0.0 - 8.0 mg/L Final     UA RESULTS:  No results for input(s): COLOR, APPEARANCE, URINEGLC, URINEBILI, URINEKETONE, SG, UBLD, URINEPH, PROTEIN, UROBILINOGEN,  NITRITE, LEUKEST, RBCU, WBCU in the last [id number] hours.   Rheumatoid Factor   Date Value Ref Range Status   02/05/2021 <7 <12 IU/mL Final     6/16/21  SSA, SSB negative  Mumps IgG and IgM positive  Creatinine 0.63  LFTs normal  CBC WNL    2/5/21  HIV negative  Hep C AB negative  Hep B s AG negative  Hep B c AB reactive  Quant gold negative  Quant gold negative  RF negative  CRP <2.9  ESR 10  Cr 0.62  WBC 7.1  HGB 13.1    HLA b27 negative  Hep B virus DNA negative    2/2/21  ESR 9  CRP <2.9  WBC 6.1  HGB 12.8      11/11/20  A1C 5.3  Hep C/ HIV negative    Imaging:  MR left hand  Impression:  1a. Correlating to the marker at the third digit interphalangeal  joint, there is a focal erosion along the volar aspect of the distal  phalangeal base with intense bone marrow edema and abnormal T1 marrow  signal. Findings are concerning for osteomyelitis and infection should  be ruled out. Also on the differential is sequelae of long-standing  active inflammatory arthropathy (including psoriatic arthritis given  patient's clinical history) where marginal erosions and marrow signal  abnormality are expected though the degree of T1 marrow replacement is  out of proportion to typical findings.   b. Subtle bone marrow edema in the middle phalanx without T1 marrow  signal abnormality, this is favored to represent reactive edema.  c. Joint effusion at the third digit interphalangeal joint,  potentially infectious or inflammatory. Consider aspiration for  definitive diagnosis.  d. The partially visualized fifth digit shows edema within the distal  phalanx and head of the middle phalanx with regional soft tissue  edema. Given predominant findings in the third digit, similar changes  in the fifth digit could support a polyarticular inflammatory  arthropathy. Correlate clinically.  2. Tenosynovitis of the third digit flexor and extensor tendons. There  is also tendinosis with high-grade (near full-thickness) tearing of  the  extensor tendon/extensor saul at the distal phalangeal base with  slight proximal retraction of the residual fibers.  3. Diffusely thickened, edematous ulnar and radial collateral  ligaments, likely combination of sprain and reactive edema.  4. Extensive soft tissue edema centered about the interphalangeal  joint.       Marc Arias MD

## 2024-01-03 NOTE — PROGRESS NOTES
Outpatient Rheumatology Follow-up    Name: Willa Downey    MRN [id number]   Today's date: January 3, 2024   Date of last visit: 8/2/23         Reason for follow-up: psoriatic arthritis   Requesting physician: Kitty Mendez MD        Assessment & Plan:   #Psoriatic arthritis  #negative RF  #psoriasis, cutaneous and nail involvement  #DIP Left hand 3rd, 4th, 5th and right hand 4th digit inflammatory arthritis- resolved  #humira, enbrel, methotrexate failure  #Submandibular LAD- resolved  #High risk drug therapy: xeljanz and Otezla    63 year old female with hx of advanced glaucoma followed closely by ophthalmology presented to rheumatology on 2/5/21 with 5 month of progressive left hand 3rd, 4th, 5th digit and right hand 4th digit DIP inflammatory arthritis in the context of psoriasis, psoriatic changes of the nails. Taken together most consistent with psoriatic arthritis. Tried on 2 NSAIDs which failed to control symptoms, and had severe GERD symptoms. Tried methotrexate by prior rheumatologist and disease not controlled. Humira started Mid February 2021 due to lack of efficacy of these other therapies and continued through May 14th, though had severe/ rapidly progressive disease after some improvement in the first 4 weeks involving DIP inflammatory arthritis and nail disease. We then ordered Enbrel, though starting was delayed due to development of impressive bilateral submandibular lymphadenopathy which resolved within about 1 week and thought likely secondary to viral infection. She started enbrel on 6/20/2021 which she had continued on until switching to cosentyx after approval on 8/31/21 due to lack of efficacy of enbrel and injection site reaction. Had been on cosentyx from 8/31/21 with initial improvement in inflammatory DIP arthritis, psoriatic lesions, stabilization of her nail disease though she then had progression of her cutaneous nail and joint inflammatory process which prompted restarting  prednisone at 10 mg daily along with doubling her dose of Cosentyx to 300 mg each month.  She took her first dose of 300 mg on 11/23/2021.  She did try to decrease her prednisone shortly after that down to 7.5 though noticed return of her skin peeling/psoriatic skin lesions worsening and so went back up to 10 mg daily which has again improved these symptoms. She was again unable to taper from prednisone below 10mg daily without progression of both cutaneous and articular symptoms and so cosentyx was discontinued and xeljanz 11mg once daily started after insurance approval on 12/29/21. With each attempt to taper her steroids, had difficultly due to return of psoriasis so Willa was seen by Dermatology and started on otezla in addition to xeljanz. She returns today for follow-up.    Psoriatic Arthritis: Ms Downey continues on Xeljanz 11mg once daily and Otezla 30mg BID along with celebrex 200mg qAM and 100mg qPM. Her disease is currently in remission at that time on this regimen. It is important that we continue on this biologic therapy regimen as any prior interruptions in her therapy have resulted in severe flares of her disease which then necessitate prolonged steroid tapers, specifically for her cutaneous disease which has been very challenging to treat. She is tolerating these therapies without any noticeable side effects.   -Continue otezla 30mg BID  -Continue xeljanz 11mg once daily  -continue celebrex 200mg qAM and 100mg qPM  -Has a follow-up appointment already scheduled with Dr Ira Guzman in April 2024.     High risk medication: Xeljanz and otezla  -Most recent labs reviewed from 11/27/23 to include ESR, CRP, Creatinine, CBC with differential, AST, ALT, albumin. All were within normal range. No evidence of acute drug toxicity. Will continue with routine screening drug toxicity monitoring every 6 months while on her current therapy. Standing orders have been updated.   -Risks and benefits again  discussed both therapies to include infection, BM suppression, Rash, HA, GI/hepatotoxicity, malignancy, DVT/PE, all cause mortality among others. Patient agreeable to continue  -Labs q6 months. Next due May 2024.     Long term NSAID use: celebrex  -Labs q6 months for monitoring. Standing orders in place. Reviewed labs from 11/27/23 to include CBC, creatinine without evidence of toxicity    Marc Arias MD  Rheumatology     Subjective:   January 3, 2024  -met with [facility] pharmacist tobin layne PharmD on 12/15/23 and updated medication list at this time includes:  Otezla 30 mg twice daily  Xeljanz XR 11 mg daily  Celebrex 200 mg every morning and 100 mg every evening  Protopic 0.1% ointment twice daily as needed   Tazarotene 0.1% twice daily as needed   Clobetasol solution daily as needed    Following a consultation with pharmacist Tobin, the patient has adjusted her Celebrex dosage to 200 mg in the morning and 100 mg at night. This modification has been beneficial in controlling her pain, and she has not experienced any acute pain episodes since the change.    The patient experiences joint stiffness and occasional finger joint pain. She has expressed curiosity about other potential areas that may be affected by psoriatic arthritis. We discussed this today and discussed that give her quiet disease at this time that it would be unlikely that new areas are to develop, though if they were to develop they would be akin to the initial presenting symptoms of red/warm/swollen joints.     The patient has been managing sciatica with gabapentin, which has proven to be effective.     She reports an absence of fevers, chills, or night sweats, and has not had any infections.    Although the patient notes a decrease in  strength, she does not perceive it to be deteriorating. Previous attempts at hand therapy did not yield beneficial results.    The patient confirms she has no mouth or nose ulcers and her weight has  remained stable. She has sufficient refills for her current medications, which include Xeljanz, Otezla, and Celebrex, and does not require any additional refills at this time.    She denies any red/hot/swollen joints. No symptoms consistent with dactylitis. No inflammatory back pain symptoms. No inflammatory eye disease symptoms. Has a history of glaucoma however. No GI symptoms from her otezla or celebrex, which she is mindful of.     Her skin has been quiet. She saw dermatology this AM prior to her appointment with me. She continues to have nail pitting on the finger nails of her left hand, though right hand nails remain unaffected.     She has not required the use of systemic steroids since her last visit with me. Overall she is happy with her disease control at this time.     14 point ROS collected and negative if not documented above.     August 2, 2023  -was off of xeljanz for about full month in April. Was taking prednisone during that time. Was having fingertip peeling. When one gets better another gets worse. More joint pain on her hands. Also part of her toes.   -This month, her skin and joint pains are much improved. Her skin is currently completely cleared.   -Still will have joint pains  -has completely tapered off of prednisone  -has continued on xeljanz and otezla. Tolerating well without noticeable side effects  -every 1-2 months, will have skin peeling on her hands, otherwise she feels well. Joint pain is not associated with erythema/edema/warmth. Described as shooting pain. In her CMCs/bialteral thumb IP joints. Has stiffness in her DIPS/PIPs.   -Does have stiffness in the AM that is localized to bilateral DIPs. Lasts for 1-2 hours.   -no interval infections. No fevers/chills/night sweats.   -stiffness does not limit any specific activities that she wants to do  -she does avoid certain activities due to the pain in her hands  -The pain that she has after increased activity is at the end of the  day  -has been going for walks in the AM 2.5 miles 5x/week. Afterwards/towards the end, her right foot will have   14 point ROS collected and negative if not documented above    April 28, 2023   -last dose of xeljanz was on April 3rd  -One week later, all psoriasis symptoms returned. On fingertips/worsening joints/palms/ankles/scalp/back of palms.   -Continued on otezla  -Started on 10mg of prednisone on 4/18, took for 7 days, now on 7.5mg on day 4.   -xeljanz approved. Had delivered on Tuesday.  -starting today, after taking prednisone since 4/18, prednisone is helping. First day of improvement.   -still fatigue/lack of energy.   -No fevers chills night sweats.  No unintentional weight loss.  - No interval infections  - 14 point review of systems collected and otherwise negative    2/10/2023  - Continues on Otezla and Xeljanz without noticeable side effects.  - This combination has allowed for complete resolution of her cutaneous psoriasis  - She denies red hot or swollen joints.  Still some intermittent pain and discomfort in her DIPs though significantly improved.  - Also some pain discomfort in her CMC's secondary to known noninflammatory osteoarthritis  - Her fingernails are improving and look healthy on her hands with minimal digital pitting fissures and breaking  - No fevers chills night sweats  - No unintentional weight loss    September 16, 2022  Has started on otezla and without ongoing side effects with exception of new evening HA. She thinks it may be 5 or so days per week. Mild. During the day she does not notice anything. Noticing this after dinner/watching TV. Slight. Not to the point she needs to take anything. Has noticed that her psoriasis on her fingertips and scalp have improved. Still some sensitivity on her fingertips, but much better. Has been able to taper down to 1mg of prednisone. Has been on this dose for 6 days. Articular symptoms have been stable/at baseline. No obvious red/hot/swollen  "joints. No new areas of joint pain/involvement. No new side effects from xeljanz. Tolerating low dose prednisone without noticeable side effects. No interval infections. No fevers/chills/night sweats. Family member with COVID so has been isolating. Has continued to walk for exercise, as helps with overall symptoms, though has been experiencing pain in ball of foot/greater MTP. Low suspicion for gout, though will assess for other pathology.  14 point ROS collected and negative if not documented above.     Interval history 3/25/2022  Currently on 3mg of prednisone daily. Still with pink/ reddish spot on her scalp and left digits at tips of digits, though much improved. Has been tolerating her prednisone and xeljanz without issues. She did by accident take an increased dose of prednisone up to 10mg for 2 days when she was to reduce from 4 to 3mg due to having a different bottle of 2.5mg tabs (instead of the 1mg tabs she was taking). Her pain in the AM is 6/10. Takes her celebrex in the AM with pain resolution. No GI side effects from her celebrex. No fevers/chills/night sweats. She has noticed slight increase in the lesions of her digits since decreasing her prednisone down to 3mg for 5 days. Reports feeling well and that this is the best she has felt with DMARD therapy and being able to reduce her dose. No interval infections. ROS otherwise negative.     Interval history 1/28/22  Was already on prednisone 10mg and then xeljanz was added on Jan 3rd. Had complete resolution of psoriasis on scalp/inner ear. Hands also completely clear at that time. She then decreased to 7.5mg on 1/17 and for the first week was doing \"okay\", then this week noticed some pits on fingertips, some skin peeling. Yesterday and today has noticed some more skin peeling. Some more scalp irriation 3 days ago as well. She feels that since starting xeljanz this is the best improvement that she has found. Continues to take celebrex daily due to pain " "in joints without it. With celebrex, pain is resolved. If she does lots of activity (cleaning etc) will have some pain. No GI upset as she had with mobic. Not taking PPI. She has noticed some more fatigue in the AM and also more so in the afternoon. Uses clobetasol ointment and cream. Also topical for scalp. Uses them all at night. Using 0.05% clobetasol. Has continued to experience rare intermittent tender lymph nodes. Had last booster on August 21st. No interval infections. No fevers, chills, weight changes. Remains active. No noticeable side effects since starting xeljanz. ROS otherwise negative.     12/2/2021 interval history  Took her first dose of Cosentyx 200 mg daily on 11/23/2021.  Around that time tried to decrease her prednisone from 10 mg daily down to 7.5 mg daily though written to discuss she had return of joint pain, joint swelling and stiffness particularly of her DIPs, worsening of her nail disease and psoriatic lesions on the tips of her fingers.  Fatigue also more significant.  As a result she went back up to 10 mg daily and this improved some.  She is not been taking any NSAIDs since her last appointment despite her joint pain as the meloxicam previously prescribed it was giving her reflux/gastritis symptoms.  No injection site reaction from her Cosentyx.  No fevers or chills.  No weight changes.  Tolerating prednisone without issues.  Review of systems otherwise negative.    9/30/21 Interval history  Has been taking cosentyx without issues. Next dose on Friday and then switching to monthly. Has found that her skin is some improved over the fingertips. Though still some painful/burning sensation at the sites of prior lesions. This is improving. When she started prednisone, did have dramatic improvement. Though with taper improvement has been slower though has continued. Currently on 2.5mg daily. No side effects from low dose. Energy still \"not great\" though she thinks it is slowly improving. " Going for walks 5 days per week 30-40 minutes per day. Reports that the pain is OK when doing activities, but has been avoiding activities that are painful like weeding/carrying heavy items with grocery shopping. Has continued with mobic 3-4x week. 15mg tabs each time. No GI side effects at this frequency/dose. Still some stiff at DIPs. Though improved from prior. Did have lesion mid upper back and front groin which have improved since cosentyx/ prednisone. No fevers/chills. No interval infections. ROS otherwise negative.     August 12, 2021  Interval history  Had ?ear infection early July. Given ear drops by PCP. Had developed more psoriasis in new locations (palm of hand/scalp (10-15%)/lower stomach/low back/ and groin/gluteal cleft) where previously was on fingertips/nails only. Saw dermatology who prescribed topical, the topical has been somewhat helpful though has not resolves symptoms. She thinks that she has had 10-15% improvement with the topical. Spoke to pharmacist, who counseled her than can take up to 4 months for enbrel to take full effect. Gives herself injection every Sunday. After 5th injection, has itching/burning at the site. Takes Claritin already. Iced. Was told by pharmacist to try hydrocortisone, which she did. Decreased from 4-5 days of symptoms down to 3 days. From 5th injection onwards has had this reaction. Total of 8 injections so far. Joints much less of an issue compared to cutaneous involvement. If she has any joint pain, takes mobic. Roughly 1x per week. Around once per 7 -10days has extreme fatigue. Yesterday was significant. Slept until noon. Usually gets up at 630. Usually trouble sleeping, so this was unusual for her. No return of submandibular LAD. No fever, chills. No cough/sore throat. Some stiffness in the DIPs, morning predominant. Improves throughout the day. Some increase in pain this week. ROS otherwise negative.      6/17/21  Has advanced glaucoma/ dry eyes. Chronic. No  new head/neck symptoms. Dry mouth during submandibular gland swelling, dry mouth now resolved. Reports the swelling has mproved now by 50+%. No more pain, so no longer using ibuprofen. No fever/chills/cough or other signs/symptoms of infection. With ongoing progression of nail/distal digit predominant psoriasis and DIP arthritis. Severe pain. Unable to tolerate NSAIDs given GI upset despite PPI. ROS otherwise negative       5/14/21  Patient initially was going to wait to contact, though has been having progression of symptoms of nails/joint pain/ skin. Last injection was on Tuesday. Had a flare in the days following her injection. First 2 doses, she did notice some improvement. Though after that time, with each dose, has had less and less of a positive change in nails/joints/skin. Has instead been getting worse and worse response. Has stiffness and pain in her DIPs that has been progressive. Has redness as well. Also with radiation of the pain proximally which is new. Also some days with severe fatigue. No fevers/chills. No weight changes. No interval infections.     Past Medical History  Past Medical History:   Diagnosis Date    Bilateral low back pain with right-sided sciatica, unspecified chronicity 11/11/2020    Neuroforaminal stenosis of lumbar spine 11/11/2020    Nuclear senile cataract of both eyes 08/16/2018    Osteoarthritis 10/15/2020    Psoriatic arthropathy (H) 10/19/2020    Serum calcium elevated 5/31/2023     Past Surgical History  Past Surgical History:   Procedure Laterality Date    BIOPSY  07/30/2019    the right breast tissue(benign)    COLONOSCOPY N/A 7/14/2022    Procedure: COLONOSCOPY;  Surgeon: Roderick Campos MD;  Location: [facility] [place] OR    EYE SURGERY      3 surgeries for glaucoma treatment     Medications  Current Outpatient Medications   Medication    apremilast (OTEZLA) 30 MG tablet    CALCIUM CARBONATE-VIT D-MIN PO    celecoxib (CELEBREX) 100 MG capsule    clobetasol (TEMOVATE)  "0.05 % external solution    doxycycline monohydrate (MONODOX) 100 MG capsule    gabapentin (NEURONTIN) 300 MG capsule    loratadine 10 MG capsule    melatonin 3 MG tablet    Multiple Vitamins-Minerals (MULTIVITAMIN ADULTS 50+) TABS    tacrolimus (PROTOPIC) 0.1 % external ointment    tazarotene (TAZORAC) 0.1 % external cream    tofacitinib (XELJANZ XR) 11 MG 24 hr tablet     Current Facility-Administered Medications   Medication    triamcinolone (KENALOG-40) injection 20 mg     Allergies   Allergies   Allergen Reactions    Adhesive Tape Blisters, Itching and Swelling    Dust Mites     Latex Blisters, Itching, Other (See Comments) and Swelling     Skin sensitivity      Morphine Sulfate (Concentrate) Itching     Other reaction(s): Chills  heart palpatations    Seasonal Allergies     Codeine Palpitations     chills     Family History:   No family history of autoimmune diseases.     Social History  , with children. 2 kids and they are healthy. No ETOH, smoking, drug use.      Objective:    Physical exam:  /86   Pulse 75   Ht 1.626 m (5' 4\")   Wt 62.6 kg (138 lb)   LMP  (LMP Unknown)   SpO2 99%   BMI 23.69 kg/m    Sitting up unassisted NAD, pleasant and interactive as always  No facial rash, wearing N95 mask  Sclera appear clear  Breathing comfortably on room air, no cough, no audible wheeze, no use of accessory muscles, speaking in full sentences without difficulty  Full active and passive ROM of bilateral shoulders, elbows, wrists, MCPs, PIPs, DIPs without synovitis of any joints. Able to make a full fist bilaterally.  strength bilaterally is some weaker than would be expected. No evidence of dactylitis. Had TTP of the left hand 2nd digit DIP, though no appreciable synovitis of this joint. When I went back to examine this joint later in the encounter, it was no longer tender to palpation.   Has nail pitting on the fingernails of the left hand. Not on the right hand.   No acute psoriatic plaques " appreciated today on skin exam    WBC   Date Value Ref Range Status   06/11/2021 9.4 4.0 - 11.0 10e9/L Final     WBC Count   Date Value Ref Range Status   11/27/2023 6.4 4.0 - 11.0 10e3/uL Final     Hemoglobin   Date Value Ref Range Status   11/27/2023 12.3 11.7 - 15.7 g/dL Final   06/11/2021 12.9 11.7 - 15.7 g/dL Final     Platelet Count   Date Value Ref Range Status   11/27/2023 388 150 - 450 10e3/uL Final   06/11/2021 369 150 - 450 10e9/L Final     Creatinine   Date Value Ref Range Status   11/27/2023 0.61 0.51 - 0.95 mg/dL Final   06/11/2021 0.63 0.52 - 1.04 mg/dL Final     Lab Results   Component Value Date    ALKPHOS 64 02/16/2023    ALKPHOS 76 06/11/2021     AST   Date Value Ref Range Status   11/27/2023 30 0 - 45 U/L Final     Comment:     Reference intervals for this test were updated on 6/12/2023 to more accurately reflect our healthy population. There may be differences in the flagging of prior results with similar values performed with this method. Interpretation of those prior results can be made in the context of the updated reference intervals.   06/11/2021 14 0 - 45 U/L Final     Lab Results   Component Value Date    ALT 16 02/16/2023    ALT 19 06/11/2021     Sed Rate   Date Value Ref Range Status   06/16/2021 15 0 - 30 mm/h Final     Erythrocyte Sedimentation Rate   Date Value Ref Range Status   11/27/2023 10 0 - 30 mm/hr Final     CRP Inflammation   Date Value Ref Range Status   02/01/2022 <2.9 0.0 - 8.0 mg/L Final   06/16/2021 5.2 0.0 - 8.0 mg/L Final     UA RESULTS:  No results for input(s): COLOR, APPEARANCE, URINEGLC, URINEBILI, URINEKETONE, SG, UBLD, URINEPH, PROTEIN, UROBILINOGEN, NITRITE, LEUKEST, RBCU, WBCU in the last [id number] hours.   Rheumatoid Factor   Date Value Ref Range Status   02/05/2021 <7 <12 IU/mL Final     6/16/21  SSA, SSB negative  Mumps IgG and IgM positive  Creatinine 0.63  LFTs normal  CBC WNL    2/5/21  HIV negative  Hep C AB negative  Hep B s AG negative  Hep B c AB  reactive  Quant gold negative  Quant gold negative  RF negative  CRP <2.9  ESR 10  Cr 0.62  WBC 7.1  HGB 13.1    HLA b27 negative  Hep B virus DNA negative    2/2/21  ESR 9  CRP <2.9  WBC 6.1  HGB 12.8      11/11/20  A1C 5.3  Hep C/ HIV negative    Imaging:  MR left hand  Impression:  1a. Correlating to the marker at the third digit interphalangeal  joint, there is a focal erosion along the volar aspect of the distal  phalangeal base with intense bone marrow edema and abnormal T1 marrow  signal. Findings are concerning for osteomyelitis and infection should  be ruled out. Also on the differential is sequelae of long-standing  active inflammatory arthropathy (including psoriatic arthritis given  patient's clinical history) where marginal erosions and marrow signal  abnormality are expected though the degree of T1 marrow replacement is  out of proportion to typical findings.   b. Subtle bone marrow edema in the middle phalanx without T1 marrow  signal abnormality, this is favored to represent reactive edema.  c. Joint effusion at the third digit interphalangeal joint,  potentially infectious or inflammatory. Consider aspiration for  definitive diagnosis.  d. The partially visualized fifth digit shows edema within the distal  phalanx and head of the middle phalanx with regional soft tissue  edema. Given predominant findings in the third digit, similar changes  in the fifth digit could support a polyarticular inflammatory  arthropathy. Correlate clinically.  2. Tenosynovitis of the third digit flexor and extensor tendons. There  is also tendinosis with high-grade (near full-thickness) tearing of  the extensor tendon/extensor saul at the distal phalangeal base with  slight proximal retraction of the residual fibers.  3. Diffusely thickened, edematous ulnar and radial collateral  ligaments, likely combination of sprain and reactive edema.  4. Extensive soft tissue edema centered about the  interphalangeal  joint.

## 2024-01-03 NOTE — LETTER
1/3/2024       RE: Willa Downey  [address]  [facility] [address]     Dear Colleague,    Thank you for referring your patient, Willa Downey, to the [facility] DERMATOLOGY [facility]IC [place] at [facility]. Please see a copy of my visit note below.    [facility] Dermatology Note  Encounter Date: Steven 3, 2024  Office Visit     Dermatology Problem List:  1. Psoriasis with psoriatic arthritis: mainly on the hands, fingertips, nails   - current tx: protopic, xeljanz per rheumatology (12/2021 to present, did not tolerate hiatus 04/2023) and otezla, clobetasol 0.05% ointment/cream for hands and trunk, clobetasol solution, t-gel and t-sal shampoo  - past tx: methotrexate (not effective), humira per rheumatology (started 2/2021, stoppped 5/2021, not effective), enbrel (6/2021 to 8/2021, not effective), cosentyx (8/2021-12/2021), prednisone, lidex solution  - future: consider alternative biologic   2. Cyst of skin, posterior neck midline, s/p excision 8/25/21  3. Inflamed cyst, left inferior labia, s/p ILK 12/06/23 and doxycyline 100 mg BID.    ____________________________________________    Assessment & Plan:    # Inflamed cyst, left inferior labia  - Completed course of doxycycline and s/p ILK with improvement  - Cyst management: Options were reviewed. The patient declines excision at this time. She will consider with reflare.    # Psoriasis in the setting of psoriatic arthritis: chronic, active, improved.   Overall doing better on tofacitinib + Otezla, was able to wean off prednisone with rheum.She was able to get protopic and so started using this with improvement.  - Continue protopic BID prn  - Continue tazarotene cream at bedtime   - Continue xeljanz, otezla per rheumatology  - Continue clobetasol or lidex scalp solution daily prn; also has T gel/sal acid containing shampoo (dermarest)  - Continue T-gel and T-sal shampoos.  -  Future considerations: Vtama  - nbUVB home hand/foot unit not covered by insurance  *rheum note reviewed    # SK, left thigh.  - benign, reassurance    Procedures Performed:   None.    Follow-up: 6 month(s) in-person, or earlier for new or changing lesions    Staff and Scribe:     Scribe Disclosure:   I, DELL RHONDA, am serving as a scribe; to document services personally performed by Delilah Isidro MD -based on data collection and the provider's statements to me.    Provider Disclosure:   The documentation recorded by the scribe accurately reflects the services I personally performed and the decisions made by me.    Delilah Isidro MD    Department of Dermatology  [facility]: Phone: [phone], Fax: [phone]  1/8/2024     ____________________________________________    CC: Derm Problem (Willa is here to follow up on her psoriasis of fingertips. She states things are stable. )    HPI:  Ms. Willa Downey is a(n) 63 year old female who presents today as a return patient for Psoriasis.    Psoriasis on fingertips is stable. Since her last visit, has started protopic. It's helping.    Inflamed cyst in inferior labia is still present.    Patient is otherwise feeling well, without additional skin concerns.    Labs Reviewed:  N/A    Physical Exam:  Vitals: LMP  (LMP Unknown)   SKIN: Focused examination of bilateral hands and left thigh was performed.  - Several fingers with scaling and deep seated vesicles.  - There is a waxy stuck on tan to brown papule on the left thigh.  - No other lesions of concern on areas examined.     Medications:  Current Outpatient Medications   Medication    apremilast (OTEZLA) 30 MG tablet    CALCIUM CARBONATE-VIT D-MIN PO    celecoxib (CELEBREX) 100 MG capsule    clobetasol (TEMOVATE) 0.05 % external solution    doxycycline monohydrate (MONODOX) 100 MG capsule    gabapentin  (NEURONTIN) 300 MG capsule    loratadine 10 MG capsule    melatonin 3 MG tablet    Multiple Vitamins-Minerals (MULTIVITAMIN ADULTS 50+) TABS    tacrolimus (PROTOPIC) 0.1 % external ointment    tazarotene (TAZORAC) 0.1 % external cream    tofacitinib (XELJANZ XR) 11 MG 24 hr tablet     Current Facility-Administered Medications   Medication    triamcinolone (KENALOG-40) injection 20 mg      Past Medical History:   Patient Active Problem List   Diagnosis    Hyperlipidemia    Low-tension glaucoma of both eyes, severe stage    Myopia    Screening for malignant neoplasm of cervix    Psoriasis of nail    Psoriatic arthropathy (H)    Spinal stenosis of lumbar region, unspecified whether neurogenic claudication present    Immunosuppression (H24)    Family history of malignant neoplasm of breast    History of snoring    Prediabetes    Diverticulosis of large intestine    Sleep disorder    Multiple pigmented nevi    Family history of osteoporosis     Past Medical History:   Diagnosis Date    Bilateral low back pain with right-sided sciatica, unspecified chronicity 11/11/2020    Neuroforaminal stenosis of lumbar spine 11/11/2020    Nuclear senile cataract of both eyes 08/16/2018    Osteoarthritis 10/15/2020    Psoriatic arthropathy (H) 10/19/2020    Serum calcium elevated 5/31/2023   CC No referring provider defined for this encounter. on close of this encounter.

## 2024-01-03 NOTE — PROGRESS NOTES
[facility] Dermatology Note  Encounter Date: Steven 3, 2024  Office Visit     Dermatology Problem List:  1. Psoriasis with psoriatic arthritis: mainly on the hands, fingertips, nails   - current tx: protopic, xeljanz per rheumatology (12/2021 to present, did not tolerate hiatus 04/2023) and otezla, clobetasol 0.05% ointment/cream for hands and trunk, clobetasol solution, t-gel and t-sal shampoo  - past tx: methotrexate (not effective), humira per rheumatology (started 2/2021, stoppped 5/2021, not effective), enbrel (6/2021 to 8/2021, not effective), cosentyx (8/2021-12/2021), prednisone, lidex solution  - future: consider alternative biologic   2. Cyst of skin, posterior neck midline, s/p excision 8/25/21  3. Inflamed cyst, left inferior labia, s/p ILK 12/06/23 and doxycyline 100 mg BID.    ____________________________________________    Assessment & Plan:    # Inflamed cyst, left inferior labia  - Completed course of doxycycline and s/p ILK with improvement  - Cyst management: Options were reviewed. The patient declines excision at this time. She will consider with reflare.    # Psoriasis in the setting of psoriatic arthritis: chronic, active, improved.   Overall doing better on tofacitinib + Otezla, was able to wean off prednisone with rheum.She was able to get protopic and so started using this with improvement.  - Continue protopic BID prn  - Continue tazarotene cream at bedtime   - Continue xeljanz, otezla per rheumatology  - Continue clobetasol or lidex scalp solution daily prn; also has T gel/sal acid containing shampoo (dermarest)  - Continue T-gel and T-sal shampoos.  - Future considerations: Vtama  - nbUVB home hand/foot unit not covered by insurance  *rheum note reviewed    # SK, left thigh.  - benign, reassurance    Procedures Performed:   None.    Follow-up: 6 month(s) in-person, or earlier for new or changing lesions    Staff and Scribe:     Scribe Disclosure:   DELL CASPER,  am serving as a scribe; to document services personally performed by Delilah Isidro MD -based on data collection and the provider's statements to me.    Provider Disclosure:   The documentation recorded by the scribe accurately reflects the services I personally performed and the decisions made by me.    Delilah Isidro MD    Department of Dermatology  [facility]: Phone: [phone], Fax: [phone]  1/8/2024     ____________________________________________    CC: Derm Problem (Willa is here to follow up on her psoriasis of fingertips. She states things are stable. )    HPI:  Ms. Willa Downey is a(n) 63 year old female who presents today as a return patient for Psoriasis.    Psoriasis on fingertips is stable. Since her last visit, has started protopic. It's helping.    Inflamed cyst in inferior labia is still present.    Patient is otherwise feeling well, without additional skin concerns.    Labs Reviewed:  N/A    Physical Exam:  Vitals: LMP  (LMP Unknown)   SKIN: Focused examination of bilateral hands and left thigh was performed.  - Several fingers with scaling and deep seated vesicles.  - There is a waxy stuck on tan to brown papule on the left thigh.  - No other lesions of concern on areas examined.     Medications:  Current Outpatient Medications   Medication    apremilast (OTEZLA) 30 MG tablet    CALCIUM CARBONATE-VIT D-MIN PO    celecoxib (CELEBREX) 100 MG capsule    clobetasol (TEMOVATE) 0.05 % external solution    doxycycline monohydrate (MONODOX) 100 MG capsule    gabapentin (NEURONTIN) 300 MG capsule    loratadine 10 MG capsule    melatonin 3 MG tablet    Multiple Vitamins-Minerals (MULTIVITAMIN ADULTS 50+) TABS    tacrolimus (PROTOPIC) 0.1 % external ointment    tazarotene (TAZORAC) 0.1 % external cream    tofacitinib (XELJANZ XR) 11 MG 24 hr tablet     Current Facility-Administered Medications    Medication    triamcinolone (KENALOG-40) injection 20 mg      Past Medical History:   Patient Active Problem List   Diagnosis    Hyperlipidemia    Low-tension glaucoma of both eyes, severe stage    Myopia    Screening for malignant neoplasm of cervix    Psoriasis of nail    Psoriatic arthropathy (H)    Spinal stenosis of lumbar region, unspecified whether neurogenic claudication present    Immunosuppression (H24)    Family history of malignant neoplasm of breast    History of snoring    Prediabetes    Diverticulosis of large intestine    Sleep disorder    Multiple pigmented nevi    Family history of osteoporosis     Past Medical History:   Diagnosis Date    Bilateral low back pain with right-sided sciatica, unspecified chronicity 11/11/2020    Neuroforaminal stenosis of lumbar spine 11/11/2020    Nuclear senile cataract of both eyes 08/16/2018    Osteoarthritis 10/15/2020    Psoriatic arthropathy (H) 10/19/2020    Serum calcium elevated 5/31/2023        CC No referring provider defined for this encounter. on close of this encounter.

## 2024-01-03 NOTE — NURSING NOTE
Dermatology Rooming Note    Willa Downey's goals for this visit include:   Chief Complaint   Patient presents with    Derm Problem     Willa is here to follow up on her psoriasis of fingertips. She states things are stable.      Sweetie Pierre, visit facilitator

## 2024-02-06 NOTE — PROGRESS NOTES
"Patient seen at the request of No ref. provider found for an opinion and evaluation of right sided low back pain.      HISTORY OF PRESENT ILLNESS:  Willa Downey is a 63 year old female who presents with a chief complaint of right sided low back pain.     She was seen today in the clinic. She describes right-sided low back pain which began around 2020 without any known cause.  She says that at the time she was having some radicular pain into the right lower extremity.  However, she feels that the pain has generally improved over time.    She says that she had been taking gabapentin 3 times a day for pain, but says that she tapered down to twice a day, which she finds is working out okay.  She states that her pain level usually ranges from 0-2/10.  If she does more activity, in particular prolonged standing, she will have an increased degree of pain.  She is active - walks 2 miles regularly 4 to 5 days out of the week.  The walking does not seem to aggravate the pain.    Today, she describes pain & tightness affecting the right side low back extending into the lateral right hip.  There is no pain extending into the right lower extremity.  She notes that this morning she had a pulling sensation extending into her left leg, but says that she does not usually have pain in that area.    Aggravating factors include: prolonged standing, laying on right  Relieving factors include: stretching or medication     Today, she rates the pain 0/10.  At its worst over the last week the pain was 2/10.   Patient states sleep is not affected though she does feel sometimes uncomfortable laying on her right side.     She denies falls, weakness, bowel or bladder incontinence, saddle anesthesia, unintentional weight loss, fevers/chills, or night sweats.     Today, she says her goal is to refill the gabapentin, which she describes as a \"miracle medicine for me\".      PRIOR INJURIES/TREATMENT:   Ice/Heat: no    Brace: no    Physical " Therapy:   PT for low back pain 2020     - Current Pain Medications -   Celebrex daily for arthritis pain  Gabapentin 300 mg BID  Xeljanz  Otezla    - Prior/Trialed Pain Medications -   Flexeril  Steroid burst    Prior Procedures:  Date    Procedure   Improvement (%)  none              Prior Related Surgery: none   Other (acupuncture, OMT, CMM, TENS, DME, etc.):     Specialists Seen - (with most recent, available notes and clinic visits reviewed)   1. Sports medicine - Dr Zhou, Dr Tesfaye  2.  Rheumatology-psoriatic arthritis      IMAGING - reviewed   MR LUMBAR SPINE W/O CONTRAST 8/14/2020    Findings: There are 5 lumbar-type vertebrae assumed for the purposes  of this dictation.  The tip of the conus medullaris is at L1.  Grade 1  anterolisthesis of L4. Mild loss of disc height at L4-5.  Normal  marrow signal.     On a level by level basis:     T12-L1: No spinal canal or neuroforaminal stenosis.     L1-2: No spinal canal or neuroforaminal stenosis.     L2-3: No spinal canal or neuroforaminal stenosis.     L3-4: No spinal canal or neuroforaminal stenosis.     L4-5: Grade 1 anterolisthesis and uncovering of the disc. Superimposed  central disc extrusion with cephalad migration. Bilateral facet  hypertrophy and ligamentum flavum thickening. Severe spinal canal  stenosis. Moderate bilateral neural foraminal stenosis. Periarticular  edema surrounding the bilateral facet joints.     L5-S1: Posterior disc bulge and bilateral facet hypertrophy. Mild  spinal canal narrowing. Mild bilateral neural foraminal narrowing.     Paraspinous tissues are within normal limits.                                                                      Impression:   1. Spondylosis, particularly at L4-5 where there is severe spinal  canal stenosis and moderate bilateral neural foraminal stenosis.  2. Active inflammatory arthropathy of the bilateral L4-5 facet joints.      2 views lumbar spine radiographs 7/13/2020  Findings:     Standing  AP  and lateral  views of the lumbar spine were obtained.     5  lumbar type vertebral bodies are assumed for the purpose of this  dictation.     There is no acute osseous abnormality.       Grade 1 spondylolisthesis of L4 on L5 measuring approximately 8 mm  with associated mild intervertebral disc space loss. Otherwise, Disc  height and vertebral body heights are maintained throughout.     The visualized bowel gas pattern is non-obstructive.                                                  Impression:  1.  No acute osseous abnormality.  2.  Grade 1 degenerative type spondylolisthesis of L4 on L5, measuring  approximately 8 mm.      Review Of Systems:  I am responding to those symptoms which are directly relevant to the specific indication for my consultation. I recommend that the patient follow up with their primary or referring provider to pursue any other symptoms which may be of concern.       Medical History:  She  has a past medical history of Bilateral low back pain with right-sided sciatica, unspecified chronicity (11/11/2020), Neuroforaminal stenosis of lumbar spine (11/11/2020), Nuclear senile cataract of both eyes (08/16/2018), Osteoarthritis (10/15/2020), Psoriatic arthropathy (H) (10/19/2020), and Serum calcium elevated (5/31/2023).     She  has a past surgical history that includes biopsy (07/30/2019); Eye surgery; and Colonoscopy (N/A, 7/14/2022).    Family History  Her family history includes Breast Cancer in her maternal aunt; Cataracts in her maternal grandmother; Hyperlipidemia in her mother; Hypertension in her mother; Osteoporosis in her mother; Stomach Cancer in her father.     Social History:  Work: stay at home mom  Current living situation: lives with family and her daughter, another daughter lives on her own. Performs ADLs/IADLs independently.    She  reports that she has never smoked. She has never used smokeless tobacco. She reports that she does not drink alcohol and does not use drugs.       "  Current Medications:   She has a current medication list which includes the following prescription(s): apremilast, calcium carbonate-vit d-min, celecoxib, clobetasol, doxycycline monohydrate, gabapentin, loratadine, melatonin, multivitamin adults 50+, tacrolimus, tazarotene, and xeljanz xr, and the following Facility-Administered Medications: triamcinolone.     Allergies:    -- Adhesive Tape -- Blisters, Itching and Swelling   -- Dust Mites    -- Latex -- Blisters, Itching, Other (See                            Comments) and Swelling    --  Skin sensitivity   -- Morphine Sulfate (Concentrate) -- Itching    --  Other reaction(s): Chills             heart palpatations   -- Seasonal Allergies    -- Codeine -- Palpitations    --  chills    PHYSICAL EXAMINATION:  /78 (BP Location: Right arm, Patient Position: Sitting)   Pulse 70   Resp 16   Ht 1.626 m (5' 4\")   Wt 62.1 kg (137 lb)   LMP  (LMP Unknown)   SpO2 98%   BMI 23.52 kg/m     --CONSTITUTIONAL: Vital signs as above. No acute distress. The patient is well nourished and well groomed.  --PSYCHIATRIC: The patient is awake, alert, oriented to person, place, time and answering questions appropriately with clear speech. Appropriate mood and affect   --SKIN:  Posterior torso is clean, dry, intact without rashes.   --RESPIRATORY: Normal rhythm and effort. No abnormal accessory muscle breathing patterns noted.   --GROSS MOTOR: Easily arises from a seated position. Toe walking and heel walking are normal.  Standing at the counter for stability, she is able to stand on one leg and lift up onto her toes 5 times without difficulty on either side  --STANDING EXAMINATION: Gait is non-antalgic. Normal lumbar lordosis noted, no lateral shift.  --LOWER EXTREMITY MOTOR TESTING:  Plantar flexion left 5/5, right 5/5   Dorsiflexion left 5/5, right 5/5   Great toe MTP extension left 5/5, right 5/5  Knee flexion left 5/5, right 5/5  Knee extension left 5/5, right 5/5   Hip " flexion left 5/5, right 5/5  --MUSCULOSKELETAL: Lumbar spine inspection reveals no evidence of deformity. Range of motion is not limited in lumbar flexion, extension, lateral rotation.  Lumbar extension and lateral rotation elicit mild low back pain.  No tenderness to palpation lumbar spine. Straight leg raise negative bilaterally. Sciatic notch non-tender. Facet loading maneuvers are mildly positive.  --SACROILIAC JOINT: No pain with palpation of the SI joint.  Leo's is negative.  Savana's on the right side elicits mild low back pain.  Savana's negative on the left.  Gaenslen's negative bilaterally.  Thigh thrust Test negative bilaterally. Sacroiliac Joint Compression Test negative bilaterally.   --HIPS: Full range of motion bilaterally. Savana's on the right side elicits mild low back pain.  Savana's negative on the left.   No pain with logrolling. No pain with palpation of the greater trochanters.   --NEUROLOGIC: 3/4 right patellar, 2/4 left patellar, and 2/4 achilles reflexes bilaterally.  Sensation to light touch is intact in the bilateral L4, L5, and S1 dermatomes.   No clonus.    --VASCULAR:  Warm lower limbs bilaterally. There is no pitting edema of the bilateral lower extremities.     ASSESSMENT:  Willa Downey is a pleasant 63 year old female who presents with chronic atraumatic right-sided low back pain.     MRI of the lumbar spine from 8/14/2020 shows:  -Severe spinal canal stenosis and moderate bilateral neuroforaminal stenosis at L4-5  -Grade 1 anterolisthesis of L4  -Lower lumbar facet hypertrophy, with periarticular facet joint edema at L4-5    Complicating comorbidities include:  # Psoriatic arthritis, follows with rheumatology    PLAN:  -MRI of the lumbar spine from 7/13/2020 was reviewed today with the patient.  We discussed that the symptoms that she has experienced are likely secondary to the severe spinal canal stenosis at L4-5, and possibly 2/2 lower lumbar neuroforaminal stenosis.  There  could be some component of facet mediated pain as well.  We discussed the option of updating the MRI of the lumbar spine,.  She feels that her symptoms are quite stable and unchanged, & does not wish to proceed with any further diagnostic testing for now.    -We discussed the option of starting a physical therapy program.  She says that she still has her home exercise program from previously.  She declines or new referral for PT.  -I refilled the gabapentin, 300 mg twice daily, which she is tolerating without issue.  - RTC as needed    Ready to learn, no apparent learning barriers.  Education provided on treatment plan according to patient's preferred learning style.  Patient verbalizes understanding.   __________________________________  Eri Kuo NP  Physical Medicine & Rehabilitation        53 minutes spent by me on the date of the encounter doing chart review, history and exam, documentation and further activities per the note

## 2024-02-09 ENCOUNTER — OFFICE VISIT (OUTPATIENT)
Dept: PHYSICAL MEDICINE AND REHAB | Facility: CLINIC | Age: 64
End: 2024-02-09
Payer: COMMERCIAL

## 2024-02-09 VITALS
DIASTOLIC BLOOD PRESSURE: 78 MMHG | WEIGHT: 137 LBS | BODY MASS INDEX: 23.39 KG/M2 | HEIGHT: 64 IN | RESPIRATION RATE: 16 BRPM | HEART RATE: 70 BPM | OXYGEN SATURATION: 98 % | SYSTOLIC BLOOD PRESSURE: 120 MMHG

## 2024-02-09 DIAGNOSIS — M54.50 CHRONIC RIGHT-SIDED LOW BACK PAIN WITHOUT SCIATICA: ICD-10-CM

## 2024-02-09 DIAGNOSIS — M47.816 FACET ARTHROPATHY, LUMBAR: ICD-10-CM

## 2024-02-09 DIAGNOSIS — M54.16 LUMBAR RADICULOPATHY: Primary | ICD-10-CM

## 2024-02-09 DIAGNOSIS — M47.816 SPONDYLOSIS OF LUMBAR REGION WITHOUT MYELOPATHY OR RADICULOPATHY: ICD-10-CM

## 2024-02-09 DIAGNOSIS — M48.061 SPINAL STENOSIS OF LUMBAR REGION WITHOUT NEUROGENIC CLAUDICATION: ICD-10-CM

## 2024-02-09 DIAGNOSIS — G89.29 CHRONIC RIGHT-SIDED LOW BACK PAIN WITHOUT SCIATICA: ICD-10-CM

## 2024-02-09 PROCEDURE — 99204 OFFICE O/P NEW MOD 45 MIN: CPT | Performed by: NURSE PRACTITIONER

## 2024-02-09 RX ORDER — GABAPENTIN 300 MG/1
300 CAPSULE ORAL 2 TIMES DAILY
Qty: 60 CAPSULE | Refills: 3 | Status: SHIPPED | OUTPATIENT
Start: 2024-02-09 | End: 2024-07-25

## 2024-02-09 ASSESSMENT — PAIN SCALES - GENERAL: PAINLEVEL: NO PAIN (0)

## 2024-02-09 NOTE — LETTER
2/9/2024       RE: Willa Downey  [address]  [facility] [address]       Dear Colleague,    Thank you for referring your patient, Willa Downey, to the [facility] [place] [facility]. Please see a copy of my visit note below.    Patient seen at the request of No ref. provider found for an opinion and evaluation of right sided low back pain.      HISTORY OF PRESENT ILLNESS:  Willa Downey is a 63 year old female who presents with a chief complaint of right sided low back pain.     She was seen today in the clinic. She describes right-sided low back pain which began around 2020 without any known cause.  She says that at the time she was having some radicular pain into the right lower extremity.  However, she feels that the pain has generally improved over time.    She says that she had been taking gabapentin 3 times a day for pain, but says that she tapered down to twice a day, which she finds is working out okay.  She states that her pain level usually ranges from 0-2/10.  If she does more activity, in particular prolonged standing, she will have an increased degree of pain.  She is active - walks 2 miles regularly 4 to 5 days out of the week.  The walking does not seem to aggravate the pain.    Today, she describes pain & tightness affecting the right side low back extending into the lateral right hip.  There is no pain extending into the right lower extremity.  She notes that this morning she had a pulling sensation extending into her left leg, but says that she does not usually have pain in that area.    Aggravating factors include: prolonged standing, laying on right  Relieving factors include: stretching or medication     Today, she rates the pain 0/10.  At its worst over the last week the pain was 2/10.   Patient states sleep is not affected though she does feel sometimes uncomfortable  "laying on her right side.     She denies falls, weakness, bowel or bladder incontinence, saddle anesthesia, unintentional weight loss, fevers/chills, or night sweats.     Today, she says her goal is to refill the gabapentin, which she describes as a \"miracle medicine for me\".      PRIOR INJURIES/TREATMENT:   Ice/Heat: no    Brace: no    Physical Therapy:   PT for low back pain 2020     - Current Pain Medications -   Celebrex daily for arthritis pain  Gabapentin 300 mg BID  Xeljanz  Otezla    - Prior/Trialed Pain Medications -   Flexeril  Steroid burst    Prior Procedures:  Date    Procedure   Improvement (%)  none              Prior Related Surgery: none   Other (acupuncture, OMT, CMM, TENS, DME, etc.):     Specialists Seen - (with most recent, available notes and clinic visits reviewed)   1. Sports medicine - Dr Zhou, Dr Tesfaye  2.  Rheumatology-psoriatic arthritis      IMAGING - reviewed   MR LUMBAR SPINE W/O CONTRAST 8/14/2020    Findings: There are 5 lumbar-type vertebrae assumed for the purposes  of this dictation.  The tip of the conus medullaris is at L1.  Grade 1  anterolisthesis of L4. Mild loss of disc height at L4-5.  Normal  marrow signal.     On a level by level basis:     T12-L1: No spinal canal or neuroforaminal stenosis.     L1-2: No spinal canal or neuroforaminal stenosis.     L2-3: No spinal canal or neuroforaminal stenosis.     L3-4: No spinal canal or neuroforaminal stenosis.     L4-5: Grade 1 anterolisthesis and uncovering of the disc. Superimposed  central disc extrusion with cephalad migration. Bilateral facet  hypertrophy and ligamentum flavum thickening. Severe spinal canal  stenosis. Moderate bilateral neural foraminal stenosis. Periarticular  edema surrounding the bilateral facet joints.     L5-S1: Posterior disc bulge and bilateral facet hypertrophy. Mild  spinal canal narrowing. Mild bilateral neural foraminal narrowing.     Paraspinous tissues are within normal limits.                   "                                                    Impression:   1. Spondylosis, particularly at L4-5 where there is severe spinal  canal stenosis and moderate bilateral neural foraminal stenosis.  2. Active inflammatory arthropathy of the bilateral L4-5 facet joints.      2 views lumbar spine radiographs 7/13/2020  Findings:     Standing  AP and lateral  views of the lumbar spine were obtained.     5  lumbar type vertebral bodies are assumed for the purpose of this  dictation.     There is no acute osseous abnormality.       Grade 1 spondylolisthesis of L4 on L5 measuring approximately 8 mm  with associated mild intervertebral disc space loss. Otherwise, Disc  height and vertebral body heights are maintained throughout.     The visualized bowel gas pattern is non-obstructive.                                                  Impression:  1.  No acute osseous abnormality.  2.  Grade 1 degenerative type spondylolisthesis of L4 on L5, measuring  approximately 8 mm.      Review Of Systems:  I am responding to those symptoms which are directly relevant to the specific indication for my consultation. I recommend that the patient follow up with their primary or referring provider to pursue any other symptoms which may be of concern.       Medical History:  She  has a past medical history of Bilateral low back pain with right-sided sciatica, unspecified chronicity (11/11/2020), Neuroforaminal stenosis of lumbar spine (11/11/2020), Nuclear senile cataract of both eyes (08/16/2018), Osteoarthritis (10/15/2020), Psoriatic arthropathy (H) (10/19/2020), and Serum calcium elevated (5/31/2023).     She  has a past surgical history that includes biopsy (07/30/2019); Eye surgery; and Colonoscopy (N/A, 7/14/2022).    Family History  Her family history includes Breast Cancer in her maternal aunt; Cataracts in her maternal grandmother; Hyperlipidemia in her mother; Hypertension in her mother; Osteoporosis in her mother; Stomach Cancer  "in her father.     Social History:  Work: stay at home mom  Current living situation: lives with family and her daughter, another daughter lives on her own. Performs ADLs/IADLs independently.    She  reports that she has never smoked. She has never used smokeless tobacco. She reports that she does not drink alcohol and does not use drugs.        Current Medications:   She has a current medication list which includes the following prescription(s): apremilast, calcium carbonate-vit d-min, celecoxib, clobetasol, doxycycline monohydrate, gabapentin, loratadine, melatonin, multivitamin adults 50+, tacrolimus, tazarotene, and xeljanz xr, and the following Facility-Administered Medications: triamcinolone.     Allergies:    -- Adhesive Tape -- Blisters, Itching and Swelling   -- Dust Mites    -- Latex -- Blisters, Itching, Other (See                            Comments) and Swelling    --  Skin sensitivity   -- Morphine Sulfate (Concentrate) -- Itching    --  Other reaction(s): Chills             heart palpatations   -- Seasonal Allergies    -- Codeine -- Palpitations    --  chills    PHYSICAL EXAMINATION:  /78 (BP Location: Right arm, Patient Position: Sitting)   Pulse 70   Resp 16   Ht 1.626 m (5' 4\")   Wt 62.1 kg (137 lb)   LMP  (LMP Unknown)   SpO2 98%   BMI 23.52 kg/m     --CONSTITUTIONAL: Vital signs as above. No acute distress. The patient is well nourished and well groomed.  --PSYCHIATRIC: The patient is awake, alert, oriented to person, place, time and answering questions appropriately with clear speech. Appropriate mood and affect   --SKIN:  Posterior torso is clean, dry, intact without rashes.   --RESPIRATORY: Normal rhythm and effort. No abnormal accessory muscle breathing patterns noted.   --GROSS MOTOR: Easily arises from a seated position. Toe walking and heel walking are normal.  Standing at the counter for stability, she is able to stand on one leg and lift up onto her toes 5 times without " difficulty on either side  --STANDING EXAMINATION: Gait is non-antalgic. Normal lumbar lordosis noted, no lateral shift.  --LOWER EXTREMITY MOTOR TESTING:  Plantar flexion left 5/5, right 5/5   Dorsiflexion left 5/5, right 5/5   Great toe MTP extension left 5/5, right 5/5  Knee flexion left 5/5, right 5/5  Knee extension left 5/5, right 5/5   Hip flexion left 5/5, right 5/5  --MUSCULOSKELETAL: Lumbar spine inspection reveals no evidence of deformity. Range of motion is not limited in lumbar flexion, extension, lateral rotation.  Lumbar extension and lateral rotation elicit mild low back pain.  No tenderness to palpation lumbar spine. Straight leg raise negative bilaterally. Sciatic notch non-tender. Facet loading maneuvers are mildly positive.  --SACROILIAC JOINT: No pain with palpation of the SI joint.  Leo's is negative.  Savana's on the right side elicits mild low back pain.  Savana's negative on the left.  Gaenslen's negative bilaterally.  Thigh thrust Test negative bilaterally. Sacroiliac Joint Compression Test negative bilaterally.   --HIPS: Full range of motion bilaterally. Savana's on the right side elicits mild low back pain.  Savana's negative on the left.   No pain with logrolling. No pain with palpation of the greater trochanters.   --NEUROLOGIC: 3/4 right patellar, 2/4 left patellar, and 2/4 achilles reflexes bilaterally.  Sensation to light touch is intact in the bilateral L4, L5, and S1 dermatomes.   No clonus.    --VASCULAR:  Warm lower limbs bilaterally. There is no pitting edema of the bilateral lower extremities.     ASSESSMENT:  Willa Downey is a pleasant 63 year old female who presents with chronic atraumatic right-sided low back pain.     MRI of the lumbar spine from 8/14/2020 shows:  -Severe spinal canal stenosis and moderate bilateral neuroforaminal stenosis at L4-5  -Grade 1 anterolisthesis of L4  -Lower lumbar facet hypertrophy, with periarticular facet joint edema at  L4-5    Complicating comorbidities include:  # Psoriatic arthritis, follows with rheumatology    PLAN:  -MRI of the lumbar spine from 7/13/2020 was reviewed today with the patient.  We discussed that the symptoms that she has experienced are likely secondary to the severe spinal canal stenosis at L4-5, and possibly 2/2 lower lumbar neuroforaminal stenosis.  There could be some component of facet mediated pain as well.  We discussed the option of updating the MRI of the lumbar spine,.  She feels that her symptoms are quite stable and unchanged, & does not wish to proceed with any further diagnostic testing for now.    -We discussed the option of starting a physical therapy program.  She says that she still has her home exercise program from previously.  She declines or new referral for PT.  -I refilled the gabapentin, 300 mg twice daily, which she is tolerating without issue.  - RTC as needed    Ready to learn, no apparent learning barriers.  Education provided on treatment plan according to patient's preferred learning style.  Patient verbalizes understanding.   ______________________________      53 minutes spent by me on the date of the encounter doing chart review, history and exam, documentation and further activities per the note         Again, thank you for allowing me to participate in the care of your patient.      Sincerely,    TRACY Hunter CNP

## 2024-02-21 ENCOUNTER — OFFICE VISIT (OUTPATIENT)
Dept: FAMILY MEDICINE | Facility: CLINIC | Age: 64
End: 2024-02-21
Payer: COMMERCIAL

## 2024-02-21 VITALS
TEMPERATURE: 99.1 F | SYSTOLIC BLOOD PRESSURE: 126 MMHG | BODY MASS INDEX: 23.95 KG/M2 | HEART RATE: 76 BPM | RESPIRATION RATE: 15 BRPM | OXYGEN SATURATION: 99 % | HEIGHT: 64 IN | DIASTOLIC BLOOD PRESSURE: 84 MMHG | WEIGHT: 140.3 LBS

## 2024-02-21 DIAGNOSIS — D84.9 IMMUNOSUPPRESSION (H): ICD-10-CM

## 2024-02-21 DIAGNOSIS — H69.92 DYSFUNCTION OF LEFT EUSTACHIAN TUBE: Primary | ICD-10-CM

## 2024-02-21 PROCEDURE — 99213 OFFICE O/P EST LOW 20 MIN: CPT | Performed by: PHYSICIAN ASSISTANT

## 2024-02-21 ASSESSMENT — PAIN SCALES - GENERAL: PAINLEVEL: NO PAIN (0)

## 2024-02-21 NOTE — PATIENT INSTRUCTIONS
Technique on Flonase:    How to spray your nasal steroid spray:    Tilt your head forward. Spray the nasal spray up your nose and tilt the spray towards your ears. Spray 2 sprays per nostril. Inhale gently. Dab excess nasal spray with tissue. Remain upright for at least 1 hour. Use nasal spray once daily.    You should never taste the nasal spray dripping down your throat. If you do, rinse out your mouth well and try tilting your head more forward the next time you use your spray.    Some people find it easier to spray 1 spray per nostril twice daily.    Recommend spraying your nasal spray straight into your nose (nose to toes), angle out towards your ears to avoid hitting the septum, breathe in while you spray.    Flonase is a steroid nasal spray that works as an anti-inflammatory to open up the nasal passages and decrease the amount of drainage from your nose and sinuses.  It is only effective when used consistently.  It may take 2-3 weeks of consistent use to reach it optimal effect.

## 2024-02-21 NOTE — PROGRESS NOTES
"  Assessment & Plan     Dysfunction of left eustachian tube - recommend intranasal steroids, she has flonase at home and will use this.    Immunosuppression (H24) - on meds for psoriatic arthritis, no change to treatment plan, follows with speciality.      Dahiana Crouch is a 63 year old, presenting for the following health issues:  Ear Problem (Ear has been clogged )      2/21/2024     4:18 PM   Additional Questions   Roomed by [facility]     Symptoms ongoing for 1.5 months  Symptoms started with a cold and didn't resolve with resolution of mild cold    Ear Problem    History of Present Illness       Reason for visit:  Left ear has been plugged over a month and it has been effecting hearing.  Symptom onset:  More than a month  Symptoms include:  Same as it listed above  Symptom intensity:  Moderate  Symptom progression:  Staying the same  Had these symptoms before:  No  What makes it worse:  No  What makes it better:  No    She eats 4 or more servings of fruits and vegetables daily.She consumes 0 sweetened beverage(s) daily.She exercises with enough effort to increase her heart rate 30 to 60 minutes per day.  She exercises with enough effort to increase her heart rate 5 days per week.   She is taking medications regularly.         Objective    /84 (BP Location: Right arm, Patient Position: Sitting, Cuff Size: Adult Regular)   Pulse 76   Temp 99.1  F (37.3  C) (Temporal)   Resp 15   Ht 1.626 m (5' 4\")   Wt 63.6 kg (140 lb 4.8 oz)   LMP  (LMP Unknown)   SpO2 99%   BMI 24.08 kg/m    Body mass index is 24.08 kg/m .  Physical Exam   GENERAL: alert and no distress  HENT: right ear: normal: no effusions, no erythema, normal landmarks and left ear: clear effusion  PSYCH: mentation appears normal, affect normal/bright          Signed Electronically by: Edda Collazo PA-C    "

## 2024-03-28 ENCOUNTER — ANCILLARY PROCEDURE (OUTPATIENT)
Dept: MAMMOGRAPHY | Facility: CLINIC | Age: 64
End: 2024-03-28
Attending: FAMILY MEDICINE
Payer: COMMERCIAL

## 2024-03-28 DIAGNOSIS — Z12.31 VISIT FOR SCREENING MAMMOGRAM: ICD-10-CM

## 2024-03-28 PROCEDURE — 77063 BREAST TOMOSYNTHESIS BI: CPT | Mod: TC | Performed by: RADIOLOGY

## 2024-03-28 PROCEDURE — 77067 SCR MAMMO BI INCL CAD: CPT | Mod: TC | Performed by: RADIOLOGY

## 2024-03-29 ENCOUNTER — TELEPHONE (OUTPATIENT)
Dept: RHEUMATOLOGY | Facility: CLINIC | Age: 64
End: 2024-03-29
Payer: COMMERCIAL

## 2024-03-29 NOTE — TELEPHONE ENCOUNTER
PA Initiation    Medication: XELJANZ XR 11 MG PO TB24  Insurance Company: "[employer]an - Phone [phone] Fax [phone]  Pharmacy Filling the Rx: [facility] - [place], [place] - [facility] [address]  Filling Pharmacy Phone:    Filling Pharmacy Fax:    Start Date: 3/29/2024    [id number]

## 2024-04-01 NOTE — TELEPHONE ENCOUNTER
Prior Authorization Approval    Medication: XELJANZ XR 11 MG PO TB24  Authorization Effective Date: 4/1/2024  Authorization Expiration Date: 3/29/2025  Approved Dose/Quantity: qd  Reference #: [id number]   Insurance Company: [employer] - Phone [phone] Fax [phone]  Expected CoPay: $    CoPay Card Available: Yes    Financial Assistance Needed: no  Which Pharmacy is filling the prescription: [facility] - [place] [id number]  Pharmacy Notified: yes  Patient Notified: yes

## 2024-04-04 NOTE — PROGRESS NOTES
"NEW PATIENT RHEUMATOLOGY VISIT     Assessment & Plan     Problem List    Psoriatic arthritis  Comment: cutaneous, joint and nail involvement    Tx history   Humira, enbrel, methotrexate failure  Current: xeljanz and Otezla    Per previous provider \"any prior interruptions in her therapy have resulted in severe flares of her disease which then necessitate prolonged steroid tapers, specifically for her cutaneous disease which has been very challenging to treat.\"    Plan:   -Continue otezla 30mg BID  -Continue xeljanz 11mg once daily  -continue celebrex 200mg qAM and 100mg qPM  -Discussed with her at that ideally I would like her to not be on continuous NSAID therapy given increased risk of renal toxicity, cardiovascular disease, and gastritis.  Given her difficult to control disease and currently doing well on this therapy will not make any changes at this time.  However, we will revisit weaning off Celebrex in the future.  I also discussed my preference for trying an alternative medication besides Xeljanz such as an IL-6 inhibitor given increased risk of blood clots myocardial infarction with Xeljanz.  Again, this will be revisited in the future as I am establishing care with her today.  -X-rays today  -Disease activity labs    Orders Placed This Encounter   Procedures    XR Sacroiliac Joint 1/2 Views    XR Hand Bilateral 2 Views    XR Foot Bilateral G/E 3 Views    ALT    AST    Creatinine    CBC with platelets    Erythrocyte sedimentation rate auto    CRP inflammation      High risk medication use  Comment: Xeljanz, Otezla, chronic NSAID use  Blood pressure is within normal limits  Plan:  -Toxicity monitoring labs every 3 months  -Status post shingles vaccine.  Recommend pneumonia vaccine and latest COVID booster.    RTC in 2 months     60 minutes spent on the day of the encounter doing chart review, history and exam, counseling and documentation.     The longitudinal plan of care for the diagnosis(es)/condition(s) " "as documented were addressed during this visit. Due to the added complexity in care, I will continue to support Willa in the subsequent management and with ongoing continuity of care.      Subjective     Previous pt of Dr. Arias transitioning care to me today.  Last seen January 2024.    Reports daily hand pain but feels it is managable. Nail disease  Also has pain over the bottom of her right big toe   Sciatica over right, longstanding   Lately has had pain over left buttock worse in the morning when she first wakes up and then improves.   Psoriasis over scalp and fingers       HPI    From original consult with Dr. Arias:     \"63 year old female with hx of advanced glaucoma followed closely by ophthalmology presented to rheumatology on 2/5/21 with 5 month of progressive left hand 3rd, 4th, 5th digit and right hand 4th digit DIP inflammatory arthritis in the context of psoriasis, psoriatic changes of the nails. Taken together most consistent with psoriatic arthritis. Tried on 2 NSAIDs which failed to control symptoms, and had severe GERD symptoms. Tried methotrexate by prior rheumatologist and disease not controlled. Humira started Mid February 2021 due to lack of efficacy of these other therapies and continued through May 14th, though had severe/ rapidly progressive disease after some improvement in the first 4 weeks involving DIP inflammatory arthritis and nail disease. We then ordered Enbrel, though starting was delayed due to development of impressive bilateral submandibular lymphadenopathy which resolved within about 1 week and thought likely secondary to viral infection. She started enbrel on 6/20/2021 which she had continued on until switching to cosentyx after approval on 8/31/21 due to lack of efficacy of enbrel and injection site reaction. Had been on cosentyx from 8/31/21 with initial improvement in inflammatory DIP arthritis, psoriatic lesions, stabilization of her nail disease though she then " "had progression of her cutaneous nail and joint inflammatory process which prompted restarting prednisone at 10 mg daily along with doubling her dose of Cosentyx to 300 mg each month.  She took her first dose of 300 mg on 2021.  She did try to decrease her prednisone shortly after that down to 7.5 though noticed return of her skin peeling/psoriatic skin lesions worsening and so went back up to 10 mg daily which has again improved these symptoms. She was again unable to taper from prednisone below 10mg daily without progression of both cutaneous and articular symptoms and so cosentyx was discontinued and xeljanz 11mg once daily started after insurance approval on 21. With each attempt to taper her steroids, had difficultly due to return of psoriasis so Willa was seen by Dermatology and started on otezla in addition to xeljanz. She returns today for follow-up.     No raynaud's, no oral or nasal ulcers, no hx of inflammatory eye disease, no rashes, no skin sores, no joint pain or swelling, no GERD, no blood in urine or stool, no dyspnea, no cough, no chest pain, no fevers or weigh loss, no hx of blood clots or miscarriages, no dry eyes or dry mouth, no numbness or tingling, no muscle pain     No family or personal Hx of known autoimmune disease  Not a previous smoker. Not a current smoker.\"      Lab and Imaging review:    I reviewed recent labs and imaging includin2024 CRP, ESR, CBC, creatinine, AST, ALT all within normal limits      Current Outpatient Medications:     apremilast (OTEZLA) 30 MG tablet, Take 1 tablet (30 mg) by mouth 2 times daily Hold for signs of infection, and seek medical attention., Disp: 180 tablet, Rfl: 1    CALCIUM CARBONATE-VIT D-MIN PO, Take 1 tablet by mouth, Disp: , Rfl:     celecoxib (CELEBREX) 100 MG capsule, Take 200 mg by mouth every morning and 100 mg by mouth every evening, Disp: 270 capsule, Rfl: 1    clobetasol (TEMOVATE) 0.05 % external solution, Apply topically " daily To affected areas of scalp, Disp: 50 mL, Rfl: 2    gabapentin (NEURONTIN) 300 MG capsule, Take 1 capsule (300 mg) by mouth 2 times daily, Disp: 60 capsule, Rfl: 3    loratadine 10 MG capsule, Take 1 tablet by mouth, Disp: , Rfl:     melatonin 3 MG tablet, Take 3 mg by mouth nightly as needed for sleep, Disp: , Rfl:     Multiple Vitamins-Minerals (MULTIVITAMIN ADULTS 50+) TABS, daily , Disp: , Rfl:     tacrolimus (PROTOPIC) 0.1 % external ointment, Apply topically 2 times daily To hands and groin as needed, Disp: 60 g, Rfl: 3    tazarotene (TAZORAC) 0.1 % external cream, Apply nightly to hands as needed, Disp: 30 g, Rfl: 3    tofacitinib (XELJANZ XR) 11 MG 24 hr tablet, Take 1 tablet (11 mg) by mouth daily Hold for signs of infection, then seek medical attention. Monitoring labs every 6 months - Oral, Disp: 90 tablet, Rfl: 3    doxycycline monohydrate (MONODOX) 100 MG capsule, Take 1 capsule (100 mg) by mouth 2 times daily, Disp: 28 capsule, Rfl: 0    Current Facility-Administered Medications:     triamcinolone (KENALOG-40) injection 20 mg, 0.5 mL, Intra-Lesional, Once, Delilah Isidro MD  Allergies:  Allergies   Allergen Reactions    Adhesive Tape Blisters, Itching and Swelling    Dust Mites     Latex Blisters, Itching, Other (See Comments) and Swelling     Skin sensitivity      Morphine Sulfate (Concentrate) Itching     Other reaction(s): Chills  heart palpatations    Seasonal Allergies     Codeine Palpitations     chills     Medical Hx:  Past Medical History:   Diagnosis Date    Bilateral low back pain with right-sided sciatica, unspecified chronicity 11/11/2020    Neuroforaminal stenosis of lumbar spine 11/11/2020    Nuclear senile cataract of both eyes 08/16/2018    Osteoarthritis 10/15/2020    Psoriatic arthropathy (H) 10/19/2020    Serum calcium elevated 5/31/2023     Surgical Hx:  Past Surgical History:   Procedure Laterality Date    BIOPSY  07/30/2019    the right breast tissue(benign)     COLONOSCOPY N/A 7/14/2022    Procedure: COLONOSCOPY;  Surgeon: Roderick Campos MD;  Location: [facility] OR    EYE SURGERY      3 surgeries for glaucoma treatment     Family Hx:  Family History   Problem Relation Age of Onset    Hyperlipidemia Mother         My mother had high cholesterol.    Osteoporosis Mother     Hypertension Mother     Stomach Cancer Father         not sure where started    Cataracts Maternal Grandmother     Breast Cancer Maternal Aunt     Melanoma No family hx of     Skin Cancer No family hx of      Social Hx:  Social History     Tobacco Use    Smoking status: Never    Smokeless tobacco: Never   Vaping Use    Vaping Use: Never used   Substance Use Topics    Alcohol use: Never    Drug use: Never        Objective   Physical Exam   /85 (BP Location: Right arm, Patient Position: Sitting, Cuff Size: Adult Regular)   Pulse 74   Wt 63.5 kg (139 lb 14.4 oz)   LMP  (LMP Unknown)   SpO2 97%   BMI 24.01 kg/m    General: alert, well appearing, no distress  HEENT:  clear conjunctiva,   Cardiac: warm and well perfused  Pulm: normal respiratory effort,   MSK: Hand, wrist, elbow, and shoulder joints without evidence of synovitis or effusion. Intact and nonpainful range of motion. No Heberden/Rick nodes. +MTP squueze on left and TTp over right MTP without warmth or swelling remainder of Foot/ankle/knee/hip joints without erythema/effusion/tenderness to palpation and with intact nonpainful range of motion. No TTP over enthesial sites  Skin: edematous finger pads with skin desquamation and erythema, No other rashes, warm and well-perfused. Psoriatic nail disease.     There is currently no information documented on the homunculus. Go to the Rheumatology activity and complete the homunculus joint exam.         Ira Guzman MD  Rheumatology

## 2024-04-05 ENCOUNTER — LAB (OUTPATIENT)
Dept: LAB | Facility: CLINIC | Age: 64
End: 2024-04-05
Attending: STUDENT IN AN ORGANIZED HEALTH CARE EDUCATION/TRAINING PROGRAM
Payer: COMMERCIAL

## 2024-04-05 ENCOUNTER — OFFICE VISIT (OUTPATIENT)
Dept: RHEUMATOLOGY | Facility: CLINIC | Age: 64
End: 2024-04-05
Attending: STUDENT IN AN ORGANIZED HEALTH CARE EDUCATION/TRAINING PROGRAM
Payer: COMMERCIAL

## 2024-04-05 VITALS
HEART RATE: 74 BPM | OXYGEN SATURATION: 97 % | DIASTOLIC BLOOD PRESSURE: 85 MMHG | BODY MASS INDEX: 24.01 KG/M2 | WEIGHT: 139.9 LBS | SYSTOLIC BLOOD PRESSURE: 124 MMHG

## 2024-04-05 DIAGNOSIS — L40.50 PSORIATIC ARTHROPATHY (H): Primary | ICD-10-CM

## 2024-04-05 DIAGNOSIS — L40.50 PSORIATIC ARTHROPATHY (H): ICD-10-CM

## 2024-04-05 DIAGNOSIS — L40.50 PSORIATIC ARTHRITIS (H): ICD-10-CM

## 2024-04-05 LAB
ALT SERPL W P-5'-P-CCNC: 14 U/L (ref 0–50)
AST SERPL W P-5'-P-CCNC: 22 U/L (ref 0–45)
CREAT SERPL-MCNC: 0.59 MG/DL (ref 0.51–0.95)
CRP SERPL-MCNC: <3 MG/L
EGFRCR SERPLBLD CKD-EPI 2021: >90 ML/MIN/1.73M2
ERYTHROCYTE [DISTWIDTH] IN BLOOD BY AUTOMATED COUNT: 13.4 % (ref 10–15)
ERYTHROCYTE [SEDIMENTATION RATE] IN BLOOD BY WESTERGREN METHOD: 11 MM/HR (ref 0–30)
HCT VFR BLD AUTO: 38.4 % (ref 35–47)
HGB BLD-MCNC: 12.5 G/DL (ref 11.7–15.7)
MCH RBC QN AUTO: 29.2 PG (ref 26.5–33)
MCHC RBC AUTO-ENTMCNC: 32.6 G/DL (ref 31.5–36.5)
MCV RBC AUTO: 90 FL (ref 78–100)
PLATELET # BLD AUTO: 397 10E3/UL (ref 150–450)
RBC # BLD AUTO: 4.28 10E6/UL (ref 3.8–5.2)
WBC # BLD AUTO: 6.4 10E3/UL (ref 4–11)

## 2024-04-05 PROCEDURE — 36415 COLL VENOUS BLD VENIPUNCTURE: CPT

## 2024-04-05 PROCEDURE — 99417 PROLNG OP E/M EACH 15 MIN: CPT | Performed by: STUDENT IN AN ORGANIZED HEALTH CARE EDUCATION/TRAINING PROGRAM

## 2024-04-05 PROCEDURE — 86140 C-REACTIVE PROTEIN: CPT

## 2024-04-05 PROCEDURE — 84460 ALANINE AMINO (ALT) (SGPT): CPT

## 2024-04-05 PROCEDURE — 99213 OFFICE O/P EST LOW 20 MIN: CPT | Performed by: STUDENT IN AN ORGANIZED HEALTH CARE EDUCATION/TRAINING PROGRAM

## 2024-04-05 PROCEDURE — G2211 COMPLEX E/M VISIT ADD ON: HCPCS | Performed by: STUDENT IN AN ORGANIZED HEALTH CARE EDUCATION/TRAINING PROGRAM

## 2024-04-05 PROCEDURE — 85027 COMPLETE CBC AUTOMATED: CPT

## 2024-04-05 PROCEDURE — 85652 RBC SED RATE AUTOMATED: CPT

## 2024-04-05 PROCEDURE — 84450 TRANSFERASE (AST) (SGOT): CPT

## 2024-04-05 PROCEDURE — 82565 ASSAY OF CREATININE: CPT

## 2024-04-05 PROCEDURE — 99215 OFFICE O/P EST HI 40 MIN: CPT | Performed by: STUDENT IN AN ORGANIZED HEALTH CARE EDUCATION/TRAINING PROGRAM

## 2024-04-05 RX ORDER — CELECOXIB 100 MG/1
CAPSULE ORAL
Qty: 270 CAPSULE | Refills: 1 | Status: SHIPPED | OUTPATIENT
Start: 2024-04-05

## 2024-04-05 NOTE — LETTER
"4/5/2024       RE: Willa Downey  [address]  [facility] [address]     Dear Colleague,    Thank you for referring your patient, Willa Downey, to the [facility] RHEUMATOLOGY at [facility]. Please see a copy of my visit note below.    NEW PATIENT RHEUMATOLOGY VISIT     Assessment & Plan    Problem List    Psoriatic arthritis  Comment: cutaneous, joint and nail involvement    Tx history   Humira, enbrel, methotrexate failure  Current: xeljanz and Otezla    Per previous provider \"any prior interruptions in her therapy have resulted in severe flares of her disease which then necessitate prolonged steroid tapers, specifically for her cutaneous disease which has been very challenging to treat.\"    Plan:   -Continue otezla 30mg BID  -Continue xeljanz 11mg once daily  -continue celebrex 200mg qAM and 100mg qPM  -Discussed with her at that ideally I would like her to not be on continuous NSAID therapy given increased risk of renal toxicity, cardiovascular disease, and gastritis.  Given her difficult to control disease and currently doing well on this therapy will not make any changes at this time.  However, we will revisit weaning off Celebrex in the future.  I also discussed my preference for trying an alternative medication besides Xeljanz such as an IL-6 inhibitor given increased risk of blood clots myocardial infarction with Xeljanz.  Again, this will be revisited in the future as I am establishing care with her today.  -X-rays today  -Disease activity labs    Orders Placed This Encounter   Procedures    XR Sacroiliac Joint 1/2 Views    XR Hand Bilateral 2 Views    XR Foot Bilateral G/E 3 Views    ALT    AST    Creatinine    CBC with platelets    Erythrocyte sedimentation rate auto    CRP inflammation      High risk medication use  Comment: Xeljanz, Otezla, chronic NSAID use  Blood pressure is within normal limits  Plan:  -Toxicity monitoring " "labs every 3 months  -Status post shingles vaccine.  Recommend pneumonia vaccine and latest COVID booster.    RTC in 2 months     60 minutes spent on the day of the encounter doing chart review, history and exam, counseling and documentation.     The longitudinal plan of care for the diagnosis(es)/condition(s) as documented were addressed during this visit. Due to the added complexity in care, I will continue to support Willa in the subsequent management and with ongoing continuity of care.      Subjective    Previous pt of Dr. Arias transitioning care to me today.  Last seen January 2024.    Reports daily hand pain but feels it is managable. Nail disease  Also has pain over the bottom of her right big toe   Sciatica over right, longstanding   Lately has had pain over left buttock worse in the morning when she first wakes up and then improves.   Psoriasis over scalp and fingers       HPI    From original consult with Dr. Arias:     \"63 year old female with hx of advanced glaucoma followed closely by ophthalmology presented to rheumatology on 2/5/21 with 5 month of progressive left hand 3rd, 4th, 5th digit and right hand 4th digit DIP inflammatory arthritis in the context of psoriasis, psoriatic changes of the nails. Taken together most consistent with psoriatic arthritis. Tried on 2 NSAIDs which failed to control symptoms, and had severe GERD symptoms. Tried methotrexate by prior rheumatologist and disease not controlled. Humira started Mid February 2021 due to lack of efficacy of these other therapies and continued through May 14th, though had severe/ rapidly progressive disease after some improvement in the first 4 weeks involving DIP inflammatory arthritis and nail disease. We then ordered Enbrel, though starting was delayed due to development of impressive bilateral submandibular lymphadenopathy which resolved within about 1 week and thought likely secondary to viral infection. She started enbrel on " "2021 which she had continued on until switching to cosentyx after approval on 21 due to lack of efficacy of enbrel and injection site reaction. Had been on cosentyx from 21 with initial improvement in inflammatory DIP arthritis, psoriatic lesions, stabilization of her nail disease though she then had progression of her cutaneous nail and joint inflammatory process which prompted restarting prednisone at 10 mg daily along with doubling her dose of Cosentyx to 300 mg each month.  She took her first dose of 300 mg on 2021.  She did try to decrease her prednisone shortly after that down to 7.5 though noticed return of her skin peeling/psoriatic skin lesions worsening and so went back up to 10 mg daily which has again improved these symptoms. She was again unable to taper from prednisone below 10mg daily without progression of both cutaneous and articular symptoms and so cosentyx was discontinued and xeljanz 11mg once daily started after insurance approval on 21. With each attempt to taper her steroids, had difficultly due to return of psoriasis so Willa was seen by Dermatology and started on otezla in addition to xeljanz. She returns today for follow-up.     No raynaud's, no oral or nasal ulcers, no hx of inflammatory eye disease, no rashes, no skin sores, no joint pain or swelling, no GERD, no blood in urine or stool, no dyspnea, no cough, no chest pain, no fevers or weigh loss, no hx of blood clots or miscarriages, no dry eyes or dry mouth, no numbness or tingling, no muscle pain     No family or personal Hx of known autoimmune disease  Not a previous smoker. Not a current smoker.\"      Lab and Imaging review:    I reviewed recent labs and imaging includin2024 CRP, ESR, CBC, creatinine, AST, ALT all within normal limits      Current Outpatient Medications:     apremilast (OTEZLA) 30 MG tablet, Take 1 tablet (30 mg) by mouth 2 times daily Hold for signs of infection, and seek medical " attention., Disp: 180 tablet, Rfl: 1    CALCIUM CARBONATE-VIT D-MIN PO, Take 1 tablet by mouth, Disp: , Rfl:     celecoxib (CELEBREX) 100 MG capsule, Take 200 mg by mouth every morning and 100 mg by mouth every evening, Disp: 270 capsule, Rfl: 1    clobetasol (TEMOVATE) 0.05 % external solution, Apply topically daily To affected areas of scalp, Disp: 50 mL, Rfl: 2    gabapentin (NEURONTIN) 300 MG capsule, Take 1 capsule (300 mg) by mouth 2 times daily, Disp: 60 capsule, Rfl: 3    loratadine 10 MG capsule, Take 1 tablet by mouth, Disp: , Rfl:     melatonin 3 MG tablet, Take 3 mg by mouth nightly as needed for sleep, Disp: , Rfl:     Multiple Vitamins-Minerals (MULTIVITAMIN ADULTS 50+) TABS, daily , Disp: , Rfl:     tacrolimus (PROTOPIC) 0.1 % external ointment, Apply topically 2 times daily To hands and groin as needed, Disp: 60 g, Rfl: 3    tazarotene (TAZORAC) 0.1 % external cream, Apply nightly to hands as needed, Disp: 30 g, Rfl: 3    tofacitinib (XELJANZ XR) 11 MG 24 hr tablet, Take 1 tablet (11 mg) by mouth daily Hold for signs of infection, then seek medical attention. Monitoring labs every 6 months - Oral, Disp: 90 tablet, Rfl: 3    doxycycline monohydrate (MONODOX) 100 MG capsule, Take 1 capsule (100 mg) by mouth 2 times daily, Disp: 28 capsule, Rfl: 0    Current Facility-Administered Medications:     triamcinolone (KENALOG-40) injection 20 mg, 0.5 mL, Intra-Lesional, Once, Delilah Isidro MD  Allergies:  Allergies   Allergen Reactions    Adhesive Tape Blisters, Itching and Swelling    Dust Mites     Latex Blisters, Itching, Other (See Comments) and Swelling     Skin sensitivity      Morphine Sulfate (Concentrate) Itching     Other reaction(s): Chills  heart palpatations    Seasonal Allergies     Codeine Palpitations     chills     Medical Hx:  Past Medical History:   Diagnosis Date    Bilateral low back pain with right-sided sciatica, unspecified chronicity 11/11/2020    Neuroforaminal stenosis of lumbar  spine 11/11/2020    Nuclear senile cataract of both eyes 08/16/2018    Osteoarthritis 10/15/2020    Psoriatic arthropathy (H) 10/19/2020    Serum calcium elevated 5/31/2023     Surgical Hx:  Past Surgical History:   Procedure Laterality Date    BIOPSY  07/30/2019    the right breast tissue(benign)    COLONOSCOPY N/A 7/14/2022    Procedure: COLONOSCOPY;  Surgeon: Roderick Campos MD;  Location: [facility] OR    EYE SURGERY      3 surgeries for glaucoma treatment     Family Hx:  Family History   Problem Relation Age of Onset    Hyperlipidemia Mother         My mother had high cholesterol.    Osteoporosis Mother     Hypertension Mother     Stomach Cancer Father         not sure where started    Cataracts Maternal Grandmother     Breast Cancer Maternal Aunt     Melanoma No family hx of     Skin Cancer No family hx of      Social Hx:  Social History     Tobacco Use    Smoking status: Never    Smokeless tobacco: Never   Vaping Use    Vaping Use: Never used   Substance Use Topics    Alcohol use: Never    Drug use: Never        Objective  Physical Exam   /85 (BP Location: Right arm, Patient Position: Sitting, Cuff Size: Adult Regular)   Pulse 74   Wt 63.5 kg (139 lb 14.4 oz)   LMP  (LMP Unknown)   SpO2 97%   BMI 24.01 kg/m    General: alert, well appearing, no distress  HEENT:  clear conjunctiva,   Cardiac: warm and well perfused  Pulm: normal respiratory effort,   MSK: Hand, wrist, elbow, and shoulder joints without evidence of synovitis or effusion. Intact and nonpainful range of motion. No Heberden/Rick nodes. +MTP squueze on left and TTp over right MTP without warmth or swelling remainder of Foot/ankle/knee/hip joints without erythema/effusion/tenderness to palpation and with intact nonpainful range of motion. No TTP over enthesial sites  Skin: edematous finger pads with skin desquamation and erythema, No other rashes, warm and well-perfused. Psoriatic nail disease.     There is currently no  information documented on the homunculus. Go to the Rheumatology activity and complete the homunculus joint exam.         Ira Guzman MD  Rheumatology

## 2024-04-05 NOTE — NURSING NOTE
L hand - 3/10  R hand - 3/10  Chief Complaint   Patient presents with    Consult     Psoriatic arthritis (H), 4 month follow-up; Rescheduled per pt; Previously seen by Dr. Arias - changing providers due to his departure     /85 (BP Location: Right arm, Patient Position: Sitting, Cuff Size: Adult Regular)   Pulse 74   Wt 63.5 kg (139 lb 14.4 oz)   LMP  (LMP Unknown)   SpO2 97%   BMI 24.01 kg/m    Purvi Ren [facility]

## 2024-04-10 ENCOUNTER — ANCILLARY PROCEDURE (OUTPATIENT)
Dept: GENERAL RADIOLOGY | Facility: CLINIC | Age: 64
End: 2024-04-10
Attending: STUDENT IN AN ORGANIZED HEALTH CARE EDUCATION/TRAINING PROGRAM
Payer: COMMERCIAL

## 2024-04-10 DIAGNOSIS — L40.50 PSORIATIC ARTHROPATHY (H): ICD-10-CM

## 2024-04-10 PROCEDURE — 73120 X-RAY EXAM OF HAND: CPT | Mod: TC | Performed by: RADIOLOGY

## 2024-04-10 PROCEDURE — 73630 X-RAY EXAM OF FOOT: CPT | Mod: TC | Performed by: RADIOLOGY

## 2024-04-10 PROCEDURE — 72200 X-RAY EXAM SI JOINTS: CPT | Mod: TC | Performed by: RADIOLOGY

## 2024-06-21 ENCOUNTER — OFFICE VISIT (OUTPATIENT)
Dept: RHEUMATOLOGY | Facility: CLINIC | Age: 64
End: 2024-06-21
Attending: STUDENT IN AN ORGANIZED HEALTH CARE EDUCATION/TRAINING PROGRAM
Payer: COMMERCIAL

## 2024-06-21 VITALS
DIASTOLIC BLOOD PRESSURE: 76 MMHG | SYSTOLIC BLOOD PRESSURE: 130 MMHG | HEART RATE: 83 BPM | OXYGEN SATURATION: 98 % | BODY MASS INDEX: 24.03 KG/M2 | WEIGHT: 140 LBS

## 2024-06-21 DIAGNOSIS — L40.50 PSORIATIC ARTHROPATHY (H): Primary | ICD-10-CM

## 2024-06-21 PROCEDURE — G2211 COMPLEX E/M VISIT ADD ON: HCPCS | Performed by: STUDENT IN AN ORGANIZED HEALTH CARE EDUCATION/TRAINING PROGRAM

## 2024-06-21 PROCEDURE — 99215 OFFICE O/P EST HI 40 MIN: CPT | Performed by: STUDENT IN AN ORGANIZED HEALTH CARE EDUCATION/TRAINING PROGRAM

## 2024-06-21 PROCEDURE — 99213 OFFICE O/P EST LOW 20 MIN: CPT | Performed by: STUDENT IN AN ORGANIZED HEALTH CARE EDUCATION/TRAINING PROGRAM

## 2024-06-21 ASSESSMENT — PAIN SCALES - GENERAL: PAINLEVEL: MODERATE PAIN (4)

## 2024-06-21 NOTE — LETTER
"6/21/2024       RE: Willa Downey  [address]  [facility] [address]     Dear Colleague,    Thank you for referring your patient, Willa Downey, to the [facility] RHEUMATOLOGY at [facility]. Please see a copy of my visit note below.    RETURN PATIENT RHEUMATOLOGY VISIT     Assessment & Plan    Problem List    Psoriatic arthritis  Comment: cutaneous, small joint and nail involvement  X-rays 4/2024   Hands - no productive or erosive changes  Feet - no productive or erosive changes  SI - mild bilateral arthrosis, no erosions     Tx history   Humira, enbrel, methotrexate failure  Current: xeljanz and Otezla    Per previous provider \"any prior interruptions in her therapy have resulted in severe flares of her disease which then necessitate prolonged steroid tapers, specifically for her cutaneous disease which has been very challenging to treat.\"    Currently with reasonably well controlled disease, without synovitis on exam, reassuring x-rays, and normal inflammatory markers,  but continues to have hand arthralgias after activities which require her hands such as weeding, cleaning or cooking which are limiting.     Plan:   -discussed option of discontinuing one of her medications in the future and replacing it with Actemra to see if we can get better control of her hand arthralgias; however I worry about disrupting her therapy given she is doing reasonably well and her disease's difficulty to control in the past. She will discuss with dermatology as well to get their input and we will re-assess on follow up. For now, encouraged increase voltaren gel and tylenol for pain   -Continue otezla 30mg BID  -Continue xeljanz 11mg once daily  -continue celebrex 200mg qAM and 100mg qPM    No orders of the defined types were placed in this encounter.     High risk medication use  Comment: Xeljanz, Otezla, chronic NSAID use  Blood pressure is within normal " "limits  Status post shingles vaccine.    Plan:  -Toxicity monitoring labs every 3 months  -Recommend pneumonia vaccine and latest COVID booster.    RTC in 3 months     40 minutes spent on the day of the encounter doing chart review, history and exam, counseling and documentation.     The longitudinal plan of care for the diagnosis(es)/condition(s) as documented were addressed during this visit. Due to the added complexity in care, I will continue to support Willa in the subsequent management and with ongoing continuity of care.      Subjective    Hand pain with weeding, cleaning, cooking. Discussed this in detail today and reviewed her response to medication changes in the past.   Finger pads peeling - has discussed this with derm and prescribed ointment.       HPI    From original consult with Dr. Arias:     \"63 year old female with hx of advanced glaucoma followed closely by ophthalmology presented to rheumatology on 2/5/21 with 5 month of progressive left hand 3rd, 4th, 5th digit and right hand 4th digit DIP inflammatory arthritis in the context of psoriasis, psoriatic changes of the nails. Taken together most consistent with psoriatic arthritis. Tried on 2 NSAIDs which failed to control symptoms, and had severe GERD symptoms. Tried methotrexate by prior rheumatologist and disease not controlled. Humira started Mid February 2021 due to lack of efficacy of these other therapies and continued through May 14th, though had severe/ rapidly progressive disease after some improvement in the first 4 weeks involving DIP inflammatory arthritis and nail disease. We then ordered Enbrel, though starting was delayed due to development of impressive bilateral submandibular lymphadenopathy which resolved within about 1 week and thought likely secondary to viral infection. She started enbrel on 6/20/2021 which she had continued on until switching to cosentyx after approval on 8/31/21 due to lack of efficacy of enbrel and " "injection site reaction. Had been on cosentyx from 21 with initial improvement in inflammatory DIP arthritis, psoriatic lesions, stabilization of her nail disease though she then had progression of her cutaneous nail and joint inflammatory process which prompted restarting prednisone at 10 mg daily along with doubling her dose of Cosentyx to 300 mg each month.  She took her first dose of 300 mg on 2021.  She did try to decrease her prednisone shortly after that down to 7.5 though noticed return of her skin peeling/psoriatic skin lesions worsening and so went back up to 10 mg daily which has again improved these symptoms. She was again unable to taper from prednisone below 10mg daily without progression of both cutaneous and articular symptoms and so cosentyx was discontinued and xeljanz 11mg once daily started after insurance approval on 21. With each attempt to taper her steroids, had difficultly due to return of psoriasis so Willa was seen by Dermatology and started on otezla in addition to xeljanz. She returns today for follow-up.     No raynaud's, no oral or nasal ulcers, no hx of inflammatory eye disease, no rashes, no skin sores, no joint pain or swelling, no GERD, no blood in urine or stool, no dyspnea, no cough, no chest pain, no fevers or weigh loss, no hx of blood clots or miscarriages, no dry eyes or dry mouth, no numbness or tingling, no muscle pain     No family or personal Hx of known autoimmune disease  Not a previous smoker. Not a current smoker.\"      Lab and Imaging review:    I reviewed recent labs and imaging includin2024 CRP, ESR, CBC, creatinine, AST, ALT all within normal limits      Current Outpatient Medications:     apremilast (OTEZLA) 30 MG tablet, Take 1 tablet (30 mg) by mouth 2 times daily Hold for signs of infection, and seek medical attention., Disp: 180 tablet, Rfl: 1    CALCIUM CARBONATE-VIT D-MIN PO, Take 1 tablet by mouth, Disp: , Rfl:     celecoxib " (CELEBREX) 100 MG capsule, Take 200 mg by mouth every morning and 100 mg by mouth every evening, Disp: 270 capsule, Rfl: 1    clobetasol (TEMOVATE) 0.05 % external solution, Apply topically daily To affected areas of scalp, Disp: 50 mL, Rfl: 2    gabapentin (NEURONTIN) 300 MG capsule, Take 1 capsule (300 mg) by mouth 2 times daily, Disp: 60 capsule, Rfl: 3    loratadine 10 MG capsule, Take 1 tablet by mouth, Disp: , Rfl:     melatonin 3 MG tablet, Take 3 mg by mouth nightly as needed for sleep, Disp: , Rfl:     Multiple Vitamins-Minerals (MULTIVITAMIN ADULTS 50+) TABS, daily , Disp: , Rfl:     tacrolimus (PROTOPIC) 0.1 % external ointment, Apply topically 2 times daily To hands and groin as needed, Disp: 60 g, Rfl: 3    tazarotene (TAZORAC) 0.1 % external cream, Apply nightly to hands as needed, Disp: 30 g, Rfl: 3    tofacitinib (XELJANZ XR) 11 MG 24 hr tablet, Take 1 tablet (11 mg) by mouth daily Hold for signs of infection, then seek medical attention. Monitoring labs every 6 months - Oral, Disp: 90 tablet, Rfl: 3    doxycycline monohydrate (MONODOX) 100 MG capsule, Take 1 capsule (100 mg) by mouth 2 times daily, Disp: 28 capsule, Rfl: 0    Current Facility-Administered Medications:     triamcinolone (KENALOG-40) injection 20 mg, 0.5 mL, Intra-Lesional, Once, Delilah Isidro MD  Allergies:  Allergies   Allergen Reactions    Adhesive Tape Blisters, Itching and Swelling    Dust Mites     Latex Blisters, Itching, Other (See Comments) and Swelling     Skin sensitivity      Morphine Sulfate (Concentrate) Itching     Other reaction(s): Chills  heart palpatations    Seasonal Allergies     Codeine Palpitations     chills     Medical Hx:  Past Medical History:   Diagnosis Date    Bilateral low back pain with right-sided sciatica, unspecified chronicity 11/11/2020    Neuroforaminal stenosis of lumbar spine 11/11/2020    Nuclear senile cataract of both eyes 08/16/2018    Osteoarthritis 10/15/2020    Psoriatic arthropathy  (H) 10/19/2020    Serum calcium elevated 5/31/2023     Surgical Hx:  Past Surgical History:   Procedure Laterality Date    BIOPSY  07/30/2019    the right breast tissue(benign)    COLONOSCOPY N/A 7/14/2022    Procedure: COLONOSCOPY;  Surgeon: Roderick Campos MD;  Location: [facility] OR    EYE SURGERY      3 surgeries for glaucoma treatment     Family Hx:  Family History   Problem Relation Age of Onset    Hyperlipidemia Mother         My mother had high cholesterol.    Osteoporosis Mother     Hypertension Mother     Stomach Cancer Father         not sure where started    Cataracts Maternal Grandmother     Breast Cancer Maternal Aunt     Melanoma No family hx of     Skin Cancer No family hx of      Social Hx:  Social History     Tobacco Use    Smoking status: Never    Smokeless tobacco: Never   Vaping Use    Vaping status: Never Used   Substance Use Topics    Alcohol use: Never    Drug use: Never        Objective  Physical Exam   /76 (BP Location: Right arm, Patient Position: Sitting, Cuff Size: Adult Regular)   Pulse 83   Wt 63.5 kg (140 lb)   LMP  (LMP Unknown)   SpO2 98%   BMI 24.03 kg/m    General: alert, well appearing, no distress  HEENT:  clear conjunctiva,   Cardiac: warm and well perfused  Pulm: normal respiratory effort,   MSK: Hand, wrist, elbow, and shoulder joints without evidence of synovitis or effusion. Intact and nonpainful range of motion. No Heberden/Rick nodes. +MTP squueze on left and TTp over right MTP without warmth or swelling remainder of Foot/ankle/knee/hip joints without erythema/effusion/tenderness to palpation and with intact nonpainful range of motion. No TTP over enthesial sites  Skin: edematous finger pads with skin desquamation and erythema, No other rashes, warm and well-perfused. Psoriatic nail disease.     There is currently no information documented on the homunculus. Go to the Rheumatology activity and complete the homunculus joint exam.         Ira  MD Megan  Rheumatology

## 2024-06-21 NOTE — PATIENT INSTRUCTIONS
You will be due for labs around July 5th 2024  You can take acetaminophen as needed for pain - I would recommend taking 1,000mg at a time; do not exceed 4,000mg in a 24 hour period   Ibuprofen is the same class of medication a Celebrex so should not be taken together   I would recommend obtaining the pneumonia vaccine (PCV 20) and an additional dose of the COVID-19 booster due to your immunocompromised status

## 2024-06-21 NOTE — PROGRESS NOTES
"RETURN PATIENT RHEUMATOLOGY VISIT     Assessment & Plan     Problem List    Psoriatic arthritis  Comment: cutaneous, small joint and nail involvement  X-rays 4/2024   Hands - no productive or erosive changes  Feet - no productive or erosive changes  SI - mild bilateral arthrosis, no erosions     Tx history   Humira, enbrel, methotrexate failure  Current: xeljanz and Otezla    Per previous provider \"any prior interruptions in her therapy have resulted in severe flares of her disease which then necessitate prolonged steroid tapers, specifically for her cutaneous disease which has been very challenging to treat.\"    Currently with reasonably well controlled disease, without synovitis on exam, reassuring x-rays, and normal inflammatory markers,  but continues to have hand arthralgias after activities which require her hands such as weeding, cleaning or cooking which are limiting.     Plan:   -discussed option of discontinuing one of her medications in the future and replacing it with Actemra to see if we can get better control of her hand arthralgias; however I worry about disrupting her therapy given she is doing reasonably well and her disease's difficulty to control in the past. She will discuss with dermatology as well to get their input and we will re-assess on follow up. For now, encouraged increase voltaren gel and tylenol for pain   -Continue otezla 30mg BID  -Continue xeljanz 11mg once daily  -continue celebrex 200mg qAM and 100mg qPM    No orders of the defined types were placed in this encounter.     High risk medication use  Comment: Xeljanz, Otezla, chronic NSAID use  Blood pressure is within normal limits  Status post shingles vaccine.    Plan:  -Toxicity monitoring labs every 3 months  -Recommend pneumonia vaccine and latest COVID booster.    RTC in 3 months     40 minutes spent on the day of the encounter doing chart review, history and exam, counseling and documentation.     The longitudinal plan of " "care for the diagnosis(es)/condition(s) as documented were addressed during this visit. Due to the added complexity in care, I will continue to support Willa in the subsequent management and with ongoing continuity of care.      Subjective     Hand pain with weeding, cleaning, cooking. Discussed this in detail today and reviewed her response to medication changes in the past.   Finger pads peeling - has discussed this with derm and prescribed ointment.       HPI    From original consult with Dr. Arias:     \"63 year old female with hx of advanced glaucoma followed closely by ophthalmology presented to rheumatology on 2/5/21 with 5 month of progressive left hand 3rd, 4th, 5th digit and right hand 4th digit DIP inflammatory arthritis in the context of psoriasis, psoriatic changes of the nails. Taken together most consistent with psoriatic arthritis. Tried on 2 NSAIDs which failed to control symptoms, and had severe GERD symptoms. Tried methotrexate by prior rheumatologist and disease not controlled. Humira started Mid February 2021 due to lack of efficacy of these other therapies and continued through May 14th, though had severe/ rapidly progressive disease after some improvement in the first 4 weeks involving DIP inflammatory arthritis and nail disease. We then ordered Enbrel, though starting was delayed due to development of impressive bilateral submandibular lymphadenopathy which resolved within about 1 week and thought likely secondary to viral infection. She started enbrel on 6/20/2021 which she had continued on until switching to cosentyx after approval on 8/31/21 due to lack of efficacy of enbrel and injection site reaction. Had been on cosentyx from 8/31/21 with initial improvement in inflammatory DIP arthritis, psoriatic lesions, stabilization of her nail disease though she then had progression of her cutaneous nail and joint inflammatory process which prompted restarting prednisone at 10 mg daily along " "with doubling her dose of Cosentyx to 300 mg each month.  She took her first dose of 300 mg on 2021.  She did try to decrease her prednisone shortly after that down to 7.5 though noticed return of her skin peeling/psoriatic skin lesions worsening and so went back up to 10 mg daily which has again improved these symptoms. She was again unable to taper from prednisone below 10mg daily without progression of both cutaneous and articular symptoms and so cosentyx was discontinued and xeljanz 11mg once daily started after insurance approval on 21. With each attempt to taper her steroids, had difficultly due to return of psoriasis so Willa was seen by Dermatology and started on otezla in addition to xeljanz. She returns today for follow-up.     No raynaud's, no oral or nasal ulcers, no hx of inflammatory eye disease, no rashes, no skin sores, no joint pain or swelling, no GERD, no blood in urine or stool, no dyspnea, no cough, no chest pain, no fevers or weigh loss, no hx of blood clots or miscarriages, no dry eyes or dry mouth, no numbness or tingling, no muscle pain     No family or personal Hx of known autoimmune disease  Not a previous smoker. Not a current smoker.\"      Lab and Imaging review:    I reviewed recent labs and imaging includin2024 CRP, ESR, CBC, creatinine, AST, ALT all within normal limits      Current Outpatient Medications:     apremilast (OTEZLA) 30 MG tablet, Take 1 tablet (30 mg) by mouth 2 times daily Hold for signs of infection, and seek medical attention., Disp: 180 tablet, Rfl: 1    CALCIUM CARBONATE-VIT D-MIN PO, Take 1 tablet by mouth, Disp: , Rfl:     celecoxib (CELEBREX) 100 MG capsule, Take 200 mg by mouth every morning and 100 mg by mouth every evening, Disp: 270 capsule, Rfl: 1    clobetasol (TEMOVATE) 0.05 % external solution, Apply topically daily To affected areas of scalp, Disp: 50 mL, Rfl: 2    gabapentin (NEURONTIN) 300 MG capsule, Take 1 capsule (300 mg) by mouth 2 " times daily, Disp: 60 capsule, Rfl: 3    loratadine 10 MG capsule, Take 1 tablet by mouth, Disp: , Rfl:     melatonin 3 MG tablet, Take 3 mg by mouth nightly as needed for sleep, Disp: , Rfl:     Multiple Vitamins-Minerals (MULTIVITAMIN ADULTS 50+) TABS, daily , Disp: , Rfl:     tacrolimus (PROTOPIC) 0.1 % external ointment, Apply topically 2 times daily To hands and groin as needed, Disp: 60 g, Rfl: 3    tazarotene (TAZORAC) 0.1 % external cream, Apply nightly to hands as needed, Disp: 30 g, Rfl: 3    tofacitinib (XELJANZ XR) 11 MG 24 hr tablet, Take 1 tablet (11 mg) by mouth daily Hold for signs of infection, then seek medical attention. Monitoring labs every 6 months - Oral, Disp: 90 tablet, Rfl: 3    doxycycline monohydrate (MONODOX) 100 MG capsule, Take 1 capsule (100 mg) by mouth 2 times daily, Disp: 28 capsule, Rfl: 0    Current Facility-Administered Medications:     triamcinolone (KENALOG-40) injection 20 mg, 0.5 mL, Intra-Lesional, Once, Delilah Isidro MD  Allergies:  Allergies   Allergen Reactions    Adhesive Tape Blisters, Itching and Swelling    Dust Mites     Latex Blisters, Itching, Other (See Comments) and Swelling     Skin sensitivity      Morphine Sulfate (Concentrate) Itching     Other reaction(s): Chills  heart palpatations    Seasonal Allergies     Codeine Palpitations     chills     Medical Hx:  Past Medical History:   Diagnosis Date    Bilateral low back pain with right-sided sciatica, unspecified chronicity 11/11/2020    Neuroforaminal stenosis of lumbar spine 11/11/2020    Nuclear senile cataract of both eyes 08/16/2018    Osteoarthritis 10/15/2020    Psoriatic arthropathy (H) 10/19/2020    Serum calcium elevated 5/31/2023     Surgical Hx:  Past Surgical History:   Procedure Laterality Date    BIOPSY  07/30/2019    the right breast tissue(benign)    COLONOSCOPY N/A 7/14/2022    Procedure: COLONOSCOPY;  Surgeon: Roderick Campos MD;  Location: [facility] OR    EYE SURGERY      3  surgeries for glaucoma treatment     Family Hx:  Family History   Problem Relation Age of Onset    Hyperlipidemia Mother         My mother had high cholesterol.    Osteoporosis Mother     Hypertension Mother     Stomach Cancer Father         not sure where started    Cataracts Maternal Grandmother     Breast Cancer Maternal Aunt     Melanoma No family hx of     Skin Cancer No family hx of      Social Hx:  Social History     Tobacco Use    Smoking status: Never    Smokeless tobacco: Never   Vaping Use    Vaping status: Never Used   Substance Use Topics    Alcohol use: Never    Drug use: Never        Objective   Physical Exam   /76 (BP Location: Right arm, Patient Position: Sitting, Cuff Size: Adult Regular)   Pulse 83   Wt 63.5 kg (140 lb)   LMP  (LMP Unknown)   SpO2 98%   BMI 24.03 kg/m    General: alert, well appearing, no distress  HEENT:  clear conjunctiva,   Cardiac: warm and well perfused  Pulm: normal respiratory effort,   MSK: Hand, wrist, elbow, and shoulder joints without evidence of synovitis or effusion. Intact and nonpainful range of motion. No Heberden/Rick nodes. +MTP squueze on left and TTp over right MTP without warmth or swelling remainder of Foot/ankle/knee/hip joints without erythema/effusion/tenderness to palpation and with intact nonpainful range of motion. No TTP over enthesial sites  Skin: edematous finger pads with skin desquamation and erythema, No other rashes, warm and well-perfused. Psoriatic nail disease.     There is currently no information documented on the homunculus. Go to the Rheumatology activity and complete the homunculus joint exam.         Ira Guzman MD  Rheumatology

## 2024-06-21 NOTE — NURSING NOTE
Chief Complaint   Patient presents with    RECHECK     2-3mo f/u       /76 (BP Location: Right arm, Patient Position: Sitting, Cuff Size: Adult Regular)   Pulse 83   Wt 63.5 kg (140 lb)   LMP  (LMP Unknown)   SpO2 98%   BMI 24.03 kg/m    Purvi HUSTON

## 2024-06-24 ENCOUNTER — VIRTUAL VISIT (OUTPATIENT)
Dept: RHEUMATOLOGY | Facility: CLINIC | Age: 64
End: 2024-06-24
Attending: STUDENT IN AN ORGANIZED HEALTH CARE EDUCATION/TRAINING PROGRAM
Payer: COMMERCIAL

## 2024-06-24 DIAGNOSIS — Z71.85 VACCINE COUNSELING: ICD-10-CM

## 2024-06-24 DIAGNOSIS — L40.0 PLAQUE PSORIASIS: ICD-10-CM

## 2024-06-24 DIAGNOSIS — L40.50 PSORIATIC ARTHRITIS (H): Primary | ICD-10-CM

## 2024-07-02 RX ORDER — TOFACITINIB 11 MG/1
TABLET, FILM COATED, EXTENDED RELEASE ORAL
Qty: 90 TABLET | Refills: 1 | Status: SHIPPED | OUTPATIENT
Start: 2024-07-02

## 2024-07-02 NOTE — PATIENT INSTRUCTIONS
"Recommendations from today's MTM visit:                                                       1. Continue taking your medications as prescribed.    2. Consider receiving your Prevnar 20 (pneumonia) vaccine at your upcoming primary care appointment.    Follow-up: Return in about 6 months (around 12/24/2024) for MTM Pharmacist Visit.    It was great speaking with you today.  I value your experience and would be very thankful for your time in providing feedback in our clinic survey. In the next few days, you may receive an email or text message from [employer] with a link to a survey related to your  clinical pharmacist.\"     To schedule another MTM appointment, please call the clinic directly or you may call the MTM scheduling line at [phone].    My Clinical Pharmacist's contact information:                                                      Please feel free to contact me with any questions or concerns you have.      Genesis Medina, PharmD  Medication Therapy Management Pharmacist  [facility]  Phone: [phone]     "

## 2024-07-02 NOTE — PROGRESS NOTES
Medication Therapy Management (MTM) Encounter    ASSESSMENT:                            Medication Adherence/Access: No issues identified    Psoriatic Arthritis/Psoriasis: Patient tolerating and finding good efficacy to current medications. Would benefit from continuing Otezla and Xeljanz dual therapy. Recommended continuing to use Tylenol 1000 mg as needed for breakthrough pain.    Vaccines: Per ACIP recommendations, eligible for Prevnar 20 vaccine.    PLAN:                            1. Continue taking your medications as prescribed.    2. Consider receiving your Prevnar 20 (pneumonia) vaccine at your upcoming primary care appointment.    Follow-up: Return in about 6 months (around 12/24/2024) for MTM Pharmacist Visit.    SUBJECTIVE/OBJECTIVE:                          Willa Downey is a 63 year old female seen for a follow-up visit.       Reason for visit: Xeljanz and Otezla follow up    Allergies/ADRs: Reviewed in chart  Past Medical History: Reviewed in chart  Tobacco: She reports that she has never smoked. She has never used smokeless tobacco.  Alcohol: not currently using    Medication Adherence/Access: no issues reported    Psoriatic Arthritis/Psoriasis:   Otezla 30 mg twice daily  Xeljanz XR 11 mg daily  Celebrex 200 mg every morning and 100 mg every evening  Protopic 0.1% ointment twice daily as needed   Tazarotene 0.1% twice daily as needed   Clobetasol solution daily as needed     Reports she is feeling good overall. Does have some days when she notices the joint pain but reports this is when she is doing a lot of physical activity like yard work. Does use Tylenol 1000 mg as needed when she needs to take the edge off this pain. Notes creams are working for her skin. No side effects noted.     Liver Function Studies -   Recent Labs   Lab Test 04/05/24  1146 11/27/23  0922 05/26/23  1616   PROTTOTAL  --   --  7.3   ALBUMIN  --  4.6 5.1   BILITOTAL  --   --  0.4   ALKPHOS  --   --  62   AST 22 30 26   ALT  14 20 13     CBC RESULTS:   Recent Labs   Lab Test 04/05/24  1146   WBC 6.4   RBC 4.28   HGB 12.5   HCT 38.4   MCV 90   MCH 29.2   MCHC 32.6   RDW 13.4        Vaccines: Due for Prevnar 20.  Immunization History   Administered Date(s) Administered    COVID-19 12+ (2023-24) (Pfizer) 10/16/2023    COVID-19 Bivalent 12+ (Pfizer) 09/13/2022, 05/26/2023    COVID-19 MONOVALENT 12+ (Pfizer) 04/09/2021, 04/30/2021, 08/21/2021    COVID-19 Monovalent 12+ (Pfizer 2022) 03/29/2022    Flu, Unspecified 10/13/2015, 10/03/2022    E5g6-30 Novel Flu- Nasal 01/06/2010    Hepatitis A (ADULT 19+) 10/09/2006, 06/20/2007    Hepatitis B Immunity: Titer 03/09/2009    Influenza (H1N1) 01/06/2010    Influenza (IIV3) PF 11/06/2003, 10/09/2006, 10/12/2007, 11/10/2008, 09/16/2009, 11/02/2010, 11/04/2011, 10/16/2012, 10/15/2013    Influenza Vaccine 18-64 (Flublok) 10/01/2020, 10/04/2021, 10/03/2022    Influenza Vaccine >6 months,quad, PF 10/03/2014, 09/28/2017, 10/24/2018, 10/16/2019, 10/30/2023    Influenza Vaccine, 6+MO IM (QUADRIVALENT W/PRESERVATIVES) 10/13/2015    RSV Vaccine (Abrysvo) 11/17/2023    TDAP (Adacel,Boostrix) 05/16/2022    Td (Adult), Adsorbed 02/17/2004    Tdap (Adult) Unspecified Formulation 02/23/2011    Zoster recombinant adjuvanted (SHINGRIX) 05/14/2018, 09/21/2018     Today's Vitals: LMP  (LMP Unknown)   ----------------    I spent 15 minutes with this patient today. All changes were made via collaborative practice agreement with Ira Guzman MD. A copy of the visit note was provided to the patient's provider(s).    A summary of these recommendations was sent via [employer].    Genesis Medina PharmD  Medication Therapy Management Pharmacist  [facility]  Phone: [phone]    Telemedicine Visit Details  Type of service:  Telephone visit  Start Time: 1:30 PM  End Time: 1:45 PM     Medication Therapy Recommendations  Vaccine counseling    Rationale: Preventive therapy - Needs  additional medication therapy - Indication   Recommendation: Start Medication - Prevnar 20 0.5 ML Amy   Status: Accepted - no CPA Needed

## 2024-07-06 SDOH — HEALTH STABILITY: PHYSICAL HEALTH: ON AVERAGE, HOW MANY DAYS PER WEEK DO YOU ENGAGE IN MODERATE TO STRENUOUS EXERCISE (LIKE A BRISK WALK)?: 4 DAYS

## 2024-07-06 SDOH — HEALTH STABILITY: PHYSICAL HEALTH: ON AVERAGE, HOW MANY MINUTES DO YOU ENGAGE IN EXERCISE AT THIS LEVEL?: 50 MIN

## 2024-07-06 ASSESSMENT — SOCIAL DETERMINANTS OF HEALTH (SDOH): HOW OFTEN DO YOU GET TOGETHER WITH FRIENDS OR RELATIVES?: ONCE A WEEK

## 2024-07-08 ENCOUNTER — TELEPHONE (OUTPATIENT)
Dept: RHEUMATOLOGY | Facility: CLINIC | Age: 64
End: 2024-07-08
Payer: COMMERCIAL

## 2024-07-08 NOTE — TELEPHONE ENCOUNTER
PA Initiation    Medication: OTEZLA 30 MG PO TABS  Insurance Company: [employer] - Phone [phone] Fax [phone]  Pharmacy Filling the Rx: [facility] - [place], [place] - [facility] [address]  Filling Pharmacy Phone:    Filling Pharmacy Fax:    Start Date: 7/8/2024    [id number]

## 2024-07-09 NOTE — PROGRESS NOTES
[facility] Dermatologic Surgery Excision Note    Case #: [id number]  Date of Service:  Aug 25, 2021  Surgery: Wide local excision  Staff Surgeon: Justus Bell MD  Fellow Surgeon: Gautam Sam MD  Resident Surgeon: None  Nurse: Mia Corcoran RN    Tumor Type: Epidermoid cyst (clinically diagnosed)  Location: Posterior neck  Derm-Path Accession #: [id number]    Skin Lesion Size:  1.0 cm x 1.0 cm  Margin: 0 mm  Excision size: 1.0 cm x 1.0 cm  Level of Defect: Fat  Repair Type: Intermediate linear  Repair Size: 1.2 cm  Suture Material: 4-0 Monocryl; 5-0 fast absorbing gut    INDICATIONS:  The patient was scheduled for wide local excision of the skin lesion documented above. We discussed the principles of treatment and most likely complications including scarring, bleeding, infection, incomplete excision, wound dehiscence, pain, nerve damage, and recurrence. Informed consent was obtained and the patient underwent the procedure as follows:    PROCEDURE:  With the patient in a prone position, the lesion was outlined with the margin documented above.  The lesion and the necessary margin (excised diameter) was measured and documented as above.  An ellipse was designed around the lesion to conform to relaxed skin tension lines in an effort to minimize scarring and deformity.  The lesion and surrounding skin were prepped with chlorhexidine, draped, and anesthetized with lidocaine with epinephrine. Using a #15 blade, the skin was excised along premarked lines.  The level of the defect is documented as above.  Wound margins were undermined to limit functional deformity/impairment of adjacent structures. Bleeding vessels were controlled with electrocoagulation.  The dermis and subcutaneous tissue were closed with buried vertical mattress sutures using 4-0 Monocryl.  Epidermal approximation was meticulously refined with 5-0 fast absorbing gut simple running sutures, resulting in a linear closure with little to no wound  tension.  Blood loss was estimated to be less than 5 mL.  The area was coated with petrolatum and covered with a non-adherent dressing followed by gauze and tape. Postoperative instructions were reviewed per protocol.  The patient left alert and fully oriented.    Follow-up for suture removal: Not applicable as only dissolving sutures used    Justus Bell MD was immediately available for the entire surgery and was physicially present for the key portions of the procedure.    Gautam Sam MD  Micrographic Surgery and Dermatologic Oncology (MSDO) Fellow    Scribe Disclosure:  IEsther, am serving as a scribe to document services personally performed by Justus Bell MD based on data collection and the provider's statements to me.    normal/well-groomed/no distress

## 2024-07-10 ENCOUNTER — OFFICE VISIT (OUTPATIENT)
Dept: DERMATOLOGY | Facility: CLINIC | Age: 64
End: 2024-07-10
Payer: COMMERCIAL

## 2024-07-10 DIAGNOSIS — L40.9 PSORIASIS: Primary | ICD-10-CM

## 2024-07-10 PROCEDURE — 99214 OFFICE O/P EST MOD 30 MIN: CPT | Performed by: DERMATOLOGY

## 2024-07-10 ASSESSMENT — PAIN SCALES - GENERAL: PAINLEVEL: NO PAIN (0)

## 2024-07-10 NOTE — NURSING NOTE
Dermatology Rooming Note    Willa Downey's goals for this visit include:   Chief Complaint   Patient presents with    Psoriasis     Here for a psoriasis follow up. Unchanged.      Joanne Bhat RN

## 2024-07-10 NOTE — PROGRESS NOTES
[facility] Dermatology Note  Encounter Date: Jul 10, 2024  Office Visit     Dermatology Problem List:  1. Psoriasis with psoriatic arthritis: mainly on the hands, fingertips, nails   - current tx: protopic, xeljanz per rheumatology (12/2021 to present, did not tolerate hiatus 04/2023) and otezla, clobetasol 0.05% ointment/cream for hands and trunk, protopic, clobetasol solution, t-gel and t-sal shampoo  - past tx: methotrexate (not effective), humira per rheumatology (started 2/2021, stoppped 5/2021, not effective), enbrel (6/2021 to 8/2021, not effective), cosentyx (8/2021-12/2021), prednisone, lidex solution  - future: consider alternative biologic   2. Cyst of skin, posterior neck midline, s/p excision 8/25/21  3. Inflamed cyst, left inferior labia, s/p ILK 12/06/23 and doxycyline 100 mg BID.     ____________________________________________     Assessment & Plan:     # Psoriasis in the setting of psoriatic arthritis: chronic, active, improved.   Overall doing fairly well on tofacitinib + Otezla. Was off tofacitinib recently and had significant flare. She saw new rheumatologist recently who discussed potentially other treatment options including Actemra. We reviewed literature and it looks like Actrema can rarely cause flaring of psoriasis so may be best to avoid. Could consider narrowing TERESA spectrum from tofacitinib to Rinvoq but patient understandably nervous about changing her regimen. Will send message to rheum with thoughts. Additionally in meantime, will try roflumilast cream given failure of other topicals in problem list.  - start roflumilast cream daily if covered by insurance  - Can continue protopic BID prn  - Continue tazarotene cream at bedtime   - Continue xeljanz, otezla per rheumatology  - Continue clobetasol or lidex scalp solution daily prn; also has T gel/sal acid containing shampoo (dermarest)  - Future considerations: Vtama  - nbUVB home hand/foot unit not covered by  insurance  *rheum note reviewed      Procedures Performed:   None    Follow-up: 6 months, sooner if concerns.     Staff and Scribe:     Scribe Disclosure:   I, ANDREA RAHMAN, am serving as a scribe; to document services personally performed by Delilah Isidro MD-based on data collection and the provider's statements to me.      Provider Disclosure:   The documentation recorded by the scribe accurately reflects the services I personally performed and the decisions made by me.    Delilah Isidro MD    Department of Dermatology  [facility]: Phone: [phone], Fax: [phone]  7/16/2024     ____________________________________________    CC: Psoriasis (Here for a psoriasis follow up. Unchanged. )    HPI:  Ms. Willa Downey is a(n) 63 year old female who presents today as a return patient for psoriasis. Last seen in dermatology by me on 1/3/24 for psoriasis and psoriatic arthritis. At that time, she was continued on protopic, tazarotene cream, xeljanz, clobetasol soln, and T-gel/T-sal shampoos.    Today, the patient notes her fingernails have been affected and fingertips have been scaling. Overall better than prior though.    She saw rheumatologist last month and they are concerned that she is taking xenljanz and otezla and still getting joint aches. Due to insurance issues, she went one month without xeljanz and she had horrible flaring. She notes her skin is quite dry.    Patient is otherwise feeling well, without additional skin concerns.    Labs Reviewed:  N/A    Physical Exam:  Vitals: LMP  (LMP Unknown)   SKIN: Focused examination of hands was performed.  - multiple nails of onycholysis and nail pitting  Scaling of fingertips  - No other lesions of concern on areas examined.     Medications:  Current Outpatient Medications   Medication Sig Dispense Refill    apremilast (OTEZLA) 30 MG tablet Take 1 tablet (30  mg) by mouth 2 times daily Hold for signs of infection, and seek medical attention. 180 tablet 1    CALCIUM CARBONATE-VIT D-MIN PO Take 1 tablet by mouth      celecoxib (CELEBREX) 100 MG capsule Take 200 mg by mouth every morning and 100 mg by mouth every evening 270 capsule 1    clobetasol (TEMOVATE) 0.05 % external solution Apply topically daily To affected areas of scalp 50 mL 2    gabapentin (NEURONTIN) 300 MG capsule Take 1 capsule (300 mg) by mouth 2 times daily 60 capsule 3    loratadine 10 MG capsule Take 1 tablet by mouth      melatonin 3 MG tablet Take 3 mg by mouth nightly as needed for sleep      Multiple Vitamins-Minerals (MULTIVITAMIN ADULTS 50+) TABS daily       tacrolimus (PROTOPIC) 0.1 % external ointment Apply topically 2 times daily To hands and groin as needed 60 g 3    tazarotene (TAZORAC) 0.1 % external cream Apply nightly to hands as needed 30 g 3    tofacitinib (XELJANZ XR) 11 MG 24 hr tablet Take 1 tablet (11 mg) by mouth daily Hold for signs of infection, then seek medical attention. Monitoring labs every 6 months - Oral 90 tablet 1     Current Facility-Administered Medications   Medication Dose Route Frequency Provider Last Rate Last Admin    triamcinolone (KENALOG-40) injection 20 mg  0.5 mL Intra-Lesional Once Delilah Isidro MD          Past Medical History:   Patient Active Problem List   Diagnosis    Hyperlipidemia    Low-tension glaucoma of both eyes, severe stage    Myopia    Screening for malignant neoplasm of cervix    Psoriasis of nail    Psoriatic arthropathy (H)    Spinal stenosis of lumbar region, unspecified whether neurogenic claudication present    Immunosuppression (H24)    Family history of malignant neoplasm of breast    History of snoring    Prediabetes    Diverticulosis of large intestine    Sleep disorder    Multiple pigmented nevi    Family history of osteoporosis     Past Medical History:   Diagnosis Date    Bilateral low back pain with right-sided sciatica,  unspecified chronicity 11/11/2020    Neuroforaminal stenosis of lumbar spine 11/11/2020    Nuclear senile cataract of both eyes 08/16/2018    Osteoarthritis 10/15/2020    Psoriatic arthropathy (H) 10/19/2020    Serum calcium elevated 5/31/2023        CC No referring provider defined for this encounter. on close of this encounter.

## 2024-07-10 NOTE — LETTER
7/10/2024       RE: Willa Downey  [address]  [facility] [address]     Dear Colleague,    Thank you for referring your patient, Willa Downey, to the [facility] [place] [facility]. Please see a copy of my visit note below.    [facility] Dermatology Note  Encounter Date: Jul 10, 2024  Office Visit     Dermatology Problem List:  1. Psoriasis with psoriatic arthritis: mainly on the hands, fingertips, nails   - current tx: protopic, xeljanz per rheumatology (12/2021 to present, did not tolerate hiatus 04/2023) and otezla, clobetasol 0.05% ointment/cream for hands and trunk, protopic, clobetasol solution, t-gel and t-sal shampoo  - past tx: methotrexate (not effective), humira per rheumatology (started 2/2021, stoppped 5/2021, not effective), enbrel (6/2021 to 8/2021, not effective), cosentyx (8/2021-12/2021), prednisone, lidex solution  - future: consider alternative biologic   2. Cyst of skin, posterior neck midline, s/p excision 8/25/21  3. Inflamed cyst, left inferior labia, s/p ILK 12/06/23 and doxycyline 100 mg BID.     ____________________________________________     Assessment & Plan:     # Psoriasis in the setting of psoriatic arthritis: chronic, active, improved.   Overall doing fairly well on tofacitinib + Otezla. Was off tofacitinib recently and had significant flare. She saw new rheumatologist recently who discussed potentially other treatment options including Actemra. We reviewed literature and it looks like Actrema can rarely cause flaring of psoriasis so may be best to avoid. Could consider narrowing TERESA spectrum from tofacitinib to Rinvoq but patient understandably nervous about changing her regimen. Will send message to rheum with thoughts. Additionally in meantime, will try roflumilast cream given failure of other topicals in problem list.  - start roflumilast cream daily if covered by  insurance  - Can continue protopic BID prn  - Continue tazarotene cream at bedtime   - Continue xeljanz, otezla per rheumatology  - Continue clobetasol or lidex scalp solution daily prn; also has T gel/sal acid containing shampoo (dermarest)  - Future considerations: Vtama  - nbUVB home hand/foot unit not covered by insurance  *rheum note reviewed      Procedures Performed:   None    Follow-up: 6 months, sooner if concerns.     Staff and Scribe:     Scribe Disclosure:   I, ANDREA RAHMAN, am serving as a scribe; to document services personally performed by Delilah Isidro MD-based on data collection and the provider's statements to me.      Provider Disclosure:   The documentation recorded by the scribe accurately reflects the services I personally performed and the decisions made by me.    Delilah Isidro MD    Department of Dermatology  [facility]: Phone: [phone], Fax: [phone]  7/16/2024     ____________________________________________    CC: Psoriasis (Here for a psoriasis follow up. Unchanged. )    HPI:  Ms. Willa Downey is a(n) 63 year old female who presents today as a return patient for psoriasis. Last seen in dermatology by me on 1/3/24 for psoriasis and psoriatic arthritis. At that time, she was continued on protopic, tazarotene cream, xeljanz, clobetasol soln, and T-gel/T-sal shampoos.    Today, the patient notes her fingernails have been affected and fingertips have been scaling. Overall better than prior though.    She saw rheumatologist last month and they are concerned that she is taking xenljanz and otezla and still getting joint aches. Due to insurance issues, she went one month without xeljanz and she had horrible flaring. She notes her skin is quite dry.    Patient is otherwise feeling well, without additional skin concerns.    Labs Reviewed:  N/A    Physical Exam:  Vitals: LMP  (LMP  Unknown)   SKIN: Focused examination of hands was performed.  - multiple nails of onycholysis and nail pitting  Scaling of fingertips  - No other lesions of concern on areas examined.     Medications:  Current Outpatient Medications   Medication Sig Dispense Refill     apremilast (OTEZLA) 30 MG tablet Take 1 tablet (30 mg) by mouth 2 times daily Hold for signs of infection, and seek medical attention. 180 tablet 1     CALCIUM CARBONATE-VIT D-MIN PO Take 1 tablet by mouth       celecoxib (CELEBREX) 100 MG capsule Take 200 mg by mouth every morning and 100 mg by mouth every evening 270 capsule 1     clobetasol (TEMOVATE) 0.05 % external solution Apply topically daily To affected areas of scalp 50 mL 2     gabapentin (NEURONTIN) 300 MG capsule Take 1 capsule (300 mg) by mouth 2 times daily 60 capsule 3     loratadine 10 MG capsule Take 1 tablet by mouth       melatonin 3 MG tablet Take 3 mg by mouth nightly as needed for sleep       Multiple Vitamins-Minerals (MULTIVITAMIN ADULTS 50+) TABS daily        tacrolimus (PROTOPIC) 0.1 % external ointment Apply topically 2 times daily To hands and groin as needed 60 g 3     tazarotene (TAZORAC) 0.1 % external cream Apply nightly to hands as needed 30 g 3     tofacitinib (XELJANZ XR) 11 MG 24 hr tablet Take 1 tablet (11 mg) by mouth daily Hold for signs of infection, then seek medical attention. Monitoring labs every 6 months - Oral 90 tablet 1     Current Facility-Administered Medications   Medication Dose Route Frequency Provider Last Rate Last Admin     triamcinolone (KENALOG-40) injection 20 mg  0.5 mL Intra-Lesional Once Delilah Isidro MD          Past Medical History:   Patient Active Problem List   Diagnosis     Hyperlipidemia     Low-tension glaucoma of both eyes, severe stage     Myopia     Screening for malignant neoplasm of cervix     Psoriasis of nail     Psoriatic arthropathy (H)     Spinal stenosis of lumbar region, unspecified whether neurogenic claudication  present     Immunosuppression (H24)     Family history of malignant neoplasm of breast     History of snoring     Prediabetes     Diverticulosis of large intestine     Sleep disorder     Multiple pigmented nevi     Family history of osteoporosis     Past Medical History:   Diagnosis Date     Bilateral low back pain with right-sided sciatica, unspecified chronicity 11/11/2020     Neuroforaminal stenosis of lumbar spine 11/11/2020     Nuclear senile cataract of both eyes 08/16/2018     Osteoarthritis 10/15/2020     Psoriatic arthropathy (H) 10/19/2020     Serum calcium elevated 5/31/2023        CC No referring provider defined for this encounter. on close of this encounter.      Again, thank you for allowing me to participate in the care of your patient.      Sincerely,    Delilah Isidro MD

## 2024-07-10 NOTE — TELEPHONE ENCOUNTER
Prior Authorization Approval    Medication: OTEZLA 30 MG PO TABS  Authorization Effective Date: 7/10/2024  Authorization Expiration Date: 7/9/2025  Approved Dose/Quantity: bid  Reference #: [id number]   Insurance Company: [employer] - Phone [phone] Fax [phone]  Expected CoPay: $    CoPay Card Available: Yes    Financial Assistance Needed: no  Which Pharmacy is filling the prescription: [facility] - [place], [place] - [facility] [address]  Pharmacy Notified: yes  Patient Notified: yes

## 2024-07-11 ENCOUNTER — OFFICE VISIT (OUTPATIENT)
Dept: FAMILY MEDICINE | Facility: CLINIC | Age: 64
End: 2024-07-11
Payer: COMMERCIAL

## 2024-07-11 VITALS
RESPIRATION RATE: 20 BRPM | DIASTOLIC BLOOD PRESSURE: 76 MMHG | WEIGHT: 138.6 LBS | SYSTOLIC BLOOD PRESSURE: 122 MMHG | OXYGEN SATURATION: 98 % | BODY MASS INDEX: 23.66 KG/M2 | HEIGHT: 64 IN | TEMPERATURE: 98.3 F | HEART RATE: 78 BPM

## 2024-07-11 DIAGNOSIS — L40.50 PSORIATIC ARTHROPATHY (H): ICD-10-CM

## 2024-07-11 DIAGNOSIS — D22.9 MULTIPLE PIGMENTED NEVI: ICD-10-CM

## 2024-07-11 DIAGNOSIS — E78.5 HYPERLIPIDEMIA, UNSPECIFIED HYPERLIPIDEMIA TYPE: ICD-10-CM

## 2024-07-11 DIAGNOSIS — Z23 NEED FOR PNEUMOCOCCAL VACCINATION: ICD-10-CM

## 2024-07-11 DIAGNOSIS — Z82.62 FAMILY HISTORY OF OSTEOPOROSIS: ICD-10-CM

## 2024-07-11 DIAGNOSIS — K57.30 DIVERTICULOSIS OF LARGE INTESTINE WITHOUT HEMORRHAGE: ICD-10-CM

## 2024-07-11 DIAGNOSIS — G47.9 SLEEP DISORDER: ICD-10-CM

## 2024-07-11 DIAGNOSIS — L40.9 PSORIASIS OF NAIL: ICD-10-CM

## 2024-07-11 DIAGNOSIS — Z13.820 SCREENING FOR OSTEOPOROSIS: ICD-10-CM

## 2024-07-11 DIAGNOSIS — H52.10 MYOPIA, UNSPECIFIED LATERALITY: ICD-10-CM

## 2024-07-11 DIAGNOSIS — Z87.898 HISTORY OF SNORING: ICD-10-CM

## 2024-07-11 DIAGNOSIS — M48.061 SPINAL STENOSIS OF LUMBAR REGION, UNSPECIFIED WHETHER NEUROGENIC CLAUDICATION PRESENT: ICD-10-CM

## 2024-07-11 DIAGNOSIS — D84.9 IMMUNOSUPPRESSION (H): ICD-10-CM

## 2024-07-11 DIAGNOSIS — M54.42 ACUTE LEFT-SIDED LOW BACK PAIN WITH LEFT-SIDED SCIATICA: ICD-10-CM

## 2024-07-11 DIAGNOSIS — R73.03 PREDIABETES: ICD-10-CM

## 2024-07-11 DIAGNOSIS — Z00.00 HEALTH CARE MAINTENANCE: ICD-10-CM

## 2024-07-11 DIAGNOSIS — Z71.89 ADVANCED DIRECTIVES, COUNSELING/DISCUSSION: ICD-10-CM

## 2024-07-11 DIAGNOSIS — Z00.00 ROUTINE GENERAL MEDICAL EXAMINATION AT A HEALTH CARE FACILITY: Primary | ICD-10-CM

## 2024-07-11 DIAGNOSIS — H40.1233 LOW-TENSION GLAUCOMA OF BOTH EYES, SEVERE STAGE: ICD-10-CM

## 2024-07-11 DIAGNOSIS — Z80.3 FAMILY HISTORY OF MALIGNANT NEOPLASM OF BREAST: ICD-10-CM

## 2024-07-11 LAB
ALBUMIN SERPL BCG-MCNC: 4.5 G/DL (ref 3.5–5.2)
ALP SERPL-CCNC: 65 U/L (ref 40–150)
ALT SERPL W P-5'-P-CCNC: 19 U/L (ref 0–50)
ANION GAP SERPL CALCULATED.3IONS-SCNC: 11 MMOL/L (ref 7–15)
AST SERPL W P-5'-P-CCNC: 25 U/L (ref 0–45)
BASOPHILS # BLD AUTO: 0 10E3/UL (ref 0–0.2)
BASOPHILS NFR BLD AUTO: 1 %
BILIRUB SERPL-MCNC: 0.3 MG/DL
BUN SERPL-MCNC: 13.3 MG/DL (ref 8–23)
CALCIUM SERPL-MCNC: 9.9 MG/DL (ref 8.8–10.2)
CHLORIDE SERPL-SCNC: 105 MMOL/L (ref 98–107)
CHOLEST SERPL-MCNC: 279 MG/DL
CREAT SERPL-MCNC: 0.61 MG/DL (ref 0.51–0.95)
CREAT UR-MCNC: 30.7 MG/DL
DEPRECATED HCO3 PLAS-SCNC: 23 MMOL/L (ref 22–29)
EGFRCR SERPLBLD CKD-EPI 2021: >90 ML/MIN/1.73M2
EOSINOPHIL # BLD AUTO: 0.1 10E3/UL (ref 0–0.7)
EOSINOPHIL NFR BLD AUTO: 2 %
ERYTHROCYTE [DISTWIDTH] IN BLOOD BY AUTOMATED COUNT: 13.4 % (ref 10–15)
FASTING STATUS PATIENT QL REPORTED: NO
FASTING STATUS PATIENT QL REPORTED: NO
GLUCOSE SERPL-MCNC: 106 MG/DL (ref 70–99)
HBA1C MFR BLD: 5.7 % (ref 0–5.6)
HCT VFR BLD AUTO: 37.4 % (ref 35–47)
HDLC SERPL-MCNC: 60 MG/DL
HGB BLD-MCNC: 12 G/DL (ref 11.7–15.7)
IMM GRANULOCYTES # BLD: 0 10E3/UL
IMM GRANULOCYTES NFR BLD: 0 %
LDLC SERPL CALC-MCNC: 155 MG/DL
LYMPHOCYTES # BLD AUTO: 1.9 10E3/UL (ref 0.8–5.3)
LYMPHOCYTES NFR BLD AUTO: 36 %
MCH RBC QN AUTO: 29.1 PG (ref 26.5–33)
MCHC RBC AUTO-ENTMCNC: 32.1 G/DL (ref 31.5–36.5)
MCV RBC AUTO: 91 FL (ref 78–100)
MICROALBUMIN UR-MCNC: <12 MG/L
MICROALBUMIN/CREAT UR: NORMAL MG/G{CREAT}
MONOCYTES # BLD AUTO: 0.5 10E3/UL (ref 0–1.3)
MONOCYTES NFR BLD AUTO: 8 %
NEUTROPHILS # BLD AUTO: 2.9 10E3/UL (ref 1.6–8.3)
NEUTROPHILS NFR BLD AUTO: 53 %
NONHDLC SERPL-MCNC: 219 MG/DL
PLATELET # BLD AUTO: 390 10E3/UL (ref 150–450)
POTASSIUM SERPL-SCNC: 4.2 MMOL/L (ref 3.4–5.3)
PROT SERPL-MCNC: 7.4 G/DL (ref 6.4–8.3)
RBC # BLD AUTO: 4.12 10E6/UL (ref 3.8–5.2)
SODIUM SERPL-SCNC: 139 MMOL/L (ref 135–145)
TRIGL SERPL-MCNC: 320 MG/DL
TSH SERPL DL<=0.005 MIU/L-ACNC: 0.77 UIU/ML (ref 0.3–4.2)
WBC # BLD AUTO: 5.4 10E3/UL (ref 4–11)

## 2024-07-11 PROCEDURE — 83036 HEMOGLOBIN GLYCOSYLATED A1C: CPT | Performed by: FAMILY MEDICINE

## 2024-07-11 PROCEDURE — 36415 COLL VENOUS BLD VENIPUNCTURE: CPT | Performed by: FAMILY MEDICINE

## 2024-07-11 PROCEDURE — 99396 PREV VISIT EST AGE 40-64: CPT | Mod: 25 | Performed by: FAMILY MEDICINE

## 2024-07-11 PROCEDURE — 85025 COMPLETE CBC W/AUTO DIFF WBC: CPT | Performed by: FAMILY MEDICINE

## 2024-07-11 PROCEDURE — 99214 OFFICE O/P EST MOD 30 MIN: CPT | Mod: 25 | Performed by: FAMILY MEDICINE

## 2024-07-11 PROCEDURE — 80053 COMPREHEN METABOLIC PANEL: CPT | Performed by: FAMILY MEDICINE

## 2024-07-11 PROCEDURE — 82570 ASSAY OF URINE CREATININE: CPT | Performed by: FAMILY MEDICINE

## 2024-07-11 PROCEDURE — 82043 UR ALBUMIN QUANTITATIVE: CPT | Performed by: FAMILY MEDICINE

## 2024-07-11 PROCEDURE — 84443 ASSAY THYROID STIM HORMONE: CPT | Performed by: FAMILY MEDICINE

## 2024-07-11 PROCEDURE — 90677 PCV20 VACCINE IM: CPT | Performed by: FAMILY MEDICINE

## 2024-07-11 PROCEDURE — 80061 LIPID PANEL: CPT | Performed by: FAMILY MEDICINE

## 2024-07-11 PROCEDURE — 90471 IMMUNIZATION ADMIN: CPT | Performed by: FAMILY MEDICINE

## 2024-07-11 ASSESSMENT — PAIN SCALES - GENERAL: PAINLEVEL: NO PAIN (0)

## 2024-07-11 NOTE — PROGRESS NOTES
The below note was dictated using voice recognition. Although reviewed after completion, some word and grammatical error may remain .       Preventive Care Visit  [facility]  Geovanna Rosales MD, Family Medicine  Jul 11, 2024      Assessment & Plan     Routine general medical examination at a health care facility  Here for preventive health and additional concerns   Health care maintenance reviewed  Encouraged working on health care directives  No breast issues. Dx Mammo & u/S in feb 2023 for axillary pain had been normal. Screening mammogram 3/2024 was normal. Doesnt do regular breast exams and decided to check self last night and noted left axillar bulge more than right in the mirror. Exam is benign. No lump felt. No tenderness. No skin changes or nipple discharge. Suspect soft tissue prominence. Monitor for worsening.   Fh of breast cancer in an mat aunt 50 to 60 ? Post menopause, declines a  referral   Pap due 2025. No vaginal symptoms  Colonoscopy due 2032  Discussed dexa, not done yet & encouraged to do so  Had her COVID vaccine on 6/27/24   To go to [place] next week visit [place] [facility] for her 30 th wed anniversary   Prevnar 20 given today  Will do labs today & make further recommendations when seen     Hyperlipidemia. Elevated LDL. Low cardiovascular risk. Will check lipids today. LDL cholesterol is elevated at 155 similar to prior, HDL is normal triglycerides are up likely from being a non fasting sample but similar to prior. An elevated LDL does increase cardiovascular risk. The 10-year ASCVD risk score (Cornelius DK, et al., 2019) is: 4%. Recommend a low-fat low-carb diet regular physical activity considering a CT calcium score to get objective data on cholesterol effect on the heart and consider statin medication if CT calcium score is not 0 or if LDL cholesterol remains elevated. Liver and gallbladder tests (ALT,AST, Alk phos,bilirubin) are normal. Electrolytes are  normal.  TSH (thyroid stimulating hormone) level is normal which indicates normal thyroid function.     Hx of prediabetes, prev HBA1c of 5.7 will check today, encouraged to eat smaller carb portions  Labs came later with glucose mildly elevated ( non fasting & also related to prediabetes) &  HBA1C elevated at 5.7 similar to prior and & a sign of PREdiabetes.  Advised eating a low carbohydrate diet, exercising, and rechecking glucose level in 12 months. Microalbumin (urine protein) test is normal.  ADVISE: rechecking this annually     Hx of snoring. Has day time drowsiness after meals sitting down triggers a nap of 15 min then able to get up & go, no falling asleep at wheel. Gets 6 hrs of sleep at night or less may be contributing. Feels gets up refreshed if sleeps ok, her 17 yr old cat has currently been affecting her sleep, no am headaches. Is on gabapentin. Using custom made mouth guard from dentist due to grinding teeth  Not had a good night sleep due to cat for a while but was occuring even before that. [place] score around 13, agreeable to see sleep for further eval. Labs showed normal red blood cell (Hgb) levels, normal white blood cell count and normal platelet levels.      Sometimes wakes up, has trouble falling back to sleep. Previously used melatonin only if unable to sleep couple days in a row.but while it helped her, it also caused next day grogginess so stopped using. Sleep hygiene discussed briefly.      Osteoarthritis with spinal stenosis lumbar spine,  MRI in 2020 showed T12-L1: No spinal canal or neuro foraminal stenosis.L1-2: No spinal canal or neuro foraminal stenosis. L2-3: No spinal canal or neuro foraminal stenosis. L3-4: No spinal canal or neuro foraminal stenosis. L4-5: Grade 1 anterolisthesis and uncovering of the disc. Superimposed central disc extrusion with cephalad migration. Bilateral facet hypertrophy and ligamentum flavum thickening. Severe spinal canal  stenosis. Moderate bilateral  neural foraminal stenosis. Periarticular edema surrounding the bilateral facet joints. L5-S1: Posterior disc bulge and bilateral facet hypertrophy. Mild spinal canal narrowing. Mild bilateral neural foraminal narrowing & Paraspinous tissues are within normal limits. Dx with Spondylosis, particularly at L4-5 where there is severe spinal canal stenosis and moderate bilateral neural foraminal stenosis. Seen by spine doctor in the past & put on gabapentin for right sciatica. Seen by neuro spine last in 1/2024 for med refill of gabapentin 300 mg twice a day as was not ready to come off the med. At  that time noted no new symptoms, opted no repeat MRI or formal PT and advised to follow up if had pain then a new mri would be indicated. In past few yrs always had right sided sciatica but recently past 2 months noted similar sensation on left. Intermittent. Currently no pain. Notes mostly in the evening after sitting in her recliner watching TV, with leg straight in front of her, when tries to get up then feels a shooting pain in left side. Also at night when lies down in bed feels uncomfortable. If gets up to go to the restroom mid of night may feel a pain first thing as well as in am when gets up out of bed. Declines referral to pt . Has home exercises given for sciatica in past and to start that encouraged she contact her spine doctor for an updated MRI to evaluate new left sided sciatica. Also noted is on a chronic dose of Celebrex as well    Psoriatic arthropathy currently well controlled in remission clinically & on labs on Xeljanz, Otelza & Celebrex under care of rheum.  Previously has tried Humira, Enbrel, Cosentyx,  No longer on prednisone since may 2023. Reports no side effects. Understands is on high risk meds, has had no symptoms of gastritis or gi bleed or effect on kidney function or BP or heart & Cbc & CMP normal to date. Will recheck labs today for med monitoring. Encouraged to try come off or decrease  Celebrex to lowest effective dose given long term risks of heart attack, stroke, kidney failure, GI bleed etc.      Psoriasis of nail and skin well controlled on clobetasol ointment to body and lidex solution to scalp and genital area as needed ,roflumist cream, protopic oint & tazorac cream prn under care of her dermatologist. UVB rx not covered. No new moles.      Asymptomatic diverticulosis noted on colonoscopy 2022. Recommend a high fiber diet.      Sees eye doc for Myopia and glaucoma.    Fh of breast cancer not first degree relative. Declines a  referral     FH of osteoporosis, at high risk for osteoporosis ( prior prednisone use, on immunosuppresants, post menopausal etc) encouraged to schedule dexa scan    Return in 1 yr for a physical and sooner in an office visit for any concerns.   - Pneumococcal 20 Valent Conjugate (Prevnar 20)  - PRIMARY CARE FOLLOW-UP SCHEDULING; Future  - DX Bone Density; Future    Hyperlipidemia, unspecified hyperlipidemia type  Hyperlipidemia. Elevated LDL. Low cardiovascular risk. Will check lipids today. LDL cholesterol is elevated at 155 similar to prior, HDL is normal triglycerides are up likely from being a non fasting sample but similar to prior. An elevated LDL does increase cardiovascular risk. The 10-year ASCVD risk score (Troy DK, et al., 2019) is: 4%. Recommend a low-fat low-carb diet regular physical activity considering a CT calcium score to get objective data on cholesterol effect on the heart and consider statin medication if CT calcium score is not 0 or if LDL cholesterol remains elevated. Liver and gallbladder tests (ALT,AST, Alk phos,bilirubin) are normal. Electrolytes are normal.  TSH (thyroid stimulating hormone) level is normal which indicates normal thyroid function.  - TSH with free T4 reflex; Future  - Lipid panel reflex to direct LDL Non-fasting; Future  - TSH with free T4 reflex  - Lipid panel reflex to direct LDL Non-fasting    Prediabetes  Hx  of prediabetes, prev HBA1c of 5.7 will check today, encouraged to eat smaller carb portions  Labs came later with glucose mildly elevated ( non fasting & also related to prediabetes) &  HBA1C elevated at 5.7 similar to prior and & a sign of PREdiabetes.  Advised eating a low carbohydrate diet, exercising, and rechecking glucose level in 12 months. Microalbumin (urine protein) test is normal.  ADVISE: rechecking this annually   - Comprehensive metabolic panel; Future  - TSH with free T4 reflex; Future  - Albumin Random Urine Quantitative with Creat Ratio; Future  - Hemoglobin A1c; Future  - Comprehensive metabolic panel  - TSH with free T4 reflex  - Albumin Random Urine Quantitative with Creat Ratio  - Hemoglobin A1c    History of snoring  Hx of snoring. Has day time drowsiness after meals sitting down triggers a nap of 15 min then able to get up & go, no falling asleep at wheel. Gets 6 hrs of sleep at night or less may be contributing. Feels gets up refreshed if sleeps ok, her 17 yr old cat has currently been affecting her sleep, no am headaches. Is on gabapentin. Using custom made mouth guard from dentist due to grinding teeth  Not had a good night sleep due to cat for a while but was occuring even before that. [place] score around 13, agreeable to see sleep for further eval. Labs showed normal red blood cell (Hgb) levels, normal white blood cell count and normal platelet levels.   - CBC with Platelets & Differential; Future  - TSH with free T4 reflex; Future  - Adult Sleep Eval & Management  Referral; Future  - CBC with Platelets & Differential  - TSH with free T4 reflex    Sleep disorder  Sometimes wakes up, has trouble falling back to sleep. Previously used melatonin only if unable to sleep couple days in a row.but while it helped her, it also caused next day grogginess so stopped using. Sleep hygiene discussed briefly.   - CBC with Platelets & Differential; Future  - TSH with free T4 reflex; Future  -  Adult Sleep Eval & Management  Referral; Future  - CBC with Platelets & Differential  - TSH with free T4 reflex    Spinal stenosis of lumbar region, unspecified whether neurogenic claudication present  Acute left-sided low back pain with left-sided sciatica  Osteoarthritis with spinal stenosis lumbar spine,  MRI in 2020 showed T12-L1: No spinal canal or neuro foraminal stenosis.L1-2: No spinal canal or neuro foraminal stenosis. L2-3: No spinal canal or neuro foraminal stenosis. L3-4: No spinal canal or neuro foraminal stenosis. L4-5: Grade 1 anterolisthesis and uncovering of the disc. Superimposed central disc extrusion with cephalad migration. Bilateral facet hypertrophy and ligamentum flavum thickening. Severe spinal canal  stenosis. Moderate bilateral neural foraminal stenosis. Periarticular edema surrounding the bilateral facet joints. L5-S1: Posterior disc bulge and bilateral facet hypertrophy. Mild spinal canal narrowing. Mild bilateral neural foraminal narrowing & Paraspinous tissues are within normal limits. Dx with Spondylosis, particularly at L4-5 where there is severe spinal canal stenosis and moderate bilateral neural foraminal stenosis. Seen by spine doctor in the past & put on gabapentin for right sciatica. Seen by neuro spine last in 1/2024 for med refill of gabapentin 300 mg twice a day as was not ready to come off the med. At  that time noted no new symptoms, opted no repeat MRI or formal PT and advised to follow up if had pain then a new mri would be indicated. In past few yrs always had right sided sciatica but recently past 2 months noted similar sensation on left. Intermittent. Currently no pain. Notes mostly in the evening after sitting in her recliner watching TV, with leg straight in front of her, when tries to get up then feels a shooting pain in left side. Also at night when lies down in bed feels uncomfortable. If gets up to go to the restroom mid of night may feel a pain first  thing as well as in am when gets up out of bed. Declines referral to pt . Has home exercises given for sciatica in past and to start that encouraged she contact her spine doctor for an updated MRI to evaluate new left sided sciatica. Also noted is on a chronic dose of Celebrex as well    Psoriatic arthropathy (H)  Psoriatic arthropathy currently well controlled in remission clinically & on labs on Xeljanz, Otelza & Celebrex under care of rheum.  Previously has tried Humira, Enbrel, Cosentyx,  No longer on prednisone since may 2023. Reports no side effects. Understands is on high risk meds, has had no symptoms of gastritis or gi bleed or effect on kidney function or BP or heart & Cbc & CMP normal to date. Will recheck labs today for med monitoring. Encouraged to try come off or decrease Celebrex to lowest effective dose given long term risks of heart attack, stroke, kidney failure, GI bleed etc.   - DX Bone Density; Future    Immunosuppression (H24)  - Pneumococcal 20 Valent Conjugate (Prevnar 20)  - CBC with Platelets & Differential; Future  - CBC with Platelets & Differential    Psoriasis of nail  Multiple pigmented nevi  Psoriasis of nail and skin well controlled on clobetasol ointment to body and lidex solution to scalp and genital area as needed ,roflumist cream, protopic oint & tazorac cream prn under care of her dermatologist. UVB rx not covered. No new moles.     Diverticulosis of large intestine without hemorrhage  Asymptomatic diverticulosis noted on colonoscopy 2022. Recommend a high fiber diet.   - CBC with Platelets & Differential; Future  - CBC with Platelets & Differential    Low-tension glaucoma of both eyes, severe stage  Myopia, unspecified laterality  Sees eye doc for Myopia and glaucoma.    Family history of malignant neoplasm of breast  Fh of breast cancer not first degree relative. Declines a  referral     Family history of osteoporosis  FH of osteoporosis, at high risk for  osteoporosis ( prior prednisone use, on immunosuppresants, post menopausal etc) encouraged to schedule dexa scan  - DX Bone Density; Future    Screening for osteoporosis  - DX Bone Density; Future    Health care maintenance  Health care maintenance reviewed  Encouraged working on health care directives  No breast issues. Dx Mammo & u/S in feb 2023 for axillary pain had been normal. Screening mammogram 3/2024 was normal. Doesnt do regular breast exams and decided to check self last night and noted left axillar bulge more than right in the mirror. Exam is benign. No lump felt. No tenderness. No skin changes or nipple discharge. Suspect soft tissue prominence. Monitor for worsening.   Fh of breast cancer in an mat aunt 50 to 60 ? Post menopause, declines a  referral   Pap due 2025. No vaginal symptoms  Colonoscopy due 2032  Discussed dexa, not done yet & encouraged to do so  Had her COVID vaccine on 6/27/24   To go to [place] next week visit [place] [facility] for her 30 th wed anniversary   Prevnar 20 given today  Will do labs today & make further recommendations when seen  Return in 1 yr for a physical and sooner in an office visit for any concerns.   - REVIEW OF HEALTH MAINTENANCE PROTOCOL ORDERS    Advanced directives, counseling/discussion    Need for pneumococcal vaccination  - Pneumococcal 20 Valent Conjugate (Prevnar 20)    Patient has been advised of split billing requirements and indicates understanding: Yes    Counseling  Appropriate preventive services were addressed with this patient via screening, questionnaire, or discussion as appropriate for fall prevention, nutrition, physical activity, Tobacco-use cessation, weight loss and cognition.  Checklist reviewing preventive services available has been given to the patient.  Work on weight loss  Regular exercise  See Patient Instructions        Dahiana Crouch is a 63 year old, presenting for the following:  Physical (/RT side sciatica, got better, now  switched to the LT side, the lower back to the ankles.)        7/11/2024     9:28 AM   Additional Questions   Roomed by SAUNDRA Peraza   Accompanied by Self        Health Care Directive  Patient does not have a Health Care Directive or Living Will: Discussed advance care planning with patient; information given to patient to review.    HPI      7/6/2024   General Health   How would you rate your overall physical health? (!) FAIR   Feel stress (tense, anxious, or unable to sleep) Not at all          7/6/2024   Nutrition   Three or more servings of calcium each day? Yes   Diet: Regular (no restrictions)   How many servings of fruit and vegetables per day? 4 or more   How many sweetened beverages each day? 0-1          7/6/2024   Exercise   Days per week of moderate/strenous exercise 4 days   Average minutes spent exercising at this level 50 min          7/6/2024   Social Factors   Frequency of gathering with friends or relatives Once a week   Worry food won't last until get money to buy more No   Food not last or not have enough money for food? No   Do you have housing? (Housing is defined as stable permanent housing and does not include staying ouside in a car, in a tent, in an abandoned building, in an overnight shelter, or couch-surfing.) Yes   Are you worried about losing your housing? No   Lack of transportation? No   Unable to get utilities (heat,electricity)? No          7/6/2024   Fall Risk   Fallen 2 or more times in the past year? No   Trouble with walking or balance? No             7/6/2024   Dental   Dentist two times every year? Yes          Today's PHQ-2 Score:       1/3/2024     8:34 AM   PHQ-2 ( 1999 Pfizer)   Q1: Little interest or pleasure in doing things 0   Q2: Feeling down, depressed or hopeless 0   PHQ-2 Score 0         7/6/2024   Substance Use   Alcohol more than 3/day or more than 7/wk Not Applicable   Do you use any other substances recreationally? No      Social History     Tobacco Use    Smoking  status: Never    Smokeless tobacco: Never   Vaping Use    Vaping status: Never Used   Substance Use Topics    Alcohol use: Never    Drug use: Never           3/28/2024   LAST FHS-7 RESULTS   1st degree relative breast or ovarian cancer No   Any relative bilateral breast cancer No   Any male have breast cancer No   Any ONE woman have BOTH breast AND ovarian cancer No   Any woman with breast cancer before 50yrs No   2 or more relatives with breast AND/OR ovarian cancer No   2 or more relatives with breast AND/OR bowel cancer No         Mammogram Screening - Mammogram every 1-2 years updated in Health Maintenance based on mutual decision making        2024   STI Screening   New sexual partner(s) since last STI/HIV test? No      History of abnormal Pap smear: No - age 30- 64 PAP with HPV every 5 years recommended        Latest Ref Rng & Units 2022    10:15 AM   PAP / HPV   PAP  Negative for Intraepithelial Lesion or Malignancy (NILM)    HPV 16 DNA Negative Negative    HPV 18 DNA Negative Negative    Other HR HPV Negative Negative      ASCVD Risk   The 10-year ASCVD risk score (Cornelius CHOUDHARY, et al., 2019) is: 4%    Values used to calculate the score:      Age: 63 years      Sex: Female      Is Non- : No      Diabetic: No      Tobacco smoker: No      Systolic Blood Pressure: 122 mmHg      Is BP treated: No      HDL Cholesterol: 60 mg/dL      Total Cholesterol: 279 mg/dL    Fracture Risk Assessment Tool  Link to Frax Calculator  Use the information below to complete the Frax calculator  : 1960  Sex: female  Weight (kg): 62.9 kg (actual weight)  Height (cm): 162 cm  Previous Fragility Fracture:  No  History of parent with fractured hip:  No  Current Smoking:  No  Patient has been on glucocorticoids for more than 3 months (5mg/day or more): Yes  Rheumatoid Arthritis on Problem List:  No  Secondary Osteoporosis on Problem List:  No  Consumes 3 or more units of alcohol per day:  No  Femoral Neck BMD (g/cm2)           Reviewed and updated as needed this visit by Provider   Tobacco  Allergies  Meds  Problems  Med Hx  Surg Hx  Fam Hx  Soc   Hx Sexual Activity          Past Medical History:   Diagnosis Date    Bilateral low back pain with right-sided sciatica, unspecified chronicity 11/11/2020    Neuroforaminal stenosis of lumbar spine 11/11/2020    Nuclear senile cataract of both eyes 08/16/2018    Osteoarthritis 10/15/2020    Psoriatic arthropathy (H) 10/19/2020    Serum calcium elevated 5/31/2023     Past Surgical History:   Procedure Laterality Date    BIOPSY  07/30/2019    the right breast tissue(benign)    COLONOSCOPY N/A 7/14/2022    Procedure: COLONOSCOPY;  Surgeon: Roderick Campos MD;  [place] [facility] [place]    EYE SURGERY      3 surgeries for glaucoma treatment     OB History   No obstetric history on file.     Lab work is in process  Labs reviewed in EPIC  BP Readings from Last 3 Encounters:   07/11/24 122/76   06/21/24 130/76   04/05/24 124/85    Wt Readings from Last 3 Encounters:   07/11/24 62.9 kg (138 lb 9.6 oz)   06/21/24 63.5 kg (140 lb)   04/05/24 63.5 kg (139 lb 14.4 oz)                  Patient Active Problem List   Diagnosis    Hyperlipidemia    Low-tension glaucoma of both eyes, severe stage    Myopia    Screening for malignant neoplasm of cervix    Psoriasis of nail    Psoriatic arthropathy (H)    Spinal stenosis of lumbar region, unspecified whether neurogenic claudication present    Immunosuppression (H24)    Family history of malignant neoplasm of breast    History of snoring    Prediabetes    Diverticulosis of large intestine    Sleep disorder    Multiple pigmented nevi    Family history of osteoporosis     Past Surgical History:   Procedure Laterality Date    BIOPSY  07/30/2019    the right breast tissue(benign)    COLONOSCOPY N/A 7/14/2022    Procedure: COLONOSCOPY;  Surgeon: Roderick Campos MD;  [place] [facility] [place]    EYE SURGERY       3 surgeries for glaucoma treatment       Social History     Tobacco Use    Smoking status: Never    Smokeless tobacco: Never   Substance Use Topics    Alcohol use: Never     Family History   Problem Relation Age of Onset    Hyperlipidemia Mother         My mother had high cholesterol.    Osteoporosis Mother     Hypertension Mother     Stomach Cancer Father         not sure where started    Cataracts Maternal Grandmother     Breast Cancer Maternal Aunt         post enopause    Melanoma No family hx of     Skin Cancer No family hx of          Current Outpatient Medications   Medication Sig Dispense Refill    apremilast (OTEZLA) 30 MG tablet Take 1 tablet (30 mg) by mouth 2 times daily Hold for signs of infection, and seek medical attention. 180 tablet 1    CALCIUM CARBONATE-VIT D-MIN PO Take 1 tablet by mouth      celecoxib (CELEBREX) 100 MG capsule Take 200 mg by mouth every morning and 100 mg by mouth every evening (Patient taking differently: Take 100 mg by mouth 2 times daily Take 200 mg by mouth every morning and 100 mg by mouth every evening) 270 capsule 1    clobetasol (TEMOVATE) 0.05 % external solution Apply topically daily To affected areas of scalp 50 mL 2    gabapentin (NEURONTIN) 300 MG capsule Take 1 capsule (300 mg) by mouth 2 times daily 60 capsule 3    loratadine 10 MG capsule Take 1 tablet by mouth      Multiple Vitamins-Minerals (MULTIVITAMIN ADULTS 50+) TABS daily       Roflumilast 0.3 % CREA Externally apply 1 Dose topically daily To psoraisis on hands as needed 60 g 11    tacrolimus (PROTOPIC) 0.1 % external ointment Apply topically 2 times daily To hands and groin as needed 60 g 3    tazarotene (TAZORAC) 0.1 % external cream Apply nightly to hands as needed 30 g 3    tofacitinib (XELJANZ XR) 11 MG 24 hr tablet Take 1 tablet (11 mg) by mouth daily Hold for signs of infection, then seek medical attention. Monitoring labs every 6 months - Oral 90 tablet 1     Allergies   Allergen Reactions     Adhesive Tape Blisters, Itching and Swelling    Dust Mites     Latex Blisters, Itching, Other (See Comments) and Swelling     Skin sensitivity      Morphine Sulfate (Concentrate) Itching     Other reaction(s): Chills  heart palpatations    Seasonal Allergies     Codeine Palpitations     chills     Recent Labs   Lab Test 07/11/24  1038 04/05/24  1146 11/27/23  0922 05/26/23  1616 02/16/23  1051 05/16/22  1033 02/01/22  0918 06/11/21  1644 02/05/21  1559   A1C 5.7*  --   --  5.7*  --  5.7*  --   --   --    *  --   --  147*  --  147*  --  153*  --    HDL 60  --   --  76  --  76  --  56  --    TRIG 320*  --   --  144  --  186*  --  208*  --    ALT 19 14 20 13   < > 21   < > 19 21   CR 0.61 0.59 0.61 0.67   < > 0.67   < > 0.63 0.62   GFRESTIMATED >90 >90 >90 >90   < > >90   < > >90 >90   GFRESTBLACK  --   --   --   --   --   --   --  >90 >90   POTASSIUM 4.2  --   --  4.1   < > 3.6   < > 4.3 3.7   TSH 0.77  --   --  0.63  --  0.94  --  1.48  --     < > = values in this interval not displayed.        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,  with two children, in [place] since 1989, parents diseased, older sister also in the , Hx of OA back and thumb on gabapentin tid for low back pain, hx of lumbar spinal stenosis, sciatica, on gabapentin, hx of psoriatic arthropathy on Xeljanz & Otelza, Celebrex & off a slow prednisone taper ( previously on Humira, Enbrel &cosit), under care of rheumatology and psoriasis of nails/  Skin on temovate ointment and lidex solution as needed under care of dermatology, chronic immunosuppression, fh of osteoporosis, HLD, prediabetes, hx of snoring, sleep difficulty, hx of migraines, gum disease, glaucoma S/p laser in 2009 & surgery 2012 & 2016, on eye drops,myopia,  managed by eye, multiple pigmented nevi, diverticulosis,fh of breast cancer,  seasonal allergies, dust mites, on loratadine prn, allergic to codeine, morphine, latex & adhesive tape, hx of prior C section in  2001 secondary to a fibroid blocking the birth canal, went into [facility] after the surgery and uterine artery embolization done and adopted daughter in 2006, prior cataract surgery 2015 & 2018, prior right breast biopsy ( benign) in 2019,      Seen for chest pain in 2012 and EKG and CXR and exam was normal. Seen by cardiology and stress echo test done was normal. Under care of eye. Seen by dentist , for URI in 2014, seen for a physical in 2014. In 2016 seen for ETD after a flight. Seen by audiology ( normal test) ,  & ENT, in 2017 seen for acute back pain and multiple small hand joint pain DIP & PIP advised PT and NSAID'S.      In 5/2018 noted nail changes of right ring finger and right thumb. Felt nail was  from nail bed. Nail clippings sent for fungal culture. Seen 5/9/2018 and gyn was normal and pap done.      Seen 7/2018 by rheumatology at [facility] for a one year history of intermittent bilateral first CMC joint tenderness consistent with osteoarthritis, who presented with right fourth fingernail changes beginning March 2018 with onycholysis and dystrophic changes, fungal culture negative, mild onycholysis in the right thumbnail, and acute swelling, redness and tenderness of the right fourth DIP joint since May. This had been associated with some swelling and tenderness in her finger pad with tender blister-type sores. Dermatology gave her triamcinolone which caused burning of the skin. Her primary physician gave her naproxen which caused daytime lethargy but gave her some partial relief. Exam revealed nail changes and right fourth DIP joint swelling, most consistent with psoriatic arthritis. Laboratories to include CBC with differential, basic metabolic panel, liver panel, ESR, CRP, TSH, RF, CCP and hepatitis C antibody (on two occasions she has positive hepatitis B core antibody and surface antibody suggestive of prior infection which she cleared). Was given desonide for perioral dermatitis.  testing was done and X-ray of hands showed soft tissue swelling at the ring finger DIP joints. No bony changes. X-ray of feet were normal. RF and CCP negative. Normal ESR and CRP. She was started on methotrexate four tablets weekly, tapered up to 6 tablets weekly. &  received prednisone 30 mg, 20 mg, 10 mg, 5 mg each ×5 days, then off.  Seen oct 2018 noted improved and methotrexate increased to manage symptoms and felt had OA of left knee and referred to derm for fungal scrapings of toenail concerns.      Seen by dermatology at [facility] for nail changes right fourth fingernail suspected to be psoriasis & had tac injection to the nail bed, , Pincer nail deformity B/l 5th toes & epidermal inclusion cyst on neck.      Seen by rheum at [facility] on 2/222019 & noted improved and continued on  eight tablets of methotrexate weekly.   Seen by derm at [facility] 3/13/19 for psoriatic nail disease and continue don clobetasol ointment bid  prn and TAC injection done of nail bed.   Seen by derm 6/2019 and declined soriatane or Humira or kenalog injections and was to continue clobetasol ointment twice a day.  Had PVD left eye 8/2019.  Seen by rheum 9/13/19 & stable & continued on methotrexate, refilled  weekly. & advised thumb splints & OTC meds for B/l OA 1st CMC & meds refilled in 12/2019.  methotrexate      Next seen at [facility] after insurance changes  Seen by Chad Peck 7/9/20 for acute back pain , referred to ortho and given flexeril. Seen by ortho 7/13/20 & given steroids and advised PT & to use flexeril sparingly. Seen by PT 7/15, then by back doctor and xray was unremarkable. On 8/16/20 due to persistent lumbar radiculopathy MRI done showed lumbar stenosis. Had an epidural 9/8/20 & noted pain improved and continued on gabapentin and home care exercise program & advised to increase activity and follow up as needed.      Seen first time by this writer on 10/15/20 for swelling of left middle finger pulp  and onycholysis of the nail with prior hx of psoriatic arthropathy / psoriatic arthritis & psoriasis of right fourth finger nail treated in 2018 with Kenalog  injections by derm, clobetasol bid & on methotrexate by rheum, no longer on methotrexate since 12/2019 with new onset left middle finger nail changes, separation from nail bed with DIP to pulp swelling pain & tenderness similar to when dx with psoriasis & psoriatic arthritis previously.   [facility]um but since insurance changed & didn't have a PCP did see rheum out of [facility] in [place]  & given two courses of abx with no improvement & reported rheum concerned about dactylitis & advised to get an mri & see the hand surgeon. Had not done the imaging yet & needed help on what to do next.   Had no sign of sepsis & looked well. Records reviewed after visit in more detail. Was advised & given referral to see a hand surgeon & to hang on to mri order until seen by the  hand surgeon to make further recommendations on that. Advised to Go to the ER if had a fever     Derm notes received and reviewed noting psoriasis. No notes received from rheum. [facility]  but they were not able to see her. Seen by hand specialist at [facility] on 1/22, by then nail had fallen off and pain had subsided and was told had simple OA and opted not to see another rheumatologist.      Seen 11/11/20 for a physical. Breast exam was normal. Mammogram due 5/2021 as dx imaging normal in 5/2020. Pelvic/ pap due 2021. Discussed working on advance directives. HLD on no meds. Likely familial. Had not had much activity due to sciatica and spinal stenosis but was going to work on her exercise. Had OA CMC B/l thumb & Spinal stenosis/ spondylolisthesis L 4-5 with neuro foraminal stenosis and sciatica on right, not much helped with epidural. Did PT, seen by Dr Zhou on gabapentin 300 mg tid taking bid which helped. Had started using a  stationary bike. Hoped to defer surgery for as long as possible. Had no side effects from gabapentin to date. Was to continue care with ortho.   Reviewed prior hx of right finger documented psoriatic arthropathy treated in 2018 with methotrexate, but not had in over 1 yr. Hx of psoriatic nail changes seen by derm in past and recently for it. Recently had new onset left middle finger DIP to pulp swelling with loosening and subsequent loss of a dystrophic looking nail. Was ruled out for fungal infection and treated for paronychia with two different antibiotics, used clobetasol on nail folds and seen by a different rheumatologist who told her it was not psoriatic arthritis and advised seeing a hand surgeon & doing an mri as was concerned about possibility of dactylitis. Seen by [facility] hand surgeon on 10/22/20 who also reviewed xray's and said joints were not involved and did not look like psoriatic arthritis , symptoms improved after nail fell off mid October and advised  MRI was not needed and to monitor. At the physical felt was doing better and opted to wait to see a new derm and rheum for a third opinion. Was to follow up with dermatology and ortho back as needed & to get us records from her recent visit with [facility]. Prior records had been a bit confusing as some stated she had psoriatic arthritis and usually it is not a diagnosis that goes away. But recently outside ortho and outside rheum said it was not psoriatic arthritis & felt was just localized skin/ nail psoriasis. Was to continue care with her eye doc for her glaucoma & return in  1 yr for a preventive physical or sooner in an office visit or virtual visit for any new or persistent concerns.  Labs came back showing negative HIV, Hep C, normal HBA1c, normal cbc, CMP, TSH & negative FIT with elevated TG & LDL & ASCVD risk of 3.5 %  & dietary & lifestyle changes discussed & to recheck in 6 months.      Seen by ortho 1/15/21 for pain and swelling left third finger &  CMC OA left thumb and MRI ordered. MRI 3rd digit showed bony erosions and differential was polyarticular inflammatory arthropathy versus osteomyelitis. Labs showed normal esr, CRP & cbc & referred to hand surgeon. Seen by the hand surgeon on 2/3/21, felt didn't have osteomyelitis and referred to rheumatology.      Seen by rheumatology on 2/5/21 & noted 5 month of progressive left hand 3rd, 4th, 5th digit and right hand 4th digit DIP inflammatory arthritis in the context of psoriasis of the nails of these same fingers. Had had evaluation by PCP, derm, ortho, rheumatology with this presentation without obvious etiology found. Possibility of infection by exam and MRI raised, though normal inflammatory markers, lack of improvement with antibiotics, and now chronic small joint symmetric polyarthritis was felt somewhat atypical for infection. Her presentation appeared to be consistent with the diagnosis given to the patient in 2018 by Dr. Owens of  rheumatology, for which she was prescribed methotrexate though self discontinued when she changed insurance and was lost to follow-up in early 2020. It was felt her presentation was most likely secondary to psoriatic arthritis, though discussed with the patient that without aspiration or Bx of an affected joint/bone that they could not rule out infection.   Started on mobic. On 2/18 rheum decreased mobic to 7.5 mg & given omeprazole to use when used mobic. Started on Humira and referred to dermatology. Seen by derm on 4/1 and given clobetasol to use for skin around psoriatic nails & referred to get a epidermal cyst on her neck excised. On 4/2/21 seen by rheum and noted continued active Psoriatic arthritis disease and continued on mobic 7.5 mg, omeprazole 20 & Humira. On 5/11/21 noted active disease having failed NSAID, methotrexate and Humira and to start Enbrel.      On 6/9/21 message noted that she reported that 2 days prior she woke up and was washing her face and  noticed what sounded like bilateral submandibular lymphadenopathy.  Not tender or painful that day so continued and nothing done about it.  Then the day before  she did have some development of pain and noticed that they were tender to palpation.  She denied any other symptoms at the time other than her ongoing psoriatic arthritis.  Denied any fever, chills, cough, shortness of breath, oral infection, weight loss.  No viral symptoms.  She felt otherwise well.  It was discussed with her that her symptoms were most likely the sequelae of reactive lymphadenopathy and  that they would either evolve into typical viral URI symptoms or continue to regress and resolve without intervention.  Given the bilateral nature, tenderness, and overnight development there was less suspicion for malignancy like lymphoma.  Rheum counseled her that she could use NSAID's/tylenol for symptoms.      Seen 6/11/21 for submandibular gland enlargement . Labs done showed normal B12, CMP, TSH, ferritin, & cbc. LDL was elevated. U/S showed non specific enlarged submandibular glands. Testing showed mumps Ig M positive with normal CRP and esr. MD informed and advised to get a buccal swab but due to difficulty getting labs opted to skip further resting and symptoms resolved on their own     Seen 7/2/21 by Dr Ho for ear itching , had mild cerumen and given ciprodex. Seen by derm 7/13/21 for a psoriasis flare off th Humira and was to start enberal and given clobetasol for nail & gluteal cleft and lidex solution for scalp and genital area. & to use T gel and  Dove soap.   Seen by Rheum 8/17/21 & continue don Enbrel and started da prednisone taper. On 8/16/21 seen by sports med and continued on gabapentin for spinal stenosis. On 8/25/21 seen by derm &had a wide excision of a skin lesion. Seen by rheum on 9/30/21 & Enbrel had been switched to cosent & continued on a slow Pred taper.on 11/15/21 had a flare when prednisone decreased and went back on  7.5 mg of prednisone & coscint increased to 300. Seen 12/2/21 virtually by rheum & continued on coscint 300 & prednisone decreased from 10 to 7.5 on 12/24. Started on Celebrex, PPI given and to switched to Xeljanz. On 1/25/22 seen by rheum virtually and continued on Xeljanz, Celebrex and prednisone taper. On 2/1/22 CRP, Hep B, CMP, Cbc, HIV , esr and protein was normal.       Seen 5/16/22 for preventive health. Mammogram due but due to left anterior armpit wall pain 1 month and right armpit wall pain 2 weeks recommended diagnostic mammogram and ultrasound bilaterally to assess symptoms.  Previously had had a biopsy on the right in 2019 that had been normal and diagnostic imaging for a lump on the left in 2020 that had been normal. Had hx of a aunt with breast cancer, opted out of a  referral. Pelvic / PAP. HPV obtained   History of hyperlipidemia.    For history of drowsiness/ odalis post lunch & dinner, & hx of snoring. Post lunch drowsiness improved with exercise.  Also on gabapentin which may be contributing to drowsiness.  Recommended eating healthy, some weight loss, low carbohydrate intake, to consider referral to a sleep specialist. After the visit hemoglobin A1c did come back elevated in the prediabetes range.   History of osteoarthritis with spinal stenosis lumbar spine under care of sports doctor on gabapentin for pain relief taking 300 mg twice a day instead of the 3 times a day as prescribed, with good relief.  Psoriatic arthropathy well controlled on Xeljanz, previously had tried Humira, Enbrel, Cosentyx, also on Celebrex daily and a slow Pred taper down to 2.5 mg daily.  Celebrex likely helping back pain.  Under care with her rheumatologist managing these meds.  Psoriasis of nail and skin well controlled on clobetasol ointment to body and lidex  solution to scalp and genital area as needed. Under care of her dermatologist.  To continue calcium and vitamin D supplementation as at risk of  osteoporosis on prednisone use and being postmenopausal. & to continue care with eye doctor for myopia and glaucoma.  Health care maintenance reviewed. Colon cancer screening due and referred for colonoscopy. To consider working on health care directives. Tdap given. Return in 1 year for a preventive physical and sooner in an office visit for any new concerns.      LDL was elevated. ASCVD risk 3%, normal CMP, TSH, cbc, HBA1c in prediabetic level. Seen 6/16/22  Neg for DVT, had a lipoma. Seen virtually 6/24/22 seen by rheum & continued on Xeljanz, 2.5 prednisone & Celebrex. Seen by derm 7/6/22 & started Otelza & continued on clobetasol  Prn. Colonoscopy done 7/14 showed diverticulosis & advised to recheck in 10 yrs. Seen by rheum 9/14 & doing well on otelza, Xeljanz, & Pred taper. Seen by rheum 2/10/23 noted disease remission with no symptoms & normal esr & CRP. Dx mammogram & u/S in feb showed no concerns. Seen by pharmacist & Xeljanz re approved. Seen by rheum 4/28 & doing well after minor flare off Xeljanz.  [place]  shows getting gabapentin 300 mg # 90 monthly last on 5/15/23 by Dr Zhou.    Seen 5/26/23 for preventive health and additional concerns  No breast issues. Exam normal prior axillar pain resolved. Dx Mammo & u/S in feb 2023 had been normal. To continue with annual screening. Fh of breast cancer in an aunt, declined a  referral as busy with other specialists.   Pap due 2025. No vaginal symptoms. HM reviewed. Encouraged working on health care directives. Colonoscopy due 2032. Discussed dexa. Vaccines utd. To do COVID booster as immunosuppressed  Hyperlipidemia. LDL elevated which can increase heart disease risk. The 10-year ASCVD risk score (Cornelisu DK, et al., 2019) was 2.8% ( low), Advised exercising 150 minutes of aerobic exercise per week (30 minutes for 5 days per week or 50 minutes for 3 days per week are options) and eating a low saturated fat/low carbohydrate diet  to improve this. Advised  to recheck a fasting cholesterol panel.  Rest of CMP and tsh normal. HBA1c was 5.7 in prediabetic range to continue to work smaller carb portions & recheck in 6 to 12 months.  Hx of snoring. Had day time drowsiness after meals sitting down triggered a nap of 15 min then able to get up & go, no falling asleep at wheel. Got 6 hrs of sleep at night or less may be contributing. [place] getting up refreshed, no am headaches. On gabapentin. Declined referral to sleep. Advised to sleep on side & eat less carb's at lunch. Sometimes had trouble going back to sleep after waking up to use the bathroom. Had been advised to use melatonin. Waited to take if not able to sleep couple days in a row. While it helped her sleep well it made her groggy in am. Sleep hygiene discussed briefly.   History of osteoarthritis with spinal stenosis lumbar spine seen by sports doctor in the past & continued on gabapentin for right sciatica hx , was taking 300 mg twice a day. Not ready to try coming off it.   Psoriatic arthropathy well controlled in remission clinically & on labs on Xeljanz, Otelza & Celebrex under care of rheum.  Previously had tried Humira, Enbrel, Cosentyx,  Had a flare up 1 month prior when awaiting Xeljanz to be approved but since back on it was doing well. No longer on prednisone since may 13 th 2023. Understood was on high risk meds, had had no symptoms of gastritis or gi bleed or effect on kidney function or BP or heart & continued on these meds. Cbc & CMP normal to date.    Psoriasis of nail and skin well controlled on clobetasol ointment to body and lidex  solution to scalp and genital area as needed under care of her dermatologist & biologics for PA No new moles.   Elevated calcium at last lab with rheum. Was on calcium and vitamin D. Was at risk of osteoporosis given hx of chronic prednisone use, high risk meds, post menopausal & family hx.  Dexa scan ordered. Later labs showed normal calcium, vitamin D and PTH &  recommended getting 1000 to 2000 IU daily in diet or supplements  Asymptomatic diverticulosis noted on colonoscopy in 2022 & recommended a high fiber diet.   Was to continue care with eye doc for Myopia and glaucoma. & return in 1 yr for a preventive physical & sooner for any new concerns.      Seen by derm 7/8/23 & they discussed UVB rx, try tazorac cream and continued with rest of topicals and PA plan by rheum. Seen by rheum 8/2/23 for PA, neg RF, cuticle and nail involvement DIP 3, 4, 5 on R. Had failed humera, enbrel and methotrexate and on xylega and otelza. Tapered off prednisone. & continued on all meds including Celebrex. Seen by MTM nov 2024 & advised to increase Celebrex. Seen by derm 12/6/23 & given protopic. MTM 12/5/23. Seen by derm 1/3/24 declined excision of left labial cyst. Continued on protopic, UVB not covered. Seen by rheum 1/3/24 and continued same meds. Seen by neuro 2/9/24 for chronic low back pain with right sided sciatica. Hx of spinal stenosis, declined repeating mri last done in 2020 as asymptomatic , opted out of formal pt , to do home exercises taught previously. Desired and refill given on gabapentin 300 mg bid. Seen 2/21/24 for left ETD and advised flonase. See by rheum 4/5 & continued same meds. Seen by rheum 6/29/24 and advised to cut back on Celebrex and considering other options. Seen by derm 7/10 & continued on same topicals.      CURRENTLY  Here for preventive health and additional concerns   HM reviewed  Encouraged working on health care directives  No breast issues. Dx Mammo & u/S in feb 2023 for axillary pain had been normal. Screening mammogram 3/2024 was normal. Doesnt do regular breast exams and decided to check self last night and noted left axillar bulge more than right in the mirror. No lump felt. No tenderness. No skin changes or nipple discharge.   Fh of breast cancer in an mat aunt 50 to 60 ? Post menopause, declines a  referral   Pap due 2025. No vaginal  symptoms  Colonoscopy due 2032  Discussed dexa, not done yet & encouraged to do so  Had her COVID vaccine on 6/27/24   To go to [place] next week visit [place] [facility] for her 30 th wed anniverysary   Will do prevnar 20 today  Will do labs today & make further recommendations when seen     Hyperlipidemia. Elevated LDL. Low cardiovascular risk. Will check lipids today.      Hx of prediabetes, prev HBA1c of 5.7 will check today, encouraged to eat smaller carb portions     Hx of snoring. Has day time drowsiness after meals sitting down triggers a nap of 15 min then able to get up & go, no falling asleep at wheel. Gets 6 hrs of sleep at night or less may be contributing. Feels gets up refreshed if sleeps ok, her 17 yr old cat has currently been affecting her sleep, no am headaches. Is on gabapentin. Using custom made mouth guard from dentist due to grinding teeth  Not had a good night sleep due to cat for a while but was occuring even before that. Erwinna score around 13, agreeable to see sleep for further eval.      Sometimes wakes up, has trouble falling back to sleep. Previously used melatonin only if unable to sleep couple days in a row.but while it helped her, it also caused next day grogginess so stopped using. Sleep hygiene discussed briefly.      Osteoarthritis with spinal stenosis lumbar spine,  MRI in 2020 showed T12-L1: No spinal canal or neuro foraminal stenosis.L1-2: No spinal canal or neuro foraminal stenosis. L2-3: No spinal canal or neuro foraminal stenosis. L3-4: No spinal canal or neuro foraminal stenosis. L4-5: Grade 1 anterolisthesis and uncovering of the disc. Superimposed central disc extrusion with cephalad migration. Bilateral facet hypertrophy and ligamentum flavum thickening. Severe spinal canal  stenosis. Moderate bilateral neural foraminal stenosis. Periarticular edema surrounding the bilateral facet joints. L5-S1: Posterior disc bulge and bilateral facet hypertrophy. Mild spinal canal  narrowing. Mild bilateral neural foraminal narrowing & Paraspinous tissues are within normal limits. Dx with Spondylosis, particularly at L4-5 where there is severe spinal canal stenosis and moderate bilateral neural foraminal stenosis. Seen by spine doctor in the past & put on gabapentin for right sciatica. Seen by neuro spine last in 1/2024 for med refill of gabapentin 300 mg twice a day as was not ready to come off the med. At  that time noted no new symptoms, opted no repeat MRI or formal PT and advised to follow up if had pain then a new mri would be indicated. In past few yrs always had right sided sciatica but recently past 2 months noted similar sensation on left. Intermittent. Currently no pain. Notes mostly in the evening after sitting in her recliner watching TV, with leg straight in front of her, when tries to get up then feels a shooting pain in left side. Also at night when lies down in bed feels uncomfortable. If gets up to go to the restroom mid of night may feel a pain first thing as well as in am when gets up out of bed. Declines referral to pt . Has home exercises given for sciatica in past and to start that encouraged she contact her spine doctor for an updated MRI to evaluate new left sided sciatica. Also noted is on a chronic dose of Celebrex as well    Psoriatic arthropathy currently well controlled in remission clinically & on labs on Xeljanz, Otelza & Celebrex under care of rheum.  Previously has tried Humira, Enbrel, Cosentyx,  No longer on prednisone since may 2023. Reports no side effects. Understands is on high risk meds, has had no symptoms of gastritis or gi bleed or effect on kidney function or BP or heart & Cbc & CMP normal to date. Will recheck labs today for med monitoring. Encouraged to try come off or decrease Celebrex to lowest effective dose given long term risks of heart attack, stroke, kidney failure, GI bleed etc.      Psoriasis of nail and skin well controlled on clobetasol  "ointment to body and lidex solution to scalp and genital area as needed ,roflumist cream, protopic oint & tazorac cream prn under care of her dermatologist. UVB rx not covered. No new moles.      Asymptomatic diverticulosis noted on colonoscopy 2022. Recommend a high fiber diet.      Sees eye doc for Myopia and glaucoma.    Fh of breast cancer not first degree relative. Declines a  referral     FH of osteoporosis, at high risk for osteoporosis ( prior prednisone use, on immunosuppresants, post menopausal etc) encouraged to schedule dexa scan      Review of Systems  Constitutional, HEENT, cardiovascular, pulmonary, GI, , musculoskeletal, neuro, skin, endocrine and psych systems are negative, except as otherwise noted.     Objective    Exam  /76 (BP Location: Right arm, Patient Position: Sitting, Cuff Size: Adult Regular)   Pulse 78   Temp 98.3  F (36.8  C) (Temporal)   Resp 20   Ht 1.62 m (5' 3.78\")   Wt 62.9 kg (138 lb 9.6 oz)   LMP  (LMP Unknown)   SpO2 98%   BMI 23.96 kg/m     Estimated body mass index is 23.96 kg/m  as calculated from the following:    Height as of this encounter: 1.62 m (5' 3.78\").    Weight as of this encounter: 62.9 kg (138 lb 9.6 oz).    Physical Exam  GENERAL: alert and no distress  EYES: Eyes grossly normal to inspection, PERRL and conjunctivae and sclerae normal, glasses, ring around cornea,   HENT: ear canals and TM's normal, nose and mouth without ulcers or lesions  NECK: no adenopathy, no asymmetry, masses, or scars  RESP: lungs clear to auscultation - no rales, rhonchi or wheezes  BREAST: normal without masses, tenderness or nipple discharge and no palpable axillary masses or adenopathy, prominent fat tissue left axilla no lump felt  CV: regular rate and rhythm, normal S1 S2, no S3 or S4, no murmur, click or rub, no peripheral edema  ABDOMEN: soft, nontender, no hepatosplenomegaly, no masses and bowel sounds normal  MS: no gross musculoskeletal defects noted, " no edema  SKIN: no suspicious lesions or rashes, multiple pigmented nevi  NEURO: Normal strength and tone, mentation intact and speech normal  PSYCH: mentation appears normal, affect normal/bright  LYMPH: no cervical, supraclavicular, axillary, or inguinal adenopathy    Signed Electronically by: Geovanna Rosales MD

## 2024-07-11 NOTE — PATIENT INSTRUCTIONS
Patient Education   Preventive Care Advice   This is general advice given by our system to help you stay healthy. However, your care team may have specific advice just for you. Please talk to your care team about your preventive care needs.  Nutrition  Eat 5 or more servings of fruits and vegetables each day.  Try wheat bread, brown rice and whole grain pasta (instead of white bread, rice, and pasta).  Get enough calcium and vitamin D. Check the label on foods and aim for 100% of the RDA (recommended daily allowance).  Lifestyle  Exercise at least 150 minutes each week  (30 minutes a day, 5 days a week).  Do muscle strengthening activities 2 days a week. These help control your weight and prevent disease.  No smoking.  Wear sunscreen to prevent skin cancer.  Have a dental exam and cleaning every 6 months.  Yearly exams  See your health care team every year to talk about:  Any changes in your health.  Any medicines your care team has prescribed.  Preventive care, family planning, and ways to prevent chronic diseases.  Shots (vaccines)   HPV shots (up to age 26), if you've never had them before.  Hepatitis B shots (up to age 59), if you've never had them before.  COVID-19 shot: Get this shot when it's due.  Flu shot: Get a flu shot every year.  Tetanus shot: Get a tetanus shot every 10 years.  Pneumococcal, hepatitis A, and RSV shots: Ask your care team if you need these based on your risk.  Shingles shot (for age 50 and up)  General health tests  Diabetes screening:  Starting at age 35, Get screened for diabetes at least every 3 years.  If you are younger than age 35, ask your care team if you should be screened for diabetes.  Cholesterol test: At age 39, start having a cholesterol test every 5 years, or more often if advised.  Bone density scan (DEXA): At age 50, ask your care team if you should have this scan for osteoporosis (brittle bones).  Hepatitis C: Get tested at least once in your life.  STIs (sexually  transmitted infections)  Before age 24: Ask your care team if you should be screened for STIs.  After age 24: Get screened for STIs if you're at risk. You are at risk for STIs (including HIV) if:  You are sexually active with more than one person.  You don't use condoms every time.  You or a partner was diagnosed with a sexually transmitted infection.  If you are at risk for HIV, ask about PrEP medicine to prevent HIV.  Get tested for HIV at least once in your life, whether you are at risk for HIV or not.  Cancer screening tests  Cervical cancer screening: If you have a cervix, begin getting regular cervical cancer screening tests starting at age 21.  Breast cancer scan (mammogram): If you've ever had breasts, begin having regular mammograms starting at age 40. This is a scan to check for breast cancer.  Colon cancer screening: It is important to start screening for colon cancer at age 45.  Have a colonoscopy test every 10 years (or more often if you're at risk) Or, ask your provider about stool tests like a FIT test every year or Cologuard test every 3 years.  To learn more about your testing options, visit:   .  For help making a decision, visit:   https://bit.ly/he21891.  Prostate cancer screening test: If you have a prostate, ask your care team if a prostate cancer screening test (PSA) at age 55 is right for you.  Lung cancer screening: If you are a current or former smoker ages 50 to 80, ask your care team if ongoing lung cancer screenings are right for you.  For informational purposes only. Not to replace the advice of your health care provider. Copyright   2023 [employer]. All rights reserved. Clinically reviewed by the [facility]. [employer] 579829 - REV 01/24.

## 2024-07-11 NOTE — RESULT ENCOUNTER NOTE
Hello -    Here are my comments about your recent results:  -Normal red blood cell (hgb) levels, normal white blood cell count and normal platelet levels.  -A1C (diabetic test) is elevated at 5.7 similar to prior and & a sign of PREdiabetes.  ADVISE: eating a low carbohydrate diet, exercising, and rechecking your glucose level in 12 months.  For additional lab test information, labtestsonline.org is an excellent reference..    Please let us know if you have any questions or concerns.     Regards,  Geovanna Rosales MD

## 2024-07-11 NOTE — NURSING NOTE
Prior to immunization administration, verified patients identity using patient s name and date of birth. Please see Immunization Activity for additional information.     Screening Questionnaire for Adult Immunization    Are you sick today?   No   Do you have allergies to medications, food, a vaccine component or latex?   No   Have you ever had a serious reaction after receiving a vaccination?   No   Do you have a long-term health problem with heart, lung, kidney, or metabolic disease (e.g., diabetes), asthma, a blood disorder, no spleen, complement component deficiency, a cochlear implant, or a spinal fluid leak?  Are you on long-term aspirin therapy?   No   Do you have cancer, leukemia, HIV/AIDS, or any other immune system problem?   No   Do you have a parent, brother, or sister with an immune system problem?   No   In the past 3 months, have you taken medications that affect  your immune system, such as prednisone, other steroids, or anticancer drugs; drugs for the treatment of rheumatoid arthritis, Crohn s disease, or psoriasis; or have you had radiation treatments?   No   Have you had a seizure, or a brain or other nervous system problem?   No   During the past year, have you received a transfusion of blood or blood    products, or been given immune (gamma) globulin or antiviral drug?   No   For women: Are you pregnant or is there a chance you could become       pregnant during the next month?   No   Have you received any vaccinations in the past 4 weeks?   No     Immunization questionnaire answers were all negative.      Patient instructed to remain in clinic for 15 minutes afterwards, and to report any adverse reactions.     Screening performed by Lizz Paula MA on 7/11/2024 at 10:24 AM.

## 2024-07-12 NOTE — RESULT ENCOUNTER NOTE
Hello -    Here are my comments about your recent results:  The 10-year ASCVD risk score (Cornelius CHOUDHARY, et al., 2019) is: 4%    Values used to calculate the score:      Age: 63 years      Sex: Female      Is Non- : No      Diabetic: No      Tobacco smoker: No      Systolic Blood Pressure: 122 mmHg      Is BP treated: No      HDL Cholesterol: 60 mg/dL      Total Cholesterol: 279 mg/dL  LDL cholesterol is elevated at 155 similar to prior HDL is normal triglycerides are up likely from being a nonfasting sample but similar to prior.  An elevated LDL does increase cardiovascular risk.  Recommend a low-fat low-carb diet regular physical activity considering a CT calcium score to get objective data on cholesterol effect on the heart and consider statin medication if CT calcium score is not 0 or if LDL cholesterol remains elevated    -Liver and gallbladder tests (ALT,AST, Alk phos,bilirubin) are normal.  -Kidney function (GFR) is normal.  -Sodium is normal.  -Potassium is normal.  -Calcium is normal.  -Glucose is mildly elevated but you were nonfasting & also related to prediabetes   -TSH (thyroid stimulating hormone) level is normal which indicates normal thyroid function.  -Microalbumin (urine protein) test is normal.  ADVISE: rechecking this annually.  For additional lab test information, labtestsonline.org is an excellent reference..    Please let us know if you have any questions or concerns.     Regards,  Geovanna Rosales MD

## 2024-07-25 ENCOUNTER — MYC REFILL (OUTPATIENT)
Dept: PHYSICAL MEDICINE AND REHAB | Facility: CLINIC | Age: 64
End: 2024-07-25
Payer: COMMERCIAL

## 2024-07-25 DIAGNOSIS — M54.16 LUMBAR RADICULOPATHY: ICD-10-CM

## 2024-07-25 RX ORDER — GABAPENTIN 300 MG/1
300 CAPSULE ORAL 2 TIMES DAILY
Qty: 60 CAPSULE | Refills: 3 | Status: SHIPPED | OUTPATIENT
Start: 2024-07-25 | End: 2024-09-11 | Stop reason: DRUGHIGH

## 2024-07-25 NOTE — TELEPHONE ENCOUNTER
Refill request received from patient via her My Chart request    Medication: gabapentin (NEURONTIN) 300 MG capsule   Directions: Take 1 capsule (300 mg) by mouth 2 times daily      Last ordered: 2/9/24   Qty: 60  RF: 3  Last OV: 2/9/24  Next OV: 8/6/24    Routing to Eri Kuo NP to review and sign if appropriate.    POLLO Watkins RN Care Coordinator  [facility]   [facility] main phone # [phone] fax # [phone]

## 2024-08-05 NOTE — PROGRESS NOTES
PM&R Follow-Up Visit -     Date of Initial Visit: 2/9/2024  LOV: 2/9/2024  TD: 8/6/2024     Recall: Willa Downey is a 63 year old female who was previously seen in the spine clinic with a chief complaint of right sided low back pain.       INTERVAL HISTORY:  Patient was last seen in clinic 2/9/2024. At that visit, the plan was to continue gabapentin 300 mg twice daily.    She was seen today in the clinic due to new distribution of pain.    At the prior visit, she described pain & tightness affecting the right side low back extending into the lateral right hip.    Currently, she says that she the nonradiating right-sided back pain has been well-controlled.  However, she has now developed some new radiating pain on the left side which began in April.      She describes:  -Left low back/gluteal pain radiating to the lateral left leg to the ankle.    She states this pain feels familiar to what she experienced on the right lower extremity in the past.    She denies lower extremity numbness or tingling.      Pain is aggravated with: Lying down, sitting, standing, twisting, being on the hands and knees or squatting while cleaning, vacuuming, getting up after sitting on her recliner couch, worse in the evening  Pain is relieved with: Walking, sitting upright or on a more firm chair    She is tolerating gabapentin 300 mg twice daily.    She follows with rheumatology in regards to psoriatic arthritis.    She denies falls, weakness, bowel or bladder incontinence, or saddle anesthesia.          RECALL HISTORY OF PRESENT ILLNESS:  Willa Downey is a 63 year old female who presents with a chief complaint of right sided low back pain.     She was seen today in the clinic. She describes right-sided low back pain which began around 2020 without any known cause.  She says that at the time she was having some radicular pain into the right lower extremity.  However, she feels that the pain has generally improved over  "time.    She says that she had been taking gabapentin 3 times a day for pain, but says that she tapered down to twice a day, which she finds is working out okay.  She states that her pain level usually ranges from 0-2/10.  If she does more activity, in particular prolonged standing, she will have an increased degree of pain.  She is active - walks 2 miles regularly 4 to 5 days out of the week.  The walking does not seem to aggravate the pain.    Today, she describes pain & tightness affecting the right side low back extending into the lateral right hip.  There is no pain extending into the right lower extremity.  She notes that this morning she had a pulling sensation extending into her left leg, but says that she does not usually have pain in that area.    Aggravating factors include: prolonged standing, laying on right  Relieving factors include: stretching or medication     Today, she rates the pain 0/10.  At its worst over the last week the pain was 2/10.   Patient states sleep is not affected though she does feel sometimes uncomfortable laying on her right side.     She denies falls, weakness, bowel or bladder incontinence, saddle anesthesia, unintentional weight loss, fevers/chills, or night sweats.     Today, she says her goal is to refill the gabapentin, which she describes as a \"miracle medicine for me\".      PRIOR INJURIES/TREATMENT:   Ice/Heat: no    Brace: no    Physical Therapy:   PT for low back pain 2020     - Current Pain Medications -   Gabapentin 300 mg BID  Xeljanz   Otezla  Celebrex     - Prior/Trialed Pain Medications -   Flexeril  Steroid burst    Prior Procedures:  Date    Procedure   Improvement (%)  none              Prior Related Surgery: none   Other (acupuncture, OMT, CMM, TENS, DME, etc.):     Specialists Seen - (with most recent, available notes and clinic visits reviewed)   1. Sports medicine - Dr Zhou, Dr Tesfaye  2.  Rheumatology-psoriatic arthritis      IMAGING - reviewed   XR " SACROILIAC JOINT 1/2 VIEWS  04/10/2024                                                                 IMPRESSION: Mild bilateral SI joint arthrosis, left greater than right. No erosive change to suggest an inflammatory arthropathy. Both hip joint spaces are maintained.         MR LUMBAR SPINE W/O CONTRAST 8/14/2020    Findings: There are 5 lumbar-type vertebrae assumed for the purposes  of this dictation.  The tip of the conus medullaris is at L1.  Grade 1  anterolisthesis of L4. Mild loss of disc height at L4-5.  Normal  marrow signal.     On a level by level basis:     T12-L1: No spinal canal or neuroforaminal stenosis.     L1-2: No spinal canal or neuroforaminal stenosis.     L2-3: No spinal canal or neuroforaminal stenosis.     L3-4: No spinal canal or neuroforaminal stenosis.     L4-5: Grade 1 anterolisthesis and uncovering of the disc. Superimposed  central disc extrusion with cephalad migration. Bilateral facet  hypertrophy and ligamentum flavum thickening. Severe spinal canal  stenosis. Moderate bilateral neural foraminal stenosis. Periarticular  edema surrounding the bilateral facet joints.     L5-S1: Posterior disc bulge and bilateral facet hypertrophy. Mild  spinal canal narrowing. Mild bilateral neural foraminal narrowing.     Paraspinous tissues are within normal limits.                                                    Impression:   1. Spondylosis, particularly at L4-5 where there is severe spinal  canal stenosis and moderate bilateral neural foraminal stenosis.  2. Active inflammatory arthropathy of the bilateral L4-5 facet joints.      2 views lumbar spine radiographs 7/13/2020  Findings:     Standing  AP and lateral  views of the lumbar spine were obtained.     5  lumbar type vertebral bodies are assumed for the purpose of this  dictation.     There is no acute osseous abnormality.       Grade 1 spondylolisthesis of L4 on L5 measuring approximately 8 mm  with associated mild intervertebral disc  space loss. Otherwise, Disc  height and vertebral body heights are maintained throughout.     The visualized bowel gas pattern is non-obstructive.                                                  Impression:  1.  No acute osseous abnormality.  2.  Grade 1 degenerative type spondylolisthesis of L4 on L5, measuring  approximately 8 mm.      Review Of Systems:  I am responding to those symptoms which are directly relevant to the specific indication for my consultation. I recommend that the patient follow up with their primary or referring provider to pursue any other symptoms which may be of concern.       Medical History:  She  has a past medical history of Bilateral low back pain with right-sided sciatica, unspecified chronicity (11/11/2020), Neuroforaminal stenosis of lumbar spine (11/11/2020), Nuclear senile cataract of both eyes (08/16/2018), Osteoarthritis (10/15/2020), Psoriatic arthropathy (H) (10/19/2020), and Serum calcium elevated (5/31/2023).     She  has a past surgical history that includes biopsy (07/30/2019); Eye surgery; and Colonoscopy (N/A, 7/14/2022).    Family History  Her family history includes Breast Cancer in her maternal aunt; Cataracts in her maternal grandmother; Hyperlipidemia in her mother; Hypertension in her mother; Osteoporosis in her mother; Stomach Cancer in her father.     Social History:  Work: stay at home mom  Current living situation: lives with family and her daughter, another daughter lives on her own. Performs ADLs/IADLs independently.    She  reports that she has never smoked. She has never used smokeless tobacco. She reports that she does not drink alcohol and does not use drugs.        Current Medications:   She has a current medication list which includes the following prescription(s): apremilast, calcium carbonate-vit d-min, celecoxib, clobetasol, doxycycline monohydrate, gabapentin, loratadine, melatonin, multivitamin adults 50+, tacrolimus, tazarotene, and xeljanz xr,  and the following Facility-Administered Medications: triamcinolone.     Allergies:    -- Adhesive Tape -- Blisters, Itching and Swelling   -- Dust Mites    -- Latex -- Blisters, Itching, Other (See                            Comments) and Swelling    --  Skin sensitivity   -- Morphine Sulfate (Concentrate) -- Itching    --  Other reaction(s): Chills             heart palpatations   -- Seasonal Allergies    -- Codeine -- Palpitations    --  chills    PHYSICAL EXAMINATION:  /80 (BP Location: Right arm, Patient Position: Sitting, Cuff Size: Adult Regular)   Pulse 90   Resp 16   LMP  (LMP Unknown)   SpO2 97%    --CONSTITUTIONAL: Vital signs as above. No acute distress. The patient is well nourished and well groomed.  --PSYCHIATRIC: The patient is awake, alert, oriented to person, place, time and answering questions appropriately with clear speech. Appropriate mood and affect   --SKIN:  Posterior torso is clean, dry, intact without rashes.   --RESPIRATORY: Normal rhythm and effort. No abnormal accessory muscle breathing patterns noted.   --GROSS MOTOR: Easily arises from a seated position. Toe walking and heel walking are normal.    --STANDING EXAMINATION: Gait is non-antalgic. Normal lumbar lordosis noted, no lateral shift.  --LOWER EXTREMITY MOTOR TESTING:  Plantar flexion left 5/5, right 5/5   Dorsiflexion left 5/5, right 5/5   Great toe MTP extension left 5/5, right 5/5  Knee flexion left 5/5, right 5/5  Knee extension left 5/5, right 5/5   Hip flexion left 5/5, right 5/5  --MUSCULOSKELETAL: Lumbar spine inspection reveals no evidence of deformity. Range of motion is not limited in lumbar flexion, extension, lateral rotation.  Lumbar extension and lateral rotation elicit mild low back pain.  No tenderness to palpation lumbar spine. Straight leg raise negative bilaterally. Sciatic notch non-tender. Facet loading maneuvers are mildly positive.  --SACROILIAC JOINT: No pain with palpation of the SI joint.   Leo's is negative.  Savana's negative bilaterally.     --HIPS: Full range of motion bilaterally.  Savana's negative.  No pain with logrolling. No pain with palpation of the greater trochanters.   --NEUROLOGIC: 2/4 right patellar, 2/4 left patellar, and 2/4 achilles reflexes bilaterally.  Sensation to light touch is intact in the bilateral L4, L5, and S1 dermatomes.   No clonus.    --VASCULAR:  Warm lower limbs bilaterally. There is no pitting edema of the bilateral lower extremities.       ASSESSMENT:  Willa Downey is a pleasant 63 year old female who presents with   -chronic atraumatic, nonradiating right-sided low back pain  -New distribution of left low back/gluteal pain radiating to the lateral left leg to the ankle.    Differential includes severe spinal canal stenosis, L5 lumbar radiculopathy, lumbar facet mediated pain      MRI of the lumbar spine from 8/14/2020 shows:  -Severe spinal canal stenosis and moderate bilateral neuroforaminal stenosis at L4-5  -Grade 1 anterolisthesis of L4  -Lower lumbar facet hypertrophy, with periarticular facet joint edema at L4-5    Complicating comorbidities include:  # Psoriatic arthritis, follows with rheumatology    PLAN:  -Given the new distribution of pain she is describing, we will update the MRI of the lumbar spine to assess any progression of the spinal canal stenosis seen at L4-5 or neuroforaminal stenosis seen at L5-S1 on the prior lumbar spine MRI from 2020  -She is tolerating 300 mg of gabapentin twice a day.  We discussed titrating to 3 times daily and she was in agreement and the order was updated.  -She did not find physical therapy useful in the past.  We discussed other types of PT which could be considered including pool therapy or [facility].  She prefers to hold off for now.  Additionally, she reports a recent eye surgery and says she has been advised to avoid pools currently  -I asked her to make an appointment to follow-up with me at least 1 day after  the MRI to review the findings and discuss next steps  -RTC for review of MRI        Ready to learn, no apparent learning barriers.  Education provided on treatment plan according to patient's preferred learning style.  Patient verbalizes understanding.   __________________________________  Eri Kuo NP  [facility] Medicine [facility] Rehabilitation        36 minutes spent by me on the date of the encounter doing chart review, history and exam, documentation and further activities per the note

## 2024-08-06 ENCOUNTER — OFFICE VISIT (OUTPATIENT)
Dept: PHYSICAL MEDICINE AND REHAB | Facility: CLINIC | Age: 64
End: 2024-08-06
Payer: COMMERCIAL

## 2024-08-06 VITALS
OXYGEN SATURATION: 97 % | HEART RATE: 90 BPM | SYSTOLIC BLOOD PRESSURE: 129 MMHG | DIASTOLIC BLOOD PRESSURE: 80 MMHG | RESPIRATION RATE: 16 BRPM

## 2024-08-06 DIAGNOSIS — M48.061 SPINAL STENOSIS OF LUMBAR REGION, UNSPECIFIED WHETHER NEUROGENIC CLAUDICATION PRESENT: ICD-10-CM

## 2024-08-06 DIAGNOSIS — M47.816 SPONDYLOSIS OF LUMBAR REGION WITHOUT MYELOPATHY OR RADICULOPATHY: ICD-10-CM

## 2024-08-06 DIAGNOSIS — M54.42 ACUTE LEFT-SIDED LOW BACK PAIN WITH LEFT-SIDED SCIATICA: Primary | ICD-10-CM

## 2024-08-06 DIAGNOSIS — M54.16 LUMBAR RADICULOPATHY: ICD-10-CM

## 2024-08-06 DIAGNOSIS — M48.061 NEUROFORAMINAL STENOSIS OF LUMBAR SPINE: ICD-10-CM

## 2024-08-06 PROCEDURE — 99214 OFFICE O/P EST MOD 30 MIN: CPT | Performed by: NURSE PRACTITIONER

## 2024-08-06 RX ORDER — GABAPENTIN 300 MG/1
300 CAPSULE ORAL 3 TIMES DAILY
Qty: 90 CAPSULE | Refills: 3 | Status: SHIPPED | OUTPATIENT
Start: 2024-08-06 | End: 2024-09-11 | Stop reason: DRUGHIGH

## 2024-08-06 ASSESSMENT — PAIN SCALES - GENERAL: PAINLEVEL: NO PAIN (0)

## 2024-08-06 NOTE — LETTER
8/6/2024       RE: Willa Downey  [address]  [facility] [address]     Dear Colleague,    Thank you for referring your patient, Willa Downey, to the [facility] PHYSICAL [facility]ICINE [facility]IT[facility] CLIN[facility] [place] [facility]. Please see a copy of my visit note below.    PM&R Follow-Up Visit -     Date of Initial Visit: 2/9/2024  LOV: 2/9/2024  TD: 8/6/2024     Recall: Willa Downye is a 63 year old female who was previously seen in the spine clinic with a chief complaint of right sided low back pain.       INTERVAL HISTORY:  Patient was last seen in clinic 2/9/2024. At that visit, the plan was to continue gabapentin 300 mg twice daily.    She was seen today in the clinic due to new distribution of pain.    At the prior visit, she described pain & tightness affecting the right side low back extending into the lateral right hip.    Currently, she says that she the nonradiating right-sided back pain has been well-controlled.  However, she has now developed some new radiating pain on the left side which began in April.      She describes:  -Left low back/gluteal pain radiating to the lateral left leg to the ankle.    She states this pain feels familiar to what she experienced on the right lower extremity in the past.    She denies lower extremity numbness or tingling.      Pain is aggravated with: Lying down, sitting, standing, twisting, being on the hands and knees or squatting while cleaning, vacuuming, getting up after sitting on her recliner couch, worse in the evening  Pain is relieved with: Walking, sitting upright or on a more firm chair    She is tolerating gabapentin 300 mg twice daily.    She follows with rheumatology in regards to psoriatic arthritis.    She denies falls, weakness, bowel or bladder incontinence, or saddle anesthesia.          RECALL HISTORY OF PRESENT ILLNESS:  Willa Downey is a 63 year old female who  "presents with a chief complaint of right sided low back pain.     She was seen today in the clinic. She describes right-sided low back pain which began around 2020 without any known cause.  She says that at the time she was having some radicular pain into the right lower extremity.  However, she feels that the pain has generally improved over time.    She says that she had been taking gabapentin 3 times a day for pain, but says that she tapered down to twice a day, which she finds is working out okay.  She states that her pain level usually ranges from 0-2/10.  If she does more activity, in particular prolonged standing, she will have an increased degree of pain.  She is active - walks 2 miles regularly 4 to 5 days out of the week.  The walking does not seem to aggravate the pain.    Today, she describes pain & tightness affecting the right side low back extending into the lateral right hip.  There is no pain extending into the right lower extremity.  She notes that this morning she had a pulling sensation extending into her left leg, but says that she does not usually have pain in that area.    Aggravating factors include: prolonged standing, laying on right  Relieving factors include: stretching or medication     Today, she rates the pain 0/10.  At its worst over the last week the pain was 2/10.   Patient states sleep is not affected though she does feel sometimes uncomfortable laying on her right side.     She denies falls, weakness, bowel or bladder incontinence, saddle anesthesia, unintentional weight loss, fevers/chills, or night sweats.     Today, she says her goal is to refill the gabapentin, which she describes as a \"miracle medicine for me\".      PRIOR INJURIES/TREATMENT:   Ice/Heat: no    Brace: no    Physical Therapy:   PT for low back pain 2020     - Current Pain Medications -   Gabapentin 300 mg BID  Xeljanz   Otezla  Celebrex     - Prior/Trialed Pain Medications -   Flexeril  Steroid burst    Prior " Procedures:  Date    Procedure   Improvement (%)  none              Prior Related Surgery: none   Other (acupuncture, OMT, CMM, TENS, DME, etc.):     Specialists Seen - (with most recent, available notes and clinic visits reviewed)   1. Sports medicine - Dr Zhou, Dr Tesfaye  2.  Rheumatology-psoriatic arthritis      IMAGING - reviewed   XR SACROILIAC JOINT 1/2 VIEWS  04/10/2024                                                                 IMPRESSION: Mild bilateral SI joint arthrosis, left greater than right. No erosive change to suggest an inflammatory arthropathy. Both hip joint spaces are maintained.         MR LUMBAR SPINE W/O CONTRAST 8/14/2020    Findings: There are 5 lumbar-type vertebrae assumed for the purposes  of this dictation.  The tip of the conus medullaris is at L1.  Grade 1  anterolisthesis of L4. Mild loss of disc height at L4-5.  Normal  marrow signal.     On a level by level basis:     T12-L1: No spinal canal or neuroforaminal stenosis.     L1-2: No spinal canal or neuroforaminal stenosis.     L2-3: No spinal canal or neuroforaminal stenosis.     L3-4: No spinal canal or neuroforaminal stenosis.     L4-5: Grade 1 anterolisthesis and uncovering of the disc. Superimposed  central disc extrusion with cephalad migration. Bilateral facet  hypertrophy and ligamentum flavum thickening. Severe spinal canal  stenosis. Moderate bilateral neural foraminal stenosis. Periarticular  edema surrounding the bilateral facet joints.     L5-S1: Posterior disc bulge and bilateral facet hypertrophy. Mild  spinal canal narrowing. Mild bilateral neural foraminal narrowing.     Paraspinous tissues are within normal limits.                                                    Impression:   1. Spondylosis, particularly at L4-5 where there is severe spinal  canal stenosis and moderate bilateral neural foraminal stenosis.  2. Active inflammatory arthropathy of the bilateral L4-5 facet joints.      2 views lumbar spine  radiographs 7/13/2020  Findings:     Standing  AP and lateral  views of the lumbar spine were obtained.     5  lumbar type vertebral bodies are assumed for the purpose of this  dictation.     There is no acute osseous abnormality.       Grade 1 spondylolisthesis of L4 on L5 measuring approximately 8 mm  with associated mild intervertebral disc space loss. Otherwise, Disc  height and vertebral body heights are maintained throughout.     The visualized bowel gas pattern is non-obstructive.                                                  Impression:  1.  No acute osseous abnormality.  2.  Grade 1 degenerative type spondylolisthesis of L4 on L5, measuring  approximately 8 mm.    Review Of Systems:  I am responding to those symptoms which are directly relevant to the specific indication for my consultation. I recommend that the patient follow up with their primary or referring provider to pursue any other symptoms which may be of concern.     Medical History:  She  has a past medical history of Bilateral low back pain with right-sided sciatica, unspecified chronicity (11/11/2020), Neuroforaminal stenosis of lumbar spine (11/11/2020), Nuclear senile cataract of both eyes (08/16/2018), Osteoarthritis (10/15/2020), Psoriatic arthropathy (H) (10/19/2020), and Serum calcium elevated (5/31/2023).     She  has a past surgical history that includes biopsy (07/30/2019); Eye surgery; and Colonoscopy (N/A, 7/14/2022).    Family History  Her family history includes Breast Cancer in her maternal aunt; Cataracts in her maternal grandmother; Hyperlipidemia in her mother; Hypertension in her mother; Osteoporosis in her mother; Stomach Cancer in her father.     Social History:  Work: stay at home mom  Current living situation: lives with family and her daughter, another daughter lives on her own. Performs ADLs/IADLs independently.    She  reports that she has never smoked. She has never used smokeless tobacco. She reports that she does  not drink alcohol and does not use drugs.        Current Medications:   She has a current medication list which includes the following prescription(s): apremilast, calcium carbonate-vit d-min, celecoxib, clobetasol, doxycycline monohydrate, gabapentin, loratadine, melatonin, multivitamin adults 50+, tacrolimus, tazarotene, and xeljanz xr, and the following Facility-Administered Medications: triamcinolone.     Allergies:    -- Adhesive Tape -- Blisters, Itching and Swelling   -- Dust Mites    -- Latex -- Blisters, Itching, Other (See                            Comments) and Swelling    --  Skin sensitivity   -- Morphine Sulfate (Concentrate) -- Itching    --  Other reaction(s): Chills             heart palpatations   -- Seasonal Allergies    -- Codeine -- Palpitations    --  chills    PHYSICAL EXAMINATION:  /80 (BP Location: Right arm, Patient Position: Sitting, Cuff Size: Adult Regular)   Pulse 90   Resp 16   LMP  (LMP Unknown)   SpO2 97%    --CONSTITUTIONAL: Vital signs as above. No acute distress. The patient is well nourished and well groomed.  --PSYCHIATRIC: The patient is awake, alert, oriented to person, place, time and answering questions appropriately with clear speech. Appropriate mood and affect   --SKIN:  Posterior torso is clean, dry, intact without rashes.   --RESPIRATORY: Normal rhythm and effort. No abnormal accessory muscle breathing patterns noted.   --GROSS MOTOR: Easily arises from a seated position. Toe walking and heel walking are normal.    --STANDING EXAMINATION: Gait is non-antalgic. Normal lumbar lordosis noted, no lateral shift.  --LOWER EXTREMITY MOTOR TESTING:  Plantar flexion left 5/5, right 5/5   Dorsiflexion left 5/5, right 5/5   Great toe MTP extension left 5/5, right 5/5  Knee flexion left 5/5, right 5/5  Knee extension left 5/5, right 5/5   Hip flexion left 5/5, right 5/5  --MUSCULOSKELETAL: Lumbar spine inspection reveals no evidence of deformity. Range of motion is not  limited in lumbar flexion, extension, lateral rotation.  Lumbar extension and lateral rotation elicit mild low back pain.  No tenderness to palpation lumbar spine. Straight leg raise negative bilaterally. Sciatic notch non-tender. Facet loading maneuvers are mildly positive.  --SACROILIAC JOINT: No pain with palpation of the SI joint.  Leo's is negative.  Savana's negative bilaterally.     --HIPS: Full range of motion bilaterally.  Savana's negative.  No pain with logrolling. No pain with palpation of the greater trochanters.   --NEUROLOGIC: 2/4 right patellar, 2/4 left patellar, and 2/4 achilles reflexes bilaterally.  Sensation to light touch is intact in the bilateral L4, L5, and S1 dermatomes.   No clonus.    --VASCULAR:  Warm lower limbs bilaterally. There is no pitting edema of the bilateral lower extremities.       ASSESSMENT:  Willa Downey is a pleasant 63 year old female who presents with   -chronic atraumatic, nonradiating right-sided low back pain  -New distribution of left low back/gluteal pain radiating to the lateral left leg to the ankle.    Differential includes severe spinal canal stenosis, L5 lumbar radiculopathy, lumbar facet mediated pain      MRI of the lumbar spine from 8/14/2020 shows:  -Severe spinal canal stenosis and moderate bilateral neuroforaminal stenosis at L4-5  -Grade 1 anterolisthesis of L4  -Lower lumbar facet hypertrophy, with periarticular facet joint edema at L4-5    Complicating comorbidities include:  # Psoriatic arthritis, follows with rheumatology    PLAN:  -Given the new distribution of pain she is describing, we will update the MRI of the lumbar spine to assess any progression of the spinal canal stenosis seen at L4-5 or neuroforaminal stenosis seen at L5-S1 on the prior lumbar spine MRI from 2020  -She is tolerating 300 mg of gabapentin twice a day.  We discussed titrating to 3 times daily and she was in agreement and the order was updated.  -She did not find  physical therapy useful in the past.  We discussed other types of PT which could be considered including pool therapy or [facility] PT.  She prefers to hold off for now.  Additionally, she reports a recent eye surgery and says she has been advised to avoid pools currently  -I asked her to make an appointment to follow-up with me at least 1 day after the MRI to review the findings and discuss next steps  -RTC for review of MRI    Ready to learn, no apparent learning barriers.  Education provided on treatment plan according to patient's preferred learning style.  Patient verbalizes understanding.   __________________________________    36 minutes spent by me on the date of the encounter doing chart review, history and exam, documentation and further activities per the note      Again, thank you for allowing me to participate in the care of your patient.      Sincerely,    TRACY Hunter CNP

## 2024-08-06 NOTE — NURSING NOTE
Chief Complaint   Patient presents with    RECHECK     /80 (BP Location: Right arm, Patient Position: Sitting, Cuff Size: Adult Regular)   Pulse 90   Resp 16   LMP  (LMP Unknown)   SpO2 97%     YANETH FRANK

## 2024-08-06 NOTE — PATIENT INSTRUCTIONS
It was a pleasure seeing you in the clinic today.  As discussed, we made the following updates to your plan of care:     A MRI order was added today, which you can schedule after today's visit.        You can begin to take gabapentin 300 mg three times per day.   The most common side effect is sedation. Do not drive a motor vehicle until after you are familiar with how the medication may impact your attention and reaction.  Note that it may take several weeks to notice the benefits of this medication.   Do not abruptly stopped this medication prior to consulting with a physician.         Please schedule follow up with me after MRI. (Please schedule the follow up at least 1 day after the MRI to give radiology time to do the report as well.)         Thank you,  Eri Kuo NP  Physical Medicine and Rehabilitation, Medical Spine  PM&R clinic Phone: [phone]  PM&R clinic Fax: [phone]

## 2024-08-18 ENCOUNTER — HEALTH MAINTENANCE LETTER (OUTPATIENT)
Age: 64
End: 2024-08-18

## 2024-09-07 NOTE — PROGRESS NOTES
Willa is a 64 year old who is being evaluated via a billable video visit.      Video-Visit Details    Type of service:  Video Visit   Originating Location (pt. Location): Home    Visit duration approx 25 min 51 seconds.  [facility] did not provide start/stop times.  Distant Location (provider location):  Off-site  Platform used for Video Visit: [employer]      PM&R Follow-Up Visit -     Date of Initial Visit: 2/9/2024  LOV: 8/6/2024  TD: 9/11/2024     Recall: Willa Downey is a 63 year old female who was previously seen in the spine clinic with a chief complaint of right sided low back pain.       INTERVAL HISTORY:  Patient was last seen in clinic 8/6/2024. At that visit, the plan was to update the MRI of the lumbar spine and titrate gabapentin 300 mg from BID to TID.    At the last visit she described:  -Left low back/gluteal pain radiating to the lateral left leg to the ankle.   -prior history of right sided low back pain extending into the lateral right hip which had improved    Today, she endorses ongoing & worsening pain radiating from the left buttocks along the lateral side of the left leg and terminating at the ankle.  She states that last night she was having pain at the middle of her back also.    The pain tends to be worse at night or in the evening.  She notes shooting pain into the left leg in the evening.  Getting up and taking a few steps tends to help.  However, if she sits back down, the pain returns. She sometimes has a hard time falling back asleep at night.      She does have pain when she wakes up in the morning, but typically the pain is better during the day.  However, in the past few days, she notices the pain has been getting worse during the day too.  Despite that, she can still walk up to 2 miles without pain interfering.    Lumbar flexion or lumbar extension do not seem to affect pain levels.    She recalls that this morning when she got up the pain was 4-5/10.    She denies bowel or bladder  "incontinence, or saddle anesthesia.      Since last time she tried adding a midday dose of gabapentin but it made her too drowsy. So she is currently taking 300 mg of gabapentin twice daily.            RECALL HISTORY OF PRESENT ILLNESS:  Willa Downey is a 63 year old female who presents with a chief complaint of right sided low back pain.     She was seen today in the clinic. She describes right-sided low back pain which began around 2020 without any known cause.  She says that at the time she was having some radicular pain into the right lower extremity.  However, she feels that the pain has generally improved over time.    She says that she had been taking gabapentin 3 times a day for pain, but says that she tapered down to twice a day, which she finds is working out okay.  She states that her pain level usually ranges from 0-2/10.  If she does more activity, in particular prolonged standing, she will have an increased degree of pain.  She is active - walks 2 miles regularly 4 to 5 days out of the week.  The walking does not seem to aggravate the pain.    Today, she describes pain & tightness affecting the right side low back extending into the lateral right hip.  There is no pain extending into the right lower extremity.  She notes that this morning she had a pulling sensation extending into her left leg, but says that she does not usually have pain in that area.    Aggravating factors include: prolonged standing, laying on right  Relieving factors include: stretching or medication     Today, she rates the pain 0/10.  At its worst over the last week the pain was 2/10.   Patient states sleep is not affected though she does feel sometimes uncomfortable laying on her right side.     She denies falls, weakness, bowel or bladder incontinence, saddle anesthesia, unintentional weight loss, fevers/chills, or night sweats.     Today, she says her goal is to refill the gabapentin, which she describes as a \"miracle " "medicine for me\".      8/6/2024:  Patient was last seen in clinic 2/9/2024. At that visit, the plan was to continue gabapentin 300 mg twice daily.    She was seen today in the clinic due to new distribution of pain.    At the prior visit, she described pain & tightness affecting the right side low back extending into the lateral right hip.    Currently, she says that she the nonradiating right-sided back pain has been well-controlled.  However, she has now developed some new radiating pain on the left side which began in April.      She describes:  -Left low back/gluteal pain radiating to the lateral left leg to the ankle.    She states this pain feels familiar to what she experienced on the right lower extremity in the past.    She denies lower extremity numbness or tingling.      Pain is aggravated with: Lying down, sitting, standing, twisting, being on the hands and knees or squatting while cleaning, vacuuming, getting up after sitting on her recliner couch, worse in the evening  Pain is relieved with: Walking, sitting upright or on a more firm chair    She is tolerating gabapentin 300 mg twice daily.    She follows with rheumatology in regards to psoriatic arthritis.    She denies falls, weakness, bowel or bladder incontinence, or saddle anesthesia.            PRIOR INJURIES/TREATMENT:   Ice/Heat: no    Brace: no    Physical Therapy:   PT for low back pain 2020     - Current Pain Medications -   Gabapentin 300 mg BID (TID dosing was too sedating)  Xeljanz   Otezla  Celebrex     - Prior/Trialed Pain Medications -   Flexeril  Steroid burst    Prior Procedures:  Date    Procedure   Improvement (%)  none              Prior Related Surgery: none   Other (acupuncture, OMT, CMM, TENS, DME, etc.):     Specialists Seen - (with most recent, available notes and clinic visits reviewed)   1. Sports medicine - Dr Zhou, Dr Tesfaye  2.  Rheumatology-psoriatic arthritis      IMAGING - reviewed   MR LUMBAR SPINE W/O CONTRAST " 9/8/2024   Findings: There are 5 lumbar-type vertebrae assumed for the purposes  of this dictation.  The tip of the conus medullaris is at L1. Grade  1-2 anterolisthesis of L4, increased from previous. Mild loss of disc  height at L4-5.  Normal marrow signal.     On a level by level basis:     T12-L1: No spinal canal or neuroforaminal stenosis.     L1-2: No spinal canal or neuroforaminal stenosis.     L2-3: No spinal canal or neuroforaminal stenosis.     L3-4: No spinal canal or neuroforaminal stenosis.     L4-5: Grade 1-2 anterolisthesis and uncovering of the disc.  Superimposed central disc extrusion with slight cephalad migration.  Bilateral facet hypertrophy and ligamentum flavum thickening. Further  increased severe spinal canal stenosis. Moderate bilateral neural  foraminal stenosis.      L5-S1: Posterior disc bulge and bilateral facet hypertrophy. Mild  spinal canal narrowing. Mild bilateral neural foraminal narrowing.     Paraspinous tissues are within normal limits.                                                 Impression:   1. Increased grade 1-2 anterolisthesis of L4 on L5, with continued  severe spinal canal stenosis and moderate bilateral neural foraminal  stenosis at this level. Portion of the uncovered disc appears slightly  decreased in size.  2. Additional levels appear similar to previous.      XR SACROILIAC JOINT 1/2 VIEWS  04/10/2024                                                                 IMPRESSION: Mild bilateral SI joint arthrosis, left greater than right. No erosive change to suggest an inflammatory arthropathy. Both hip joint spaces are maintained.         MR LUMBAR SPINE W/O CONTRAST 8/14/2020    Findings: There are 5 lumbar-type vertebrae assumed for the purposes  of this dictation.  The tip of the conus medullaris is at L1.  Grade 1  anterolisthesis of L4. Mild loss of disc height at L4-5.  Normal  marrow signal.     On a level by level basis:     T12-L1: No spinal canal or  neuroforaminal stenosis.     L1-2: No spinal canal or neuroforaminal stenosis.     L2-3: No spinal canal or neuroforaminal stenosis.     L3-4: No spinal canal or neuroforaminal stenosis.     L4-5: Grade 1 anterolisthesis and uncovering of the disc. Superimposed  central disc extrusion with cephalad migration. Bilateral facet  hypertrophy and ligamentum flavum thickening. Severe spinal canal  stenosis. Moderate bilateral neural foraminal stenosis. Periarticular  edema surrounding the bilateral facet joints.     L5-S1: Posterior disc bulge and bilateral facet hypertrophy. Mild  spinal canal narrowing. Mild bilateral neural foraminal narrowing.     Paraspinous tissues are within normal limits.                                                    Impression:   1. Spondylosis, particularly at L4-5 where there is severe spinal  canal stenosis and moderate bilateral neural foraminal stenosis.  2. Active inflammatory arthropathy of the bilateral L4-5 facet joints.      2 views lumbar spine radiographs 7/13/2020  Findings:     Standing  AP and lateral  views of the lumbar spine were obtained.     5  lumbar type vertebral bodies are assumed for the purpose of this  dictation.     There is no acute osseous abnormality.       Grade 1 spondylolisthesis of L4 on L5 measuring approximately 8 mm  with associated mild intervertebral disc space loss. Otherwise, Disc  height and vertebral body heights are maintained throughout.     The visualized bowel gas pattern is non-obstructive.                                                  Impression:  1.  No acute osseous abnormality.  2.  Grade 1 degenerative type spondylolisthesis of L4 on L5, measuring  approximately 8 mm.      Review Of Systems:  I am responding to those symptoms which are directly relevant to the specific indication for my consultation. I recommend that the patient follow up with their primary or referring provider to pursue any other symptoms which may be of concern.        Medical History:  She  has a past medical history of Bilateral low back pain with right-sided sciatica, unspecified chronicity (11/11/2020), Neuroforaminal stenosis of lumbar spine (11/11/2020), Nuclear senile cataract of both eyes (08/16/2018), Osteoarthritis (10/15/2020), Psoriatic arthropathy (H) (10/19/2020), and Serum calcium elevated (5/31/2023).     She  has a past surgical history that includes biopsy (07/30/2019); Eye surgery; and Colonoscopy (N/A, 7/14/2022).    Family History  Her family history includes Breast Cancer in her maternal aunt; Cataracts in her maternal grandmother; Hyperlipidemia in her mother; Hypertension in her mother; Osteoporosis in her mother; Stomach Cancer in her father.     Social History:  Work: stay at home mom  Current living situation: lives with family and her daughter, another daughter lives on her own. Performs ADLs/IADLs independently.    She  reports that she has never smoked. She has never used smokeless tobacco. She reports that she does not drink alcohol and does not use drugs.        Current Medications:   She has a current medication list which includes the following prescription(s): apremilast, calcium carbonate-vit d-min, celecoxib, clobetasol, doxycycline monohydrate, gabapentin, loratadine, melatonin, multivitamin adults 50+, tacrolimus, tazarotene, and xeljanz xr, and the following Facility-Administered Medications: triamcinolone.     Allergies:    -- Adhesive Tape -- Blisters, Itching and Swelling   -- Dust Mites    -- Latex -- Blisters, Itching, Other (See                            Comments) and Swelling    --  Skin sensitivity   -- Morphine Sulfate (Concentrate) -- Itching    --  Other reaction(s): Chills             heart palpatations   -- Seasonal Allergies    -- Codeine -- Palpitations    --  chills        PHYSICAL EXAMINATION: *limited by nature of virtual visit   No vital signs taken for visit  --CONSTITUTIONAL: No acute distress.   --PSYCHIATRIC: The  patient is awake, alert, oriented to person, place, time and answering questions appropriately with clear speech. Appropriate mood and affect          ASSESSMENT:  Willa Downey is a pleasant 63 year old female who presents with   -Progressive, worsening left low back/gluteal pain radiating to the lateral left leg terminating at the ankle.   -prior history of right sided low back pain extending into the lateral right hip which has improved    Differential includes radicular pain 2/2 L4-5 severe spinal canal stenosis, L5 lumbar radiculopathy, lumbar facet mediated pain    MRI of the lumbar spine from 9/8/2024 shows:  -Grade 1-2 anterolisthesis of L4, increased from previous  -increased severe spinal canal stenosis at L4-5  -moderate bilat NFS at L4-5    MRI of the lumbar spine from 8/14/2020 shows:  -Severe spinal canal stenosis and moderate bilateral neuroforaminal stenosis at L4-5  -Grade 1 anterolisthesis of L4  -Lower lumbar facet hypertrophy, with periarticular facet joint edema at L4-5    Complicating comorbidities include:  # Psoriatic arthritis, follows with rheumatology    PLAN:  -MRI of the lumbar spine was reviewed in detail with the patient today.  There is increased anterolisthesis of L4 on L5 and progression of spinal canal stenosis at that same level.  -Obtain lumbar flexion/extension views to assess for dynamic motion at the L4-5 level given the progression of the anterolisthesis   -Continue gabapentin 300 mg in the morning and increase to 600 mg at bedtime  -Given the significant level of pain she is experiencing, she would like to proceed with an interventional procedure for pain.  Add L5-S1 ILESI, at the level below the severe spinal canal stenosis at L4-5 .  Given her preference to have the procedure done soon, will add a referral for the [facility] with first available provider.  We also discussed that the order could be sent to [facility] if needed. Will also reach out to  [facility] given the patient is on Xeljanz.  -Given the progression of the severe spinal canal stenosis at L4-5, we also did discuss a referral for consultation with spine surgery.  Her preference is to exhaust all conservative measures  -We reviewed red flags which would necessitate her being seen in the emergency department right away and she expressed understanding  -She did not find physical therapy useful in the past, declined referral for PT previously  -RTC for procedure.  Will plan to follow-up 2 weeks after the steroid injection to see how she is doing and discuss next steps        Ready to learn, no apparent learning barriers.  Education provided on treatment plan according to patient's preferred learning style.  Patient verbalizes understanding.   __________________________________  Eri Kuo NP  Physical Medicine & Rehabilitation        54 minutes spent by me on the date of the encounter doing chart review, history and exam, documentation, coordination with staff regarding injection and further activities per the note

## 2024-09-08 ENCOUNTER — ANCILLARY PROCEDURE (OUTPATIENT)
Dept: MRI IMAGING | Facility: CLINIC | Age: 64
End: 2024-09-08
Attending: NURSE PRACTITIONER
Payer: COMMERCIAL

## 2024-09-08 DIAGNOSIS — M54.16 LUMBAR RADICULOPATHY: ICD-10-CM

## 2024-09-08 DIAGNOSIS — M54.42 ACUTE LEFT-SIDED LOW BACK PAIN WITH LEFT-SIDED SCIATICA: ICD-10-CM

## 2024-09-08 DIAGNOSIS — M48.061 SPINAL STENOSIS OF LUMBAR REGION, UNSPECIFIED WHETHER NEUROGENIC CLAUDICATION PRESENT: ICD-10-CM

## 2024-09-08 DIAGNOSIS — M47.816 SPONDYLOSIS OF LUMBAR REGION WITHOUT MYELOPATHY OR RADICULOPATHY: ICD-10-CM

## 2024-09-08 DIAGNOSIS — M48.061 NEUROFORAMINAL STENOSIS OF LUMBAR SPINE: ICD-10-CM

## 2024-09-08 PROCEDURE — 72148 MRI LUMBAR SPINE W/O DYE: CPT | Performed by: RADIOLOGY

## 2024-09-11 ENCOUNTER — TELEPHONE (OUTPATIENT)
Dept: PHYSICAL MEDICINE AND REHAB | Facility: CLINIC | Age: 64
End: 2024-09-11

## 2024-09-11 ENCOUNTER — VIRTUAL VISIT (OUTPATIENT)
Dept: PHYSICAL MEDICINE AND REHAB | Facility: CLINIC | Age: 64
End: 2024-09-11
Payer: COMMERCIAL

## 2024-09-11 VITALS — WEIGHT: 138 LBS | BODY MASS INDEX: 23.56 KG/M2 | HEIGHT: 64 IN

## 2024-09-11 DIAGNOSIS — M48.061 SPINAL STENOSIS OF LUMBAR REGION, UNSPECIFIED WHETHER NEUROGENIC CLAUDICATION PRESENT: ICD-10-CM

## 2024-09-11 DIAGNOSIS — M54.42 ACUTE LEFT-SIDED LOW BACK PAIN WITH LEFT-SIDED SCIATICA: ICD-10-CM

## 2024-09-11 DIAGNOSIS — M54.16 LUMBAR RADICULOPATHY: Primary | ICD-10-CM

## 2024-09-11 DIAGNOSIS — G89.29 CHRONIC RIGHT-SIDED LOW BACK PAIN WITHOUT SCIATICA: ICD-10-CM

## 2024-09-11 DIAGNOSIS — M43.16 ANTEROLISTHESIS OF LUMBAR SPINE: ICD-10-CM

## 2024-09-11 DIAGNOSIS — M48.061 NEUROFORAMINAL STENOSIS OF LUMBAR SPINE: ICD-10-CM

## 2024-09-11 DIAGNOSIS — M54.50 CHRONIC RIGHT-SIDED LOW BACK PAIN WITHOUT SCIATICA: ICD-10-CM

## 2024-09-11 PROCEDURE — 99215 OFFICE O/P EST HI 40 MIN: CPT | Mod: 95 | Performed by: NURSE PRACTITIONER

## 2024-09-11 RX ORDER — GABAPENTIN 300 MG/1
CAPSULE ORAL
Qty: 90 CAPSULE | Refills: 3 | Status: SHIPPED | OUTPATIENT
Start: 2024-09-11

## 2024-09-11 ASSESSMENT — PAIN SCALES - GENERAL: PAINLEVEL: NO PAIN (0)

## 2024-09-11 NOTE — TELEPHONE ENCOUNTER
"Patient calling as she was seen this AM and an injection was ordered per Eri Kuo CNP. States she had received a call from this clinic to get scheduled earlier, but didn't have her 's availability then.     Tashia reviewed. Order not seen. Noted in plan that \"L5-S1 ILESI at the level below the severe spinal canal stenosis at L4-5 . Given her preference to have the procedure done soon, will add a referral for the [place] [facility] in this regard with first available provider.\" Order to be placed and will have schedulers call her back to get set up.   "

## 2024-09-11 NOTE — NURSING NOTE
Current patient location: [address] [place] [id number]    Is the patient currently in the state of [place]? YES    Visit mode:VIDEO    If the visit is dropped, the patient can be reconnected by: VIDEO VISIT: Send to e-mail at: [email or web address]    Will anyone else be joining the visit? NO  (If patient encounters technical issues they should call [phone]    How would you like to obtain your AVS? MyChart    Are changes needed to the allergy or medication list? No    Are refills needed on medications prescribed by this physician? NO    Rooming Documentation:  Questionnaire(s) completed      Reason for visit: Follow Up    Anel ALICIA

## 2024-09-11 NOTE — TELEPHONE ENCOUNTER
Order placed in Epic for L5-S1 ILESI. Patient was called back. Injection requirements reviewed in full with patient. Discussed steroids have immunosuppressant properties which could potentially put patient at a higher risk for a worsened course of any infection including COVID. Stated understanding. Transferred to scheduling to make appointment.

## 2024-09-11 NOTE — TELEPHONE ENCOUNTER
Ok to proceed with scheduling as nursing reviewed with patient about steroids and their immunosuppressive properties and patient wanted to proceed. See other telephone encounter.     Please schedule patient. I do have note out to the PA team to see about approval to get patient in sooner as well.

## 2024-09-11 NOTE — TELEPHONE ENCOUNTER
Procedure ordered? YES    What insurance are we billing for this procedure?  [employer]  IF SCHEDULING AT [facility] PLEASE SCHEDULE AT LEAST 7-10 BUSINESS DAYS OUT SO A PA CAN BE OBTAINED    Is a  PAIN  order available to link to injection appointment or does one need to be transcribed?  YES:     PAIN Interlaminar Epidural Steroid Injection Lumbar/Sacral        If needed, route to CN to assist in doing so.    Is  needed?: No   If YES, please initiate in-person  request.    Will patient have a ?  Yes    All fluoro procedures require a .  These US procedures also require a : piriformis, pudendal, scalene, & carpal tunnel.    Is patient taking any blood thinners (i.e. Plavix, coumadin, jantoven, warfarin, heparin, Xarelto, Pradaxa, Eliquis, Brilinta, or Effient, etc)? No   If YES, schedule out 2 weeks, and route to RN pool to determine if medication hold is needed.    Is patient taking aspirin? No   For CERVICAL procedures, route message to Care Navigation so they can seek approval from managing provider to hold aspirin for 6 days prior to the procedure.    Does patient have an allergy to contrast dye or iodine?  No  If YES, OK to schedule. Route to RN pool AND add allergy information to appointment notes    Is patient diabetic? No If YES, blood glucose level will be checked on day of procedure and will need to be 300mg/dL or below (for steroid injections).    Does patient have an active infection or treated for one within the past week? No   If YES, do NOT schedule and route to Care Navigation.     Is patient currently taking any antibiotics?  No  For patients on chronic, preventative, or prophylactic antibiotics, procedures may be scheduled.   For patients on antibiotics for active or recent infection: antibiotic course must have been completed and symptom-free of infection to safely proceed with injection.  Send to Care Navigation if unsure.    Is patient actively  being treated for cancer or immunocompromised? Yes - IMMUNOCOMPROMISED  If YES, do NOT schedule and route to RN pool.     Any chance of pregnancy? NO   If YES, do NOT schedule and route to RN pool.    Reminders:  -  If you are started on any steroids or antibiotics between now and your appointment, you must contact us because it may affect our ability to perform your procedure.   reviewed and discussed briefly    -  Informed patient that it is OK to take normal medications before the procedure and must hold blood thinners as instructed.  reviewed and discussed briefly    -  Patients scheduled for MBBs should not take pain meds prior to injection and pain rating needs to be 5/10 or greater the day of the procedure.    -  Informed cervical injection patients that an IV will be placed as a precautionary measure.  not discussed    -  Patients should eat a light meal prior to their procedure appointment.  reviewed and discussed briefly    -  All radiofrequency ablations are in a 40 minute time slot and not to be scheduled until in-basket message has been sent by the procedure team that the PA has been  received.  not discussed     -  Spinal cord stimulator trial scheduling is coordinated by the procedure team.    -  Care Navigation (#[phone]) is available to assist patients with additional questions.  reviewed and discussed briefly    -  Cervical TFESIs with Dr. Velazquez only.

## 2024-09-11 NOTE — LETTER
9/11/2024       RE: Willa Downey  [address]  [facility] [address]     Dear Colleague,    Thank you for referring your patient, Willa Downey, to the [facility]YS[facility]IC[facility] CLIN[facility] [place] at [facility]. Please see a copy of my visit note below.    Willa is a 64 year old who is being evaluated via a billable video visit.      Video-Visit Details    Type of service:  Video Visit   Originating Location (pt. Location): Home    Visit duration approx 25 min 51 seconds.  [facility] did not provide start/stop times.  Distant Location (provider location):  Off-site  Platform used for Video Visit: Tiburcio      PM&R Follow-Up Visit -     Date of Initial Visit: 2/9/2024  LOV: 8/6/2024  TD: 9/11/2024     Recall: Willa Downey is a 63 year old female who was previously seen in the spine clinic with a chief complaint of right sided low back pain.       INTERVAL HISTORY:  Patient was last seen in clinic 8/6/2024. At that visit, the plan was to update the MRI of the lumbar spine and titrate gabapentin 300 mg from BID to TID.    At the last visit she described:  -Left low back/gluteal pain radiating to the lateral left leg to the ankle.   -prior history of right sided low back pain extending into the lateral right hip which had improved    Today, she endorses ongoing & worsening pain radiating from the left buttocks along the lateral side of the left leg and terminating at the ankle.  She states that last night she was having pain at the middle of her back also.    The pain tends to be worse at night or in the evening.  She notes shooting pain into the left leg in the evening.  Getting up and taking a few steps tends to help.  However, if she sits back down, the pain returns. She sometimes has a hard time falling back asleep at night.      She does have pain when she wakes up in the morning, but typically the pain is better during the  day.  However, in the past few days, she notices the pain has been getting worse during the day too.  Despite that, she can still walk up to 2 miles without pain interfering.    Lumbar flexion or lumbar extension do not seem to affect pain levels.    She recalls that this morning when she got up the pain was 4-5/10.    She denies bowel or bladder incontinence, or saddle anesthesia.      Since last time she tried adding a midday dose of gabapentin but it made her too drowsy. So she is currently taking 300 mg of gabapentin twice daily.            RECALL HISTORY OF PRESENT ILLNESS:  Willa Downey is a 63 year old female who presents with a chief complaint of right sided low back pain.     She was seen today in the clinic. She describes right-sided low back pain which began around 2020 without any known cause.  She says that at the time she was having some radicular pain into the right lower extremity.  However, she feels that the pain has generally improved over time.    She says that she had been taking gabapentin 3 times a day for pain, but says that she tapered down to twice a day, which she finds is working out okay.  She states that her pain level usually ranges from 0-2/10.  If she does more activity, in particular prolonged standing, she will have an increased degree of pain.  She is active - walks 2 miles regularly 4 to 5 days out of the week.  The walking does not seem to aggravate the pain.    Today, she describes pain & tightness affecting the right side low back extending into the lateral right hip.  There is no pain extending into the right lower extremity.  She notes that this morning she had a pulling sensation extending into her left leg, but says that she does not usually have pain in that area.    Aggravating factors include: prolonged standing, laying on right  Relieving factors include: stretching or medication     Today, she rates the pain 0/10.  At its worst over the last week the pain was  "2/10.   Patient states sleep is not affected though she does feel sometimes uncomfortable laying on her right side.     She denies falls, weakness, bowel or bladder incontinence, saddle anesthesia, unintentional weight loss, fevers/chills, or night sweats.     Today, she says her goal is to refill the gabapentin, which she describes as a \"miracle medicine for me\".      8/6/2024:  Patient was last seen in clinic 2/9/2024. At that visit, the plan was to continue gabapentin 300 mg twice daily.    She was seen today in the clinic due to new distribution of pain.    At the prior visit, she described pain & tightness affecting the right side low back extending into the lateral right hip.    Currently, she says that she the nonradiating right-sided back pain has been well-controlled.  However, she has now developed some new radiating pain on the left side which began in April.      She describes:  -Left low back/gluteal pain radiating to the lateral left leg to the ankle.    She states this pain feels familiar to what she experienced on the right lower extremity in the past.    She denies lower extremity numbness or tingling.      Pain is aggravated with: Lying down, sitting, standing, twisting, being on the hands and knees or squatting while cleaning, vacuuming, getting up after sitting on her recliner couch, worse in the evening  Pain is relieved with: Walking, sitting upright or on a more firm chair    She is tolerating gabapentin 300 mg twice daily.    She follows with rheumatology in regards to psoriatic arthritis.    She denies falls, weakness, bowel or bladder incontinence, or saddle anesthesia.            PRIOR INJURIES/TREATMENT:   Ice/Heat: no    Brace: no    Physical Therapy:   PT for low back pain 2020     - Current Pain Medications -   Gabapentin 300 mg BID (TID dosing was too sedating)  Xelstacey   Otezla  Celebrex     - Prior/Trialed Pain Medications -   Flexeril  Steroid burst    Prior Procedures:  Date  "   Procedure   Improvement (%)  none              Prior Related Surgery: none   Other (acupuncture, OMT, CMM, TENS, DME, etc.):     Specialists Seen - (with most recent, available notes and clinic visits reviewed)   1. Sports medicine - Dr Zhou, Dr Tesfaye  2.  Rheumatology-psoriatic arthritis      IMAGING - reviewed   MR LUMBAR SPINE W/O CONTRAST 9/8/2024   Findings: There are 5 lumbar-type vertebrae assumed for the purposes  of this dictation.  The tip of the conus medullaris is at L1. Grade  1-2 anterolisthesis of L4, increased from previous. Mild loss of disc  height at L4-5.  Normal marrow signal.     On a level by level basis:     T12-L1: No spinal canal or neuroforaminal stenosis.     L1-2: No spinal canal or neuroforaminal stenosis.     L2-3: No spinal canal or neuroforaminal stenosis.     L3-4: No spinal canal or neuroforaminal stenosis.     L4-5: Grade 1-2 anterolisthesis and uncovering of the disc.  Superimposed central disc extrusion with slight cephalad migration.  Bilateral facet hypertrophy and ligamentum flavum thickening. Further  increased severe spinal canal stenosis. Moderate bilateral neural  foraminal stenosis.      L5-S1: Posterior disc bulge and bilateral facet hypertrophy. Mild  spinal canal narrowing. Mild bilateral neural foraminal narrowing.     Paraspinous tissues are within normal limits.                                                 Impression:   1. Increased grade 1-2 anterolisthesis of L4 on L5, with continued  severe spinal canal stenosis and moderate bilateral neural foraminal  stenosis at this level. Portion of the uncovered disc appears slightly  decreased in size.  2. Additional levels appear similar to previous.      XR SACROILIAC JOINT 1/2 VIEWS  04/10/2024                                                                 IMPRESSION: Mild bilateral SI joint arthrosis, left greater than right. No erosive change to suggest an inflammatory arthropathy. Both hip joint spaces are  maintained.         MR LUMBAR SPINE W/O CONTRAST 8/14/2020    Findings: There are 5 lumbar-type vertebrae assumed for the purposes  of this dictation.  The tip of the conus medullaris is at L1.  Grade 1  anterolisthesis of L4. Mild loss of disc height at L4-5.  Normal  marrow signal.     On a level by level basis:     T12-L1: No spinal canal or neuroforaminal stenosis.     L1-2: No spinal canal or neuroforaminal stenosis.     L2-3: No spinal canal or neuroforaminal stenosis.     L3-4: No spinal canal or neuroforaminal stenosis.     L4-5: Grade 1 anterolisthesis and uncovering of the disc. Superimposed  central disc extrusion with cephalad migration. Bilateral facet  hypertrophy and ligamentum flavum thickening. Severe spinal canal  stenosis. Moderate bilateral neural foraminal stenosis. Periarticular  edema surrounding the bilateral facet joints.     L5-S1: Posterior disc bulge and bilateral facet hypertrophy. Mild  spinal canal narrowing. Mild bilateral neural foraminal narrowing.     Paraspinous tissues are within normal limits.                                                    Impression:   1. Spondylosis, particularly at L4-5 where there is severe spinal  canal stenosis and moderate bilateral neural foraminal stenosis.  2. Active inflammatory arthropathy of the bilateral L4-5 facet joints.      2 views lumbar spine radiographs 7/13/2020  Findings:     Standing  AP and lateral  views of the lumbar spine were obtained.     5  lumbar type vertebral bodies are assumed for the purpose of this  dictation.     There is no acute osseous abnormality.       Grade 1 spondylolisthesis of L4 on L5 measuring approximately 8 mm  with associated mild intervertebral disc space loss. Otherwise, Disc  height and vertebral body heights are maintained throughout.     The visualized bowel gas pattern is non-obstructive.                                                  Impression:  1.  No acute osseous abnormality.  2.  Grade 1  degenerative type spondylolisthesis of L4 on L5, measuring  approximately 8 mm.      Review Of Systems:  I am responding to those symptoms which are directly relevant to the specific indication for my consultation. I recommend that the patient follow up with their primary or referring provider to pursue any other symptoms which may be of concern.       Medical History:  She  has a past medical history of Bilateral low back pain with right-sided sciatica, unspecified chronicity (11/11/2020), Neuroforaminal stenosis of lumbar spine (11/11/2020), Nuclear senile cataract of both eyes (08/16/2018), Osteoarthritis (10/15/2020), Psoriatic arthropathy (H) (10/19/2020), and Serum calcium elevated (5/31/2023).     She  has a past surgical history that includes biopsy (07/30/2019); Eye surgery; and Colonoscopy (N/A, 7/14/2022).    Family History  Her family history includes Breast Cancer in her maternal aunt; Cataracts in her maternal grandmother; Hyperlipidemia in her mother; Hypertension in her mother; Osteoporosis in her mother; Stomach Cancer in her father.     Social History:  Work: stay at home mom  Current living situation: lives with family and her daughter, another daughter lives on her own. Performs ADLs/IADLs independently.    She  reports that she has never smoked. She has never used smokeless tobacco. She reports that she does not drink alcohol and does not use drugs.        Current Medications:   She has a current medication list which includes the following prescription(s): apremilast, calcium carbonate-vit d-min, celecoxib, clobetasol, doxycycline monohydrate, gabapentin, loratadine, melatonin, multivitamin adults 50+, tacrolimus, tazarotene, and xeljanz xr, and the following Facility-Administered Medications: triamcinolone.     Allergies:    -- Adhesive Tape -- Blisters, Itching and Swelling   -- Dust Mites    -- Latex -- Blisters, Itching, Other (See                            Comments) and Swelling    --   Skin sensitivity   -- Morphine Sulfate (Concentrate) -- Itching    --  Other reaction(s): Chills             heart palpatations   -- Seasonal Allergies    -- Codeine -- Palpitations    --  chills        PHYSICAL EXAMINATION: *limited by nature of virtual visit   No vital signs taken for visit  --CONSTITUTIONAL: No acute distress.   --PSYCHIATRIC: The patient is awake, alert, oriented to person, place, time and answering questions appropriately with clear speech. Appropriate mood and affect          ASSESSMENT:  Willa Downey is a pleasant 63 year old female who presents with   -Progressive, worsening left low back/gluteal pain radiating to the lateral left leg terminating at the ankle.   -prior history of right sided low back pain extending into the lateral right hip which has improved    Differential includes radicular pain 2/2 L4-5 severe spinal canal stenosis, L5 lumbar radiculopathy, lumbar facet mediated pain    MRI of the lumbar spine from 9/8/2024 shows:  -Grade 1-2 anterolisthesis of L4, increased from previous  -increased severe spinal canal stenosis at L4-5  -moderate bilat NFS at L4-5    MRI of the lumbar spine from 8/14/2020 shows:  -Severe spinal canal stenosis and moderate bilateral neuroforaminal stenosis at L4-5  -Grade 1 anterolisthesis of L4  -Lower lumbar facet hypertrophy, with periarticular facet joint edema at L4-5    Complicating comorbidities include:  # Psoriatic arthritis, follows with rheumatology    PLAN:  -MRI of the lumbar spine was reviewed in detail with the patient today.  There is increased anterolisthesis of L4 on L5 and progression of spinal canal stenosis at that same level.  -Obtain lumbar flexion/extension views to assess for dynamic motion at the L4-5 level given the progression of the anterolisthesis   -Continue gabapentin 300 mg in the morning and increase to 600 mg at bedtime  -Given the significant level of pain she is experiencing, she would like to proceed with an  interventional procedure for pain.  Add L5-S1 ILESI, at the level below the severe spinal canal stenosis at L4-5 .  Given her preference to have the procedure done soon, will add a referral for the [facility] with first available provider.  We also discussed that the order could be sent to [facility] if needed  -Given the progression of the severe spinal canal stenosis at L4-5, we also did discuss a referral for consultation with spine surgery.  Her preference is to exhaust all conservative measures  -We reviewed red flags which would necessitate her being seen in the emergency department right away and she expressed understanding  -She did not find physical therapy useful in the past, declined referral for PT previously  -RTC for procedure.  Will plan to follow-up 2 weeks after the steroid injection to see how she is doing and discuss next steps        Ready to learn, no apparent learning barriers.  Education provided on treatment plan according to patient's preferred learning style.  Patient verbalizes understanding.   __________________________________  Eri Kuo NP  Physical Medicine & Rehabilitation        54 minutes spent by me on the date of the encounter doing chart review, history and exam, documentation, coordination with staff regarding injection and further activities per the note      Again, thank you for allowing me to participate in the care of your patient.      Sincerely,    TRACY Hunter CNP

## 2024-09-11 NOTE — PATIENT INSTRUCTIONS
It was a pleasure seeing you via telehealth today.  As discussed, we made the following updates to your plan of care:    An XR order was added today.  You will receive a call from radiology scheduling to set up the appointment.  I will also include the scheduling phone numbers below.  I will send you a message about the x-ray result through [employer].     You can continue taking 300 mg of gabapentin in the morning and increase the gabapentin at bedtime to 600 mg.  I will add a new prescription to reflect this change    I will add an order for epidural steroid injection at the [facility] to try and have the injection done sooner. You will receive a call to help schedule the appointment. I included additional information about the injection below.  Please let me know if you have any questions or concerns.    -If you develop new/severe pain, numbness, or tingling, lose control of bowel or bladder, numbness between the legs/groin area, falls, or new weakness, go to the emergency department right away      Let's plan to follow up 2 weeks after the steroid injection to see how you are doing and talk about next steps. Once you have the injection scheduled, please call our clinic number below to schedule.        Thank you,  Eri Kuo NP  Physical Medicine and Rehabilitation, Medical Spine  PM&R clinic Phone: [phone]  PM&R clinic Fax: [phone]       ------    Imaging scheduling  [facility] [phone] Clinics and Surgery Center [facility] ([facility] and [facility] Clinics, including [facility], [facility] [phone] [facility], [facility] Clinics including [facility], and [facility] [phone] [facility], [facility], [facility], [facility] clinics, including [facility], and [facility] [phone] [facility], [facility], [facility] Clinics, including [facility], [facility]     ------------------------------      Preparing for Spine Injection Therapy              Why Do I Need Spine Injection Therapy?  Your Health Care Provider is recommending spine injection therapy to  help relieve your back and neck pain. This will be in addition to other therapies such as medications and physical therapy. The purpose of these injections is to reduce the amount of inflammation (swelling, pain, heat, redness, loss of body function) around the nerves thus reducing the amount of pain.     The medications you will receive with the injections will include:   Anesthetic - medication to numb the painful area.    Steroid - medication that prevents or reduces swelling and pain (anti-inflammatory).   To reduce your discomfort during the injection procedure, you will receive a numbing medication injection prior to the placement of the needles. You will be lying on your stomach during the injection procedure. We will use a low-dose x-ray (fluoroscopy) to help guide needle placement.  You must have a  for this procedure. We will need to reschedule your injection if you do not have a  with you.      What are the different types of injections and procedure?  Below, is a brief description of the different types of injections we use to deliver pain medication as close as possible to the nerves in the painful area:    Epidural injection: (picture on the right) Epidural injections place 2 medications in your epidural space.  This is the space alongside your spinal canal (not inside of it). Nerves from your spinal cord pass through this space. The medications will bathe those nerves.       Facet joint injection: These injections place 2 medications into the joints of your neck or spine.   SI joint injection: (picture on the left) deliver pain medications into the  Sacroiliac joint that connects the hip bones.   Hip joint injection: deliver pain medication to the joint that connect your hip and femur bones (the femur is the bone in the center of the leg that extends from the knee with the hip).  You will be lying on your side with your affected side up. For example, if we are injecting your left hip, then you will be lying on your right side.   Medial Branch Block injections: This is a test to see if your pain is coming from a specific nerve. This injection is similar to a facet joint injection but contains only the numbing medication.  You will keep a pain score diary for the rest of the day and the following morning after receiving the injection.    Radiofrequency ablation: This is a procedure that uses radio waves and numbing medication to block the nerves that feel pain at the joint. The pain relief effects can last for a long time, but are not permanent. The procedure is similar to the Medial Branch Block but requires additional testing to ensure that the needle is near the nerve before numbing and ablating it.  Doctors often order sedation for this procedure.  Please see the sections on sedation below.      What Should I Expect if I Receive Sedation? THIS IS ORDERED BY THE PHYSICIAN  This is conscious sedation.  The medications we give to you will help you relax and reduce your anxiety.  You will still be awake for the procedure so we can ask you questions and hear your answers.     What Are My Responsibilities With Sedation?  You must stop eating and drinking 8 (eight) hours before your procedure. You can have clear fluids (water, coffee/tea without milk) up to 2 hours prior to the injections. Nothing by mouth for 2 hours prior to injection.  This includes gum, mints, or chew.  Take your morning medications with a small sip of water.    You must have a  who will check-in with you, and stay in the building while the procedure is underway.  If you do not have a   your sedation will be cancelled.  We will monitor you for at least 30 minutes after the procedure before being discharged home.      What Are the Risks and Complications For This Procedure?  Risks and complication are rare, but can still occur.  You should understand, discuss, and accept these risks before agreeing to the procedure. They include, but are not limited to:  infection  nerve damage   paralysis  injection failure or a need for further injections or additional procedures  continued or worsening of symptoms/pain,   medication reaction,  dural leak (into the hole covering around the spinal cord. This may cause a a spinal headache)  leak of the medication into the spinal canal, nerves, or blood vessel.  death       What do I need to do before the procedure?   If you do not follow these instructions your procedure may be cancelled.  Tell us if you are on major blood thinners such as Coumadin, Xarelto , Plavix, Eliquis , Pradaxa , or others.    Contact the doctor who prescribed your blood thinner to ask for permission to stop taking it before you have the injection.    Schedule your pain injection procedure after your doctor gave their permission.   We will notify you when to stop and re-start your blood thinner.  Tell us if you have any allergies to contrast dye.  If you do, we may give additional medications to take before the procedure.  Tell us if you are pregnant, or possibly pregnant.  If so, you cannot receive steroid medications or be exposed to fluoroscopic X-rays.  Tell us if you have been sick during the 10 days before the procedure. This includes:   colds   gastrointestinal illness or discomfort  dental sores,   skin infection, or any other type of infection.  Tell us if you have taken antibiotics during the 10 days prior to the procedure.  Do not drink alcohol the night before or on the day of the procedure.  You must shower the night before and on the day of your procedure.  Wear  comfortable, clean clothing.  If you have an outside MRI (Magnetic Resonance Imaging photo), please bring it with you.    What Will Happen After the Procedure?  If you did not receive sedation we will monitor you for 15 minutes after the procedure. If you received sedation, we will monitor you for at least 30 minutes after the procedure     How soon can I expect pain relief?  You have received 2 types of medications with your injection:    Anesthetic - numbing medication which only acts for a few hours    Steroid which may take 3-14 days to be effective.   You can expect to feel your normal pain after the anesthetic wears off, until the steroid becomes effective.    How should I care for myself at home?  Get plenty of rest and avoid twisting, bending movements, heavy lifting, or strenuous activity for the first 24 hours. This will help the steroid be more effective. Medial Branch Block injections will have different discharge instructions.  Those will be discussed at that injection appointment.  Resume your pain medications  Apply ice packs (on for 20 minutes at a time), every 2-3 hours to your injection area for the first 2-3 days to help with pain control.    Avoid heat (pads or water bottles), which can cause the veins to open up, making the steroid less effective. You can use heat after 48 hours.  Take showers only for the first 48 hours.  No baths, hot tubs, swimming, or soaking for 48 hours to reduce the risk of infection.     When should I call the doctor?  Call us if you have any of the following:   Fever more than 100.5 degrees Fahrenheit   Signs of infection   Severe headache   Severe back pain   Increased numbness or weakness in your legs or arms   Loss of bladder or bowel control  Nausea   Other concerns    What is the contact information?  During business hours Monday-Friday 8a-5p call [phone].   After business hours, on weekends and holidays call [phone] and ask for the PM&R doctor on  call

## 2024-09-12 ENCOUNTER — ANCILLARY PROCEDURE (OUTPATIENT)
Dept: GENERAL RADIOLOGY | Facility: CLINIC | Age: 64
End: 2024-09-12
Attending: NURSE PRACTITIONER
Payer: COMMERCIAL

## 2024-09-12 ENCOUNTER — TELEPHONE (OUTPATIENT)
Dept: PHYSICAL MEDICINE AND REHAB | Facility: CLINIC | Age: 64
End: 2024-09-12

## 2024-09-12 DIAGNOSIS — M48.061 SPINAL STENOSIS OF LUMBAR REGION, UNSPECIFIED WHETHER NEUROGENIC CLAUDICATION PRESENT: ICD-10-CM

## 2024-09-12 DIAGNOSIS — M43.16 ANTEROLISTHESIS OF LUMBAR SPINE: ICD-10-CM

## 2024-09-12 PROCEDURE — 72120 X-RAY BEND ONLY L-S SPINE: CPT | Mod: TC | Performed by: RADIOLOGY

## 2024-09-12 NOTE — TELEPHONE ENCOUNTER
Pt scheduled for L5-S1 interlaminar epidural steroid injection on 9/30 as ordered by TRACY Hunter CNP.     Per injection provider, will need to reach out to rheumatology to get OK to proceed given patient is on Xeljanz for psoriatic arthritis and concern for increased risk of infection with Xeljanz + corticosteroids.     Routing to rheumatology team. Please advise if ok to proceed with injection. We may try to move patients injection sooner if OK given due to her severe pain.     Thank you!

## 2024-09-17 ENCOUNTER — RADIOLOGY INJECTION OFFICE VISIT (OUTPATIENT)
Dept: PHYSICAL MEDICINE AND REHAB | Facility: CLINIC | Age: 64
End: 2024-09-17
Attending: NURSE PRACTITIONER
Payer: COMMERCIAL

## 2024-09-17 VITALS
SYSTOLIC BLOOD PRESSURE: 126 MMHG | DIASTOLIC BLOOD PRESSURE: 70 MMHG | TEMPERATURE: 98.8 F | WEIGHT: 140 LBS | OXYGEN SATURATION: 95 % | RESPIRATION RATE: 16 BRPM | HEART RATE: 78 BPM | BODY MASS INDEX: 24.2 KG/M2

## 2024-09-17 DIAGNOSIS — M54.16 LUMBAR RADICULOPATHY: ICD-10-CM

## 2024-09-17 PROCEDURE — 62323 NJX INTERLAMINAR LMBR/SAC: CPT | Performed by: PAIN MEDICINE

## 2024-09-17 RX ORDER — DEXAMETHASONE SODIUM PHOSPHATE 10 MG/ML
INJECTION, SOLUTION INTRAMUSCULAR; INTRAVENOUS
Status: COMPLETED | OUTPATIENT
Start: 2024-09-17 | End: 2024-09-17

## 2024-09-17 RX ORDER — LIDOCAINE HYDROCHLORIDE 10 MG/ML
INJECTION, SOLUTION EPIDURAL; INFILTRATION; INTRACAUDAL; PERINEURAL
Status: COMPLETED | OUTPATIENT
Start: 2024-09-17 | End: 2024-09-17

## 2024-09-17 RX ADMIN — LIDOCAINE HYDROCHLORIDE 2 ML: 10 INJECTION, SOLUTION EPIDURAL; INFILTRATION; INTRACAUDAL; PERINEURAL at 13:35

## 2024-09-17 RX ADMIN — DEXAMETHASONE SODIUM PHOSPHATE 10 MG: 10 INJECTION, SOLUTION INTRAMUSCULAR; INTRAVENOUS at 13:36

## 2024-09-17 ASSESSMENT — PAIN SCALES - GENERAL
PAINLEVEL: MODERATE PAIN (5)
PAINLEVEL: MODERATE PAIN (5)

## 2024-09-17 NOTE — PATIENT INSTRUCTIONS
DISCHARGE INSTRUCTIONS    During office hours (8:00 a.m.- 4:00 p.m.) questions or concerns may be answered  by calling Spine Center Navigation Nurses at  [phone].  Messages received after hours will be returned the following business day.      In the case of an emergency, please dial 911 or seek assistance at the nearest Emergency Room/Urgent Care facility.     All Patients:    You may experience an increase in your symptoms for the first 2 days (It may take anywhere between 2 days- 2 weeks for the steroid to have maximum effect).    You may use ice on the injection site, as frequently as 20 minutes each hour if needed.    You may take your pain medicine.    You may continue taking your regular medication after your injection. If you have had a Medial Branch Block you may resume pain medication once your pain diary is completed.    You may shower. No swimming, tub bath or hot tub for 48 hours.  You may remove your bandaid/bandage as soon as you are home.    You may resume light activities, as tolerated.    Resume your usual diet as tolerated.    If you were told to hold any blood thinning medications you may resume taking them 24 hours after your procedure as prescribed.    It is strongly advised that you do not drive for 1-3 hours post injection.    If you have had oral sedation:  Do not drive for 8 hours post injection.      If you have had IV sedation:  Do not drive for 24 hours post injection.  Do not operate hazardous machinery or make important personal/business decisions for 24 hours.      POSSIBLE STEROID SIDE EFFECTS (If steroid/cortisone was used for your procedure)    -If you experience these symptoms, it should only last for a short period    Swelling of the legs              Skin redness (flushing)     Mouth (oral) irritation   Blood sugar (glucose) levels            Sweats                    Mood changes  Headache  Sleeplessness  Weakened immune system for up to 14 days, which could increase  the risk of romelia the COVID-19 virus and/or experiencing more severe symptoms of the disease, if exposed.  Decreased effectiveness of the flu vaccine if given within 2 weeks of the steroid.         POSSIBLE PROCEDURE SIDE EFFECTS  -Call the Spine Center if you are concerned  Increased Pain           Increased numbness/tingling      Nausea/Vomiting          Bruising/bleeding at site      Redness or swelling                                              Difficulty walking      Weakness           Fever greater than 100.5    *In the event of a severe headache after an epidural steroid injection that is relieved by lying down, please call the [facility] to speak with a clinical staff member*

## 2024-09-18 ENCOUNTER — TELEPHONE (OUTPATIENT)
Dept: PHYSICAL MEDICINE AND REHAB | Facility: CLINIC | Age: 64
End: 2024-09-18
Payer: COMMERCIAL

## 2024-09-18 NOTE — TELEPHONE ENCOUNTER
9/18/2024:  I spoke with the patient by phone about the lumbar flexion and extension x-rays which shows some dynamic motion at the L4-5 level with lumbar flexion.  Given the spinal canal stenosis at this level, I advised a consultation with spine surgery.  She is not interested in surgery and does not want consultation referral at this time.  We will plan to follow-up in the clinic in 2 weeks after the recent epidural steroid injection.  We reviewed red flags which would necessitate her being seen in the emergency department right away and she expressed understanding.  Eri Kuo NP  Physical Medicine and Rehabilitation, Medical Spine

## 2024-09-27 ENCOUNTER — OFFICE VISIT (OUTPATIENT)
Dept: RHEUMATOLOGY | Facility: CLINIC | Age: 64
End: 2024-09-27
Attending: STUDENT IN AN ORGANIZED HEALTH CARE EDUCATION/TRAINING PROGRAM
Payer: COMMERCIAL

## 2024-09-27 VITALS
WEIGHT: 140 LBS | OXYGEN SATURATION: 98 % | HEART RATE: 81 BPM | DIASTOLIC BLOOD PRESSURE: 82 MMHG | SYSTOLIC BLOOD PRESSURE: 119 MMHG | BODY MASS INDEX: 24.2 KG/M2

## 2024-09-27 DIAGNOSIS — L40.50 PSORIATIC ARTHRITIS (H): Primary | ICD-10-CM

## 2024-09-27 PROCEDURE — 99213 OFFICE O/P EST LOW 20 MIN: CPT | Performed by: STUDENT IN AN ORGANIZED HEALTH CARE EDUCATION/TRAINING PROGRAM

## 2024-09-27 PROCEDURE — G2211 COMPLEX E/M VISIT ADD ON: HCPCS | Performed by: STUDENT IN AN ORGANIZED HEALTH CARE EDUCATION/TRAINING PROGRAM

## 2024-09-27 PROCEDURE — 99214 OFFICE O/P EST MOD 30 MIN: CPT | Performed by: STUDENT IN AN ORGANIZED HEALTH CARE EDUCATION/TRAINING PROGRAM

## 2024-09-27 RX ORDER — HYDROXYCHLOROQUINE SULFATE 200 MG/1
TABLET, FILM COATED ORAL
Qty: 90 TABLET | Refills: 1 | Status: SHIPPED | OUTPATIENT
Start: 2024-09-27

## 2024-09-27 ASSESSMENT — PAIN SCALES - GENERAL: PAINLEVEL: MILD PAIN (3)

## 2024-09-27 NOTE — NURSING NOTE
Chief Complaint   Patient presents with    RECHECK     /82 (BP Location: Right arm, Patient Position: Sitting, Cuff Size: Adult Regular)   Pulse 81   Wt 63.5 kg (140 lb)   LMP  (LMP Unknown)   SpO2 98%   BMI 24.20 kg/m    Purvi Ren [facility]

## 2024-09-27 NOTE — LETTER
"9/27/2024       RE: Willa Downey  [address]  [place] [address]     Dear Colleague,    Thank you for referring your patient, Willa Downey, to the [facility] at [facility]. Please see a copy of my visit note below.    RETURN PATIENT RHEUMATOLOGY VISIT     Assessment & Plan    Problem List    Psoriatic arthritis  Comment: cutaneous, small joint and nail involvement  X-rays 4/2024   Hands - no productive or erosive changes  Feet - no productive or erosive changes  SI - mild bilateral arthrosis, no erosions     Tx history   Humira, enbrel, methotrexate failure  Current: xeljanz and Otezla    Per previous provider \"any prior interruptions in her therapy have resulted in severe flares of her disease which then necessitate prolonged steroid tapers, specifically for her cutaneous disease which has been very challenging to treat.\" Per Derm \"Actrema can rarely cause flaring of psoriasis so may be best to avoid\"     Currently with reasonably well controlled disease, without synovitis on exam, reassuring x-rays, and normal inflammatory markers,  but continues to have daily hand arthralgias limiting her ability to garden and do other activities important to her.       Plan:   -start Plaquenil 300mg daily (will assess for improvement in 3-4 months)   -Continue otezla 30mg BID  -Continue xeljanz 11mg once daily  -continue celebrex 200mg qAM and 100mg qPM  -MSK US of hands     Orders Placed This Encounter   Procedures     US MSK Complete      High risk medication use  Comment: Xeljanz, Otezla, chronic NSAID use, plaqunil   Blood pressure is within normal limits  Status post shingles vaccine.    Plan:  -Toxicity monitoring labs every 3 months  -Recommend latest COVID booster and flu shot this season   We discussed the possible risks and side effects of Plaquenil including Plaquenil eye toxicity.  I recommended she have yearly Plaquenil eye " "exams.  She is in agreement with starting this medication.     RTC in 3-4  months     30 minutes spent on the day of the encounter doing chart review, history and exam, counseling and documentation.     The longitudinal plan of care for the diagnosis(es)/condition(s) as documented were addressed during this visit. Due to the added complexity in care, I will continue to support Willa in the subsequent management and with ongoing continuity of care.      Subjective      Continues to have a lot of pain over her hands and is no longer able to do yard work because of this. Has pain over her MCPs mostly. Morning stiffness for 20 minutes.     HPI    From original consult with Dr. Arias:     \"63 year old female with hx of advanced glaucoma followed closely by ophthalmology presented to rheumatology on 2/5/21 with 5 month of progressive left hand 3rd, 4th, 5th digit and right hand 4th digit DIP inflammatory arthritis in the context of psoriasis, psoriatic changes of the nails. Taken together most consistent with psoriatic arthritis. Tried on 2 NSAIDs which failed to control symptoms, and had severe GERD symptoms. Tried methotrexate by prior rheumatologist and disease not controlled. Humira started Mid February 2021 due to lack of efficacy of these other therapies and continued through May 14th, though had severe/ rapidly progressive disease after some improvement in the first 4 weeks involving DIP inflammatory arthritis and nail disease. We then ordered Enbrel, though starting was delayed due to development of impressive bilateral submandibular lymphadenopathy which resolved within about 1 week and thought likely secondary to viral infection. She started enbrel on 6/20/2021 which she had continued on until switching to cosentyx after approval on 8/31/21 due to lack of efficacy of enbrel and injection site reaction. Had been on cosentyx from 8/31/21 with initial improvement in inflammatory DIP arthritis, psoriatic " "lesions, stabilization of her nail disease though she then had progression of her cutaneous nail and joint inflammatory process which prompted restarting prednisone at 10 mg daily along with doubling her dose of Cosentyx to 300 mg each month.  She took her first dose of 300 mg on 2021.  She did try to decrease her prednisone shortly after that down to 7.5 though noticed return of her skin peeling/psoriatic skin lesions worsening and so went back up to 10 mg daily which has again improved these symptoms. She was again unable to taper from prednisone below 10mg daily without progression of both cutaneous and articular symptoms and so cosentyx was discontinued and xeljanz 11mg once daily started after insurance approval on 21. With each attempt to taper her steroids, had difficultly due to return of psoriasis so Willa was seen by Dermatology and started on otezla in addition to xeljanz. She returns today for follow-up.     No raynaud's, no oral or nasal ulcers, no hx of inflammatory eye disease, no rashes, no skin sores, no joint pain or swelling, no GERD, no blood in urine or stool, no dyspnea, no cough, no chest pain, no fevers or weigh loss, no hx of blood clots or miscarriages, no dry eyes or dry mouth, no numbness or tingling, no muscle pain     No family or personal Hx of known autoimmune disease  Not a previous smoker. Not a current smoker.\"      Lab and Imaging review:    I reviewed recent labs and imaging includin2024 CRP, ESR, CBC, creatinine, AST, ALT all within normal limits      Current Outpatient Medications:      apremilast (OTEZLA) 30 MG tablet, Take 1 tablet (30 mg) by mouth 2 times daily Hold for signs of infection, and seek medical attention., Disp: 180 tablet, Rfl: 1     CALCIUM CARBONATE-VIT D-MIN PO, Take 1 tablet by mouth, Disp: , Rfl:      celecoxib (CELEBREX) 100 MG capsule, Take 200 mg by mouth every morning and 100 mg by mouth every evening (Patient taking differently: Take " 100 mg by mouth 2 times daily. Take 200 mg by mouth every morning and 100 mg by mouth every evening), Disp: 270 capsule, Rfl: 1     clobetasol (TEMOVATE) 0.05 % external solution, Apply topically daily To affected areas of scalp (Patient taking differently: Apply topically daily as needed. To affected areas of scalp), Disp: 50 mL, Rfl: 2     gabapentin (NEURONTIN) 300 MG capsule, Take 1 capsule (300 mg) by mouth every morning AND 2 capsules (600 mg) at bedtime., Disp: 90 capsule, Rfl: 3     hydroxychloroquine (PLAQUENIL) 200 MG tablet, Take one and a half pills a day (300mg), Disp: 90 tablet, Rfl: 1     loratadine 10 MG capsule, Take 1 tablet by mouth, Disp: , Rfl:      Multiple Vitamins-Minerals (MULTIVITAMIN ADULTS 50+) TABS, daily , Disp: , Rfl:      Roflumilast 0.3 % CREA, Externally apply 1 Dose topically daily To psoraisis on hands as needed, Disp: 60 g, Rfl: 11     tacrolimus (PROTOPIC) 0.1 % external ointment, Apply topically 2 times daily To hands and groin as needed, Disp: 60 g, Rfl: 3     tazarotene (TAZORAC) 0.1 % external cream, Apply nightly to hands as needed, Disp: 30 g, Rfl: 3     tofacitinib (XELJANZ XR) 11 MG 24 hr tablet, Take 1 tablet (11 mg) by mouth daily Hold for signs of infection, then seek medical attention. Monitoring labs every 6 months - Oral, Disp: 90 tablet, Rfl: 1  Allergies:  Allergies   Allergen Reactions     Adhesive Tape Blisters, Itching and Swelling     Dust Mites      Latex Blisters, Itching, Other (See Comments) and Swelling     Skin sensitivity       Morphine Sulfate (Concentrate) Itching     Other reaction(s): Chills  heart palpatations     Seasonal Allergies      Codeine Palpitations     chills     Medical Hx:  Past Medical History:   Diagnosis Date     Bilateral low back pain with right-sided sciatica, unspecified chronicity 11/11/2020     Neuroforaminal stenosis of lumbar spine 11/11/2020     Nuclear senile cataract of both eyes 08/16/2018     Osteoarthritis 10/15/2020      Psoriatic arthropathy (H) 10/19/2020     Serum calcium elevated 5/31/2023     Surgical Hx:  Past Surgical History:   Procedure Laterality Date     BIOPSY  07/30/2019    the right breast tissue(benign)     COLONOSCOPY N/A 7/14/2022    Procedure: COLONOSCOPY;  Surgeon: Roderick Campos MD;  [place] [facility] [place]     EYE SURGERY      3 surgeries for glaucoma treatment     Family Hx:  Family History   Problem Relation Age of Onset     Hyperlipidemia Mother         My mother had high cholesterol.     Osteoporosis Mother      Hypertension Mother      Stomach Cancer Father         not sure where started     Cataracts Maternal Grandmother      Breast Cancer Maternal Aunt         post enopause     Melanoma No family hx of      Skin Cancer No family hx of      Social Hx:  Social History     Tobacco Use     Smoking status: Never     Smokeless tobacco: Never   Vaping Use     Vaping status: Never Used   Substance Use Topics     Alcohol use: Never     Drug use: Never        Objective  Physical Exam   /82 (BP Location: Right arm, Patient Position: Sitting, Cuff Size: Adult Regular)   Pulse 81   Wt 63.5 kg (140 lb)   LMP  (LMP Unknown)   SpO2 98%   BMI 24.20 kg/m    General: alert, well appearing, no distress  HEENT:  clear conjunctiva,   Cardiac: warm and well perfused  Pulm: normal respiratory effort,   MSK: Hand, wrist, elbow, and shoulder joints without evidence of synovitis or effusion. Intact and nonpainful range of motion. No Heberden/Rick nodes.        Ira Guzman MD  Rheumatology       Again, thank you for allowing me to participate in the care of your patient.      Sincerely,    Ira Guzman MD

## 2024-09-27 NOTE — PROGRESS NOTES
"RETURN PATIENT RHEUMATOLOGY VISIT     Assessment & Plan     Problem List    Psoriatic arthritis  Comment: cutaneous, small joint and nail involvement  X-rays 4/2024   Hands - no productive or erosive changes  Feet - no productive or erosive changes  SI - mild bilateral arthrosis, no erosions     Tx history   Humira, enbrel, methotrexate failure  Current: xeljanz and Otezla    Per previous provider \"any prior interruptions in her therapy have resulted in severe flares of her disease which then necessitate prolonged steroid tapers, specifically for her cutaneous disease which has been very challenging to treat.\" Per Derm \"Actrema can rarely cause flaring of psoriasis so may be best to avoid\"     Currently with reasonably well controlled disease, without synovitis on exam, reassuring x-rays, and normal inflammatory markers,  but continues to have daily hand arthralgias limiting her ability to garden and do other activities important to her.       Plan:   -start Plaquenil 300mg daily (will assess for improvement in 3-4 months)   -Continue otezla 30mg BID  -Continue xeljanz 11mg once daily  -continue celebrex 200mg qAM and 100mg qPM  -MSK US of hands     Orders Placed This Encounter   Procedures    US MSK Complete      High risk medication use  Comment: Xeljanz, Otezla, chronic NSAID use, plaqunil   Blood pressure is within normal limits  Status post shingles vaccine.    Plan:  -Toxicity monitoring labs every 3 months  -Recommend latest COVID booster and flu shot this season   We discussed the possible risks and side effects of Plaquenil including Plaquenil eye toxicity.  I recommended she have yearly Plaquenil eye exams.  She is in agreement with starting this medication.     RTC in 3-4  months     30 minutes spent on the day of the encounter doing chart review, history and exam, counseling and documentation.     The longitudinal plan of care for the diagnosis(es)/condition(s) as documented were addressed during this " "visit. Due to the added complexity in care, I will continue to support Willa in the subsequent management and with ongoing continuity of care.      Subjective       Continues to have a lot of pain over her hands and is no longer able to do yard work because of this. Has pain over her MCPs mostly. Morning stiffness for 20 minutes.     HPI    From original consult with Dr. Arias:     \"63 year old female with hx of advanced glaucoma followed closely by ophthalmology presented to rheumatology on 2/5/21 with 5 month of progressive left hand 3rd, 4th, 5th digit and right hand 4th digit DIP inflammatory arthritis in the context of psoriasis, psoriatic changes of the nails. Taken together most consistent with psoriatic arthritis. Tried on 2 NSAIDs which failed to control symptoms, and had severe GERD symptoms. Tried methotrexate by prior rheumatologist and disease not controlled. Humira started Mid February 2021 due to lack of efficacy of these other therapies and continued through May 14th, though had severe/ rapidly progressive disease after some improvement in the first 4 weeks involving DIP inflammatory arthritis and nail disease. We then ordered Enbrel, though starting was delayed due to development of impressive bilateral submandibular lymphadenopathy which resolved within about 1 week and thought likely secondary to viral infection. She started enbrel on 6/20/2021 which she had continued on until switching to cosentyx after approval on 8/31/21 due to lack of efficacy of enbrel and injection site reaction. Had been on cosentyx from 8/31/21 with initial improvement in inflammatory DIP arthritis, psoriatic lesions, stabilization of her nail disease though she then had progression of her cutaneous nail and joint inflammatory process which prompted restarting prednisone at 10 mg daily along with doubling her dose of Cosentyx to 300 mg each month.  She took her first dose of 300 mg on 11/23/2021.  She did try to " "decrease her prednisone shortly after that down to 7.5 though noticed return of her skin peeling/psoriatic skin lesions worsening and so went back up to 10 mg daily which has again improved these symptoms. She was again unable to taper from prednisone below 10mg daily without progression of both cutaneous and articular symptoms and so cosentyx was discontinued and xeljanz 11mg once daily started after insurance approval on 21. With each attempt to taper her steroids, had difficultly due to return of psoriasis so Willa was seen by Dermatology and started on otezla in addition to xeljanz. She returns today for follow-up.     No raynaud's, no oral or nasal ulcers, no hx of inflammatory eye disease, no rashes, no skin sores, no joint pain or swelling, no GERD, no blood in urine or stool, no dyspnea, no cough, no chest pain, no fevers or weigh loss, no hx of blood clots or miscarriages, no dry eyes or dry mouth, no numbness or tingling, no muscle pain     No family or personal Hx of known autoimmune disease  Not a previous smoker. Not a current smoker.\"      Lab and Imaging review:    I reviewed recent labs and imaging includin2024 CRP, ESR, CBC, creatinine, AST, ALT all within normal limits      Current Outpatient Medications:     apremilast (OTEZLA) 30 MG tablet, Take 1 tablet (30 mg) by mouth 2 times daily Hold for signs of infection, and seek medical attention., Disp: 180 tablet, Rfl: 1    CALCIUM CARBONATE-VIT D-MIN PO, Take 1 tablet by mouth, Disp: , Rfl:     celecoxib (CELEBREX) 100 MG capsule, Take 200 mg by mouth every morning and 100 mg by mouth every evening (Patient taking differently: Take 100 mg by mouth 2 times daily. Take 200 mg by mouth every morning and 100 mg by mouth every evening), Disp: 270 capsule, Rfl: 1    clobetasol (TEMOVATE) 0.05 % external solution, Apply topically daily To affected areas of scalp (Patient taking differently: Apply topically daily as needed. To affected areas of " scalp), Disp: 50 mL, Rfl: 2    gabapentin (NEURONTIN) 300 MG capsule, Take 1 capsule (300 mg) by mouth every morning AND 2 capsules (600 mg) at bedtime., Disp: 90 capsule, Rfl: 3    hydroxychloroquine (PLAQUENIL) 200 MG tablet, Take one and a half pills a day (300mg), Disp: 90 tablet, Rfl: 1    loratadine 10 MG capsule, Take 1 tablet by mouth, Disp: , Rfl:     Multiple Vitamins-Minerals (MULTIVITAMIN ADULTS 50+) TABS, daily , Disp: , Rfl:     Roflumilast 0.3 % CREA, Externally apply 1 Dose topically daily To psoraisis on hands as needed, Disp: 60 g, Rfl: 11    tacrolimus (PROTOPIC) 0.1 % external ointment, Apply topically 2 times daily To hands and groin as needed, Disp: 60 g, Rfl: 3    tazarotene (TAZORAC) 0.1 % external cream, Apply nightly to hands as needed, Disp: 30 g, Rfl: 3    tofacitinib (XELJANZ XR) 11 MG 24 hr tablet, Take 1 tablet (11 mg) by mouth daily Hold for signs of infection, then seek medical attention. Monitoring labs every 6 months - Oral, Disp: 90 tablet, Rfl: 1  Allergies:  Allergies   Allergen Reactions    Adhesive Tape Blisters, Itching and Swelling    Dust Mites     Latex Blisters, Itching, Other (See Comments) and Swelling     Skin sensitivity      Morphine Sulfate (Concentrate) Itching     Other reaction(s): Chills  heart palpatations    Seasonal Allergies     Codeine Palpitations     chills     Medical Hx:  Past Medical History:   Diagnosis Date    Bilateral low back pain with right-sided sciatica, unspecified chronicity 11/11/2020    Neuroforaminal stenosis of lumbar spine 11/11/2020    Nuclear senile cataract of both eyes 08/16/2018    Osteoarthritis 10/15/2020    Psoriatic arthropathy (H) 10/19/2020    Serum calcium elevated 5/31/2023     Surgical Hx:  Past Surgical History:   Procedure Laterality Date    BIOPSY  07/30/2019    the right breast tissue(benign)    COLONOSCOPY N/A 7/14/2022    Procedure: COLONOSCOPY;  Surgeon: Roderick Campos MD;  Location: [facility] OR    EYE  SURGERY      3 surgeries for glaucoma treatment     Family Hx:  Family History   Problem Relation Age of Onset    Hyperlipidemia Mother         My mother had high cholesterol.    Osteoporosis Mother     Hypertension Mother     Stomach Cancer Father         not sure where started    Cataracts Maternal Grandmother     Breast Cancer Maternal Aunt         post enopause    Melanoma No family hx of     Skin Cancer No family hx of      Social Hx:  Social History     Tobacco Use    Smoking status: Never    Smokeless tobacco: Never   Vaping Use    Vaping status: Never Used   Substance Use Topics    Alcohol use: Never    Drug use: Never        Objective   Physical Exam   /82 (BP Location: Right arm, Patient Position: Sitting, Cuff Size: Adult Regular)   Pulse 81   Wt 63.5 kg (140 lb)   LMP  (LMP Unknown)   SpO2 98%   BMI 24.20 kg/m    General: alert, well appearing, no distress  HEENT:  clear conjunctiva,   Cardiac: warm and well perfused  Pulm: normal respiratory effort,   MSK: Hand, wrist, elbow, and shoulder joints without evidence of synovitis or effusion. Intact and nonpainful range of motion. No [place]/Rick nodes.        Ira Guzman MD  Rheumatology

## 2024-10-11 ENCOUNTER — LAB (OUTPATIENT)
Dept: LAB | Facility: CLINIC | Age: 64
End: 2024-10-11
Payer: COMMERCIAL

## 2024-10-11 DIAGNOSIS — L40.50 PSORIATIC ARTHROPATHY (H): ICD-10-CM

## 2024-10-11 LAB
ALT SERPL W P-5'-P-CCNC: 16 U/L (ref 0–50)
AST SERPL W P-5'-P-CCNC: 23 U/L (ref 0–45)
CREAT SERPL-MCNC: 0.62 MG/DL (ref 0.51–0.95)
CRP SERPL-MCNC: 5.76 MG/L
EGFRCR SERPLBLD CKD-EPI 2021: >90 ML/MIN/1.73M2
ERYTHROCYTE [DISTWIDTH] IN BLOOD BY AUTOMATED COUNT: 13.2 % (ref 10–15)
ERYTHROCYTE [SEDIMENTATION RATE] IN BLOOD BY WESTERGREN METHOD: 11 MM/HR (ref 0–30)
HCT VFR BLD AUTO: 38.3 % (ref 35–47)
HGB BLD-MCNC: 12.5 G/DL (ref 11.7–15.7)
MCH RBC QN AUTO: 29.3 PG (ref 26.5–33)
MCHC RBC AUTO-ENTMCNC: 32.6 G/DL (ref 31.5–36.5)
MCV RBC AUTO: 90 FL (ref 78–100)
PLATELET # BLD AUTO: 372 10E3/UL (ref 150–450)
RBC # BLD AUTO: 4.26 10E6/UL (ref 3.8–5.2)
WBC # BLD AUTO: 6.7 10E3/UL (ref 4–11)

## 2024-10-11 PROCEDURE — 84450 TRANSFERASE (AST) (SGOT): CPT

## 2024-10-11 PROCEDURE — 85652 RBC SED RATE AUTOMATED: CPT

## 2024-10-11 PROCEDURE — 86140 C-REACTIVE PROTEIN: CPT

## 2024-10-11 PROCEDURE — 85027 COMPLETE CBC AUTOMATED: CPT

## 2024-10-11 PROCEDURE — 84460 ALANINE AMINO (ALT) (SGPT): CPT

## 2024-10-11 PROCEDURE — 36415 COLL VENOUS BLD VENIPUNCTURE: CPT

## 2024-10-11 PROCEDURE — 82565 ASSAY OF CREATININE: CPT

## 2024-10-14 ENCOUNTER — OFFICE VISIT (OUTPATIENT)
Dept: PHYSICAL MEDICINE AND REHAB | Facility: CLINIC | Age: 64
End: 2024-10-14
Payer: COMMERCIAL

## 2024-10-14 VITALS
SYSTOLIC BLOOD PRESSURE: 128 MMHG | HEART RATE: 82 BPM | DIASTOLIC BLOOD PRESSURE: 82 MMHG | RESPIRATION RATE: 16 BRPM | OXYGEN SATURATION: 97 %

## 2024-10-14 DIAGNOSIS — M54.16 LUMBAR RADICULOPATHY: ICD-10-CM

## 2024-10-14 DIAGNOSIS — M48.061 NEUROFORAMINAL STENOSIS OF LUMBAR SPINE: ICD-10-CM

## 2024-10-14 DIAGNOSIS — M47.816 SPONDYLOSIS OF LUMBAR REGION WITHOUT MYELOPATHY OR RADICULOPATHY: ICD-10-CM

## 2024-10-14 DIAGNOSIS — M48.061 SPINAL STENOSIS OF LUMBAR REGION, UNSPECIFIED WHETHER NEUROGENIC CLAUDICATION PRESENT: Primary | ICD-10-CM

## 2024-10-14 DIAGNOSIS — M43.16 ANTEROLISTHESIS OF LUMBAR SPINE: ICD-10-CM

## 2024-10-14 PROCEDURE — 99213 OFFICE O/P EST LOW 20 MIN: CPT | Performed by: NURSE PRACTITIONER

## 2024-10-14 NOTE — PROGRESS NOTES
"PM&R Follow-Up Visit -     Date of Initial Visit: 2/9/2024  LOV: 9/17/2024  - procedure visit  TD: 10/14/2024     Recall: Willa Downey is a 63 year old female who was previously seen in the spine clinic with a chief complaint of right sided low back pain.     INTERVAL HISTORY:  Patient was last seen in clinic 9/17/2024 for L5-S1 ILESI  with Dr Velazquez.   She was seen today in the clinic for follow up and reports 80-85% improvement in the level of pain after the EMILY.   She reports the \"sharp, sciatic\" pain is milder. However she continues to have a dull pain in sacral area especially with walking. She enjoys walking 1.5 miles. She says it is hard to identify triggers for the back and leg pain.      After the last visit, lumbar flexion/extension X rays showed some dynamic motion with lumbar flexion at the L4-5 level; at this level there is also severe spinal canal stenosis. We had previously discussed spine surgery consultation given these findings and she had declined, but today states she would like to proceed with a spine surgery referral as she would like to know more about surgical options.        RECALL HISTORY OF PRESENT ILLNESS:  Willa Downey is a 63 year old female who presents with a chief complaint of right sided low back pain.     She was seen today in the clinic. She describes right-sided low back pain which began around 2020 without any known cause.  She says that at the time she was having some radicular pain into the right lower extremity.  However, she feels that the pain has generally improved over time.    She says that she had been taking gabapentin 3 times a day for pain, but says that she tapered down to twice a day, which she finds is working out okay.  She states that her pain level usually ranges from 0-2/10.  If she does more activity, in particular prolonged standing, she will have an increased degree of pain.  She is active - walks 2 miles regularly 4 to 5 days out of the week. " " The walking does not seem to aggravate the pain.    Today, she describes pain & tightness affecting the right side low back extending into the lateral right hip.  There is no pain extending into the right lower extremity.  She notes that this morning she had a pulling sensation extending into her left leg, but says that she does not usually have pain in that area.    Aggravating factors include: prolonged standing, laying on right  Relieving factors include: stretching or medication     Today, she rates the pain 0/10.  At its worst over the last week the pain was 2/10.   Patient states sleep is not affected though she does feel sometimes uncomfortable laying on her right side.     She denies falls, weakness, bowel or bladder incontinence, saddle anesthesia, unintentional weight loss, fevers/chills, or night sweats.     Today, she says her goal is to refill the gabapentin, which she describes as a \"miracle medicine for me\".      8/6/2024:  Patient was last seen in clinic 2/9/2024. At that visit, the plan was to continue gabapentin 300 mg twice daily.    She was seen today in the clinic due to new distribution of pain.    At the prior visit, she described pain & tightness affecting the right side low back extending into the lateral right hip.    Currently, she says that she the nonradiating right-sided back pain has been well-controlled.  However, she has now developed some new radiating pain on the left side which began in April.      She describes:  -Left low back/gluteal pain radiating to the lateral left leg to the ankle.    She states this pain feels familiar to what she experienced on the right lower extremity in the past.    She denies lower extremity numbness or tingling.      Pain is aggravated with: Lying down, sitting, standing, twisting, being on the hands and knees or squatting while cleaning, vacuuming, getting up after sitting on her recliner couch, worse in the evening  Pain is relieved with: Walking, " sitting upright or on a more firm chair    She is tolerating gabapentin 300 mg twice daily.    She follows with rheumatology in regards to psoriatic arthritis.    She denies falls, weakness, bowel or bladder incontinence, or saddle anesthesia.      9/11/2024:  Patient was last seen in clinic 8/6/2024. At that visit, the plan was to update the MRI of the lumbar spine and titrate gabapentin 300 mg from BID to TID.    At the last visit she described:  -Left low back/gluteal pain radiating to the lateral left leg to the ankle.   -prior history of right sided low back pain extending into the lateral right hip which had improved    Today, she endorses ongoing & worsening pain radiating from the left buttocks along the lateral side of the left leg and terminating at the ankle.  She states that last night she was having pain at the middle of her back also.    The pain tends to be worse at night or in the evening.  She notes shooting pain into the left leg in the evening.  Getting up and taking a few steps tends to help.  However, if she sits back down, the pain returns. She sometimes has a hard time falling back asleep at night.      She does have pain when she wakes up in the morning, but typically the pain is better during the day.  However, in the past few days, she notices the pain has been getting worse during the day too.  Despite that, she can still walk up to 2 miles without pain interfering.    Lumbar flexion or lumbar extension do not seem to affect pain levels.    She recalls that this morning when she got up the pain was 4-5/10.    She denies bowel or bladder incontinence, or saddle anesthesia.      Since last time she tried adding a midday dose of gabapentin but it made her too drowsy. So she is currently taking 300 mg of gabapentin twice daily.          PRIOR INJURIES/TREATMENT:   Ice/Heat: no    Brace: no    Physical Therapy:   PT for low back pain 2020     - Current Pain Medications -   Gabapentin 300 mg in  the morning, 600 mg at bedtime  Xeljanz   Otezla  Celebrex     - Prior/Trialed Pain Medications -   Flexeril  Steroid burst    Prior Procedures:  Date    Procedure   Improvement (%)  9/17/2024  L5-S1 ILESI ([facility])  80-85%              Prior Related Surgery: none   Other (acupuncture, OMT, CMM, TENS, DME, etc.):     Specialists Seen - (with most recent, available notes and clinic visits reviewed)   1. Sports medicine - Dr Zhou, Dr Tesfaye  2.  Rheumatology-psoriatic arthritis      IMAGING - reviewed   LUMBAR FLEX/EXT TWO TO THREE VIEWS September 12, 2024 IMPRESSION: Five lumbar type vertebrae. Grade 1 degenerative  anterolisthesis of L4 upon L5 again noted. Alignment otherwise normal.  Alignment does not change appreciably in the extended position. In  flexion, there is probable interval increase in anterolisthesis of L4  upon L5; alignment otherwise remains normal in flexion. Vertebral body  heights normal. Facet arthropathy at L4-L5 and L5-S1.         MR LUMBAR SPINE W/O CONTRAST 9/8/2024   Findings: There are 5 lumbar-type vertebrae assumed for the purposes  of this dictation.  The tip of the conus medullaris is at L1. Grade  1-2 anterolisthesis of L4, increased from previous. Mild loss of disc  height at L4-5.  Normal marrow signal.     On a level by level basis:     T12-L1: No spinal canal or neuroforaminal stenosis.     L1-2: No spinal canal or neuroforaminal stenosis.     L2-3: No spinal canal or neuroforaminal stenosis.     L3-4: No spinal canal or neuroforaminal stenosis.     L4-5: Grade 1-2 anterolisthesis and uncovering of the disc.  Superimposed central disc extrusion with slight cephalad migration.  Bilateral facet hypertrophy and ligamentum flavum thickening. Further  increased severe spinal canal stenosis. Moderate bilateral neural  foraminal stenosis.      L5-S1: Posterior disc bulge and bilateral facet hypertrophy. Mild  spinal canal narrowing. Mild bilateral neural foraminal narrowing.      Paraspinous tissues are within normal limits.                                                 Impression:   1. Increased grade 1-2 anterolisthesis of L4 on L5, with continued  severe spinal canal stenosis and moderate bilateral neural foraminal  stenosis at this level. Portion of the uncovered disc appears slightly  decreased in size.  2. Additional levels appear similar to previous.      XR SACROILIAC JOINT 1/2 VIEWS  04/10/2024                                                                 IMPRESSION: Mild bilateral SI joint arthrosis, left greater than right. No erosive change to suggest an inflammatory arthropathy. Both hip joint spaces are maintained.         MR LUMBAR SPINE W/O CONTRAST 8/14/2020    Findings: There are 5 lumbar-type vertebrae assumed for the purposes  of this dictation.  The tip of the conus medullaris is at L1.  Grade 1  anterolisthesis of L4. Mild loss of disc height at L4-5.  Normal  marrow signal.     On a level by level basis:     T12-L1: No spinal canal or neuroforaminal stenosis.     L1-2: No spinal canal or neuroforaminal stenosis.     L2-3: No spinal canal or neuroforaminal stenosis.     L3-4: No spinal canal or neuroforaminal stenosis.     L4-5: Grade 1 anterolisthesis and uncovering of the disc. Superimposed  central disc extrusion with cephalad migration. Bilateral facet  hypertrophy and ligamentum flavum thickening. Severe spinal canal  stenosis. Moderate bilateral neural foraminal stenosis. Periarticular  edema surrounding the bilateral facet joints.     L5-S1: Posterior disc bulge and bilateral facet hypertrophy. Mild  spinal canal narrowing. Mild bilateral neural foraminal narrowing.     Paraspinous tissues are within normal limits.                                                    Impression:   1. Spondylosis, particularly at L4-5 where there is severe spinal  canal stenosis and moderate bilateral neural foraminal stenosis.  2. Active inflammatory arthropathy of the  bilateral L4-5 facet joints.      2 views lumbar spine radiographs 7/13/2020  Findings:     Standing  AP and lateral  views of the lumbar spine were obtained.     5  lumbar type vertebral bodies are assumed for the purpose of this  dictation.     There is no acute osseous abnormality.       Grade 1 spondylolisthesis of L4 on L5 measuring approximately 8 mm  with associated mild intervertebral disc space loss. Otherwise, Disc  height and vertebral body heights are maintained throughout.     The visualized bowel gas pattern is non-obstructive.                                                  Impression:  1.  No acute osseous abnormality.  2.  Grade 1 degenerative type spondylolisthesis of L4 on L5, measuring  approximately 8 mm.      Review Of Systems:  I am responding to those symptoms which are directly relevant to the specific indication for my consultation. I recommend that the patient follow up with their primary or referring provider to pursue any other symptoms which may be of concern.       Medical History:  She  has a past medical history of Bilateral low back pain with right-sided sciatica, unspecified chronicity (11/11/2020), Neuroforaminal stenosis of lumbar spine (11/11/2020), Nuclear senile cataract of both eyes (08/16/2018), Osteoarthritis (10/15/2020), Psoriatic arthropathy (H) (10/19/2020), and Serum calcium elevated (5/31/2023).     She  has a past surgical history that includes biopsy (07/30/2019); Eye surgery; and Colonoscopy (N/A, 7/14/2022).    Family History  Her family history includes Breast Cancer in her maternal aunt; Cataracts in her maternal grandmother; Hyperlipidemia in her mother; Hypertension in her mother; Osteoporosis in her mother; Stomach Cancer in her father.     Social History:  Work: stay at home mom  Current living situation: lives with family and her daughter, another daughter lives on her own. Performs ADLs/IADLs independently.    She  reports that she has never smoked. She  has never used smokeless tobacco. She reports that she does not drink alcohol and does not use drugs.        Current Medications:   She has a current medication list which includes the following prescription(s): apremilast, calcium carbonate-vit d-min, celecoxib, clobetasol, doxycycline monohydrate, gabapentin, loratadine, melatonin, multivitamin adults 50+, tacrolimus, tazarotene, and xeljanz xr, and the following Facility-Administered Medications: triamcinolone.     Allergies:    -- Adhesive Tape -- Blisters, Itching and Swelling   -- Dust Mites    -- Latex -- Blisters, Itching, Other (See                            Comments) and Swelling    --  Skin sensitivity   -- Morphine Sulfate (Concentrate) -- Itching    --  Other reaction(s): Chills             heart palpatations   -- Seasonal Allergies    -- Codeine -- Palpitations    --  chills        PHYSICAL EXAMINATION:   /82 (BP Location: Right arm, Patient Position: Sitting, Cuff Size: Adult Regular)   Pulse 82   Resp 16   LMP  (LMP Unknown)   SpO2 97%    --CONSTITUTIONAL: Vital signs as above. No acute distress. The patient is well nourished and well groomed.  --PSYCHIATRIC: The patient is awake, alert, oriented to person, place, time and answering questions appropriately with clear speech. Appropriate mood and affect   --SKIN:  Posterior torso is clean, dry, intact without rashes.   --RESPIRATORY: Normal rhythm and effort. No abnormal accessory muscle breathing patterns noted.   --GROSS MOTOR: Easily arises from a seated position.          ASSESSMENT:  Willa Downey is a pleasant 63 year old female who presents with   -Progressive, worsening left low back/gluteal pain radiating to the lateral left leg terminating at the ankle.   -prior history of right sided low back pain extending into the lateral right hip which has improved    MRI of the lumbar spine from 9/8/2024 shows:  -Grade 1-2 anterolisthesis of L4, increased from previous  -increased severe  spinal canal stenosis at L4-5  -moderate bilat NFS at L4-5    Complicating comorbidities include:  # Psoriatic arthritis on Xeljanz, follows with rheumatology    PLAN:  -9/17/2024 L5-S1 ILESI provided 80-85% improvement in the level of pain  -MRI lumbar spine and lumbar flexion/extension views reviewed with patient today  -given dynamic motion seen on lumbar flexion/extension X rays at the L4/5 level where there is also severe spinal canal stenosis, we discussed consultation with spine surgery. She was in agreement and referral was added for consultation with Dr Caballero  -we did discuss the option of MBB/RFA for facet mediated pain and she prefers to hold off   -Continue gabapentin 300 mg in the morning and 600 mg at bedtime  -We reviewed red flags which would necessitate her being seen in the emergency department right away and she expressed understanding  -She did not find physical therapy useful in the past, declined referral for PT previously  -RTC PRN pending consultation with spine surgery        Ready to learn, no apparent learning barriers.  Education provided on treatment plan according to patient's preferred learning style.  Patient verbalizes understanding.   __________________________________  Eri Kuo NP  Physical Medicine & Rehabilitation        29 minutes spent by me on the date of the encounter doing chart review, history and exam, documentation, coordination with staff regarding injection and further activities per the note

## 2024-10-14 NOTE — LETTER
"10/14/2024       RE: Willa Downey  [address]  [facility] [id number]     Dear Colleague,    Thank you for referring your patient, Willa Downey, to the [facility] PHYSICAL MEDICINE AND REHABILITATION CLINIC [place] at [facility]. Please see a copy of my visit note below.    PM&R Follow-Up Visit -     Date of Initial Visit: 2/9/2024  LOV: 9/17/2024  - procedure visit  TD: 10/14/2024     Recall: Willa Downey is a 63 year old female who was previously seen in the spine clinic with a chief complaint of right sided low back pain.     INTERVAL HISTORY:  Patient was last seen in clinic 9/17/2024 for L5-S1 ILESI  with Dr Velazquez.   She was seen today in the clinic for follow up and reports 80-85% improvement in the level of pain after the EMILY.   She reports the \"sharp, sciatic\" pain is milder. However she continues to have a dull pain in sacral area especially with walking. She enjoys walking 1.5 miles. She says it is hard to identify triggers for the back and leg pain.      After the last visit, lumbar flexion/extension X rays showed some dynamic motion with lumbar flexion at the L4-5 level; at this level there is also severe spinal canal stenosis. We had previously discussed spine surgery consultation given these findings and she had declined, but today states she would like to proceed with a spine surgery referral as she would like to know more about surgical options.        RECALL HISTORY OF PRESENT ILLNESS:  Willa Downey is a 63 year old female who presents with a chief complaint of right sided low back pain.     She was seen today in the clinic. She describes right-sided low back pain which began around 2020 without any known cause.  She says that at the time she was having some radicular pain into the right lower extremity.  However, she feels that the pain has generally improved over time.    She says that she had been taking " "gabapentin 3 times a day for pain, but says that she tapered down to twice a day, which she finds is working out okay.  She states that her pain level usually ranges from 0-2/10.  If she does more activity, in particular prolonged standing, she will have an increased degree of pain.  She is active - walks 2 miles regularly 4 to 5 days out of the week.  The walking does not seem to aggravate the pain.    Today, she describes pain & tightness affecting the right side low back extending into the lateral right hip.  There is no pain extending into the right lower extremity.  She notes that this morning she had a pulling sensation extending into her left leg, but says that she does not usually have pain in that area.    Aggravating factors include: prolonged standing, laying on right  Relieving factors include: stretching or medication     Today, she rates the pain 0/10.  At its worst over the last week the pain was 2/10.   Patient states sleep is not affected though she does feel sometimes uncomfortable laying on her right side.     She denies falls, weakness, bowel or bladder incontinence, saddle anesthesia, unintentional weight loss, fevers/chills, or night sweats.     Today, she says her goal is to refill the gabapentin, which she describes as a \"miracle medicine for me\".      8/6/2024:  Patient was last seen in clinic 2/9/2024. At that visit, the plan was to continue gabapentin 300 mg twice daily.    She was seen today in the clinic due to new distribution of pain.    At the prior visit, she described pain & tightness affecting the right side low back extending into the lateral right hip.    Currently, she says that she the nonradiating right-sided back pain has been well-controlled.  However, she has now developed some new radiating pain on the left side which began in April.      She describes:  -Left low back/gluteal pain radiating to the lateral left leg to the ankle.    She states this pain feels familiar to " what she experienced on the right lower extremity in the past.    She denies lower extremity numbness or tingling.      Pain is aggravated with: Lying down, sitting, standing, twisting, being on the hands and knees or squatting while cleaning, vacuuming, getting up after sitting on her recliner couch, worse in the evening  Pain is relieved with: Walking, sitting upright or on a more firm chair    She is tolerating gabapentin 300 mg twice daily.    She follows with rheumatology in regards to psoriatic arthritis.    She denies falls, weakness, bowel or bladder incontinence, or saddle anesthesia.      9/11/2024:  Patient was last seen in clinic 8/6/2024. At that visit, the plan was to update the MRI of the lumbar spine and titrate gabapentin 300 mg from BID to TID.    At the last visit she described:  -Left low back/gluteal pain radiating to the lateral left leg to the ankle.   -prior history of right sided low back pain extending into the lateral right hip which had improved    Today, she endorses ongoing & worsening pain radiating from the left buttocks along the lateral side of the left leg and terminating at the ankle.  She states that last night she was having pain at the middle of her back also.    The pain tends to be worse at night or in the evening.  She notes shooting pain into the left leg in the evening.  Getting up and taking a few steps tends to help.  However, if she sits back down, the pain returns. She sometimes has a hard time falling back asleep at night.      She does have pain when she wakes up in the morning, but typically the pain is better during the day.  However, in the past few days, she notices the pain has been getting worse during the day too.  Despite that, she can still walk up to 2 miles without pain interfering.    Lumbar flexion or lumbar extension do not seem to affect pain levels.    She recalls that this morning when she got up the pain was 4-5/10.    She denies bowel or bladder  incontinence, or saddle anesthesia.      Since last time she tried adding a midday dose of gabapentin but it made her too drowsy. So she is currently taking 300 mg of gabapentin twice daily.          PRIOR INJURIES/TREATMENT:   Ice/Heat: no    Brace: no    Physical Therapy:   PT for low back pain 2020     - Current Pain Medications -   Gabapentin 300 mg in the morning, 600 mg at bedtime  Xeljanz   Otezla  Celebrex     - Prior/Trialed Pain Medications -   Flexeril  Steroid burst    Prior Procedures:  Date    Procedure   Improvement (%)  9/17/2024  L5-S1 ILESI (Caroline)  80-85%              Prior Related Surgery: none   Other (acupuncture, OMT, CMM, TENS, DME, etc.):     Specialists Seen - (with most recent, available notes and clinic visits reviewed)   1. Sports medicine - Dr Zhou, Dr Tesfaye  2.  Rheumatology-psoriatic arthritis      IMAGING - reviewed   LUMBAR FLEX/EXT TWO TO THREE VIEWS September 12, 2024 IMPRESSION: Five lumbar type vertebrae. Grade 1 degenerative  anterolisthesis of L4 upon L5 again noted. Alignment otherwise normal.  Alignment does not change appreciably in the extended position. In  flexion, there is probable interval increase in anterolisthesis of L4  upon L5; alignment otherwise remains normal in flexion. Vertebral body  heights normal. Facet arthropathy at L4-L5 and L5-S1.         MR LUMBAR SPINE W/O CONTRAST 9/8/2024   Findings: There are 5 lumbar-type vertebrae assumed for the purposes  of this dictation.  The tip of the conus medullaris is at L1. Grade  1-2 anterolisthesis of L4, increased from previous. Mild loss of disc  height at L4-5.  Normal marrow signal.     On a level by level basis:     T12-L1: No spinal canal or neuroforaminal stenosis.     L1-2: No spinal canal or neuroforaminal stenosis.     L2-3: No spinal canal or neuroforaminal stenosis.     L3-4: No spinal canal or neuroforaminal stenosis.     L4-5: Grade 1-2 anterolisthesis and uncovering of the disc.  Superimposed  central disc extrusion with slight cephalad migration.  Bilateral facet hypertrophy and ligamentum flavum thickening. Further  increased severe spinal canal stenosis. Moderate bilateral neural  foraminal stenosis.      L5-S1: Posterior disc bulge and bilateral facet hypertrophy. Mild  spinal canal narrowing. Mild bilateral neural foraminal narrowing.     Paraspinous tissues are within normal limits.                                                 Impression:   1. Increased grade 1-2 anterolisthesis of L4 on L5, with continued  severe spinal canal stenosis and moderate bilateral neural foraminal  stenosis at this level. Portion of the uncovered disc appears slightly  decreased in size.  2. Additional levels appear similar to previous.      XR SACROILIAC JOINT 1/2 VIEWS  04/10/2024                                                                 IMPRESSION: Mild bilateral SI joint arthrosis, left greater than right. No erosive change to suggest an inflammatory arthropathy. Both hip joint spaces are maintained.         MR LUMBAR SPINE W/O CONTRAST 8/14/2020    Findings: There are 5 lumbar-type vertebrae assumed for the purposes  of this dictation.  The tip of the conus medullaris is at L1.  Grade 1  anterolisthesis of L4. Mild loss of disc height at L4-5.  Normal  marrow signal.     On a level by level basis:     T12-L1: No spinal canal or neuroforaminal stenosis.     L1-2: No spinal canal or neuroforaminal stenosis.     L2-3: No spinal canal or neuroforaminal stenosis.     L3-4: No spinal canal or neuroforaminal stenosis.     L4-5: Grade 1 anterolisthesis and uncovering of the disc. Superimposed  central disc extrusion with cephalad migration. Bilateral facet  hypertrophy and ligamentum flavum thickening. Severe spinal canal  stenosis. Moderate bilateral neural foraminal stenosis. Periarticular  edema surrounding the bilateral facet joints.     L5-S1: Posterior disc bulge and bilateral facet hypertrophy. Mild  spinal  canal narrowing. Mild bilateral neural foraminal narrowing.     Paraspinous tissues are within normal limits.                                                    Impression:   1. Spondylosis, particularly at L4-5 where there is severe spinal  canal stenosis and moderate bilateral neural foraminal stenosis.  2. Active inflammatory arthropathy of the bilateral L4-5 facet joints.      2 views lumbar spine radiographs 7/13/2020  Findings:     Standing  AP and lateral  views of the lumbar spine were obtained.     5  lumbar type vertebral bodies are assumed for the purpose of this  dictation.     There is no acute osseous abnormality.       Grade 1 spondylolisthesis of L4 on L5 measuring approximately 8 mm  with associated mild intervertebral disc space loss. Otherwise, Disc  height and vertebral body heights are maintained throughout.     The visualized bowel gas pattern is non-obstructive.                                                  Impression:  1.  No acute osseous abnormality.  2.  Grade 1 degenerative type spondylolisthesis of L4 on L5, measuring  approximately 8 mm.      Review Of Systems:  I am responding to those symptoms which are directly relevant to the specific indication for my consultation. I recommend that the patient follow up with their primary or referring provider to pursue any other symptoms which may be of concern.       Medical History:  She  has a past medical history of Bilateral low back pain with right-sided sciatica, unspecified chronicity (11/11/2020), Neuroforaminal stenosis of lumbar spine (11/11/2020), Nuclear senile cataract of both eyes (08/16/2018), Osteoarthritis (10/15/2020), Psoriatic arthropathy (H) (10/19/2020), and Serum calcium elevated (5/31/2023).     She  has a past surgical history that includes biopsy (07/30/2019); Eye surgery; and Colonoscopy (N/A, 7/14/2022).    Family History  Her family history includes Breast Cancer in her maternal aunt; Cataracts in her maternal  grandmother; Hyperlipidemia in her mother; Hypertension in her mother; Osteoporosis in her mother; Stomach Cancer in her father.     Social History:  Work: stay at home mom  Current living situation: lives with family and her daughter, another daughter lives on her own. Performs ADLs/IADLs independently.    She  reports that she has never smoked. She has never used smokeless tobacco. She reports that she does not drink alcohol and does not use drugs.        Current Medications:   She has a current medication list which includes the following prescription(s): apremilast, calcium carbonate-vit d-min, celecoxib, clobetasol, doxycycline monohydrate, gabapentin, loratadine, melatonin, multivitamin adults 50+, tacrolimus, tazarotene, and xeljanz xr, and the following Facility-Administered Medications: triamcinolone.     Allergies:    -- Adhesive Tape -- Blisters, Itching and Swelling   -- Dust Mites    -- Latex -- Blisters, Itching, Other (See                            Comments) and Swelling    --  Skin sensitivity   -- Morphine Sulfate (Concentrate) -- Itching    --  Other reaction(s): Chills             heart palpatations   -- Seasonal Allergies    -- Codeine -- Palpitations    --  chills        PHYSICAL EXAMINATION:   /82 (BP Location: Right arm, Patient Position: Sitting, Cuff Size: Adult Regular)   Pulse 82   Resp 16   LMP  (LMP Unknown)   SpO2 97%    --CONSTITUTIONAL: Vital signs as above. No acute distress. The patient is well nourished and well groomed.  --PSYCHIATRIC: The patient is awake, alert, oriented to person, place, time and answering questions appropriately with clear speech. Appropriate mood and affect   --SKIN:  Posterior torso is clean, dry, intact without rashes.   --RESPIRATORY: Normal rhythm and effort. No abnormal accessory muscle breathing patterns noted.   --GROSS MOTOR: Easily arises from a seated position.          ASSESSMENT:  Willa Downey is a pleasant 63 year old female who  presents with   -Progressive, worsening left low back/gluteal pain radiating to the lateral left leg terminating at the ankle.   -prior history of right sided low back pain extending into the lateral right hip which has improved    MRI of the lumbar spine from 9/8/2024 shows:  -Grade 1-2 anterolisthesis of L4, increased from previous  -increased severe spinal canal stenosis at L4-5  -moderate bilat NFS at L4-5    Complicating comorbidities include:  # Psoriatic arthritis on Xeljanz, follows with rheumatology    PLAN:  -9/17/2024 L5-S1 ILESI provided 80-85% improvement in the level of pain  -MRI lumbar spine and lumbar flexion/extension views reviewed with patient today  -given dynamic motion seen on lumbar flexion/extension X rays at the L4/5 level where there is also severe spinal canal stenosis, we discussed consultation with spine surgery. She was in agreement and referral was added for consultation with Dr Caballero  -we did discuss the option of MBB/RFA for facet mediated pain and she prefers to hold off   -Continue gabapentin 300 mg in the morning and 600 mg at bedtime  -We reviewed red flags which would necessitate her being seen in the emergency department right away and she expressed understanding  -She did not find physical therapy useful in the past, declined referral for PT previously  -RTC PRN pending consultation with spine surgery        Ready to learn, no apparent learning barriers.  Education provided on treatment plan according to patient's preferred learning style.  Patient verbalizes understanding.   __________________________________  Eri Kuo NP  Physical Medicine & Rehabilitation        29 minutes spent by me on the date of the encounter doing chart review, history and exam, documentation, coordination with staff regarding injection and further activities per the note      Again, thank you for allowing me to participate in the care of your patient.      Sincerely,    TRACY Hunter  CNP

## 2024-10-14 NOTE — NURSING NOTE
Chief Complaint   Patient presents with    RECHECK     Here for a follow up, confirmed with patient     Julianna Olguin

## 2024-10-14 NOTE — PATIENT INSTRUCTIONS
Medial Branch Blocks and Radiofrequency Ablation (RFA) aka Neurotomy  Back or neck pain may be due to problems with certain nerves near your spine. If so, a medial branch neurotomy can help relieve your pain. Neurotomy destroys a nerve to relieve pain or stop involuntary movements. In some cases, your doctor may give you a nerve block to see how your body will respond to neurotomy. The treatment uses heat, cold, radiofrequency, or chemicals to destroy the nerves near a problem joint. This keeps some pain messages from traveling to your brain, and helps relieve your symptoms.   Medial branch nerves  Each vertebra in your spine has facets (flat surfaces). They touch where the vertebrae fit together. This forms a facet joint. Each facet joint has at least 2 medial branch nerves. They are part of the nerve pathway to and from each facet joint. A facet joint in your back or neck can become inflamed (swollen and irritated). Pain messages may then travel along the nerve pathway from the facet joint to your brain.     Blocking pain messages  Medial branch nerves in each facet joint send and carry messages about back or neck pain. Destroying a few of these nerves can keep certain pain messages from reaching your brain. This can help bring you relief. The relief typically lasts for months to years.   Risks and complications  Risks and complications are rare, but can include:  Infection  Increased pain, numbness, or weakness  Nerve damage  Bleeding  Failure to relieve pain  Srinivas last reviewed this educational content on 4/1/2018 2000-2021 [employer]. All rights reserved. This information is not intended as a substitute for professional medical care. Always follow your healthcare professional's instructions.          Preparing for Spine Injection Therapy                 Why Do I Need Spine Injection Therapy?  Your Health Care Provider is recommending spine injection therapy to  help relieve your back  and neck pain. This will be in addition to other therapies such as medications and physical therapy. The purpose of these injections is to reduce the amount of inflammation (swelling, pain, heat, redness, loss of body function) around the nerves thus reducing the amount of pain.      The medications you will receive with the injections will include:   Anesthetic - medication to numb the painful area.      To reduce your discomfort during the injection procedure, you will receive a numbing medication injection prior to the placement of the needles. You will be lying on your stomach during the injection procedure. We will use a low-dose x-ray (fluoroscopy) to help guide needle placement.  You must have a  for this procedure. We will need to reschedule your injection if you do not have a  with you.       What are the different types of injections and procedure?  Below, is a brief description of the different types of injections we use to deliver pain medication as close as possible to the nerves in the painful area:     Medial Branch Block injections: This is a test to see if your pain is      coming from a specific nerve. This injection is similar to a facet joint injection but contains only the numbing medication.  You will keep a pain score diary for the rest of the day and the following morning after receiving the injection.    Radiofrequency ablation: This is a procedure that uses radio waves and numbing medication to block the nerves that feel pain at the joint. The pain relief effects can last for a long time, but are not permanent. The procedure is similar to the Medial Branch Block but requires additional testing to ensure that the needle is near the nerve before numbing and ablating it.  Doctors often order sedation for this procedure.  Please see the sections on sedation below.       What Should I Expect if I Receive Sedation? THIS IS ORDERED BY THE PHYSICIAN  This is conscious sedation.  The  medications we give to you will help you relax and reduce your anxiety.  You will still be awake for the procedure so we can ask you questions and hear your answers. You will NOT receive sedation for the diagnostic test blocks in order to better help evaluate your response to the injection.      What Are My Responsibilities With Sedation?  You must stop eating and drinking 8 (eight) hours before your procedure. You can have clear fluids (water, coffee/tea without milk) up to 2 hours prior to the injections. Nothing by mouth for 2 hours prior to injection.  This includes gum, mints, or chew.  Take your morning medications with a small sip of water.    You must have a  who will check-in with you, and stay in the building while the procedure is underway.  If you do not have a  your sedation will be cancelled.  We will monitor you for at least 30 minutes after the procedure before being discharged home.        What Are the Risks and Complications For This Procedure?  Risks and complication are rare, but can still occur.  You should understand, discuss, and accept these risks before agreeing to the procedure. They include, but are not limited to:  infection  nerve damage   paralysis  injection failure or a need for further injections or additional procedures  continued or worsening of symptoms/pain,   medication reaction,  dural leak (into the hole covering around the spinal cord. This may cause a a spinal headache)  leak of the medication into the spinal canal, nerves, or blood vessel.  death        What do I need to do before the procedure?   If you do not follow these instructions your procedure may be cancelled.  Tell us if you are on major blood thinners such as Coumadin, Xarelto , Plavix, Eliquis , Pradaxa , or others.    Contact the doctor who prescribed your blood thinner to ask for permission to stop taking it before you have the injection.    Schedule your pain injection procedure after your doctor  gave their permission.   We will notify you when to stop and re-start your blood thinner.  Tell us if you have any allergies to contrast dye.  If you do, we may give additional medications to take before the procedure.  Tell us if you are pregnant, or possibly pregnant.  If so, you cannot receive steroid medications or be exposed to fluoroscopic X-rays.  Tell us if you have been sick during the 10 days before the procedure. This includes:   colds   gastrointestinal illness or discomfort  dental sores,   skin infection, or any other type of infection.  Tell us if you have taken antibiotics during the 10 days prior to the procedure.  Do not drink alcohol the night before or on the day of the procedure.  You must shower the night before and on the day of your procedure.  Wear comfortable, clean clothing.  If you have an outside MRI (Magnetic Resonance Imaging photo), please bring it with you.     What Will Happen After the Procedure?  If you did not receive sedation we will monitor you for 15 minutes after the procedure. If you received sedation, we will monitor you for at least 30 minutes after the procedure      How soon can I expect pain relief?  You have received 2 types of medications with your injection:    Anesthetic - numbing medication which only acts for a few hours    Steroid which may take 3-14 days to be effective.   You can expect to feel your normal pain after the anesthetic wears off, until the steroid becomes effective.     How should I care for myself at home?  Get plenty of rest and avoid twisting, bending movements, heavy lifting, or strenuous activity for the first 24 hours. This will help the steroid be more effective. Medial Branch Block injections will have different discharge instructions.  Those will be discussed at that injection appointment.  Resume your pain medications  Apply ice packs (on for 20 minutes at a time), every 2-3 hours to your injection area for the first 2-3 days to help with  pain control.    Avoid heat (pads or water bottles), which can cause the veins to open up, making the steroid less effective. You can use heat after 48 hours.  Take showers only for the first 48 hours.  No baths, hot tubs, swimming, or soaking for 48 hours to reduce the risk of infection.      When should I call the doctor?  Call us if you have any of the following:   Fever more than 100.5 degrees Fahrenheit   Signs of infection   Severe headache   Severe back pain   Increased numbness or weakness in your legs or arms   Loss of bladder or bowel control  Nausea   Other concerns     What is the contact information?  During business hours Monday-Friday 8a-5p call [phone].   After business hours, on weekends and holidays call [phone] and ask for the [facility] doctor on call

## 2024-10-17 ENCOUNTER — PATIENT OUTREACH (OUTPATIENT)
Dept: CARE COORDINATION | Facility: CLINIC | Age: 64
End: 2024-10-17
Payer: COMMERCIAL

## 2024-10-30 DIAGNOSIS — M48.061 SPINAL STENOSIS OF LUMBAR REGION, UNSPECIFIED WHETHER NEUROGENIC CLAUDICATION PRESENT: Primary | ICD-10-CM

## 2024-11-13 NOTE — TELEPHONE ENCOUNTER
DIAGNOSIS: Anterolisthesis of lumbar spine, Spinal stenosis of lumbar region, unspecified whether neurogenic claudication, Lumbar radiculopathy, Neuroforaminal stenosis of lumbar spine, Spondylosis of lumbar region without myelopathy or radiculopathy    APPOINTMENT DATE: 11/14/2024      NOTES STATUS DETAILS   OFFICE NOTE from referring provider Internal Eri Kuo APRN CNP    OFFICE NOTE from other specialist Internal OV with Dr. Kuo 10/14/2024 - Spinal Stenosis of lumbar region     More OV notes in EPIC   MEDICATION LIST Internal    LABS     CBC/DIFF Internal    MRI Internal MRI Lumbar Spine 9/8/2024    XRAYS (IMAGES & REPORTS) Internal Xray Lumbar Flex 9/12/2024

## 2024-11-14 ENCOUNTER — OFFICE VISIT (OUTPATIENT)
Dept: ORTHOPEDICS | Facility: CLINIC | Age: 64
End: 2024-11-14
Attending: NURSE PRACTITIONER
Payer: COMMERCIAL

## 2024-11-14 ENCOUNTER — PRE VISIT (OUTPATIENT)
Dept: ORTHOPEDICS | Facility: CLINIC | Age: 64
End: 2024-11-14

## 2024-11-14 ENCOUNTER — ANCILLARY PROCEDURE (OUTPATIENT)
Dept: GENERAL RADIOLOGY | Facility: CLINIC | Age: 64
End: 2024-11-14
Attending: ORTHOPAEDIC SURGERY
Payer: COMMERCIAL

## 2024-11-14 VITALS — HEIGHT: 65 IN | WEIGHT: 141.54 LBS | BODY MASS INDEX: 23.58 KG/M2

## 2024-11-14 DIAGNOSIS — M43.16 ANTEROLISTHESIS OF LUMBAR SPINE: ICD-10-CM

## 2024-11-14 DIAGNOSIS — M48.061 SPINAL STENOSIS OF LUMBAR REGION, UNSPECIFIED WHETHER NEUROGENIC CLAUDICATION PRESENT: ICD-10-CM

## 2024-11-14 DIAGNOSIS — M48.061 NEUROFORAMINAL STENOSIS OF LUMBAR SPINE: ICD-10-CM

## 2024-11-14 DIAGNOSIS — M47.816 SPONDYLOSIS OF LUMBAR REGION WITHOUT MYELOPATHY OR RADICULOPATHY: ICD-10-CM

## 2024-11-14 DIAGNOSIS — M54.16 LUMBAR RADICULOPATHY: ICD-10-CM

## 2024-11-14 PROCEDURE — 72082 X-RAY EXAM ENTIRE SPI 2/3 VW: CPT | Performed by: STUDENT IN AN ORGANIZED HEALTH CARE EDUCATION/TRAINING PROGRAM

## 2024-11-14 PROCEDURE — 77073 BONE LENGTH STUDIES: CPT | Performed by: STUDENT IN AN ORGANIZED HEALTH CARE EDUCATION/TRAINING PROGRAM

## 2024-11-14 PROCEDURE — 99204 OFFICE O/P NEW MOD 45 MIN: CPT | Performed by: ORTHOPAEDIC SURGERY

## 2024-11-14 NOTE — PROGRESS NOTES
REASON FOR CONSULTATION: No chief complaint on file.     REFERRING PHYSICIAN: Eri Kuo   PCP:Geovanna Rosales    History of Present Illness:    64 year old female who presents today for evaluation of lumbar spondylolisthesis.  Referred by Eri Proctor CNP with [facility] who has been managing patient for these symptoms; previously managed by Dr. Zhou with Sports medicine.     Accompanied by  David.  Sxs started 3 yrs ago; developed R leg sciatica.  Saw Dr. Dimitri Zhou (Sports) who prescribed gabapenitn, which helped.  He also recommended PT, but which patient did not schedule at the time.  Sxs essentially resolved.    This past April (7 mos ago), had recurrence of sxs, but this time more on the left side.  Started seeing Eri Proctor NP ([facility]).    Back 70 low back and L buttock%, Leg 30%,  Left Only (lateral calf and ankle; L5 pattern).  Worse: per patient, hard to tell.  Walking did not used to bother; however, now at 1 mile she starts having low back pain.  Standing generally same as sitting.  However, if asked to choose, she says standing may be easier.  Still, in general, phsyical activities make things worse; these include working in the garden, running, etc.    Treatments:  No formal PT.  Per patient, she exercises mainly by walking (2 miles, 3x/week).  Injections:  9/17/24 - L5-S1 ILESI 80-85% improvement.  Per patient, helped.  First 2 weeks, no benefit; but afterwards experienced ~75% relief; still helping.  Medications: Gabapentin 300mg in AM; 600mg in PM (900 mg/day).     PMHx:  Psoriatic arthritis, on biologics.    Social history:  , lives with her .  Homemaker.  Nonsmoker.  Ambulatory status at baseline: independent ambulator.    Oswestry (ADITHYA) Questionnaire        11/11/2024    11:23 AM   OSWESTRY DISABILITY INDEX   Count 9    Sum 9    Oswestry Score (%) 20 %        Patient-reported        PROMIS-10 Scores  Global Mental Health Score: (Patient-Rptd) (P) 15  Global Physical Health  Score: (Patient-Rptd) (P) 13  PROMIS TOTAL - SUBSCORES: (Patient-Rptd) (P) 28    ROS:  A 12-point review of systems was completed and is negative except for otherwise noted above in the history of present illness.    Med Hx:  Past Medical History:   Diagnosis Date    Bilateral low back pain with right-sided sciatica, unspecified chronicity 11/11/2020    Neuroforaminal stenosis of lumbar spine 11/11/2020    Nuclear senile cataract of both eyes 08/16/2018    Osteoarthritis 10/15/2020    Psoriatic arthropathy (H) 10/19/2020    Serum calcium elevated 5/31/2023       Surg Hx:  Past Surgical History:   Procedure Laterality Date    BIOPSY  07/30/2019    the right breast tissue(benign)    COLONOSCOPY N/A 7/14/2022    Procedure: COLONOSCOPY;  Surgeon: Roderick Campos MD;  Location: [facility] OR    EYE SURGERY      3 surgeries for glaucoma treatment       Allergies:  Allergies   Allergen Reactions    Adhesive Tape Blisters, Itching and Swelling    Dust Mites     Latex Blisters, Itching, Other (See Comments) and Swelling     Skin sensitivity      Morphine Sulfate (Concentrate) Itching     Other reaction(s): Chills  heart palpatations    Seasonal Allergies     Codeine Palpitations     chills       Meds:  Current Outpatient Medications   Medication Sig Dispense Refill    apremilast (OTEZLA) 30 MG tablet Take 1 tablet (30 mg) by mouth 2 times daily Hold for signs of infection, and seek medical attention. 180 tablet 1    CALCIUM CARBONATE-VIT D-MIN PO Take 1 tablet by mouth      celecoxib (CELEBREX) 100 MG capsule Take 200 mg by mouth every morning and 100 mg by mouth every evening (Patient taking differently: Take 100 mg by mouth 2 times daily. Take 200 mg by mouth every morning and 100 mg by mouth every evening) 270 capsule 1    clobetasol (TEMOVATE) 0.05 % external solution Apply topically daily To affected areas of scalp (Patient taking differently: Apply topically daily as needed. To affected areas of scalp) 50 mL 2     gabapentin (NEURONTIN) 300 MG capsule Take 1 capsule (300 mg) by mouth every morning AND 2 capsules (600 mg) at bedtime. 90 capsule 3    hydroxychloroquine (PLAQUENIL) 200 MG tablet Take one and a half pills a day (300mg) 90 tablet 1    loratadine 10 MG capsule Take 1 tablet by mouth      Multiple Vitamins-Minerals (MULTIVITAMIN ADULTS 50+) TABS daily       Roflumilast 0.3 % CREA Externally apply 1 Dose topically daily To psoraisis on hands as needed 60 g 11    tacrolimus (PROTOPIC) 0.1 % external ointment Apply topically 2 times daily To hands and groin as needed 60 g 3    tazarotene (TAZORAC) 0.1 % external cream Apply nightly to hands as needed 30 g 3    tofacitinib (XELJANZ XR) 11 MG 24 hr tablet Take 1 tablet (11 mg) by mouth daily Hold for signs of infection, then seek medical attention. Monitoring labs every 6 months - Oral 90 tablet 1     No current facility-administered medications for this visit.       Fam Hx:  Family History   Problem Relation Age of Onset    Hyperlipidemia Mother         My mother had high cholesterol.    Osteoporosis Mother     Hypertension Mother     Stomach Cancer Father         not sure where started    Cataracts Maternal Grandmother     Breast Cancer Maternal Aunt         post enopause    Melanoma No family hx of     Skin Cancer No family hx of        P/S Hx:  Social History     Tobacco Use    Smoking status: Never    Smokeless tobacco: Never   Substance Use Topics    Alcohol use: Never         Physical Exam:  Very pleasant, healthy appearing, alert, oriented x 3, cooperative.  Normal mood and affect.  Not in cardiorespiratory distress.  LMP  (LMP Unknown)   Normal upright posture.    Normal gait without assistive device.  No antalgia / imbalance.  Able to walk on toes and on heels with ease.  Back: no deformity, no skin lesions or surgical scars.  Localizes pain at left buttock radiating down left leg  Tenderness: (-) midline, (-) paraspinal, (-) R and L PSIS.    Neuro  Exam:  Motor:        LOWER EXTREMITY Left Right   Hip flexion 5/5 5/5   Knee flexion 5/5 5/5   Knee extension 5/5 5/5   Ankle dorsiflexion 5/5 5/5   Ankle plantarflexion 5/5 5/5   Great toe extension 5/5 5/5      Sensory:  Intact to light touch in both LE's.   Reflexes:  Knee 1+ bilat.  Ankle 1+ bilat.  (-) Babinski, (-) clonus.    Lower Extremity:  Equal leg lengths, full pulses, (-) atrophy / asymmetry.  Full painless passive knee and ankle motion.  - log roll. Full painless hip ROM bilat    Straight leg raise: (-) right, (-) left.  Hip impingement: (-) right, (-) left.  LILLIAM/Deyvi's: (-) right, (-) left.        Imagin2024 MRI lumbar spine without contrast: L4-5 spondylolisthesis with significant spinal canal lateral recess stenosis, moderate bilateral foraminal stenosis.  L5-S1 right lateral recess stenosis.    2024 XR lumbar spine AP/lateral and flexion/extension views: Grade 1 spondylolisthesis L4-5; minimal instability between flexion and extension views.    EOS full-spine ap-lat x-rays show grade 1-2 degenerative spondylolisthesis L4-5.    Impression:   64/F with  1.  Gr. 1 degenerative spondylolisthesis with instability L4-5.  2.  Severe canal and bilateral subarticular stenosis L4-5, with neurogenic claducation.   3.  Left L5 radicular pain.  4.  Medical comorbidities: Psoriatic arthritis (on Plaqenil, xeljanz and Otezla).    Plan:   Had good long discussion with patient and  David.  We discussed her spinal condition, and its nonlife threatening nature.  Also would not lead to significant neurologic deficits or paralysis.  Thus, would be reasonable to continue and exhaust nonoperative treatment prior to considering surgical treatment.  I recommend initiating formal physical therapy; explained to them that this would likely help with her symptoms, and even if she eventually decides to undergo surgery, may also help with postoperative recovery.  Furthermore, would also be an insurance  prior authorization requirement.  We also discussed repeat injections.  She had good response to an epidural injection performed 2 months ago.  Right now, it is still helping.  If and when it completely wears off and she would like to undergo another injection, I believe it would be very reasonable.  I told her she could reach out to us via [employer] or telephone call anytime, and we could set this up for her.  They live in [place]; although she had her injection and [facility], she is open to getting the injection in any facility that she could get to.    We also discussed potential surgery for her condition.  The good news is that she has focal pathology, and would need only single level surgery.  I explained to her that because of underlying instability, she is not a good candidate for decompression only surgery, and would have to be done with fusion/stabilization.  I discussed with her that spinal fusion could be done via different approaches.  I initially told her that I would likely prefer an open posterior approach, to allow direct decompression, posterior instrumentation and interbody fusion all through the same approach.  However, in light of her Psoriatec arthritis on which she is on biologic medication, which her rheumatologist told her would need to be stopped during the perioperative period because of risk of wound healing problems, we may also consider minimally invasive approach (LLIF-PPS).  I told her we could talk more about these options if and when we get to that point.    We briefly discussed bone graft options.  I would recommend crushed cancellous allograft (spinal graft technologies, [employer]), with or without bone morphogenetic protein.    RTC prn.  If will decide to proceed with surgery, will need lumbar CT scan for surgical planning.    45 minutes spent on the date of the encounter doing chart review/review of outside records/review of test results/interpretation of tests/patient  visit/documentation/discussion with other provider(s)/discussion with patient and family.        Yemi Caballero MD    Orthopaedic Spine Surgery  Dept Orthopaedic Surgery, [facility]  [phone] [facility], [phone] pager  [email or web address]

## 2024-11-14 NOTE — LETTER
11/14/2024      Willa Downey  [address]  [facility] [address]      Dear Colleague,    Thank you for referring your patient, Willa Downey, to the [facility] [place]. Please see a copy of my visit note below.    REASON FOR CONSULTATION: No chief complaint on file.     REFERRING PHYSICIAN: Eri Kuo   PCP:Geovanna Rosales    History of Present Illness:    64 year old female who presents today for evaluation of lumbar spondylolisthesis.  Referred by Eri Proctor CNP with PM&R who has been managing patient for these symptoms; previously managed by Dr. Zhou with Sports medicine.     Accompanied by  David.  Sxs started 3 yrs ago; developed R leg sciatica.  Saw Dr. Dimitri Zhou (Sports) who prescribed gabapenitn, which helped.  He also recommended PT, but which patient did not schedule at the time.  Sxs essentially resolved.    This past April (7 mos ago), had recurrence of sxs, but this time more on the left side.  Started seeing Eri Proctor NP (PM&R).    Back 70 low back and L buttock%, Leg 30%,  Left Only (lateral calf and ankle; L5 pattern).  Worse: per patient, hard to tell.  Walking did not used to bother; however, now at 1 mile she starts having low back pain.  Standing generally same as sitting.  However, if asked to choose, she says standing may be easier.  Still, in general, phsyical activities make things worse; these include working in the garden, running, etc.    Treatments:  No formal PT.  Per patient, she exercises mainly by walking (2 miles, 3x/week).  Injections:  9/17/24 - L5-S1 ILESI 80-85% improvement.  Per patient, helped.  First 2 weeks, no benefit; but afterwards experienced ~75% relief; still helping.  Medications: Gabapentin 300mg in AM; 600mg in PM (900 mg/day).     PMHx:  Psoriatic arthritis, on biologics.    Social history:  , lives with her .  Homemaker.  Nonsmoker.  Ambulatory status at baseline: independent  ambulator.    Oswestry (ADITHYA) Questionnaire        11/11/2024    11:23 AM   OSWESTRY DISABILITY INDEX   Count 9    Sum 9    Oswestry Score (%) 20 %        Patient-reported        PROMIS-10 Scores  Global Mental Health Score: (Patient-Rptd) (P) 15  Global Physical Health Score: (Patient-Rptd) (P) 13  PROMIS TOTAL - SUBSCORES: (Patient-Rptd) (P) 28    ROS:  A 12-point review of systems was completed and is negative except for otherwise noted above in the history of present illness.    Med Hx:  Past Medical History:   Diagnosis Date     Bilateral low back pain with right-sided sciatica, unspecified chronicity 11/11/2020     Neuroforaminal stenosis of lumbar spine 11/11/2020     Nuclear senile cataract of both eyes 08/16/2018     Osteoarthritis 10/15/2020     Psoriatic arthropathy (H) 10/19/2020     Serum calcium elevated 5/31/2023       Surg Hx:  Past Surgical History:   Procedure Laterality Date     BIOPSY  07/30/2019    the right breast tissue(benign)     COLONOSCOPY N/A 7/14/2022    Procedure: COLONOSCOPY;  Surgeon: Roderick Campos MD;  Location: [facility] OR     EYE SURGERY      3 surgeries for glaucoma treatment       Allergies:  Allergies   Allergen Reactions     Adhesive Tape Blisters, Itching and Swelling     Dust Mites      Latex Blisters, Itching, Other (See Comments) and Swelling     Skin sensitivity       Morphine Sulfate (Concentrate) Itching     Other reaction(s): Chills  heart palpatations     Seasonal Allergies      Codeine Palpitations     chills       Meds:  Current Outpatient Medications   Medication Sig Dispense Refill     apremilast (OTEZLA) 30 MG tablet Take 1 tablet (30 mg) by mouth 2 times daily Hold for signs of infection, and seek medical attention. 180 tablet 1     CALCIUM CARBONATE-VIT D-MIN PO Take 1 tablet by mouth       celecoxib (CELEBREX) 100 MG capsule Take 200 mg by mouth every morning and 100 mg by mouth every evening (Patient taking differently: Take 100 mg by mouth 2 times  daily. Take 200 mg by mouth every morning and 100 mg by mouth every evening) 270 capsule 1     clobetasol (TEMOVATE) 0.05 % external solution Apply topically daily To affected areas of scalp (Patient taking differently: Apply topically daily as needed. To affected areas of scalp) 50 mL 2     gabapentin (NEURONTIN) 300 MG capsule Take 1 capsule (300 mg) by mouth every morning AND 2 capsules (600 mg) at bedtime. 90 capsule 3     hydroxychloroquine (PLAQUENIL) 200 MG tablet Take one and a half pills a day (300mg) 90 tablet 1     loratadine 10 MG capsule Take 1 tablet by mouth       Multiple Vitamins-Minerals (MULTIVITAMIN ADULTS 50+) TABS daily        Roflumilast 0.3 % CREA Externally apply 1 Dose topically daily To psoraisis on hands as needed 60 g 11     tacrolimus (PROTOPIC) 0.1 % external ointment Apply topically 2 times daily To hands and groin as needed 60 g 3     tazarotene (TAZORAC) 0.1 % external cream Apply nightly to hands as needed 30 g 3     tofacitinib (XELJANZ XR) 11 MG 24 hr tablet Take 1 tablet (11 mg) by mouth daily Hold for signs of infection, then seek medical attention. Monitoring labs every 6 months - Oral 90 tablet 1     No current facility-administered medications for this visit.       Fam Hx:  Family History   Problem Relation Age of Onset     Hyperlipidemia Mother         My mother had high cholesterol.     Osteoporosis Mother      Hypertension Mother      Stomach Cancer Father         not sure where started     Cataracts Maternal Grandmother      Breast Cancer Maternal Aunt         post enopause     Melanoma No family hx of      Skin Cancer No family hx of        P/S Hx:  Social History     Tobacco Use     Smoking status: Never     Smokeless tobacco: Never   Substance Use Topics     Alcohol use: Never         Physical Exam:  Very pleasant, healthy appearing, alert, oriented x 3, cooperative.  Normal mood and affect.  Not in cardiorespiratory distress.  LMP  (LMP Unknown)   Normal upright  posture.    Normal gait without assistive device.  No antalgia / imbalance.  Able to walk on toes and on heels with ease.  Back: no deformity, no skin lesions or surgical scars.  Localizes pain at left buttock radiating down left leg  Tenderness: (-) midline, (-) paraspinal, (-) R and L PSIS.    Neuro Exam:  Motor:        LOWER EXTREMITY Left Right   Hip flexion 5/5 5/5   Knee flexion 5/5 5/5   Knee extension 5/5 5/5   Ankle dorsiflexion 5/5 5/5   Ankle plantarflexion 5/5 5/5   Great toe extension 5/5 5/5      Sensory:  Intact to light touch in both LE's.   Reflexes:  Knee 1+ bilat.  Ankle 1+ bilat.  (-) Babinski, (-) clonus.    Lower Extremity:  Equal leg lengths, full pulses, (-) atrophy / asymmetry.  Full painless passive knee and ankle motion.  - log roll. Full painless hip ROM bilat    Straight leg raise: (-) right, (-) left.  Hip impingement: (-) right, (-) left.  LILLIAM/Deyvi's: (-) right, (-) left.        Imagin2024 MRI lumbar spine without contrast: L4-5 spondylolisthesis with significant spinal canal lateral recess stenosis, moderate bilateral foraminal stenosis.  L5-S1 right lateral recess stenosis.    2024 XR lumbar spine AP/lateral and flexion/extension views: Grade 1 spondylolisthesis L4-5; minimal instability between flexion and extension views.    EOS full-spine ap-lat x-rays show grade 1-2 degenerative spondylolisthesis L4-5.    Impression:   64/F with  1.  Gr. 1 degenerative spondylolisthesis with instability L4-5.  2.  Severe canal and bilateral subarticular stenosis L4-5, with neurogenic claducation.   3.  Left L5 radicular pain.  4.  Medical comorbidities: Psoriatic arthritis (on Plaqenil, xeljanz and Otezla).    Plan:   Had good long discussion with patient and  David.  We discussed her spinal condition, and its nonlife threatening nature.  Also would not lead to significant neurologic deficits or paralysis.  Thus, would be reasonable to continue and exhaust nonoperative  treatment prior to considering surgical treatment.  I recommend initiating formal physical therapy; explained to them that this would likely help with her symptoms, and even if she eventually decides to undergo surgery, may also help with postoperative recovery.  Furthermore, would also be an insurance prior authorization requirement.  We also discussed repeat injections.  She had good response to an epidural injection performed 2 months ago.  Right now, it is still helping.  If and when it completely wears off and she would like to undergo another injection, I believe it would be very reasonable.  I told her she could reach out to us via xMatterst or telephone call anytime, and we could set this up for her.  They live in [place]; although she had her injection and [facility], she is open to getting the injection in any facility that she could get to.    We also discussed potential surgery for her condition.  The good news is that she has focal pathology, and would need only single level surgery.  I explained to her that because of underlying instability, she is not a good candidate for decompression only surgery, and would have to be done with fusion/stabilization.  I discussed with her that spinal fusion could be done via different approaches.  I initially told her that I would likely prefer an open posterior approach, to allow direct decompression, posterior instrumentation and interbody fusion all through the same approach.  However, in light of her Psoriatec arthritis on which she is on biologic medication, which her rheumatologist told her would need to be stopped during the perioperative period because of risk of wound healing problems, we may also consider minimally invasive approach (LLIF-PPS).  I told her we could talk more about these options if and when we get to that point.    We briefly discussed bone graft options.  I would recommend crushed cancellous allograft (spinal graft technologies,  [employer]), with or without bone morphogenetic protein.    RTC prn.  If will decide to proceed with surgery, will need lumbar CT scan for surgical planning.    45 minutes spent on the date of the encounter doing chart review/review of outside records/review of test results/interpretation of tests/patient visit/documentation/discussion with other provider(s)/discussion with patient and family.        Yemi Caballero MD    Orthopaedic Spine Surgery  Dept Orthopaedic Surgery, [facility]  [phone] [facility], [phone] pager  [email or web address]         Again, thank you for allowing me to participate in the care of your patient.        Sincerely,        Yemi Caballero MD

## 2024-12-04 ENCOUNTER — MYC REFILL (OUTPATIENT)
Dept: RHEUMATOLOGY | Facility: CLINIC | Age: 64
End: 2024-12-04
Payer: COMMERCIAL

## 2024-12-04 DIAGNOSIS — L40.50 PSORIATIC ARTHRITIS (H): ICD-10-CM

## 2024-12-04 RX ORDER — CELECOXIB 100 MG/1
CAPSULE ORAL
Qty: 270 CAPSULE | Refills: 0 | Status: SHIPPED | OUTPATIENT
Start: 2024-12-04

## 2024-12-05 ENCOUNTER — THERAPY VISIT (OUTPATIENT)
Dept: PHYSICAL THERAPY | Facility: CLINIC | Age: 64
End: 2024-12-05
Attending: ORTHOPAEDIC SURGERY
Payer: COMMERCIAL

## 2024-12-05 DIAGNOSIS — M43.16 ANTEROLISTHESIS OF LUMBAR SPINE: ICD-10-CM

## 2024-12-05 PROCEDURE — 97161 PT EVAL LOW COMPLEX 20 MIN: CPT | Mod: GP

## 2024-12-05 PROCEDURE — 97110 THERAPEUTIC EXERCISES: CPT | Mod: GP

## 2024-12-05 NOTE — PROGRESS NOTES
PHYSICAL THERAPY EVALUATION  Type of Visit: Evaluation       Fall Risk Screen:  Fall screen completed by: PT  Have you fallen 2 or more times in the past year?: (Patient-Rptd) No  Have you fallen and had an injury in the past year?: (Patient-Rptd) No  Is patient a fall risk?: No    Subjective   Pt notes she started struggling with L sided sciatica symptoms 2-3 years ago. She notes gabapentin was helpful. She notes in April of this year she started getting more symptoms in her B LE. She notes she had a steroid injection in September with benefit. She notes symptoms will increase with prolonged walking (1 mile) or transitioning positions after prolonged sitting.       Presenting condition or subjective complaint: (Patient-Rptd) Spinal stenosis and sciatica  Date of onset:      Relevant medical history: (Patient-Rptd) Arthritis; Osteoarthritis; Pain at night or rest   Dates & types of surgery:     Past Surgical History:   Procedure Laterality Date    BIOPSY  07/30/2019    the right breast tissue(benign)    COLONOSCOPY N/A 7/14/2022    Procedure: COLONOSCOPY;  Surgeon: Roderick Campos MD;  Location: [facility] OR    EYE SURGERY      3 surgeries for glaucoma treatment     Prior diagnostic imaging/testing results: (Patient-Rptd) MRI; X-ray     Prior therapy history for the same diagnosis, illness or injury: (Patient-Rptd) Yes (Patient-Rptd) exercises for sciatica pain with a physical therapist at [facility] about two and half years ago.    Living Environment  Social support: (Patient-Rptd) With family members   Type of home: (Patient-Rptd) House   Stairs to enter the home: (Patient-Rptd) Yes (Patient-Rptd) 5 Is there a railing: (Patient-Rptd) Yes     Ramp: (Patient-Rptd) No   Stairs inside the home: (Patient-Rptd) Yes (Patient-Rptd) 15 Is there a railing: (Patient-Rptd) Yes     Help at home: (Patient-Rptd) None  Equipment owned:       Employment: (Patient-Rptd) No    Hobbies/Interests:  "(Patient-Rptd) cooking and baking    Patient goals for therapy: (Patient-Rptd) Going for a 2 mile walk without pain and to learn how to build muscle safely       Objective   LUMBAR SPINE EVALUATION  PAIN: Pain Level at Rest: 0/10  Pain Level with Use: 6/10  Pain Location: lumbar spine and radiating symptoms into R LE  Pain Quality: Aching and Dull  Pain is Worst: daytime  Pain is Exacerbated By: transitioning positions after prolonged sitting  Pain is Relieved By: stretch and use  POSTURE: Standing Posture: Rounded shoulders, Forward head, Lordosis increased  GAIT:    ROM:  Noted limitations with L>R hip rotation - noted pain with lumbar extension, \"feels good stretch\" with lumbar flexion and B rotation with moderate limitations  STRENGTH:  mild impairment without pain to manually resisted R>L hip flexion  NEURAL TENSION:  Negative Slump test B  FLEXIBILITY:  Muscle length impairments to R>L iliopsoas, R>L TFL, R>L rectus femoris  FUNCTIONAL TESTS: Double Leg Squat: Anterior knee translation, Knee valgus, Hip internal rotation, and Improper use of glutes/hips  PALPATION:  Noted tenderness to palpation to lumbar paraspinal musculature B    Assessment & Plan   CLINICAL IMPRESSIONS  Medical Diagnosis:      Treatment Diagnosis:     Impression/Assessment: Patient is a 64 year old female with low back pain complaints.  The following significant findings have been identified: Pain, Decreased ROM/flexibility, Decreased joint mobility, Decreased strength, Decreased proprioception, Impaired sensation, Impaired muscle performance, and Decreased activity tolerance. These impairments interfere with their ability to perform work tasks, recreational activities, and household chores as compared to previous level of function.     Clinical Decision Making (Complexity):  Clinical Presentation: Stable/Uncomplicated  Clinical Presentation Rationale: based on medical and personal factors listed in PT evaluation  Clinical Decision Making " (Complexity): Low complexity    PLAN OF CARE  Treatment Interventions:  Interventions: Gait Training, Manual Therapy, Neuromuscular Re-education, Therapeutic Activity, Therapeutic Exercise, Self-Care/Home Management    Long Term Goals            Frequency of Treatment: 1x/week  Duration of Treatment: 6 weeks    Education Assessment:   Learner/Method: Patient;Demonstration;Pictures/Video;No Barriers to Learning    Risks and benefits of evaluation/treatment have been explained.   Patient/Family/caregiver agrees with Plan of Care.     Evaluation Time:     PT Eval, Low Complexity Minutes (33510): 20     Signing Clinician: ZAIN ARAUJO

## 2024-12-06 ENCOUNTER — ANCILLARY PROCEDURE (OUTPATIENT)
Dept: ULTRASOUND IMAGING | Facility: CLINIC | Age: 64
End: 2024-12-06
Attending: STUDENT IN AN ORGANIZED HEALTH CARE EDUCATION/TRAINING PROGRAM
Payer: COMMERCIAL

## 2024-12-06 DIAGNOSIS — L40.50 PSORIATIC ARTHRITIS (H): ICD-10-CM

## 2024-12-06 PROCEDURE — 76881 US COMPL JOINT R-T W/IMG: CPT | Performed by: RADIOLOGY

## 2024-12-09 PROBLEM — M43.16 ANTEROLISTHESIS OF LUMBAR SPINE: Status: ACTIVE | Noted: 2024-12-09

## 2024-12-11 DIAGNOSIS — L40.50 PSORIATIC ARTHROPATHY (H): Primary | ICD-10-CM

## 2024-12-19 ENCOUNTER — THERAPY VISIT (OUTPATIENT)
Dept: PHYSICAL THERAPY | Facility: CLINIC | Age: 64
End: 2024-12-19
Attending: ORTHOPAEDIC SURGERY
Payer: COMMERCIAL

## 2024-12-19 DIAGNOSIS — M43.16 ANTEROLISTHESIS OF LUMBAR SPINE: Primary | ICD-10-CM

## 2025-01-13 ENCOUNTER — TELEPHONE (OUTPATIENT)
Dept: PHARMACY | Facility: CLINIC | Age: 65
End: 2025-01-13
Payer: COMMERCIAL

## 2025-01-13 NOTE — TELEPHONE ENCOUNTER
Pt is due for follow up with [facility]     Call placed to pt to initiate scheduling on 1/13/25    Outcome: Pt is scheduled for video visit on 1/15 at 2:30pm

## 2025-01-20 DIAGNOSIS — M48.061 SPINAL STENOSIS OF LUMBAR REGION, UNSPECIFIED WHETHER NEUROGENIC CLAUDICATION PRESENT: ICD-10-CM

## 2025-01-20 DIAGNOSIS — M43.16 ANTEROLISTHESIS OF LUMBAR SPINE: ICD-10-CM

## 2025-01-20 DIAGNOSIS — M48.061 NEUROFORAMINAL STENOSIS OF LUMBAR SPINE: ICD-10-CM

## 2025-01-20 DIAGNOSIS — G89.29 CHRONIC RIGHT-SIDED LOW BACK PAIN WITHOUT SCIATICA: ICD-10-CM

## 2025-01-20 DIAGNOSIS — M54.16 LUMBAR RADICULOPATHY: ICD-10-CM

## 2025-01-20 DIAGNOSIS — M54.42 ACUTE LEFT-SIDED LOW BACK PAIN WITH LEFT-SIDED SCIATICA: ICD-10-CM

## 2025-01-20 DIAGNOSIS — M54.50 CHRONIC RIGHT-SIDED LOW BACK PAIN WITHOUT SCIATICA: ICD-10-CM

## 2025-01-20 NOTE — TELEPHONE ENCOUNTER
Gabapentin 300mg capsules  Last filled 12/23/2024  Qty: 90 capsules ( to last 30 days )   Last office visit 10/14/2024    Please send a new order if appropriate.    Thanks  Elif Casillas [facility]

## 2025-01-21 RX ORDER — GABAPENTIN 300 MG/1
CAPSULE ORAL
Qty: 90 CAPSULE | Refills: 3 | Status: SHIPPED | OUTPATIENT
Start: 2025-01-21 | End: 2025-01-23

## 2025-01-21 NOTE — TELEPHONE ENCOUNTER
Last Written Prescription:    gabapentin (NEURONTIN) 300 MG capsule 90 capsule 3 9/11/2024     ----------------------  Last Visit Date: 10/14/24  Future Visit Date: none  ----------------------        Refill decision: Medication unable to be refilled by RN due to:         [x]  Medication not included in refill protocol policy        Request from pharmacy:  Requested Prescriptions   Pending Prescriptions Disp Refills    gabapentin (NEURONTIN) 300 MG capsule 90 capsule 3     Sig: Take 1 capsule (300 mg) by mouth every morning AND 2 capsules (600 mg) at bedtime.       There is no refill protocol information for this order        Fiona Brito RN  [facility] Central Nursing/Red Flag Triage & Med Refill Team

## 2025-01-22 ENCOUNTER — MYC REFILL (OUTPATIENT)
Dept: NEUROLOGY | Facility: CLINIC | Age: 65
End: 2025-01-22
Payer: COMMERCIAL

## 2025-01-22 DIAGNOSIS — M54.42 ACUTE LEFT-SIDED LOW BACK PAIN WITH LEFT-SIDED SCIATICA: ICD-10-CM

## 2025-01-22 DIAGNOSIS — M54.50 CHRONIC RIGHT-SIDED LOW BACK PAIN WITHOUT SCIATICA: ICD-10-CM

## 2025-01-22 DIAGNOSIS — M54.16 LUMBAR RADICULOPATHY: ICD-10-CM

## 2025-01-22 DIAGNOSIS — M48.061 NEUROFORAMINAL STENOSIS OF LUMBAR SPINE: ICD-10-CM

## 2025-01-22 DIAGNOSIS — M43.16 ANTEROLISTHESIS OF LUMBAR SPINE: ICD-10-CM

## 2025-01-22 DIAGNOSIS — G89.29 CHRONIC RIGHT-SIDED LOW BACK PAIN WITHOUT SCIATICA: ICD-10-CM

## 2025-01-22 DIAGNOSIS — M48.061 SPINAL STENOSIS OF LUMBAR REGION, UNSPECIFIED WHETHER NEUROGENIC CLAUDICATION PRESENT: ICD-10-CM

## 2025-01-22 RX ORDER — GABAPENTIN 300 MG/1
CAPSULE ORAL
Qty: 90 CAPSULE | Refills: 3 | Status: CANCELLED | OUTPATIENT
Start: 2025-01-22

## 2025-01-23 RX ORDER — GABAPENTIN 300 MG/1
CAPSULE ORAL
Qty: 90 CAPSULE | Refills: 3 | Status: SHIPPED | OUTPATIENT
Start: 2025-01-23

## 2025-01-23 NOTE — TELEPHONE ENCOUNTER
Called [facility] and spoke with the technician who confirmed that the RX that says it was sent successfully to them on 1/21/25 was not received in their system.     Re-sent the RX per Nursing scope of practice and notified Eri Kuo NP of the need to re-send the RX due to the pharmacy not receiving the 1/21/25 RX  she sent in previously.     My Chart message sent back to patient to update her that the pharmacy is working on her prescription.     GERALD WatkinsN RN  RN Care Coordinator for Physical Medicine and Rehabilitation  [facility] [address] [address]  Office: [phone]  Fax: [phone]

## 2025-01-24 ENCOUNTER — MYC REFILL (OUTPATIENT)
Dept: RHEUMATOLOGY | Facility: CLINIC | Age: 65
End: 2025-01-24

## 2025-01-24 DIAGNOSIS — L40.50 PSORIATIC ARTHRITIS (H): ICD-10-CM

## 2025-01-27 NOTE — TELEPHONE ENCOUNTER
Hydroxychloroquine (Plaquenil)       Last Written Prescription Date:  9/27/24  Last Fill Quantity: 90,   # refills: 1  Last Office Visit: 1/24/25  Future Office visit:  7/25/25    Last Eye exam: Patient reminded to schedule at 1/24/25 appointment     According to [facility] Rheumatology refill protocol:        Creatinine   Date Value Ref Range Status   10/11/2024 0.62 0.51 - 0.95 mg/dL Final   06/11/2021 0.63 0.52 - 1.04 mg/dL Final     Reyna Duncan RN

## 2025-01-28 RX ORDER — HYDROXYCHLOROQUINE SULFATE 200 MG/1
TABLET, FILM COATED ORAL
Qty: 90 TABLET | Refills: 1 | Status: SHIPPED | OUTPATIENT
Start: 2025-01-28

## 2025-01-29 ENCOUNTER — LAB (OUTPATIENT)
Dept: LAB | Facility: CLINIC | Age: 65
End: 2025-01-29
Payer: COMMERCIAL

## 2025-01-29 DIAGNOSIS — L40.50 PSORIATIC ARTHROPATHY (H): ICD-10-CM

## 2025-01-29 LAB
ERYTHROCYTE [DISTWIDTH] IN BLOOD BY AUTOMATED COUNT: 13.1 % (ref 10–15)
ERYTHROCYTE [SEDIMENTATION RATE] IN BLOOD BY WESTERGREN METHOD: 8 MM/HR (ref 0–30)
HCT VFR BLD AUTO: 37.5 % (ref 35–47)
HGB BLD-MCNC: 12.2 G/DL (ref 11.7–15.7)
MCH RBC QN AUTO: 29.3 PG (ref 26.5–33)
MCHC RBC AUTO-ENTMCNC: 32.5 G/DL (ref 31.5–36.5)
MCV RBC AUTO: 90 FL (ref 78–100)
PLATELET # BLD AUTO: 363 10E3/UL (ref 150–450)
RBC # BLD AUTO: 4.16 10E6/UL (ref 3.8–5.2)
WBC # BLD AUTO: 5.8 10E3/UL (ref 4–11)

## 2025-01-29 PROCEDURE — 85652 RBC SED RATE AUTOMATED: CPT

## 2025-01-29 PROCEDURE — 84460 ALANINE AMINO (ALT) (SGPT): CPT

## 2025-01-29 PROCEDURE — 82565 ASSAY OF CREATININE: CPT

## 2025-01-29 PROCEDURE — 84450 TRANSFERASE (AST) (SGOT): CPT

## 2025-01-29 PROCEDURE — 85027 COMPLETE CBC AUTOMATED: CPT

## 2025-01-29 PROCEDURE — 86140 C-REACTIVE PROTEIN: CPT

## 2025-01-29 PROCEDURE — 36415 COLL VENOUS BLD VENIPUNCTURE: CPT

## 2025-01-30 LAB
ALT SERPL W P-5'-P-CCNC: 17 U/L (ref 0–50)
AST SERPL W P-5'-P-CCNC: 25 U/L (ref 0–45)
CREAT SERPL-MCNC: 0.69 MG/DL (ref 0.51–0.95)
CRP SERPL-MCNC: <3 MG/L
EGFRCR SERPLBLD CKD-EPI 2021: >90 ML/MIN/1.73M2

## 2025-02-26 ENCOUNTER — PATIENT OUTREACH (OUTPATIENT)
Dept: CARE COORDINATION | Facility: CLINIC | Age: 65
End: 2025-02-26
Payer: COMMERCIAL

## 2025-03-13 ENCOUNTER — MYC MEDICAL ADVICE (OUTPATIENT)
Dept: PHYSICAL MEDICINE AND REHAB | Facility: CLINIC | Age: 65
End: 2025-03-13
Payer: COMMERCIAL

## 2025-03-13 DIAGNOSIS — M54.16 LUMBAR RADICULOPATHY: Primary | ICD-10-CM

## 2025-03-13 DIAGNOSIS — M48.061 SPINAL STENOSIS OF LUMBAR REGION, UNSPECIFIED WHETHER NEUROGENIC CLAUDICATION PRESENT: ICD-10-CM

## 2025-03-17 ENCOUNTER — MYC MEDICAL ADVICE (OUTPATIENT)
Dept: FAMILY MEDICINE | Facility: CLINIC | Age: 65
End: 2025-03-17
Payer: COMMERCIAL

## 2025-03-17 ENCOUNTER — TELEPHONE (OUTPATIENT)
Dept: RHEUMATOLOGY | Facility: CLINIC | Age: 65
End: 2025-03-17
Payer: COMMERCIAL

## 2025-03-17 NOTE — TELEPHONE ENCOUNTER
PA Initiation    Medication: XELJANZ XR 11 MG PO TB24  Insurance Company: "[employer] - Phone [phone] Fax [phone]  Pharmacy Filling the Rx:    Filling Pharmacy Phone:    Filling Pharmacy Fax:    Start Date: 3/17/2025    Key: [id number]

## 2025-03-18 RX ORDER — GABAPENTIN 300 MG/1
600 CAPSULE ORAL 3 TIMES DAILY
Qty: 180 CAPSULE | Refills: 3 | Status: SHIPPED | OUTPATIENT
Start: 2025-03-18

## 2025-03-18 NOTE — TELEPHONE ENCOUNTER
Writer replied to patient via [employer].  JERSON Kaur BSN, PHN, AMB-BC (she/her)  [facility] RN

## 2025-03-20 NOTE — TELEPHONE ENCOUNTER
PRIOR AUTHORIZATION DENIED    Medication: XELJANZ XR 11 MG PO TB24  Insurance Company: [employer] - Phone [phone] Fax [phone]  Denial Date: 3/18/2025  Denial Reason(s): using in combination with Otezla        Appeal Information:         Patient Notified:

## 2025-03-24 NOTE — TELEPHONE ENCOUNTER
Medication Appeal Initiation    Medication: XELJANZ XR 11 MG PO TB24  Appeal Start Date:  3/24/2025  Insurance Company: [employer]  Insurance Phone: [phone]  Insurance Fax: [phone]  Comments:    Faxed appeal

## 2025-03-25 NOTE — TELEPHONE ENCOUNTER
MEDICATION APPEAL DENIED    Medication: XELJANZ XR 11 MG PO TB24  Insurance Company: [employer]  Denial Date: 3/25/2025  Denial Reason(s):           Second Level Appeal Information:     Second level appeal can be sent if new or different information is sent otherwise there is also an option to do an external review.          Patient Notified: not yet  Central Prior Authorization Team ONLY: Second level appeals will be managed by the clinic staff and provider. Please contact the [employer] Prior Authorization Team if additional information about the denial is needed.

## 2025-03-26 NOTE — TELEPHONE ENCOUNTER
Completed external review forms and sent My Chart message to Willa to see if I can sen them to the email on file for her signature.

## 2025-03-27 NOTE — TELEPHONE ENCOUNTER
MEDICATION SECOND LEVEL APPEAL INITIATED-External Review    Medication: XELJANZ XR 11 MG PO TB24  Second Level Appeal Start Date: 3/27/2025  Insurance Company: Unknown external review company, will have to follow up with [employer] for updates since denial has us sending external eview to them to submit  Insurance Phone: [phone]  Insurance Fax: [phone]  Additional Information:Faxed Expedited External Review to [employer]

## 2025-03-31 ENCOUNTER — ANCILLARY PROCEDURE (OUTPATIENT)
Dept: MAMMOGRAPHY | Facility: CLINIC | Age: 65
End: 2025-03-31
Attending: FAMILY MEDICINE
Payer: COMMERCIAL

## 2025-03-31 DIAGNOSIS — Z12.31 VISIT FOR SCREENING MAMMOGRAM: ICD-10-CM

## 2025-03-31 PROCEDURE — 77067 SCR MAMMO BI INCL CAD: CPT | Mod: TC | Performed by: RADIOLOGY

## 2025-03-31 PROCEDURE — 77063 BREAST TOMOSYNTHESIS BI: CPT | Mod: TC | Performed by: RADIOLOGY

## 2025-03-31 NOTE — TELEPHONE ENCOUNTER
MEDICATION SECOND LEVEL APPEAL APPROVED    Medication: XELJANZ XR 11 MG PO TB24  Authorization Effective Date: 3/29/2025  Authorization Expiration Date: 3/29/2026  Approved Dose/Quantity:   Reference #: Key: [id number]   Insurance Company: [employer]  Expected CoPay: $       CoPay Card Available:      Foundation Assistance Needed:   Which Pharmacy is filling the prescription: [facility] - [place] [address]  Patient Notified: Yes, sent My Chart msg

## 2025-04-01 ENCOUNTER — TELEPHONE (OUTPATIENT)
Dept: PHYSICAL MEDICINE AND REHAB | Facility: CLINIC | Age: 65
End: 2025-04-01

## 2025-04-01 ENCOUNTER — RADIOLOGY INJECTION OFFICE VISIT (OUTPATIENT)
Dept: PHYSICAL MEDICINE AND REHAB | Facility: CLINIC | Age: 65
End: 2025-04-01
Attending: NURSE PRACTITIONER
Payer: COMMERCIAL

## 2025-04-01 VITALS
TEMPERATURE: 97.8 F | DIASTOLIC BLOOD PRESSURE: 74 MMHG | HEART RATE: 86 BPM | OXYGEN SATURATION: 97 % | SYSTOLIC BLOOD PRESSURE: 130 MMHG | RESPIRATION RATE: 16 BRPM

## 2025-04-01 DIAGNOSIS — M54.16 LUMBAR RADICULITIS: ICD-10-CM

## 2025-04-01 PROCEDURE — 62323 NJX INTERLAMINAR LMBR/SAC: CPT | Performed by: PAIN MEDICINE

## 2025-04-01 RX ORDER — LIDOCAINE HYDROCHLORIDE 10 MG/ML
INJECTION, SOLUTION EPIDURAL; INFILTRATION; INTRACAUDAL; PERINEURAL
Status: COMPLETED | OUTPATIENT
Start: 2025-04-01 | End: 2025-04-01

## 2025-04-01 RX ORDER — DEXAMETHASONE SODIUM PHOSPHATE 10 MG/ML
INJECTION, SOLUTION INTRAMUSCULAR; INTRAVENOUS
Status: COMPLETED | OUTPATIENT
Start: 2025-04-01 | End: 2025-04-01

## 2025-04-01 RX ADMIN — DEXAMETHASONE SODIUM PHOSPHATE 10 MG: 10 INJECTION, SOLUTION INTRAMUSCULAR; INTRAVENOUS at 15:05

## 2025-04-01 RX ADMIN — LIDOCAINE HYDROCHLORIDE 2 ML: 10 INJECTION, SOLUTION EPIDURAL; INFILTRATION; INTRACAUDAL; PERINEURAL at 15:05

## 2025-04-01 ASSESSMENT — PAIN SCALES - GENERAL
PAINLEVEL_OUTOF10: MILD PAIN (2)
PAINLEVEL_OUTOF10: MODERATE PAIN (4)

## 2025-04-01 NOTE — PATIENT INSTRUCTIONS
DISCHARGE INSTRUCTIONS    During office hours (8:00 a.m.- 4:00 p.m.) questions or concerns may be answered  by calling Spine Center Navigation Nurses at  [phone].  Messages received after hours will be returned the following business day.      In the case of an emergency, please dial 911 or seek assistance at the nearest Emergency Room/Urgent Care facility.     All Patients:    You may experience an increase in your symptoms for the first 2 days (It may take anywhere between 2 days- 2 weeks for the steroid to have maximum effect).    You may use ice on the injection site, as frequently as 20 minutes each hour if needed.    You may take your pain medicine.    You may continue taking your regular medication after your injection. If you have had a Medial Branch Block you may resume pain medication once your pain diary is completed.    You may shower. No swimming, tub bath or hot tub for 48 hours.  You may remove your bandaid/bandage as soon as you are home.    You may resume light activities, as tolerated.    Resume your usual diet as tolerated.    If you were told to hold any blood thinning medications you may resume taking them 24 hours after your procedure as prescribed.    It is strongly advised that you do not drive for 1-3 hours post injection.    If you have had oral sedation:  Do not drive for 8 hours post injection.      If you have had IV sedation:  Do not drive for 24 hours post injection.  Do not operate hazardous machinery or make important personal/business decisions for 24 hours.      POSSIBLE STEROID SIDE EFFECTS (If steroid/cortisone was used for your procedure)    -If you experience these symptoms, it should only last for a short period    Swelling of the legs              Skin redness (flushing)     Mouth (oral) irritation   Blood sugar (glucose) levels            Sweats                    Mood changes  Headache  Sleeplessness  Weakened immune system for up to 14 days, which could increase  the risk of romelia the COVID-19 virus and/or experiencing more severe symptoms of the disease, if exposed.  Decreased effectiveness of the flu vaccine if given within 2 weeks of the steroid.         POSSIBLE PROCEDURE SIDE EFFECTS  -Call the Spine Center if you are concerned  Increased Pain           Increased numbness/tingling      Nausea/Vomiting          Bruising/bleeding at site      Redness or swelling                                              Difficulty walking      Weakness           Fever greater than 100.5    *In the event of a severe headache after an epidural steroid injection that is relieved by lying down, please call the [facility] to speak with a clinical staff member*

## 2025-04-01 NOTE — TELEPHONE ENCOUNTER
Patient confirmed scheduled appointment:  Date: 4/15/25  Time: 11:40am  Visit type: Return Med Spine  Provider: Eri Kuo  [place] [facility]  [place]/imaging: NA  Additional notes: follow up epidural steroid injection 4/1

## 2025-04-07 ENCOUNTER — ANCILLARY PROCEDURE (OUTPATIENT)
Dept: BONE DENSITY | Facility: CLINIC | Age: 65
End: 2025-04-07
Attending: FAMILY MEDICINE
Payer: COMMERCIAL

## 2025-04-07 DIAGNOSIS — Z82.62 FAMILY HISTORY OF OSTEOPOROSIS: ICD-10-CM

## 2025-04-07 DIAGNOSIS — Z00.00 ROUTINE GENERAL MEDICAL EXAMINATION AT A HEALTH CARE FACILITY: ICD-10-CM

## 2025-04-07 DIAGNOSIS — L40.50 PSORIATIC ARTHROPATHY (H): ICD-10-CM

## 2025-04-07 DIAGNOSIS — Z13.820 SCREENING FOR OSTEOPOROSIS: ICD-10-CM

## 2025-04-07 PROCEDURE — 77091 TBS TECHL CALCULATION ONLY: CPT | Performed by: RADIOLOGY

## 2025-04-07 PROCEDURE — 77080 DXA BONE DENSITY AXIAL: CPT | Mod: TC | Performed by: RADIOLOGY

## 2025-04-09 NOTE — RESULT ENCOUNTER NOTE
Hello -    Here are my comments about your recent results:  Dexa shows normal bone mineral density  Keep up with calcium through diet and vit d supp daily, regular aerobic activity, walking is good for spine and hip density and recheck dexa scan in 3 yrs.    Please let us know if you have any questions or concerns.     Regards,  Geovanna Rosales MD

## 2025-04-15 ENCOUNTER — OFFICE VISIT (OUTPATIENT)
Dept: PHYSICAL MEDICINE AND REHAB | Facility: CLINIC | Age: 65
End: 2025-04-15
Payer: COMMERCIAL

## 2025-04-15 VITALS — HEART RATE: 75 BPM | OXYGEN SATURATION: 96 % | DIASTOLIC BLOOD PRESSURE: 84 MMHG | SYSTOLIC BLOOD PRESSURE: 135 MMHG

## 2025-04-15 DIAGNOSIS — M54.16 LUMBAR RADICULOPATHY: Primary | ICD-10-CM

## 2025-04-15 DIAGNOSIS — G89.29 CHRONIC LEFT-SIDED LOW BACK PAIN WITH LEFT-SIDED SCIATICA: ICD-10-CM

## 2025-04-15 DIAGNOSIS — M48.061 SPINAL STENOSIS OF LUMBAR REGION, UNSPECIFIED WHETHER NEUROGENIC CLAUDICATION PRESENT: ICD-10-CM

## 2025-04-15 DIAGNOSIS — M54.42 CHRONIC LEFT-SIDED LOW BACK PAIN WITH LEFT-SIDED SCIATICA: ICD-10-CM

## 2025-04-15 PROCEDURE — 3075F SYST BP GE 130 - 139MM HG: CPT | Performed by: NURSE PRACTITIONER

## 2025-04-15 PROCEDURE — 3079F DIAST BP 80-89 MM HG: CPT | Performed by: NURSE PRACTITIONER

## 2025-04-15 PROCEDURE — 99215 OFFICE O/P EST HI 40 MIN: CPT | Performed by: NURSE PRACTITIONER

## 2025-04-15 NOTE — PATIENT INSTRUCTIONS
It was a pleasure seeing you in the clinic today.  As discussed, we made the following updates to your plan of care:       -It's good to hear you are feeling better after the steroid injection! If you continue to have some persistent  / recurrent pain, we could consider repeat epidural steroid injection if needed.    -Continue gabapentin (600 mg in the morning, 300 mg in the afternoon, and 600 mg at bedtime).  You can increase the medication to 600 mg 3 times daily if needed.          Thank you,  Eri Kuo NP  Physical Medicine and Rehabilitation, Medical Spine  PM&R clinic Phone: [phone]  PM&R clinic Fax: [phone]

## 2025-04-15 NOTE — PROGRESS NOTES
PM&R Follow-Up Visit -     Date of Initial Visit: 2/9/2024  LOV: 4/1/2025 - procedure visit (Dr Velazquez)  TD: 4/15/2025     Recall: Willa Downey is a 64 year old female who was previously seen in the spine clinic with a chief complaint of right sided low back pain.        INTERVAL HISTORY:  Patient was last seen in clinic 4/1/2025 for L5-S1 ILESI with Dr Velazquez.     Since last time, she also had a consultation with Dr. Caballero in orthopedic spine surgery 11/14/2024 and plan was to continue with conservative management at this time.      She was seen today for follow up after the steroid injection.  She reports initially experiencing 85% improvement in pain after the procedure.  After the first week, she began having intermittent recurrent pain.  She now estimates the level of improvement waned to about 70%.    She tends to notice recurrent pain in the evening.  In the evening she enjoys sitting in the recliner and watching a show, however, when she stands up, she sometimes notices radiating pain into the left leg.  Squatting can also bring on the pain. When the radiating pain occurs, it extends along the left lateral thigh into the posterolateral left lower leg.   However, she is still able to regularly walk 1.5 to 2 miles.    Of note she does report trouble sleeping for a few nights after the steroid injection.    Since last time she was seen, she increased the gabapentin dosing/frequency to 600 mg in the morning, 300 mg in the afternoon, and 600 mg at bedtime.  She is curious about increasing the gabapentin to 600 mg TID, but overall, indicates she may want to decrease the number of medication she takes.    She did 3 sessions of physical therapy in December.  She says the PT was somewhat helpful, but did not provide a tremendous level of relief.            RECALL HISTORY OF PRESENT ILLNESS:  Willa Downey is a 63 year old female who presents with a chief complaint of right sided low back pain.  "    She was seen today in the clinic. She describes right-sided low back pain which began around 2020 without any known cause.  She says that at the time she was having some radicular pain into the right lower extremity.  However, she feels that the pain has generally improved over time.    She says that she had been taking gabapentin 3 times a day for pain, but says that she tapered down to twice a day, which she finds is working out okay.  She states that her pain level usually ranges from 0-2/10.  If she does more activity, in particular prolonged standing, she will have an increased degree of pain.  She is active - walks 2 miles regularly 4 to 5 days out of the week.  The walking does not seem to aggravate the pain.    Today, she describes pain & tightness affecting the right side low back extending into the lateral right hip.  There is no pain extending into the right lower extremity.  She notes that this morning she had a pulling sensation extending into her left leg, but says that she does not usually have pain in that area.    Aggravating factors include: prolonged standing, laying on right  Relieving factors include: stretching or medication     Today, she rates the pain 0/10.  At its worst over the last week the pain was 2/10.   Patient states sleep is not affected though she does feel sometimes uncomfortable laying on her right side.     She denies falls, weakness, bowel or bladder incontinence, saddle anesthesia, unintentional weight loss, fevers/chills, or night sweats.     Today, she says her goal is to refill the gabapentin, which she describes as a \"miracle medicine for me\".      8/6/2024:  Patient was last seen in clinic 2/9/2024. At that visit, the plan was to continue gabapentin 300 mg twice daily.    She was seen today in the clinic due to new distribution of pain.    At the prior visit, she described pain & tightness affecting the right side low back extending into the lateral right " hip.    Currently, she says that she the nonradiating right-sided back pain has been well-controlled.  However, she has now developed some new radiating pain on the left side which began in April.      She describes:  -Left low back/gluteal pain radiating to the lateral left leg to the ankle.    She states this pain feels familiar to what she experienced on the right lower extremity in the past.    She denies lower extremity numbness or tingling.      Pain is aggravated with: Lying down, sitting, standing, twisting, being on the hands and knees or squatting while cleaning, vacuuming, getting up after sitting on her recliner couch, worse in the evening  Pain is relieved with: Walking, sitting upright or on a more firm chair    She is tolerating gabapentin 300 mg twice daily.    She follows with rheumatology in regards to psoriatic arthritis.    She denies falls, weakness, bowel or bladder incontinence, or saddle anesthesia.      9/11/2024:  Patient was last seen in clinic 8/6/2024. At that visit, the plan was to update the MRI of the lumbar spine and titrate gabapentin 300 mg from BID to TID.    At the last visit she described:  -Left low back/gluteal pain radiating to the lateral left leg to the ankle.   -prior history of right sided low back pain extending into the lateral right hip which had improved    Today, she endorses ongoing & worsening pain radiating from the left buttocks along the lateral side of the left leg and terminating at the ankle.  She states that last night she was having pain at the middle of her back also.    The pain tends to be worse at night or in the evening.  She notes shooting pain into the left leg in the evening.  Getting up and taking a few steps tends to help.  However, if she sits back down, the pain returns. She sometimes has a hard time falling back asleep at night.      She does have pain when she wakes up in the morning, but typically the pain is better during the day.  However,  "in the past few days, she notices the pain has been getting worse during the day too.  Despite that, she can still walk up to 2 miles without pain interfering.    Lumbar flexion or lumbar extension do not seem to affect pain levels.    She recalls that this morning when she got up the pain was 4-5/10.    She denies bowel or bladder incontinence, or saddle anesthesia.      Since last time she tried adding a midday dose of gabapentin but it made her too drowsy. So she is currently taking 300 mg of gabapentin twice daily.        10/14/2024:  Patient was last seen in clinic 9/17/2024 for L5-S1 ILESI  with Dr Velazquez.   She was seen today in the clinic for follow up and reports 80-85% improvement in the level of pain after the EMILY.   She reports the \"sharp, sciatic\" pain is milder. However she continues to have a dull pain in sacral area especially with walking. She enjoys walking 1.5 miles. She says it is hard to identify triggers for the back and leg pain.      After the last visit, lumbar flexion/extension X rays showed some dynamic motion with lumbar flexion at the L4-5 level; at this level there is also severe spinal canal stenosis. We had previously discussed spine surgery consultation given these findings and she had declined, but today states she would like to proceed with a spine surgery referral as she would like to know more about surgical options.      PRIOR INJURIES/TREATMENT:   Ice/Heat: no    Brace: no    Physical Therapy:   Recent PT December 2024  PT for low back pain 2020     - Current Pain Medications -   Gabapentin 600 mg in the morning, 300 mg in the afternoon, and 600 mg at bedtime  Xeljanz   Otezla  Celebrex     - Prior/Trialed Pain Medications -   Flexeril  Steroid burst    Prior Procedures:  Date    Procedure   Improvement (%)  4/1/2025 L5-S1 ILESI ([facility]) 85% x1 week, then 70% ongoing  9/17/2024  L5-S1 ILESI ([facility])  80-85%              Prior Related Surgery: none   Other " (acupuncture, OMT, CMM, TENS, DME, etc.):     Specialists Seen - (with most recent, available notes and clinic visits reviewed)   1. Sports medicine - Dr Zhou, Dr Tesfaye  2.  Rheumatology-psoriatic arthritis  3. Ortho spine - Dr Caballero      IMAGING - reviewed   LUMBAR FLEX/EXT TWO TO THREE VIEWS September 12, 2024 IMPRESSION: Five lumbar type vertebrae. Grade 1 degenerative  anterolisthesis of L4 upon L5 again noted. Alignment otherwise normal.  Alignment does not change appreciably in the extended position. In  flexion, there is probable interval increase in anterolisthesis of L4  upon L5; alignment otherwise remains normal in flexion. Vertebral body  heights normal. Facet arthropathy at L4-L5 and L5-S1.         MR LUMBAR SPINE W/O CONTRAST 9/8/2024   Findings: There are 5 lumbar-type vertebrae assumed for the purposes  of this dictation.  The tip of the conus medullaris is at L1. Grade  1-2 anterolisthesis of L4, increased from previous. Mild loss of disc  height at L4-5.  Normal marrow signal.     On a level by level basis:     T12-L1: No spinal canal or neuroforaminal stenosis.     L1-2: No spinal canal or neuroforaminal stenosis.     L2-3: No spinal canal or neuroforaminal stenosis.     L3-4: No spinal canal or neuroforaminal stenosis.     L4-5: Grade 1-2 anterolisthesis and uncovering of the disc.  Superimposed central disc extrusion with slight cephalad migration.  Bilateral facet hypertrophy and ligamentum flavum thickening. Further  increased severe spinal canal stenosis. Moderate bilateral neural  foraminal stenosis.      L5-S1: Posterior disc bulge and bilateral facet hypertrophy. Mild  spinal canal narrowing. Mild bilateral neural foraminal narrowing.     Paraspinous tissues are within normal limits.                                                 Impression:   1. Increased grade 1-2 anterolisthesis of L4 on L5, with continued  severe spinal canal stenosis and moderate bilateral neural  foraminal  stenosis at this level. Portion of the uncovered disc appears slightly  decreased in size.  2. Additional levels appear similar to previous.      XR SACROILIAC JOINT 1/2 VIEWS  04/10/2024                                                                 IMPRESSION: Mild bilateral SI joint arthrosis, left greater than right. No erosive change to suggest an inflammatory arthropathy. Both hip joint spaces are maintained.         MR LUMBAR SPINE W/O CONTRAST 8/14/2020    Findings: There are 5 lumbar-type vertebrae assumed for the purposes  of this dictation.  The tip of the conus medullaris is at L1.  Grade 1  anterolisthesis of L4. Mild loss of disc height at L4-5.  Normal  marrow signal.     On a level by level basis:     T12-L1: No spinal canal or neuroforaminal stenosis.     L1-2: No spinal canal or neuroforaminal stenosis.     L2-3: No spinal canal or neuroforaminal stenosis.     L3-4: No spinal canal or neuroforaminal stenosis.     L4-5: Grade 1 anterolisthesis and uncovering of the disc. Superimposed  central disc extrusion with cephalad migration. Bilateral facet  hypertrophy and ligamentum flavum thickening. Severe spinal canal  stenosis. Moderate bilateral neural foraminal stenosis. Periarticular  edema surrounding the bilateral facet joints.     L5-S1: Posterior disc bulge and bilateral facet hypertrophy. Mild  spinal canal narrowing. Mild bilateral neural foraminal narrowing.     Paraspinous tissues are within normal limits.                                                    Impression:   1. Spondylosis, particularly at L4-5 where there is severe spinal  canal stenosis and moderate bilateral neural foraminal stenosis.  2. Active inflammatory arthropathy of the bilateral L4-5 facet joints.      2 views lumbar spine radiographs 7/13/2020  Findings:     Standing  AP and lateral  views of the lumbar spine were obtained.     5  lumbar type vertebral bodies are assumed for the purpose of  this  dictation.     There is no acute osseous abnormality.       Grade 1 spondylolisthesis of L4 on L5 measuring approximately 8 mm  with associated mild intervertebral disc space loss. Otherwise, Disc  height and vertebral body heights are maintained throughout.     The visualized bowel gas pattern is non-obstructive.                                                  Impression:  1.  No acute osseous abnormality.  2.  Grade 1 degenerative type spondylolisthesis of L4 on L5, measuring  approximately 8 mm.      Review Of Systems:  I am responding to those symptoms which are directly relevant to the specific indication for my consultation. I recommend that the patient follow up with their primary or referring provider to pursue any other symptoms which may be of concern.       Medical History:  She  has a past medical history of Bilateral low back pain with right-sided sciatica, unspecified chronicity (11/11/2020), Neuroforaminal stenosis of lumbar spine (11/11/2020), Nuclear senile cataract of both eyes (08/16/2018), Osteoarthritis (10/15/2020), Psoriatic arthropathy (H) (10/19/2020), and Serum calcium elevated (5/31/2023).     She  has a past surgical history that includes biopsy (07/30/2019); Eye surgery; and Colonoscopy (N/A, 7/14/2022).    Family History  Her family history includes Breast Cancer in her maternal aunt; Cataracts in her maternal grandmother; Hyperlipidemia in her mother; Hypertension in her mother; Osteoporosis in her mother; Stomach Cancer in her father.     Social History:  Work: stay at home mom  Current living situation: lives with family and her daughter, another daughter lives on her own. Performs ADLs/IADLs independently.    She  reports that she has never smoked. She has never used smokeless tobacco. She reports that she does not drink alcohol and does not use drugs.        Current Medications:   She has a current medication list which includes the following prescription(s): apremilast,  calcium carbonate-vit d-min, celecoxib, clobetasol, doxycycline monohydrate, gabapentin, loratadine, melatonin, multivitamin adults 50+, tacrolimus, tazarotene, and xeljanz xr, and the following Facility-Administered Medications: triamcinolone.     Allergies:    -- Adhesive Tape -- Blisters, Itching and Swelling   -- Dust Mites    -- Latex -- Blisters, Itching, Other (See                            Comments) and Swelling    --  Skin sensitivity   -- Morphine Sulfate (Concentrate) -- Itching    --  Other reaction(s): Chills             heart palpatations   -- Seasonal Allergies    -- Codeine -- Palpitations    --  chills        PHYSICAL EXAMINATION:   /84 (BP Location: Right arm, Patient Position: Sitting, Cuff Size: Adult Regular)   Pulse 75   LMP  (LMP Unknown)   SpO2 96%    --CONSTITUTIONAL: Vital signs as above. No acute distress. The patient is well nourished and well groomed.  --PSYCHIATRIC: The patient is awake, alert, oriented to person, place, time and answering questions appropriately with clear speech. Appropriate mood and affect   --SKIN:  Posterior torso is clean, dry, intact without rashes.  --RESPIRATORY: Normal rhythm and effort. No abnormal accessory muscle breathing patterns noted.   --GROSS MOTOR: Easily arises from a seated position.          ASSESSMENT:  Willa Downey is a pleasant 63 year old female who presents with   -Progressive, worsening left low back/gluteal pain radiating to the lateral left leg terminating at the ankle.   -prior history of right sided low back pain extending into the lateral right hip which has improved    MRI of the lumbar spine from 9/8/2024 shows:  -Grade 1-2 anterolisthesis of L4, increased from previous  -increased severe spinal canal stenosis at L4-5  -moderate bilat NFS at L4-5    Complicating comorbidities include:  # Psoriatic arthritis on Xeljanz, follows with rheumatology    PLAN:  -L5-S1 IL EMILY 4/1/2025 provided 85% improvement in the level of  pain for 1 week, since then decreased to about 70% relief.  She would like to wait and see how the pain level is doing over the course of the coming weeks.  If the pain level is increasing, she is potentially interested in repeating an epidural steroid injection.  Could consider left L5-S1 and S1 TFESI if needed. (MBB/RFA could be considered in the future, but the left leg pain is significantly bothersome for her at this point)  -Currently she is tolerating gabapentin (600 mg in the morning, 300 mg in the afternoon, and 600 mg at bedtime).  If the pain level remains persistent she would like to consider increasing the gabapentin to 600 mg 3 times daily.  Big picture she would like to consider cutting out medications when possible.  Offered neurology pharmacist MTM consultation.  She prefers to reach out to rheumatology pharmacist to review her medications for now  - She recently completed PT in December.  Encourage ongoing home exercise program  - She did have a consultation with Dr Caballero 11/14/2024 and the plan is to continue conservative management for now  -RTC PRN         Ready to learn, no apparent learning barriers.  Education provided on treatment plan according to patient's preferred learning style.  Patient verbalizes understanding.   __________________________________  Eri Kuo NP  Physical Medicine & Rehabilitation        47 minutes spent by me on the date of the encounter doing chart review, history and exam, documentation, coordination with staff regarding injection and further activities per the note

## 2025-04-15 NOTE — LETTER
4/15/2025       RE: Willa Downey  [address]  [facility] [address]     Dear Colleague,    Thank you for referring your patient, Willa Downey, to the [facility] [place] at [facility]. Please see a copy of my visit note below.    PM&R Follow-Up Visit -     Date of Initial Visit: 2/9/2024  LOV: 4/1/2025 - procedure visit (Dr Velazquez)  TD: 4/15/2025     Recall: Willa Downey is a 64 year old female who was previously seen in the spine clinic with a chief complaint of right sided low back pain.        INTERVAL HISTORY:  Patient was last seen in clinic 4/1/2025 for L5-S1 ILESI with Dr Velazquez.     Since last time, she also had a consultation with Dr. Caballero in orthopedic spine surgery 11/14/2024 and plan was to continue with conservative management at this time.      She was seen today for follow up after the steroid injection.  She reports initially experiencing 85% improvement in pain after the procedure.  After the first week, she began having intermittent recurrent pain.  She now estimates the level of improvement waned to about 70%.    She tends to notice recurrent pain in the evening.  In the evening she enjoys sitting in the recliner and watching a show, however, when she stands up, she sometimes notices radiating pain into the left leg.  Squatting can also bring on the pain. When the radiating pain occurs, it extends along the left lateral thigh into the posterolateral left lower leg.   However, she is still able to regularly walk 1.5 to 2 miles.    Of note she does report trouble sleeping for a few nights after the steroid injection.    Since last time she was seen, she increased the gabapentin dosing/frequency to 600 mg in the morning, 300 mg in the afternoon, and 600 mg at bedtime.  She is curious about increasing the gabapentin to 600 mg TID, but overall, indicates she may want  "to decrease the number of medication she takes.    She did 3 sessions of physical therapy in December.  She says the PT was somewhat helpful, but did not provide a tremendous level of relief.            RECALL HISTORY OF PRESENT ILLNESS:  Willa Downey is a 63 year old female who presents with a chief complaint of right sided low back pain.     She was seen today in the clinic. She describes right-sided low back pain which began around 2020 without any known cause.  She says that at the time she was having some radicular pain into the right lower extremity.  However, she feels that the pain has generally improved over time.    She says that she had been taking gabapentin 3 times a day for pain, but says that she tapered down to twice a day, which she finds is working out okay.  She states that her pain level usually ranges from 0-2/10.  If she does more activity, in particular prolonged standing, she will have an increased degree of pain.  She is active - walks 2 miles regularly 4 to 5 days out of the week.  The walking does not seem to aggravate the pain.    Today, she describes pain & tightness affecting the right side low back extending into the lateral right hip.  There is no pain extending into the right lower extremity.  She notes that this morning she had a pulling sensation extending into her left leg, but says that she does not usually have pain in that area.    Aggravating factors include: prolonged standing, laying on right  Relieving factors include: stretching or medication     Today, she rates the pain 0/10.  At its worst over the last week the pain was 2/10.   Patient states sleep is not affected though she does feel sometimes uncomfortable laying on her right side.     She denies falls, weakness, bowel or bladder incontinence, saddle anesthesia, unintentional weight loss, fevers/chills, or night sweats.     Today, she says her goal is to refill the gabapentin, which she describes as a \"miracle " "medicine for me\".      8/6/2024:  Patient was last seen in clinic 2/9/2024. At that visit, the plan was to continue gabapentin 300 mg twice daily.    She was seen today in the clinic due to new distribution of pain.    At the prior visit, she described pain & tightness affecting the right side low back extending into the lateral right hip.    Currently, she says that she the nonradiating right-sided back pain has been well-controlled.  However, she has now developed some new radiating pain on the left side which began in April.      She describes:  -Left low back/gluteal pain radiating to the lateral left leg to the ankle.    She states this pain feels familiar to what she experienced on the right lower extremity in the past.    She denies lower extremity numbness or tingling.      Pain is aggravated with: Lying down, sitting, standing, twisting, being on the hands and knees or squatting while cleaning, vacuuming, getting up after sitting on her recliner couch, worse in the evening  Pain is relieved with: Walking, sitting upright or on a more firm chair    She is tolerating gabapentin 300 mg twice daily.    She follows with rheumatology in regards to psoriatic arthritis.    She denies falls, weakness, bowel or bladder incontinence, or saddle anesthesia.      9/11/2024:  Patient was last seen in clinic 8/6/2024. At that visit, the plan was to update the MRI of the lumbar spine and titrate gabapentin 300 mg from BID to TID.    At the last visit she described:  -Left low back/gluteal pain radiating to the lateral left leg to the ankle.   -prior history of right sided low back pain extending into the lateral right hip which had improved    Today, she endorses ongoing & worsening pain radiating from the left buttocks along the lateral side of the left leg and terminating at the ankle.  She states that last night she was having pain at the middle of her back also.    The pain tends to be worse at night or in the evening.  " "She notes shooting pain into the left leg in the evening.  Getting up and taking a few steps tends to help.  However, if she sits back down, the pain returns. She sometimes has a hard time falling back asleep at night.      She does have pain when she wakes up in the morning, but typically the pain is better during the day.  However, in the past few days, she notices the pain has been getting worse during the day too.  Despite that, she can still walk up to 2 miles without pain interfering.    Lumbar flexion or lumbar extension do not seem to affect pain levels.    She recalls that this morning when she got up the pain was 4-5/10.    She denies bowel or bladder incontinence, or saddle anesthesia.      Since last time she tried adding a midday dose of gabapentin but it made her too drowsy. So she is currently taking 300 mg of gabapentin twice daily.        10/14/2024:  Patient was last seen in clinic 9/17/2024 for L5-S1 ILESI  with Dr Velazquez.   She was seen today in the clinic for follow up and reports 80-85% improvement in the level of pain after the EMILY.   She reports the \"sharp, sciatic\" pain is milder. However she continues to have a dull pain in sacral area especially with walking. She enjoys walking 1.5 miles. She says it is hard to identify triggers for the back and leg pain.      After the last visit, lumbar flexion/extension X rays showed some dynamic motion with lumbar flexion at the L4-5 level; at this level there is also severe spinal canal stenosis. We had previously discussed spine surgery consultation given these findings and she had declined, but today states she would like to proceed with a spine surgery referral as she would like to know more about surgical options.      PRIOR INJURIES/TREATMENT:   Ice/Heat: no    Brace: no    Physical Therapy:   Recent PT December 2024  PT for low back pain 2020     - Current Pain Medications -   Gabapentin 600 mg in the morning, 300 mg in the afternoon, and " 600 mg at bedtime  Misti Luz  Celebrex     - Prior/Trialed Pain Medications -   Flexeril  Steroid burst    Prior Procedures:  Date    Procedure   Improvement (%)  4/1/2025 L5-S1 ILESI ([facility]) 85% x1 week, then 70% ongoing  9/17/2024  L5-S1 ILESI ([facility])  80-85%              Prior Related Surgery: none   Other (acupuncture, OMT, CMM, TENS, DME, etc.):     Specialists Seen - (with most recent, available notes and clinic visits reviewed)   1. Sports medicine - Dr Zhou, Dr Tesfaye  2.  Rheumatology-psoriatic arthritis  3. Ortho spine - Dr Caballero      IMAGING - reviewed   LUMBAR FLEX/EXT TWO TO THREE VIEWS September 12, 2024 IMPRESSION: Five lumbar type vertebrae. Grade 1 degenerative  anterolisthesis of L4 upon L5 again noted. Alignment otherwise normal.  Alignment does not change appreciably in the extended position. In  flexion, there is probable interval increase in anterolisthesis of L4  upon L5; alignment otherwise remains normal in flexion. Vertebral body  heights normal. Facet arthropathy at L4-L5 and L5-S1.         MR LUMBAR SPINE W/O CONTRAST 9/8/2024   Findings: There are 5 lumbar-type vertebrae assumed for the purposes  of this dictation.  The tip of the conus medullaris is at L1. Grade  1-2 anterolisthesis of L4, increased from previous. Mild loss of disc  height at L4-5.  Normal marrow signal.     On a level by level basis:     T12-L1: No spinal canal or neuroforaminal stenosis.     L1-2: No spinal canal or neuroforaminal stenosis.     L2-3: No spinal canal or neuroforaminal stenosis.     L3-4: No spinal canal or neuroforaminal stenosis.     L4-5: Grade 1-2 anterolisthesis and uncovering of the disc.  Superimposed central disc extrusion with slight cephalad migration.  Bilateral facet hypertrophy and ligamentum flavum thickening. Further  increased severe spinal canal stenosis. Moderate bilateral neural  foraminal stenosis.      L5-S1: Posterior disc bulge and bilateral facet hypertrophy.  Mild  spinal canal narrowing. Mild bilateral neural foraminal narrowing.     Paraspinous tissues are within normal limits.                                                 Impression:   1. Increased grade 1-2 anterolisthesis of L4 on L5, with continued  severe spinal canal stenosis and moderate bilateral neural foraminal  stenosis at this level. Portion of the uncovered disc appears slightly  decreased in size.  2. Additional levels appear similar to previous.      XR SACROILIAC JOINT 1/2 VIEWS  04/10/2024                                                                 IMPRESSION: Mild bilateral SI joint arthrosis, left greater than right. No erosive change to suggest an inflammatory arthropathy. Both hip joint spaces are maintained.         MR LUMBAR SPINE W/O CONTRAST 8/14/2020    Findings: There are 5 lumbar-type vertebrae assumed for the purposes  of this dictation.  The tip of the conus medullaris is at L1.  Grade 1  anterolisthesis of L4. Mild loss of disc height at L4-5.  Normal  marrow signal.     On a level by level basis:     T12-L1: No spinal canal or neuroforaminal stenosis.     L1-2: No spinal canal or neuroforaminal stenosis.     L2-3: No spinal canal or neuroforaminal stenosis.     L3-4: No spinal canal or neuroforaminal stenosis.     L4-5: Grade 1 anterolisthesis and uncovering of the disc. Superimposed  central disc extrusion with cephalad migration. Bilateral facet  hypertrophy and ligamentum flavum thickening. Severe spinal canal  stenosis. Moderate bilateral neural foraminal stenosis. Periarticular  edema surrounding the bilateral facet joints.     L5-S1: Posterior disc bulge and bilateral facet hypertrophy. Mild  spinal canal narrowing. Mild bilateral neural foraminal narrowing.     Paraspinous tissues are within normal limits.                                                    Impression:   1. Spondylosis, particularly at L4-5 where there is severe spinal  canal stenosis and moderate bilateral  neural foraminal stenosis.  2. Active inflammatory arthropathy of the bilateral L4-5 facet joints.      2 views lumbar spine radiographs 7/13/2020  Findings:     Standing  AP and lateral  views of the lumbar spine were obtained.     5  lumbar type vertebral bodies are assumed for the purpose of this  dictation.     There is no acute osseous abnormality.       Grade 1 spondylolisthesis of L4 on L5 measuring approximately 8 mm  with associated mild intervertebral disc space loss. Otherwise, Disc  height and vertebral body heights are maintained throughout.     The visualized bowel gas pattern is non-obstructive.                                                  Impression:  1.  No acute osseous abnormality.  2.  Grade 1 degenerative type spondylolisthesis of L4 on L5, measuring  approximately 8 mm.      Review Of Systems:  I am responding to those symptoms which are directly relevant to the specific indication for my consultation. I recommend that the patient follow up with their primary or referring provider to pursue any other symptoms which may be of concern.       Medical History:  She  has a past medical history of Bilateral low back pain with right-sided sciatica, unspecified chronicity (11/11/2020), Neuroforaminal stenosis of lumbar spine (11/11/2020), Nuclear senile cataract of both eyes (08/16/2018), Osteoarthritis (10/15/2020), Psoriatic arthropathy (H) (10/19/2020), and Serum calcium elevated (5/31/2023).     She  has a past surgical history that includes biopsy (07/30/2019); Eye surgery; and Colonoscopy (N/A, 7/14/2022).    Family History  Her family history includes Breast Cancer in her maternal aunt; Cataracts in her maternal grandmother; Hyperlipidemia in her mother; Hypertension in her mother; Osteoporosis in her mother; Stomach Cancer in her father.     Social History:  Work: stay at home mom  Current living situation: lives with family and her daughter, another daughter lives on her own. Performs  ADLs/IADLs independently.    She  reports that she has never smoked. She has never used smokeless tobacco. She reports that she does not drink alcohol and does not use drugs.        Current Medications:   She has a current medication list which includes the following prescription(s): apremilast, calcium carbonate-vit d-min, celecoxib, clobetasol, doxycycline monohydrate, gabapentin, loratadine, melatonin, multivitamin adults 50+, tacrolimus, tazarotene, and xeljanz xr, and the following Facility-Administered Medications: triamcinolone.     Allergies:    -- Adhesive Tape -- Blisters, Itching and Swelling   -- Dust Mites    -- Latex -- Blisters, Itching, Other (See                            Comments) and Swelling    --  Skin sensitivity   -- Morphine Sulfate (Concentrate) -- Itching    --  Other reaction(s): Chills             heart palpatations   -- Seasonal Allergies    -- Codeine -- Palpitations    --  chills        PHYSICAL EXAMINATION:   /84 (BP Location: Right arm, Patient Position: Sitting, Cuff Size: Adult Regular)   Pulse 75   LMP  (LMP Unknown)   SpO2 96%    --CONSTITUTIONAL: Vital signs as above. No acute distress. The patient is well nourished and well groomed.  --PSYCHIATRIC: The patient is awake, alert, oriented to person, place, time and answering questions appropriately with clear speech. Appropriate mood and affect   --SKIN:  Posterior torso is clean, dry, intact without rashes.  --RESPIRATORY: Normal rhythm and effort. No abnormal accessory muscle breathing patterns noted.   --GROSS MOTOR: Easily arises from a seated position.          ASSESSMENT:  Willa Downey is a pleasant 63 year old female who presents with   -Progressive, worsening left low back/gluteal pain radiating to the lateral left leg terminating at the ankle.   -prior history of right sided low back pain extending into the lateral right hip which has improved    MRI of the lumbar spine from 9/8/2024 shows:  -Grade 1-2  anterolisthesis of L4, increased from previous  -increased severe spinal canal stenosis at L4-5  -moderate bilat NFS at L4-5    Complicating comorbidities include:  # Psoriatic arthritis on Xeljanz, follows with rheumatology    PLAN:  -L5-S1 IL EMILY 4/1/2025 provided 85% improvement in the level of pain for 1 week, since then decreased to about 70% relief.  She would like to wait and see how the pain level is doing over the course of the coming weeks.  If the pain level is increasing, she is potentially interested in repeating an epidural steroid injection.  Could consider left L5-S1 and S1 TFESI if needed. (MBB/RFA could be considered in the future, but the left leg pain is significantly bothersome for her at this point)  -Currently she is tolerating gabapentin (600 mg in the morning, 300 mg in the afternoon, and 600 mg at bedtime).  If the pain level remains persistent she would like to consider increasing the gabapentin to 600 mg 3 times daily.  Big picture she would like to consider cutting out medications when possible.  Offered neurology pharmacist MTM consultation.  She prefers to reach out to rheumatology pharmacist to review her medications for now  - She recently completed PT in December.  Encourage ongoing home exercise program  - She did have a consultation with Dr Caballero 11/14/2024 and the plan is to continue conservative management for now  -RTC PRN         Ready to learn, no apparent learning barriers.  Education provided on treatment plan according to patient's preferred learning style.  Patient verbalizes understanding.   __________________________________  Eri Kuo NP  Physical Medicine & Rehabilitation        47 minutes spent by me on the date of the encounter doing chart review, history and exam, documentation, coordination with staff regarding injection and further activities per the note      Again, thank you for allowing me to participate in the care of your patient.       Sincerely,    TRACY Hunter CNP

## 2025-05-15 ENCOUNTER — TELEPHONE (OUTPATIENT)
Dept: PHYSICAL MEDICINE AND REHAB | Facility: CLINIC | Age: 65
End: 2025-05-15

## 2025-05-15 DIAGNOSIS — M54.16 LUMBAR RADICULOPATHY: Primary | ICD-10-CM

## 2025-05-15 DIAGNOSIS — M48.061 SPINAL STENOSIS OF LUMBAR REGION, UNSPECIFIED WHETHER NEUROGENIC CLAUDICATION PRESENT: ICD-10-CM

## 2025-05-15 DIAGNOSIS — M48.061 NEUROFORAMINAL STENOSIS OF LUMBAR SPINE: ICD-10-CM

## 2025-05-15 RX ORDER — CYCLOBENZAPRINE HCL 5 MG
5-10 TABLET ORAL 3 TIMES DAILY PRN
Qty: 30 TABLET | Refills: 0 | Status: SHIPPED | OUTPATIENT
Start: 2025-05-15

## 2025-05-15 NOTE — TELEPHONE ENCOUNTER
5/15/2025 3:02 pm:  Patient had to cancel her appointment today due to weather related issues.  I called to speak to her by phone due to uncontrolled pain.  She reports that since she was seen in the clinic, the efficacy of the epidural steroid injection has been slowly weaning.  She is having an ongoing midline low back pain radiating towards the left hip.  She also has left calf pain.  At this point the left thigh is spared.  The pain is severe when she bends forward.  In contrast, lumbar extension and twisting do not cause much pain for her.     To manage the pain she has been using gabapentin (600 mg in the morning, 300 mg in the afternoon, and 600 mg at bedtime). She was not able to tolerate 600 mg of gabapentin TID due to headache in the evening.    As prior, her preference is to maximize all conservative options.  She is not interested in surgery, in part because she is concerned she would have to pause her medication for psoriatic arthritis.    As such, her preference is to repeat an epidural steroid injection.  She is also interested in other medication options to manage pain.    PLAN:  -Add left L4-5 and L5-S1 TFESI with Dr. Velazquez, with plan to see clearance with her rheumatologist prior to the EMILY.   -Add cyclobenzaprine 5-10 milligrams up to 3 times daily.  Reviewed that she should avoid driving or activities requiring concentration while taking this medication and that it should not be combined with alcohol.    Eri Kuo NP  Physical Medicine and Rehabilitation, Medical Spine

## 2025-05-15 NOTE — TELEPHONE ENCOUNTER
Received message from Eri Kuo CNP that she is recommending Left L4-5, L5-S1 transforaminal epidural steroid injections for this patient.   Requesting we connect with her rheumatology team to ensure they are okay with her getting steroid injections.   Please advise. Thank you!     Once we hear back we will then contact patient to schedule.

## 2025-05-19 ENCOUNTER — TELEPHONE (OUTPATIENT)
Dept: PHYSICAL MEDICINE AND REHAB | Facility: CLINIC | Age: 65
End: 2025-05-19
Payer: COMMERCIAL

## 2025-05-19 NOTE — TELEPHONE ENCOUNTER
Procedure ordered? YES    What insurance are we billing for this procedure?  [employer]  IF SCHEDULING AT [facility] PAIN OR SPINE PLEASE SCHEDULE AT LEAST 7-10 BUSINESS DAYS OUT SO A PA CAN BE OBTAINED    Is a  PAIN  order available to link to injection appointment or does one need to be transcribed?  YES:     PAIN Transforaminal EMILY Inj Lumbosacral Two Levels Left      If needed, route to CN to assist in doing so.    Is  needed?: No   If YES, please initiate in-person  request.    Will patient have a ?  Yes    All fluoro procedures require a .  These US procedures also require a : piriformis, pudendal, scalene, & carpal tunnel.    Is patient taking any blood thinners (i.e. Plavix, coumadin, jantoven, warfarin, heparin, Xarelto, Pradaxa, Eliquis, Brilinta, or Effient, etc)? No   If YES, schedule out 2 weeks, and route to RN pool to determine if medication hold is needed.    Is patient taking aspirin? No   For CERVICAL or INTERLAMINAR procedures, route message to Care Navigation so they can seek approval from managing provider to hold aspirin for 6 days prior to the procedure.    Does patient have an allergy to contrast dye or iodine?  No  If YES, OK to schedule. Route to RN pool AND add allergy information to appointment notes    Is patient diabetic? No If YES, blood glucose level will be checked on day of procedure and will need to be 300mg/dL or below (for steroid injections).    Does patient have an active infection or treated for one within the past week? No   If YES, do NOT schedule and route to Care Navigation.     Is patient currently taking any antibiotics or have an active infection?  No  For patients on chronic, preventative, or prophylactic antibiotics, procedures may be scheduled.   For patients on antibiotics for active or recent infection: antibiotic course must have been completed and symptom-free of infection to safely proceed with injection.  Send to Care  Navigation if unsure.    Is patient actively being treated for cancer or immunocompromised? Yes - IMMUNOCOMPROMISED  If YES, do NOT schedule and route to RN pool.     Any chance of pregnancy? NO   If YES, do NOT schedule and route to RN pool.    Have you had any vaccines in the last 2 weeks? NO  If YES, please route to Care Navigation to discuss before scheduling.     Reminders:  -  If you are started on any steroids or antibiotics between now and your appointment, you must contact us because it may affect our ability to perform your procedure.   reviewed    -  Informed patient that it is OK to take normal medications before the procedure and must hold blood thinners as instructed.  reviewed    -  Patients scheduled for MBBs should not take pain meds prior to injection and pain rating needs to be 5/10 or greater the day of the procedure.    -  Informed cervical injection patients that an IV will be placed as a precautionary measure.  reviewed    -  Patients should eat a light meal prior to their procedure appointment.  reviewed    -  All radiofrequency ablations are in a 40 minute time slot and not to be scheduled until in-basket message has been sent by the procedure team that the PA has been  received.  reviewed     -  Spinal cord stimulator trial scheduling is coordinated by the procedure team.    -  Care Navigation (#[phone]) is available to assist patients with additional questions.  reviewed    -  Cervical TFESIs with Dr. Velazquez only.    *PLEASE FORWARD TO CARE NAVIGATION IF INDICATED.  PATIENT SHOULD BE INFORMED THAT THEY WILL BE CONTACTED BY CARE NAVIGATION ONLY IF CHANGES TO MEDICATION/CARE PLAN RECOMMENDATIONS ARE NEEDED.  IF THEY DO NOT HEAR BACK FROM CARE NAVIGATION, THEY ARE FINE TO CONTINUE WITH THEIR CURRENT MEDICATIONS/CARE PLAN.*

## 2025-05-19 NOTE — TELEPHONE ENCOUNTER
Clearance received. Scheduling, please call the patient to review injection questions and assist with scheduling. Thank you!

## 2025-05-19 NOTE — TELEPHONE ENCOUNTER
Per review of telephone encounter of 5/15/25, clearance had been received from rheumatology. Please call patient and schedule for the ordered injections.    Soolantra Counseling: I discussed with the patients the risks of topial Soolantra. This is a medicine which decreases the number of mites and inflammation in the skin. You experience burning, stinging, eye irritation or allergic reactions.  Please call our office if you develop any problems from using this medication.

## 2025-06-16 ENCOUNTER — RADIOLOGY INJECTION OFFICE VISIT (OUTPATIENT)
Dept: PHYSICAL MEDICINE AND REHAB | Facility: CLINIC | Age: 65
End: 2025-06-16
Attending: NURSE PRACTITIONER
Payer: COMMERCIAL

## 2025-06-16 VITALS
RESPIRATION RATE: 16 BRPM | DIASTOLIC BLOOD PRESSURE: 72 MMHG | SYSTOLIC BLOOD PRESSURE: 112 MMHG | TEMPERATURE: 97.8 F | OXYGEN SATURATION: 98 % | HEART RATE: 77 BPM

## 2025-06-16 DIAGNOSIS — M54.16 LUMBAR RADICULOPATHY: ICD-10-CM

## 2025-06-16 RX ORDER — DEXAMETHASONE SODIUM PHOSPHATE 10 MG/ML
INJECTION, SOLUTION INTRAMUSCULAR; INTRAVENOUS
Status: COMPLETED | OUTPATIENT
Start: 2025-06-16 | End: 2025-06-16

## 2025-06-16 RX ORDER — LIDOCAINE HYDROCHLORIDE 10 MG/ML
INJECTION, SOLUTION EPIDURAL; INFILTRATION; INTRACAUDAL; PERINEURAL
Status: COMPLETED | OUTPATIENT
Start: 2025-06-16 | End: 2025-06-16

## 2025-06-16 RX ADMIN — DEXAMETHASONE SODIUM PHOSPHATE 20 MG: 10 INJECTION, SOLUTION INTRAMUSCULAR; INTRAVENOUS at 15:29

## 2025-06-16 RX ADMIN — LIDOCAINE HYDROCHLORIDE 4 ML: 10 INJECTION, SOLUTION EPIDURAL; INFILTRATION; INTRACAUDAL; PERINEURAL at 15:29

## 2025-06-16 ASSESSMENT — PAIN SCALES - GENERAL
PAINLEVEL_OUTOF10: MODERATE PAIN (4)
PAINLEVEL_OUTOF10: SEVERE PAIN (7)

## 2025-06-16 NOTE — PATIENT INSTRUCTIONS
DISCHARGE INSTRUCTIONS    During office hours (8:00 a.m.- 4:00 p.m.) questions or concerns may be answered  by calling [facility] Nurses at  [phone].  Messages received after hours will be returned the following business day.      In the case of an emergency, please dial 911 or seek assistance at the nearest Emergency Room/Urgent Care facility.     All Patients:    You may experience an increase in your symptoms for the first 2 days (It may take anywhere between 2 days - 2 weeks for the steroid to have maximum effect).    You may use ice on the injection site, as frequently as 20 minutes each hour if needed.    You may take your pain medicine.    You may continue taking your regular medication after your injection. If you have had a medial branch block you may resume pain medication once your pain diary is completed.    You may shower. No swimming, tub bath, or hot tub for 48 hours.  You may remove your bandaid/bandage as soon as you are home.    You may resume light activities, as tolerated.    Resume your usual diet as tolerated.    If you were told to hold any blood thinning medications you may resume taking them 24 hours after your procedure as prescribed.    It is strongly advised that you do not drive for 1-3 hours post injection.    If you have had oral sedation:  Do not drive for 8 hours post injection.        POSSIBLE STEROID SIDE EFFECTS (If steroid/cortisone was used for your procedure    Swelling of the legs              Skin redness (flushing)     Mouth (oral) irritation   Increased blood sugar (glucose) levels            Sweats                    Mood changes  Headache  Sleeplessness  Weakened immune system for up to 14 days, which could increase the risk of romelia the COVID-19 virus and/or experiencing more severe symptoms of the disease, if exposed.  Decreased effectiveness of vaccines if given within 2 weeks of the steroid.    -If you experience these symptoms, it should  only last for a short period         POSSIBLE PROCEDURE SIDE EFFECTS    Increased Pain           Increased numbness/tingling      Nausea/Vomiting          Bruising/bleeding at site      Redness or swelling                                              Difficulty walking      Weakness           Fever greater than 100.5    -Call the [facility] if you are concerned    *In the event of a severe headache after an epidural steroid injection that is relieved by lying down, please call the [facility] to speak with a clinical staff member*

## 2025-06-17 ENCOUNTER — TELEPHONE (OUTPATIENT)
Dept: PHYSICAL MEDICINE AND REHAB | Facility: CLINIC | Age: 65
End: 2025-06-17
Payer: COMMERCIAL

## 2025-06-17 NOTE — TELEPHONE ENCOUNTER
"Caller Spoke with the pt regarding scheduling a 2 wk follow up with Diane Kuo per IB message. Pt stated that they were told they could just message the provider.    Pt declined to schedule at this time, stated \" they will schedule later\"        Meg Yancey on 6/17/2025 at 2:14 PM    "

## 2025-07-09 PROCEDURE — 88305 TISSUE EXAM BY PATHOLOGIST: CPT | Mod: TC | Performed by: DERMATOLOGY

## 2025-07-09 PROCEDURE — 88305 TISSUE EXAM BY PATHOLOGIST: CPT | Mod: 26 | Performed by: DERMATOLOGY

## 2025-07-11 DIAGNOSIS — L40.50 PSORIATIC ARTHRITIS (H): ICD-10-CM

## 2025-07-11 RX ORDER — TOFACITINIB 11 MG/1
TABLET, FILM COATED, EXTENDED RELEASE ORAL
Qty: 90 TABLET | Refills: 1 | Status: CANCELLED | OUTPATIENT
Start: 2025-07-11

## 2025-07-16 ENCOUNTER — VIRTUAL VISIT (OUTPATIENT)
Dept: PHARMACY | Facility: CLINIC | Age: 65
End: 2025-07-16
Attending: INTERNAL MEDICINE
Payer: COMMERCIAL

## 2025-07-16 DIAGNOSIS — L40.50 PSORIATIC ARTHRITIS (H): Primary | ICD-10-CM

## 2025-07-16 DIAGNOSIS — L40.9 PSORIASIS: ICD-10-CM

## 2025-07-16 NOTE — PROGRESS NOTES
Medication Therapy Management (MTM) Encounter    ASSESSMENT:                            Medication Adherence/Access: No issues identified.    Psoriatic Arthritis/Psoriasis: Patient tolerating and finding good efficacy to current medications. Will send in celecoxib refill for patient to use 100-200 mg daily as needed for joint pain. Would benefit from continuing to work with dermatology to find a well tolerated topical option for continued skin concerns on fingers. Discussed PA for roflumilast cream is in process and she will get notified if this gets approved through insurance.    PLAN:                            1. Continue all medications as prescribed. I will send in a new refill for Celebrex to take 1-2 capsules as needed for joint pain.    2. Dermatology is working on getting roflumilast cream covered for you. You will get a mychart once this is approved and able to be filled.    Follow-up: Return in about 6 months (around 1/16/2026) for MTM Pharmacist Visit.    SUBJECTIVE/OBJECTIVE:                          Willa Downey is a 64 year old female seen for a follow-up visit.       Reason for visit: Xeljanz and Otezla follow up    Allergies/ADRs: Reviewed in chart  Past Medical History: Reviewed in chart  Tobacco: She reports that she has never smoked. She has never used smokeless tobacco.  Alcohol: not currently using    Medication Adherence/Access: no issues reported.    Psoriatic Arthritis/Psoriasis:   Otezla 30 mg twice daily  Xeljanz XR 11 mg daily  Hydroxychloroquine 300 mg daily  Celebrex 100-200 mg daily as needed   Protopic 0.1% ointment twice daily as needed   Tazarotene 0.1% twice daily as needed (holding)  Clobetasol solution daily as needed     Reports she is feeling good overall. Still having bothersome peeling and redness on her fingertips. Just met with dermatology earlier this week - was told to hold tazarotene cream as it can be irritating. Did stop tazarotene cream on Tuesday and feels redness  is already improving, peeling is still present. Per chart review, dermatology also working on getting roflumilast cream covered under insurance. Having some joint pain still that most days is tolerable - has been needing one 100 mg celebrex a couple times a week. Notes this is much better than it was over the winter. Would like a refill as she only has 6 celebrex capsules left. No side effects noted.    Last lab monitoring completed: 1/29/25    Today's Vitals: LMP  (LMP Unknown)   ----------------    I spent 23 minutes with this patient today. All changes were made via collaborative practice agreement with Ira Guzman MD.     A summary of these recommendations was sent via [email or web address].    Иван KhanD  Medication Therapy Management Pharmacist  [facility]  Phone: [phone]    Telemedicine Visit Details  The patient's medications can be safely assessed via a telemedicine encounter.  Type of service:  Telephone visit  Originating Location (pt. Location): Home    Distant Location (provider location):  Off-site  Start Time: 11:00 AM  End Time: 11:23 AM     Medication Therapy Recommendations  No medication therapy recommendations to display

## 2025-07-17 RX ORDER — CELECOXIB 100 MG/1
CAPSULE ORAL
Qty: 60 CAPSULE | Refills: 1 | Status: SHIPPED | OUTPATIENT
Start: 2025-07-17

## 2025-07-17 NOTE — PATIENT INSTRUCTIONS
"Recommendations from today's MTM visit:                                                       1. Continue all medications as prescribed. I will send in a new refill for Celebrex to take 1-2 capsules as needed for joint pain.    2. Dermatology is working on getting roflumilast cream covered for you. You will get a mychart once this is approved and able to be filled.    Follow-up: Return in about 6 months (around 1/16/2026) for MTM Pharmacist Visit.    It was great speaking with you today.  I value your experience and would be very thankful for your time in providing feedback in our clinic survey. In the next few days, you may receive an email or text message from [facility] [employer] with a link to a survey related to your  clinical pharmacist.\"     To schedule another MTM appointment, please call the clinic directly or you may call the MTM scheduling line at [phone].    My Clinical Pharmacist's contact information:                                                      Please feel free to contact me with any questions or concerns you have.      Genesis Medina, PharmD  Medication Therapy Management Pharmacist  [facility]  Phone: [phone]     "

## 2025-07-23 ENCOUNTER — TRANSFERRED RECORDS (OUTPATIENT)
Dept: HEALTH INFORMATION MANAGEMENT | Facility: CLINIC | Age: 65
End: 2025-07-23
Payer: COMMERCIAL

## 2025-07-28 ENCOUNTER — LAB (OUTPATIENT)
Dept: LAB | Facility: CLINIC | Age: 65
End: 2025-07-28
Payer: COMMERCIAL

## 2025-07-28 ENCOUNTER — PATIENT OUTREACH (OUTPATIENT)
Dept: CARE COORDINATION | Facility: CLINIC | Age: 65
End: 2025-07-28

## 2025-07-28 DIAGNOSIS — L40.50 PSORIATIC ARTHROPATHY (H): ICD-10-CM

## 2025-07-28 LAB
ALT SERPL W P-5'-P-CCNC: 17 U/L (ref 0–50)
AST SERPL W P-5'-P-CCNC: 27 U/L (ref 0–45)
CREAT SERPL-MCNC: 0.6 MG/DL (ref 0.51–0.95)
CRP SERPL-MCNC: <3 MG/L
EGFRCR SERPLBLD CKD-EPI 2021: >90 ML/MIN/1.73M2
ERYTHROCYTE [DISTWIDTH] IN BLOOD BY AUTOMATED COUNT: 13 % (ref 10–15)
ERYTHROCYTE [SEDIMENTATION RATE] IN BLOOD BY WESTERGREN METHOD: 10 MM/HR (ref 0–30)
HCT VFR BLD AUTO: 37.6 % (ref 35–47)
HGB BLD-MCNC: 12.6 G/DL (ref 11.7–15.7)
MCH RBC QN AUTO: 29.7 PG (ref 26.5–33)
MCHC RBC AUTO-ENTMCNC: 33.5 G/DL (ref 31.5–36.5)
MCV RBC AUTO: 89 FL (ref 78–100)
PLATELET # BLD AUTO: 380 10E3/UL (ref 150–450)
RBC # BLD AUTO: 4.24 10E6/UL (ref 3.8–5.2)
WBC # BLD AUTO: 6.3 10E3/UL (ref 4–11)

## 2025-07-28 PROCEDURE — 82565 ASSAY OF CREATININE: CPT

## 2025-07-28 PROCEDURE — 85027 COMPLETE CBC AUTOMATED: CPT

## 2025-07-28 PROCEDURE — 85652 RBC SED RATE AUTOMATED: CPT

## 2025-07-28 PROCEDURE — 84460 ALANINE AMINO (ALT) (SGPT): CPT

## 2025-07-28 PROCEDURE — 86140 C-REACTIVE PROTEIN: CPT

## 2025-07-28 PROCEDURE — 36415 COLL VENOUS BLD VENIPUNCTURE: CPT

## 2025-07-28 PROCEDURE — 84450 TRANSFERASE (AST) (SGOT): CPT

## 2025-07-31 SDOH — HEALTH STABILITY: PHYSICAL HEALTH: ON AVERAGE, HOW MANY DAYS PER WEEK DO YOU ENGAGE IN MODERATE TO STRENUOUS EXERCISE (LIKE A BRISK WALK)?: 3 DAYS

## 2025-07-31 SDOH — HEALTH STABILITY: PHYSICAL HEALTH: ON AVERAGE, HOW MANY MINUTES DO YOU ENGAGE IN EXERCISE AT THIS LEVEL?: 60 MIN

## 2025-07-31 ASSESSMENT — SOCIAL DETERMINANTS OF HEALTH (SDOH): HOW OFTEN DO YOU GET TOGETHER WITH FRIENDS OR RELATIVES?: ONCE A WEEK

## 2025-08-02 SDOH — HEALTH STABILITY: PHYSICAL HEALTH: ON AVERAGE, HOW MANY DAYS PER WEEK DO YOU ENGAGE IN MODERATE TO STRENUOUS EXERCISE (LIKE A BRISK WALK)?: 3 DAYS

## 2025-08-02 SDOH — HEALTH STABILITY: PHYSICAL HEALTH: ON AVERAGE, HOW MANY MINUTES DO YOU ENGAGE IN EXERCISE AT THIS LEVEL?: 60 MIN

## 2025-08-05 ENCOUNTER — OFFICE VISIT (OUTPATIENT)
Dept: FAMILY MEDICINE | Facility: CLINIC | Age: 65
End: 2025-08-05
Payer: COMMERCIAL

## 2025-08-05 VITALS
DIASTOLIC BLOOD PRESSURE: 71 MMHG | SYSTOLIC BLOOD PRESSURE: 104 MMHG | BODY MASS INDEX: 23.9 KG/M2 | RESPIRATION RATE: 16 BRPM | HEART RATE: 69 BPM | WEIGHT: 140 LBS | HEIGHT: 64 IN | TEMPERATURE: 97.5 F | OXYGEN SATURATION: 99 %

## 2025-08-05 DIAGNOSIS — Z00.00 HEALTH CARE MAINTENANCE: ICD-10-CM

## 2025-08-05 DIAGNOSIS — M48.061 SPINAL STENOSIS OF LUMBAR REGION, UNSPECIFIED WHETHER NEUROGENIC CLAUDICATION PRESENT: ICD-10-CM

## 2025-08-05 DIAGNOSIS — H40.1233 LOW-TENSION GLAUCOMA OF BOTH EYES, SEVERE STAGE: ICD-10-CM

## 2025-08-05 DIAGNOSIS — Z13.820 SCREENING FOR OSTEOPOROSIS: ICD-10-CM

## 2025-08-05 DIAGNOSIS — Z87.898 HISTORY OF SNORING: ICD-10-CM

## 2025-08-05 DIAGNOSIS — G47.9 SLEEP DISORDER: ICD-10-CM

## 2025-08-05 DIAGNOSIS — Z80.3 FAMILY HISTORY OF MALIGNANT NEOPLASM OF BREAST: ICD-10-CM

## 2025-08-05 DIAGNOSIS — L60.0 INGROWN NAIL: ICD-10-CM

## 2025-08-05 DIAGNOSIS — L40.50 PSORIATIC ARTHROPATHY (H): ICD-10-CM

## 2025-08-05 DIAGNOSIS — D84.9 IMMUNOSUPPRESSION: ICD-10-CM

## 2025-08-05 DIAGNOSIS — Z00.00 ROUTINE GENERAL MEDICAL EXAMINATION AT A HEALTH CARE FACILITY: Primary | ICD-10-CM

## 2025-08-05 DIAGNOSIS — K57.30 DIVERTICULOSIS OF LARGE INTESTINE WITHOUT HEMORRHAGE: ICD-10-CM

## 2025-08-05 DIAGNOSIS — E78.5 HYPERLIPIDEMIA, UNSPECIFIED HYPERLIPIDEMIA TYPE: ICD-10-CM

## 2025-08-05 DIAGNOSIS — Z82.62 FAMILY HISTORY OF OSTEOPOROSIS: ICD-10-CM

## 2025-08-05 DIAGNOSIS — L40.9 PSORIASIS OF NAIL: ICD-10-CM

## 2025-08-05 DIAGNOSIS — D22.9 MULTIPLE PIGMENTED NEVI: ICD-10-CM

## 2025-08-05 DIAGNOSIS — Z12.4 SCREENING FOR MALIGNANT NEOPLASM OF CERVIX: ICD-10-CM

## 2025-08-05 DIAGNOSIS — Z23 NEED FOR COVID-19 VACCINE: ICD-10-CM

## 2025-08-05 DIAGNOSIS — R73.03 PREDIABETES: ICD-10-CM

## 2025-08-05 DIAGNOSIS — Z71.89 ADVANCED DIRECTIVES, COUNSELING/DISCUSSION: ICD-10-CM

## 2025-08-05 DIAGNOSIS — H52.10 MYOPIA, UNSPECIFIED LATERALITY: ICD-10-CM

## 2025-08-05 DIAGNOSIS — M43.16 ANTEROLISTHESIS OF LUMBAR SPINE: ICD-10-CM

## 2025-08-05 LAB
ALBUMIN SERPL BCG-MCNC: 4.6 G/DL (ref 3.5–5.2)
ALP SERPL-CCNC: 56 U/L (ref 40–150)
ALT SERPL W P-5'-P-CCNC: 15 U/L (ref 0–50)
ANION GAP SERPL CALCULATED.3IONS-SCNC: 10 MMOL/L (ref 7–15)
AST SERPL W P-5'-P-CCNC: 24 U/L (ref 0–45)
BILIRUB SERPL-MCNC: 0.4 MG/DL
BUN SERPL-MCNC: 15.8 MG/DL (ref 8–23)
CALCIUM SERPL-MCNC: 9.9 MG/DL (ref 8.8–10.4)
CHLORIDE SERPL-SCNC: 106 MMOL/L (ref 98–107)
CHOLEST SERPL-MCNC: 261 MG/DL
CREAT SERPL-MCNC: 0.6 MG/DL (ref 0.51–0.95)
EGFRCR SERPLBLD CKD-EPI 2021: >90 ML/MIN/1.73M2
EST. AVERAGE GLUCOSE BLD GHB EST-MCNC: 114 MG/DL
FASTING STATUS PATIENT QL REPORTED: NO
FASTING STATUS PATIENT QL REPORTED: NO
GLUCOSE SERPL-MCNC: 96 MG/DL (ref 70–99)
HBA1C MFR BLD: 5.6 % (ref 0–5.6)
HCO3 SERPL-SCNC: 24 MMOL/L (ref 22–29)
HDLC SERPL-MCNC: 63 MG/DL
LDLC SERPL CALC-MCNC: 151 MG/DL
NONHDLC SERPL-MCNC: 198 MG/DL
POTASSIUM SERPL-SCNC: 4 MMOL/L (ref 3.4–5.3)
PROT SERPL-MCNC: 7.2 G/DL (ref 6.4–8.3)
SODIUM SERPL-SCNC: 140 MMOL/L (ref 135–145)
TRIGL SERPL-MCNC: 236 MG/DL
TSH SERPL DL<=0.005 MIU/L-ACNC: 0.84 UIU/ML (ref 0.3–4.2)

## 2025-08-05 PROCEDURE — 83036 HEMOGLOBIN GLYCOSYLATED A1C: CPT | Performed by: FAMILY MEDICINE

## 2025-08-05 PROCEDURE — 36415 COLL VENOUS BLD VENIPUNCTURE: CPT | Performed by: FAMILY MEDICINE

## 2025-08-05 PROCEDURE — 87624 HPV HI-RISK TYP POOLED RSLT: CPT | Performed by: FAMILY MEDICINE

## 2025-08-05 PROCEDURE — 3078F DIAST BP <80 MM HG: CPT | Performed by: FAMILY MEDICINE

## 2025-08-05 PROCEDURE — 99396 PREV VISIT EST AGE 40-64: CPT | Performed by: FAMILY MEDICINE

## 2025-08-05 PROCEDURE — G0123 SCREEN CERV/VAG THIN LAYER: HCPCS | Performed by: FAMILY MEDICINE

## 2025-08-05 PROCEDURE — G2211 COMPLEX E/M VISIT ADD ON: HCPCS | Performed by: FAMILY MEDICINE

## 2025-08-05 PROCEDURE — 3050F LDL-C >= 130 MG/DL: CPT | Performed by: FAMILY MEDICINE

## 2025-08-05 PROCEDURE — 99213 OFFICE O/P EST LOW 20 MIN: CPT | Mod: 25 | Performed by: FAMILY MEDICINE

## 2025-08-05 PROCEDURE — 3044F HG A1C LEVEL LT 7.0%: CPT | Performed by: FAMILY MEDICINE

## 2025-08-05 PROCEDURE — 84443 ASSAY THYROID STIM HORMONE: CPT | Performed by: FAMILY MEDICINE

## 2025-08-05 PROCEDURE — 3074F SYST BP LT 130 MM HG: CPT | Performed by: FAMILY MEDICINE

## 2025-08-05 PROCEDURE — 80061 LIPID PANEL: CPT | Performed by: FAMILY MEDICINE

## 2025-08-05 PROCEDURE — 80053 COMPREHEN METABOLIC PANEL: CPT | Performed by: FAMILY MEDICINE

## 2025-08-06 ENCOUNTER — MYC MEDICAL ADVICE (OUTPATIENT)
Dept: ORTHOPEDICS | Facility: CLINIC | Age: 65
End: 2025-08-06
Payer: COMMERCIAL

## 2025-08-06 DIAGNOSIS — M43.16 ANTEROLISTHESIS OF LUMBAR SPINE: Primary | ICD-10-CM

## 2025-08-07 ENCOUNTER — OFFICE VISIT (OUTPATIENT)
Dept: ORTHOPEDICS | Facility: CLINIC | Age: 65
End: 2025-08-07
Payer: COMMERCIAL

## 2025-08-07 ENCOUNTER — PRE VISIT (OUTPATIENT)
Dept: ORTHOPEDICS | Facility: CLINIC | Age: 65
End: 2025-08-07

## 2025-08-07 DIAGNOSIS — M48.061 SPINAL STENOSIS OF LUMBAR REGION, UNSPECIFIED WHETHER NEUROGENIC CLAUDICATION PRESENT: ICD-10-CM

## 2025-08-07 DIAGNOSIS — M43.16 ANTEROLISTHESIS OF LUMBAR SPINE: Primary | ICD-10-CM

## 2025-08-07 DIAGNOSIS — M54.16 LUMBAR RADICULOPATHY: ICD-10-CM

## 2025-08-07 LAB
HPV HR 12 DNA CVX QL NAA+PROBE: NEGATIVE
HPV16 DNA CVX QL NAA+PROBE: NEGATIVE
HPV18 DNA CVX QL NAA+PROBE: NEGATIVE
HUMAN PAPILLOMA VIRUS FINAL DIAGNOSIS: NORMAL

## 2025-08-07 PROCEDURE — 99215 OFFICE O/P EST HI 40 MIN: CPT | Mod: GC | Performed by: ORTHOPAEDIC SURGERY

## 2025-08-12 ENCOUNTER — TELEPHONE (OUTPATIENT)
Dept: ORTHOPEDICS | Facility: CLINIC | Age: 65
End: 2025-08-12
Payer: COMMERCIAL

## 2025-08-12 LAB
BKR AP ASSOCIATED HPV REPORT: NORMAL
BKR LAB AP GYN ADEQUACY: NORMAL
BKR LAB AP GYN INTERPRETATION: NORMAL
BKR LAB AP PREVIOUS ABNORMAL: NORMAL
PATH REPORT.COMMENTS IMP SPEC: NORMAL
PATH REPORT.COMMENTS IMP SPEC: NORMAL
PATH REPORT.RELEVANT HX SPEC: NORMAL

## 2025-08-16 SDOH — HEALTH STABILITY: PHYSICAL HEALTH: ON AVERAGE, HOW MANY DAYS PER WEEK DO YOU ENGAGE IN MODERATE TO STRENUOUS EXERCISE (LIKE A BRISK WALK)?: 2 DAYS

## 2025-08-16 SDOH — HEALTH STABILITY: PHYSICAL HEALTH: ON AVERAGE, HOW MANY MINUTES DO YOU ENGAGE IN EXERCISE AT THIS LEVEL?: 40 MIN

## 2025-08-18 ENCOUNTER — MYC MEDICAL ADVICE (OUTPATIENT)
Dept: PHYSICAL MEDICINE AND REHAB | Facility: CLINIC | Age: 65
End: 2025-08-18
Payer: COMMERCIAL

## 2025-08-18 DIAGNOSIS — M54.16 LUMBAR RADICULOPATHY: ICD-10-CM

## 2025-08-18 DIAGNOSIS — M48.061 SPINAL STENOSIS OF LUMBAR REGION, UNSPECIFIED WHETHER NEUROGENIC CLAUDICATION PRESENT: ICD-10-CM

## 2025-08-19 ENCOUNTER — OFFICE VISIT (OUTPATIENT)
Dept: ORTHOPEDICS | Facility: CLINIC | Age: 65
End: 2025-08-19
Attending: STUDENT IN AN ORGANIZED HEALTH CARE EDUCATION/TRAINING PROGRAM
Payer: COMMERCIAL

## 2025-08-19 DIAGNOSIS — L40.50 PSORIATIC ARTHROPATHY (H): ICD-10-CM

## 2025-08-19 DIAGNOSIS — M18.12 ARTHRITIS OF CARPOMETACARPAL (CMC) JOINT OF LEFT THUMB: Primary | ICD-10-CM

## 2025-08-19 PROCEDURE — 99203 OFFICE O/P NEW LOW 30 MIN: CPT | Performed by: STUDENT IN AN ORGANIZED HEALTH CARE EDUCATION/TRAINING PROGRAM

## 2025-08-19 RX ORDER — GABAPENTIN 300 MG/1
600 CAPSULE ORAL 3 TIMES DAILY
Qty: 180 CAPSULE | Refills: 3 | Status: SHIPPED | OUTPATIENT
Start: 2025-08-19

## 2025-08-20 ENCOUNTER — THERAPY VISIT (OUTPATIENT)
Dept: OCCUPATIONAL THERAPY | Facility: CLINIC | Age: 65
End: 2025-08-20
Payer: COMMERCIAL

## 2025-08-20 DIAGNOSIS — L40.50 PSORIATIC ARTHROPATHY (H): ICD-10-CM

## 2025-08-20 DIAGNOSIS — M18.12 PRIMARY OSTEOARTHRITIS OF FIRST CARPOMETACARPAL JOINT OF LEFT HAND: Primary | ICD-10-CM

## 2025-08-20 PROCEDURE — 97760 ORTHOTIC MGMT&TRAING 1ST ENC: CPT | Mod: GO | Performed by: OCCUPATIONAL THERAPIST

## 2025-08-20 PROCEDURE — 97530 THERAPEUTIC ACTIVITIES: CPT | Mod: GO | Performed by: OCCUPATIONAL THERAPIST

## 2025-08-20 PROCEDURE — 97165 OT EVAL LOW COMPLEX 30 MIN: CPT | Mod: GO | Performed by: OCCUPATIONAL THERAPIST

## 2025-08-20 PROCEDURE — 97110 THERAPEUTIC EXERCISES: CPT | Mod: GO | Performed by: OCCUPATIONAL THERAPIST

## 2025-08-26 ENCOUNTER — ANCILLARY PROCEDURE (OUTPATIENT)
Dept: MRI IMAGING | Facility: CLINIC | Age: 65
End: 2025-08-26
Attending: ORTHOPAEDIC SURGERY
Payer: COMMERCIAL

## 2025-08-26 ENCOUNTER — ANCILLARY PROCEDURE (OUTPATIENT)
Dept: CT IMAGING | Facility: CLINIC | Age: 65
End: 2025-08-26
Attending: ORTHOPAEDIC SURGERY
Payer: COMMERCIAL

## 2025-08-26 DIAGNOSIS — M43.16 ANTEROLISTHESIS OF LUMBAR SPINE: ICD-10-CM

## 2025-08-26 DIAGNOSIS — M54.16 LUMBAR RADICULOPATHY: ICD-10-CM

## 2025-08-26 DIAGNOSIS — M48.061 SPINAL STENOSIS OF LUMBAR REGION, UNSPECIFIED WHETHER NEUROGENIC CLAUDICATION PRESENT: ICD-10-CM

## 2025-08-26 PROCEDURE — 72131 CT LUMBAR SPINE W/O DYE: CPT | Mod: GC | Performed by: STUDENT IN AN ORGANIZED HEALTH CARE EDUCATION/TRAINING PROGRAM

## 2025-09-02 ENCOUNTER — VIRTUAL VISIT (OUTPATIENT)
Dept: ORTHOPEDICS | Facility: CLINIC | Age: 65
End: 2025-09-02
Payer: COMMERCIAL

## 2025-09-02 VITALS
WEIGHT: 134 LBS | HEIGHT: 64 IN | DIASTOLIC BLOOD PRESSURE: 75 MMHG | BODY MASS INDEX: 22.88 KG/M2 | SYSTOLIC BLOOD PRESSURE: 111 MMHG

## 2025-09-02 DIAGNOSIS — M43.16 ANTEROLISTHESIS OF LUMBAR SPINE: Primary | ICD-10-CM

## 2025-09-02 PROCEDURE — 1125F AMNT PAIN NOTED PAIN PRSNT: CPT | Performed by: ORTHOPAEDIC SURGERY

## 2025-09-02 PROCEDURE — 98005 SYNCH AUDIO-VIDEO EST LOW 20: CPT | Performed by: ORTHOPAEDIC SURGERY

## 2025-09-02 PROCEDURE — 3078F DIAST BP <80 MM HG: CPT | Performed by: ORTHOPAEDIC SURGERY

## 2025-09-02 PROCEDURE — 3074F SYST BP LT 130 MM HG: CPT | Performed by: ORTHOPAEDIC SURGERY

## 2025-09-02 ASSESSMENT — PAIN SCALES - GENERAL: PAINLEVEL_OUTOF10: MODERATE PAIN (6)

## (undated) DEVICE — SOL WATER IRRIG 500ML BOTTLE 2F7113

## (undated) DEVICE — TUBING SUCTION 12"X1/4" N612

## (undated) DEVICE — SUCTION MANIFOLD NEPTUNE 2 SYS 1 PORT 702-025-000

## (undated) DEVICE — SNARE CAPIVATOR ROUND COLD SNR BX10 M00561101

## (undated) DEVICE — GOWN IMPERVIOUS 2XL BLUE

## (undated) DEVICE — SPECIMEN CONTAINER 3OZ W/FORMALIN 59901

## (undated) DEVICE — KIT ENDO TURNOVER/PROCEDURE CARRY-ON 101822

## (undated) RX ORDER — LIDOCAINE 40 MG/G
CREAM TOPICAL
Status: DISPENSED
Start: 2023-12-06

## (undated) RX ORDER — TRIAMCINOLONE ACETONIDE 40 MG/ML
INJECTION, SUSPENSION INTRA-ARTICULAR; INTRAMUSCULAR
Status: DISPENSED
Start: 2023-12-06